# Patient Record
Sex: FEMALE | Race: WHITE | Employment: OTHER | URBAN - METROPOLITAN AREA
[De-identification: names, ages, dates, MRNs, and addresses within clinical notes are randomized per-mention and may not be internally consistent; named-entity substitution may affect disease eponyms.]

---

## 2017-01-02 DIAGNOSIS — I70.1 ATHEROSCLEROSIS OF RENAL ARTERY (H): Primary | ICD-10-CM

## 2017-01-05 DIAGNOSIS — I70.1 ATHEROSCLEROSIS OF RENAL ARTERY (H): Primary | ICD-10-CM

## 2017-01-05 RX ORDER — PREDNISONE 50 MG/1
50 TABLET ORAL ONCE
Qty: 1 TABLET | Refills: 0 | Status: SHIPPED | OUTPATIENT
Start: 2017-01-05 | End: 2017-01-05

## 2017-01-05 RX ORDER — DIPHENHYDRAMINE HCL 25 MG
50 TABLET ORAL SEE ADMIN INSTRUCTIONS
Qty: 2 TABLET | Refills: 0 | Status: SHIPPED
Start: 2017-01-05 | End: 2017-06-15

## 2017-01-05 NOTE — Clinical Note
January 5, 2017       TO: Jennifer Alvarez  [address]   [place] [address]         Dear Jennifer,    This is a notification with details reviewing your upcoming CT scan (Abd/Pelvis with contrast).    Procedure:  CT scan Abdomen Pelvis  When:   January 17, 2017 at 1:00pm .  Please arrive at 12:45 for check-in  Where:  [facility]:  [address] [place] [address]  Phone:  [phone]    Important information to note in preparation to your imaging.  1.  Do Not Eat or Drink 2 hours prior to your test.  2.  Please take benadryl 50 mg and prednisone 50 mg by mouth the evening before your scheduled imaging.  (these medications were sent to your requested pharmacy)  3.  Please take benadryl 50 mg by mouth the morning of the CT scan.      If you have any additional questions, please call your cardiology nurse.    Thank you.          Sincerely,      Office of Dr. Mana WILLS [facility]  Cardiology.

## 2017-01-05 NOTE — PROGRESS NOTES
"Order placed for Abd/Pelvis CT scan with contrast.  Order specifying specific instructions:  \"schedule contrast enhanced cross sectional CT of kidneys.  Given the multitude of antihypertensive agents, I would have a low threshold to proceed with renal angiography and stenting\" per Dr. Adair.    Patient notified of the need for additional imaging.  Patient verbalized the understanding for needed contrast.  She stated that her reaction to dye is a rash, swelling and trouble breathing.  She stated that she received pre-medication prior to the contrast dye in the past, and this worked well as she did not experience any of the noted symptoms after treatment.      She was given instruction for benadryl and prednisone to be taken the night before the CT scan.  She was instructed to take benadryl the morning of the scan as well.  Patient verbalized understanding and repeated back pre-medication dosing.      A letter was sent to patient with above information as well, per patient request.      Encouraged patient to call with any questions or concerns.                Jaleel Adair MD Trulen, Kimberly M, RN                   Petty,       I would recommend Bendadryl 50 mg po the evening before the procedure and 50 mg po on the morning of the procedure.   I would also recommend Prednisone 50 mg po the evening before and the morning of the procedure.     If the patient had anaphylaxis (as opposed to mild reaction) with prior exposure, please let me know.     Jaleel Adair MD                 Notes Recorded by Jaleel Adair MD on 1/1/2017 at 6:54 AM  Ya,    See report.  Please speak with CT and schedule the CT exactly as they requested.  They need to know we are looking for thin cuts through the renal arteries.    Jaleel Adair MD      ADDENDUM (1/1/16): We will schedule contrast enhanced cross sectional CT of kidneys.  Given the multitude of antihypertensive agents, I would have a low threshold to proceed with renal " angiography and stenting.

## 2017-01-10 ENCOUNTER — CARE COORDINATION (OUTPATIENT)
Dept: CARDIOLOGY | Facility: CLINIC | Age: 77
End: 2017-01-10

## 2017-01-17 ENCOUNTER — HOSPITAL ENCOUNTER (OUTPATIENT)
Dept: CT IMAGING | Facility: CLINIC | Age: 77
Discharge: HOME | End: 2017-01-17
Attending: INTERNAL MEDICINE | Admitting: INTERNAL MEDICINE
Payer: MEDICARE

## 2017-01-17 DIAGNOSIS — I70.1 ATHEROSCLEROSIS OF RENAL ARTERY (H): ICD-10-CM

## 2017-01-17 LAB
CREAT BLD-MCNC: 1 MG/DL (ref 0.52–1.04)
GFR SERPL CREATININE-BSD FRML MDRD: 54 ML/MIN/1.7M2

## 2017-01-17 PROCEDURE — 82565 ASSAY OF CREATININE: CPT

## 2017-01-17 PROCEDURE — 25000125 ZZHC RX 250: Performed by: RADIOLOGY

## 2017-01-17 PROCEDURE — 25500064 ZZH RX 255 OP 636: Performed by: RADIOLOGY

## 2017-01-17 PROCEDURE — 74178 CT ABD&PLV WO CNTR FLWD CNTR: CPT

## 2017-01-17 RX ORDER — IOPAMIDOL 755 MG/ML
75 INJECTION, SOLUTION INTRAVASCULAR ONCE
Status: COMPLETED | OUTPATIENT
Start: 2017-01-17 | End: 2017-01-17

## 2017-01-17 RX ADMIN — SODIUM CHLORIDE, PRESERVATIVE FREE 100 ML: 5 INJECTION INTRAVENOUS at 12:38

## 2017-01-17 RX ADMIN — IOPAMIDOL 75 ML: 755 INJECTION, SOLUTION INTRAVENOUS at 12:38

## 2017-01-19 ENCOUNTER — INFUSION THERAPY VISIT (OUTPATIENT)
Dept: INFUSION THERAPY | Facility: CLINIC | Age: 77
End: 2017-01-19
Attending: INTERNAL MEDICINE
Payer: MEDICARE

## 2017-01-19 VITALS
TEMPERATURE: 98 F | RESPIRATION RATE: 16 BRPM | DIASTOLIC BLOOD PRESSURE: 57 MMHG | HEART RATE: 67 BPM | OXYGEN SATURATION: 98 % | WEIGHT: 91.2 LBS | SYSTOLIC BLOOD PRESSURE: 119 MMHG | BODY MASS INDEX: 16.68 KG/M2

## 2017-01-19 DIAGNOSIS — D80.2 IGA DEFICIENCY (H): Primary | ICD-10-CM

## 2017-01-19 DIAGNOSIS — D80.1 HYPOGAMMAGLOBULINEMIA (H): ICD-10-CM

## 2017-01-19 PROCEDURE — 25000130 H RX MED GY IP 250 OP 259 PS 637: Mod: ZF | Performed by: INTERNAL MEDICINE

## 2017-01-19 PROCEDURE — 96367 TX/PROPH/DG ADDL SEQ IV INF: CPT

## 2017-01-19 PROCEDURE — 25000125 ZZHC RX 250: Mod: ZF | Performed by: INTERNAL MEDICINE

## 2017-01-19 PROCEDURE — 96365 THER/PROPH/DIAG IV INF INIT: CPT | Mod: ZF

## 2017-01-19 PROCEDURE — 96375 TX/PRO/DX INJ NEW DRUG ADDON: CPT | Mod: ZF

## 2017-01-19 PROCEDURE — 25000128 H RX IP 250 OP 636: Mod: ZF | Performed by: INTERNAL MEDICINE

## 2017-01-19 PROCEDURE — 25000132 ZZH RX MED GY IP 250 OP 250 PS 637: Mod: ZF | Performed by: INTERNAL MEDICINE

## 2017-01-19 PROCEDURE — 96366 THER/PROPH/DIAG IV INF ADDON: CPT | Mod: ZF

## 2017-01-19 PROCEDURE — A9270 NON-COVERED ITEM OR SERVICE: HCPCS | Mod: ZF | Performed by: INTERNAL MEDICINE

## 2017-01-19 PROCEDURE — 25000128 H RX IP 250 OP 636: Mod: ZF

## 2017-01-19 RX ORDER — DIPHENHYDRAMINE HCL 25 MG
25 CAPSULE ORAL ONCE
Status: COMPLETED | OUTPATIENT
Start: 2017-01-19 | End: 2017-01-19

## 2017-01-19 RX ORDER — ACETAMINOPHEN 325 MG/1
650 TABLET ORAL ONCE
Status: COMPLETED | OUTPATIENT
Start: 2017-01-19 | End: 2017-01-19

## 2017-01-19 RX ORDER — MEPERIDINE HYDROCHLORIDE 25 MG/ML
25 INJECTION INTRAMUSCULAR; INTRAVENOUS; SUBCUTANEOUS
Status: DISCONTINUED | OUTPATIENT
Start: 2017-01-19 | End: 2017-01-19 | Stop reason: HOSPADM

## 2017-01-19 RX ADMIN — ACETAMINOPHEN 650 MG: 325 TABLET ORAL at 13:08

## 2017-01-19 RX ADMIN — MEPERIDINE HYDROCHLORIDE 25 MG: 25 INJECTION, SOLUTION INTRAMUSCULAR; INTRAVENOUS; SUBCUTANEOUS at 13:31

## 2017-01-19 RX ADMIN — IMMUNE GLOBULIN INFUSION (HUMAN) 25 G: 100 INJECTION, SOLUTION INTRAVENOUS; SUBCUTANEOUS at 13:44

## 2017-01-19 RX ADMIN — HYDROCORTISONE SODIUM SUCCINATE 100 MG: 100 INJECTION, POWDER, LYOPHILIZED, FOR SOLUTION INTRAMUSCULAR; INTRAVENOUS at 13:22

## 2017-01-19 RX ADMIN — DIPHENHYDRAMINE HYDROCHLORIDE 25 MG: 25 CAPSULE ORAL at 13:08

## 2017-01-19 RX ADMIN — DEXTROSE MONOHYDRATE 50 ML: 50 INJECTION, SOLUTION INTRAVENOUS at 13:44

## 2017-01-19 NOTE — PATIENT INSTRUCTIONS
Dear Jennifer Alvarez    Thank you for choosing [facility] ([facility]) for your infusion.  The following information is a summary of our appointment as well as important reminders.      Please refer to your hospital discharge instructions for details on home care services, future appointments, phone numbers, and diet/activity levels.    Additional information: Your infusion of gammagard (immune globulin) 25 grams, solu-cortef 100 mg, demerol 25 mg, tylenol 650 mg, and benadryl 25 mg.      We look forward in seeing you on your next appointment here at [facility].  Please don t hesitate to call us at [phone] to reschedule any of your appointments or to speak with one of the [facility] registered nurses.  It was a pleasure taking care of you today.    Sincerely,  Suzette Norris, RN  [facility]  [facility]  [address] [place] [address]  [address]  Phone:  [phone]

## 2017-01-19 NOTE — MR AVS SNAPSHOT
After Visit Summary   1/19/2017    Jennifer Alvarez    MRN: [id number]           Patient Information     Date Of Birth          1940        Visit Information        Provider Department      1/19/2017 1:00 PM UC 43 ATC; UC SPEC INFUSION [facility] Specialty [facility] Procedure        Today's Diagnoses     IgA deficiency (H)    -  1     Hypogammaglobulinemia (H)           Care Instructions    Dear Jennifer Alvarez    Thank you for choosing [facility] ([facility]) for your infusion.  The following information is a summary of our appointment as well as important reminders.      Please refer to your hospital discharge instructions for details on home care services, future appointments, phone numbers, and diet/activity levels.    Additional information: Your infusion of gammagard (immune globulin) 25 grams, solu-cortef 100 mg, demerol 25 mg, tylenol 650 mg, and benadryl 25 mg.      We look forward in seeing you on your next appointment here at [facility].  Please don t hesitate to call us at [phone] to reschedule any of your appointments or to speak with one of the [facility] registered nurses.  It was a pleasure taking care of you today.    Sincerely,  Suzette Norris RN  [facility]  Specialty [facility]fusion [facility]  [address] [place] [address]  [address]  Phone:  [phone]          Follow-ups after your visit        Your next 10 appointments already scheduled     Feb 09, 2017 12:00 PM   Infusion 300 with UC SPEC INFUSION, UC 49 ATC   [facility] Specialty and Procedure ([facility])    [address] [place] [address] [phone]            Mar 02, 2017 11:00 AM   Infusion 300 with UC SPEC INFUSION, UC 49 ATC   [facility] Special[facility] Procedure ([facility])     [address] [facility] [facility]  [address] [facility] [address]   [phone]            Mar 15, 2017  2:30 PM   (Arrive by 2:15 PM)   Pacemaker Check with Uc Cv Device 1   [facility] ([facility])    [address] [place] [address]  [address]rd [facility] [address]   [phone]            Mar 15, 2017  3:00 PM   (Arrive by 2:45 PM)   RETURN ARRHYTHMIA with Shola Rivas MD   [facility] ([facility])    [address] [place] [address]  [address] [facility] [address]   [phone]            Apr 05, 2017 12:45 PM   Lab with UC LAB   [facility] ([facility])    [address] [place] [id number] [phone]            Apr 05, 2017  1:10 PM   (Arrive by 12:55 PM)   Return Interstitial Lung with Maxx Elizabeth MD   [facility] ([facility])    [address] [place] [id number] [phone]              Who to contact     If you have questions or need follow up information about today's clinic visit or your schedule please contact [facility] SPECIALTY AND PROCEDURE directly at [phone].  Normal or non-critical lab and imaging results will be communicated to you by [email or web address].iohart, letter or phone within 4 business days after the clinic has received the results. If you do not hear from us within 7 days, please contact the clinic through MyChart or phone. If you have a critical or abnormal lab result, we will notify you by phone as soon as possible.  Submit refill requests through Hookflash or call your pharmacy and they will forward the refill request to us. Please allow 3 business days for your refill to be completed.          Additional Information About Your Visit        Hookflash Information     Hookflash lets you send messages to your doctor, view your test results, renew your prescriptions, schedule appointments  "and more. To sign up, go to [email or web address] . Click on \"Log in\" on the left side of the screen, which will take you to the Welcome page. Then click on \"Sign up Now\" on the right side of the page.     You will be asked to enter the access code listed below, as well as some personal information. Please follow the directions to create your username and password.     Your access code is: [email or web address]  Expires: 2/15/2017  7:47 AM     Your access code will  in 90 days. If you need help or a new code, please call your [facility] clinic or [phone].        Care EveryWhere ID     This is your Care EveryWhere ID. This could be used by other organizations to access your [facility] medical records  [id number]        Your Vitals Were     Pulse Temperature Respirations Pulse Oximetry          88 98  F (36.7  C) (Oral) 16 98%         Blood Pressure from Last 3 Encounters:   17 138/76   16 163/85   16 170/84    Weight from Last 3 Encounters:   17 41.368 kg (91 lb 3.2 oz)   16 41.504 kg (91 lb 8 oz)   10/26/16 41.232 kg (90 lb 14.4 oz)              We Performed the Following     Treatment Conditions     Treatment Conditions          Today's Medication Changes          These changes are accurate as of: 17  4:12 PM.  If you have any questions, ask your nurse or doctor.               These medicines have changed or have updated prescriptions.        Dose/Directions    metFORMIN 1000 MG tablet   Commonly known as:  GLUCOPHAGE   This may have changed:    - when to take this  - additional instructions   Used for:  DM (diabetes mellitus) (H)        Take 1 tablet twice daily   Quantity:  180 tablet   Refills:  3                Primary Care Provider Office Phone # Fax #    Brandi Burks [phone]       [facility] [address]   [facility] [address]        Thank you!     Thank you for choosing [facility] SPECIAL[facility] PROCEDURE  for your care. Our " goal is always to provide you with excellent care. Hearing back from our patients is one way we can continue to improve our services. Please take a few minutes to complete the written survey that you may receive in the mail after your visit with us. Thank you!             Your Updated Medication List - Protect others around you: Learn how to safely use, store and throw away your medicines at www.disposemymeds.org.          This list is accurate as of: 1/19/17  4:12 PM.  Always use your most recent med list.                   Brand Name Dispense Instructions for use    * albuterol 108 (90 BASE) MCG/ACT Inhaler    PROAIR HFA/PROVENTIL HFA/VENTOLIN HFA    1 Inhaler    Take 2 puffs prn for wheezing or shortness of breath       * albuterol (2.5 MG/3ML) 0.083% neb solution     360 mL    Take 1 vial (2.5 mg) by nebulization 3 times daily       * albuterol 108 (90 BASE) MCG/ACT Inhaler    PROAIR HFA/PROVENTIL HFA/VENTOLIN HFA    1 Inhaler    Take 2 puffs prn for wheezing       * albuterol (2.5 MG/3ML) 0.083% neb solution      Take 1 ampule by nebulization every 6 hours as needed.       aspirin 81 MG tablet      Take 1 tablet by mouth daily.       Blood Pressure Kit      daily.       budesonide 3 MG 24 hr capsule    ENTOCORT EC    270 capsule    Take 1 capsule (3 mg) by mouth 3 times daily (with meals)       DEMEROL 25 MG/ML injection   Generic drug:  meperidine      Inject 25 mg into the vein as needed. 25 mg IV prior to IgG infusion       dicyclomine 10 MG capsule    BENTYL     Take 10 mg by mouth 4 times daily (before meals and nightly).       diltiazem 360 MG 24 hr CD capsule    DILTIAZEM HCL CD    90 capsule    Take 1 capsule (360 mg) by mouth daily       diphenhydrAMINE 25 MG tablet    BENADRYL    2 tablet    Take 2 tablets (50 mg) by mouth See Admin Instructions Please take 50 mg by mouth evening before  And morning of the Abd/Pelvis CT scan with contrast per Dr. Adair.  This is a pre medication for contrast dye.        FISH OIL      Unsure of dosage take two caps daily       fluticasone-salmeterol 250-50 MCG/DOSE diskus inhaler    ADVAIR    1 Inhaler    Inhale 1 puff into the lungs 2 times daily.       gabapentin 300 MG capsule    NEURONTIN    180 capsule    Take 2 capsules (600 mg) by mouth 3 times daily       hydrochlorothiazide 25 MG tablet    HYDRODIURIL     Take 25 mg by mouth as needed.       HYDROcodone-acetaminophen  MG per tablet    LORCET     Take 1 tablet by mouth every 6 hours as needed.       Immune Globulin (Human) 25 GM/500ML Soln      Inject 25 g into the vein See Admin Instructions. 25 grams every 3 weeks IVIG       Isosorbide Mononitrate  MG Tb24     90 tablet    Take 1 tablet (120 mg) by mouth daily       lisinopril 40 MG tablet    PRINIVIL/ZESTRIL     Take 1 tablet by mouth 2 times daily.       metFORMIN 1000 MG tablet    GLUCOPHAGE    180 tablet    Take 1 tablet twice daily       metoprolol 50 MG 24 hr tablet    TOPROL-XL    90 tablet    Take 1 tablet (50 mg) by mouth daily       nitroglycerin 0.4 MG sublingual tablet    NITROQUICK    25 tablet    Place 1 tablet under the tongue every 5 minutes as needed.       omeprazole 20 MG CR capsule    priLOSEC    60 capsule    Take 1 capsule (20 mg) by mouth 2 times daily       * blood glucose monitoring lancets     3 Box    Use to test blood sugar 3 times daily or as directed.       * ONE TOUCH FINE POINT LANCETS      2 times daily.       * ONE TOUCH ULTRA test strip   Generic drug:  blood glucose monitoring     200 strip    Test blood sugar twice a day.       * blood glucose monitoring test strip    TERRY CONTOUR NEXT    270 each    by In Vitro route 3 times daily Use to test blood sugar 3 times daily or as directed.       spironolactone 50 MG tablet    ALDACTONE     Take 50 mg by mouth daily.       tiotropium 18 MCG capsule    SPIRIVA HANDIHALER    30 capsule    One puff every day       TYLENOL 325 MG tablet   Generic drug:  acetaminophen      Take 1-2  tablets by mouth every 6 hours as needed.       * Notice:  This list has 8 medication(s) that are the same as other medications prescribed for you. Read the directions carefully, and ask your doctor or other care provider to review them with you.

## 2017-01-19 NOTE — PROGRESS NOTES
Nursing Note  Jennifer Al[facility]:   Chief Complaint   Patient presents with     Infusion     Gammagard (Immune globulin)     During [facility]'s [facility] appointment, orders from Dr. Elizabeth were completed.  Frequency: every 3 weeks    Progress note:  Patient identification verified by name and date of birth.  Assessment completed.  Vitals recorded in Doc Flowsheets.  Patient was provided with education regarding infusion and possible side effects.  Patient verbalized understanding.      needed: No  Premedications: administered per order.  Infusion Rates: infusion starts at 12 ml/hr for 15 min, 25 ml/hr for 15 min, 65 ml/hr for 15 min,  then increased to final rate of 125 ml/hr.  Approximate Infusion length:3 hours.   Labs: were not ordered for this appointment.  Vascular access: peripheral IV placed today.  Treatment Conditions: patient denies fever, chills, signs of infection, recent illness, on antibiotics, productive cough or elevated temperature.  Patient tolerated infusion: well.    Discharge Plan:   Follow up plan of care with: ongoing infusions [facility].  Discharge instructions were reviewed with patient.  Patient/representative verbalized understanding of discharge instructions and all questions answered.  [facility] in stable condition.    Suzette Norris RN    Administrations This Visit     acetaminophen (TYLENOL) tablet 650 mg     Admin Date Action Dose Route Administered By             01/19/2017 Given 650 mg Oral Suzette Norris RN                    dextrose 5% BOLUS     Admin Date Action Dose Route Administered By             01/19/2017 New Bag 50 mL Intravenous Suzette Norris, CARLIN                    diphenhydrAMINE (BENADRYL) capsule 25 mg     Admin Date Action Dose Route Administered By              01/19/2017 Given 25 mg Oral Suzette Norris, RN                    hydrocortisone sodium succinate (solu-CORTEF) 100 mg in NaCl 0.9 % intermittent infusion     Admin Date Action Dose Route Administered By             01/19/2017 New Bag 100 mg Intravenous Suzette Norris RN                    immune globulin - sucrose free (Gammagard) 10 % injection 25 g     Admin Date Action Dose Route Administered By             01/19/2017 New Bag 25 g Intravenous Suzette Norris, RN                    meperidine (DEMEROL) injection 25 mg     Admin Date Action Dose Route Administered By             01/19/2017 Given 25 mg Intravenous Suzette Norris RN                          /76 mmHg  Pulse 88  Temp(Src) 98  F (36.7  C) (Oral)  Resp 16  Wt 41.368 kg (91 lb 3.2 oz)  SpO2 98%

## 2017-02-09 ENCOUNTER — INFUSION THERAPY VISIT (OUTPATIENT)
Dept: INFUSION THERAPY | Facility: CLINIC | Age: 77
End: 2017-02-09
Attending: INTERNAL MEDICINE
Payer: MEDICARE

## 2017-02-09 VITALS
TEMPERATURE: 98.6 F | SYSTOLIC BLOOD PRESSURE: 130 MMHG | DIASTOLIC BLOOD PRESSURE: 86 MMHG | OXYGEN SATURATION: 97 % | RESPIRATION RATE: 16 BRPM | HEART RATE: 74 BPM

## 2017-02-09 DIAGNOSIS — D80.2 IGA DEFICIENCY (H): Primary | ICD-10-CM

## 2017-02-09 DIAGNOSIS — D80.1 HYPOGAMMAGLOBULINEMIA (H): ICD-10-CM

## 2017-02-09 PROCEDURE — 25000132 ZZH RX MED GY IP 250 OP 250 PS 637: Mod: ZF | Performed by: INTERNAL MEDICINE

## 2017-02-09 PROCEDURE — 96375 TX/PRO/DX INJ NEW DRUG ADDON: CPT

## 2017-02-09 PROCEDURE — 25000128 H RX IP 250 OP 636: Mod: ZF

## 2017-02-09 PROCEDURE — 25000130 H RX MED GY IP 250 OP 259 PS 637: Mod: ZF | Performed by: INTERNAL MEDICINE

## 2017-02-09 PROCEDURE — 96366 THER/PROPH/DIAG IV INF ADDON: CPT

## 2017-02-09 PROCEDURE — 25000128 H RX IP 250 OP 636: Mod: ZF | Performed by: INTERNAL MEDICINE

## 2017-02-09 PROCEDURE — 96365 THER/PROPH/DIAG IV INF INIT: CPT

## 2017-02-09 PROCEDURE — A9270 NON-COVERED ITEM OR SERVICE: HCPCS | Mod: ZF | Performed by: INTERNAL MEDICINE

## 2017-02-09 RX ORDER — DIPHENHYDRAMINE HCL 25 MG
25 CAPSULE ORAL ONCE
Status: COMPLETED | OUTPATIENT
Start: 2017-02-09 | End: 2017-02-09

## 2017-02-09 RX ORDER — MEPERIDINE HYDROCHLORIDE 25 MG/ML
25 INJECTION INTRAMUSCULAR; INTRAVENOUS; SUBCUTANEOUS
Status: DISCONTINUED | OUTPATIENT
Start: 2017-02-09 | End: 2017-02-09 | Stop reason: HOSPADM

## 2017-02-09 RX ORDER — ACETAMINOPHEN 325 MG/1
650 TABLET ORAL ONCE
Status: COMPLETED | OUTPATIENT
Start: 2017-02-09 | End: 2017-02-09

## 2017-02-09 RX ADMIN — MEPERIDINE HYDROCHLORIDE 25 MG: 25 INJECTION, SOLUTION INTRAMUSCULAR; INTRAVENOUS; SUBCUTANEOUS at 12:18

## 2017-02-09 RX ADMIN — ACETAMINOPHEN 650 MG: 325 TABLET ORAL at 12:16

## 2017-02-09 RX ADMIN — DIPHENHYDRAMINE HYDROCHLORIDE 25 MG: 25 CAPSULE ORAL at 12:16

## 2017-02-09 RX ADMIN — IMMUNE GLOBULIN INFUSION (HUMAN) 25 G: 100 INJECTION, SOLUTION INTRAVENOUS; SUBCUTANEOUS at 12:21

## 2017-02-09 NOTE — PROGRESS NOTES
Nursing Note  Jennifer Alvarez [facility] for:   Chief Complaint   Patient presents with     Infusion     IVIG     During [facility]'s [facility] appointment, orders from Dr. Elizabeth were completed.  Frequency: every 3 weeks    Progress note:  Patient identification verified by name and date of birth.  Assessment completed.  Vitals recorded in Doc Flowsheets.  Patient was provided with education regarding infusion and possible side effects.  Patient verbalized understanding.      needed: No  Premedications: administered per order.  Infusion Rates:   12 ml/hr x 15 min  25 ml/hr x 15 min  65 ml/hr x 15 min  125 ml/hr for remainder of infusion  Approximate Infusion length:3 hours.   Labs: were not ordered for this appointment.  Vascular access: peripheral IV placed today.  Treatment Conditions: patient denies fever, chills, signs of infection, recent illness, on antibiotics, productive cough or elevated temperature.  Patient tolerated infusion: well.    Discharge Plan:   Follow up plan of care with: ongoing infusions [facility].  Discharge instructions were reviewed with patient.  Patient/representative verbalized understanding of discharge instructions and all questions answered.  Patient discharged [facility] in stable condition.    Kita Barcenas RN    Administrations This Visit     acetaminophen (TYLENOL) tablet 650 mg     Admin Date Action Dose Route Administered By             02/09/2017 Given 650 mg Oral Kita Barcenas RN                    diphenhydrAMINE (BENADRYL) capsule 25 mg     Admin Date Action Dose Route Administered By             02/09/2017 Given 25 mg Oral Kita Barcenas RN                    immune globulin (Gammagard) - sucrose free 10 % injection 25 g     Admin Date Action Dose Route Administered By             02/09/2017 New Bag 25 g  Intravenous Kita Barcenas, RN                    meperidine (DEMEROL) injection 25 mg     Admin Date Action Dose Route Administered By             02/09/2017 Given 25 mg Intravenous Kita Barcenas, RN                              /86 mmHg  Pulse 74  Temp(Src) 98.6  F (37  C) (Oral)  Resp 16  SpO2 97%  Breastfeeding? No

## 2017-03-02 ENCOUNTER — INFUSION THERAPY VISIT (OUTPATIENT)
Dept: INFUSION THERAPY | Facility: CLINIC | Age: 77
End: 2017-03-02
Attending: INTERNAL MEDICINE
Payer: MEDICARE

## 2017-03-02 VITALS
TEMPERATURE: 96.7 F | SYSTOLIC BLOOD PRESSURE: 149 MMHG | RESPIRATION RATE: 16 BRPM | DIASTOLIC BLOOD PRESSURE: 56 MMHG | OXYGEN SATURATION: 99 % | HEART RATE: 71 BPM

## 2017-03-02 DIAGNOSIS — D80.2 IGA DEFICIENCY (H): Primary | ICD-10-CM

## 2017-03-02 DIAGNOSIS — I25.10 CAD (CORONARY ARTERY DISEASE): ICD-10-CM

## 2017-03-02 DIAGNOSIS — D80.1 HYPOGAMMAGLOBULINEMIA (H): ICD-10-CM

## 2017-03-02 PROCEDURE — 25000128 H RX IP 250 OP 636: Mod: ZF

## 2017-03-02 PROCEDURE — 25000132 ZZH RX MED GY IP 250 OP 250 PS 637: Mod: ZF | Performed by: INTERNAL MEDICINE

## 2017-03-02 PROCEDURE — 25000128 H RX IP 250 OP 636: Mod: ZF | Performed by: INTERNAL MEDICINE

## 2017-03-02 PROCEDURE — 96366 THER/PROPH/DIAG IV INF ADDON: CPT

## 2017-03-02 PROCEDURE — A9270 NON-COVERED ITEM OR SERVICE: HCPCS | Mod: ZF | Performed by: INTERNAL MEDICINE

## 2017-03-02 PROCEDURE — 96375 TX/PRO/DX INJ NEW DRUG ADDON: CPT

## 2017-03-02 PROCEDURE — 96365 THER/PROPH/DIAG IV INF INIT: CPT

## 2017-03-02 RX ORDER — DIPHENHYDRAMINE HCL 25 MG
25 CAPSULE ORAL ONCE
Status: CANCELLED
Start: 2017-03-02 | End: 2017-03-02

## 2017-03-02 RX ORDER — DILTIAZEM HYDROCHLORIDE 360 MG/1
360 CAPSULE, EXTENDED RELEASE ORAL DAILY
Qty: 90 CAPSULE | Refills: 3 | Status: CANCELLED | OUTPATIENT
Start: 2017-03-02

## 2017-03-02 RX ORDER — ACETAMINOPHEN 325 MG/1
650 TABLET ORAL ONCE
Status: CANCELLED
Start: 2017-03-02 | End: 2017-03-02

## 2017-03-02 RX ORDER — DIPHENHYDRAMINE HCL 25 MG
25 CAPSULE ORAL ONCE
Status: COMPLETED | OUTPATIENT
Start: 2017-03-02 | End: 2017-03-02

## 2017-03-02 RX ORDER — MEPERIDINE HYDROCHLORIDE 25 MG/ML
25 INJECTION INTRAMUSCULAR; INTRAVENOUS; SUBCUTANEOUS
Status: CANCELLED
Start: 2017-03-02

## 2017-03-02 RX ORDER — ACETAMINOPHEN 325 MG/1
650 TABLET ORAL ONCE
Status: COMPLETED | OUTPATIENT
Start: 2017-03-02 | End: 2017-03-02

## 2017-03-02 RX ORDER — MEPERIDINE HYDROCHLORIDE 25 MG/ML
25 INJECTION INTRAMUSCULAR; INTRAVENOUS; SUBCUTANEOUS
Status: DISCONTINUED | OUTPATIENT
Start: 2017-03-02 | End: 2017-03-02 | Stop reason: HOSPADM

## 2017-03-02 RX ADMIN — SODIUM CHLORIDE 100 MG: 9 INJECTION, SOLUTION INTRAVENOUS at 09:42

## 2017-03-02 RX ADMIN — MEPERIDINE HYDROCHLORIDE 25 MG: 25 INJECTION, SOLUTION INTRAMUSCULAR; INTRAVENOUS; SUBCUTANEOUS at 09:18

## 2017-03-02 RX ADMIN — DIPHENHYDRAMINE HYDROCHLORIDE 25 MG: 25 CAPSULE ORAL at 09:14

## 2017-03-02 RX ADMIN — ACETAMINOPHEN 650 MG: 325 TABLET ORAL at 09:14

## 2017-03-02 RX ADMIN — IMMUNE GLOBULIN INFUSION (HUMAN) 25 G: 100 INJECTION, SOLUTION INTRAVENOUS; SUBCUTANEOUS at 10:00

## 2017-03-02 NOTE — MR AVS SNAPSHOT
After Visit Summary   3/2/2017    Jennifer Alvarez    MRN: [id number]           Patient Information     Date Of Birth          1940        Visit Information        Provider Department      3/2/2017 9:00 AM UC 49 ATC; UC SPEC INFUSION [facility]        Today's Diagnoses     IgA deficiency (H)    -  1    Hypogammaglobulinemia (H)           Follow-ups after your visit        Your next 10 appointments already scheduled     Mar 23, 2017  1:00 PM CDT   Infusion 300 with UC SPEC INFUSION, UC 46 ATC   [facility] ([facility])    [address] [place] [address]  [address]nd [facility] [id number] [phone]            Apr 05, 2017 10:30 AM CDT   (Arrive by 10:15 AM)   Pacemaker Check with Uc Cv Device 1   [facility] ([facility])    [address] [place] [address]  3rd [facility] [id number] [phone]            Apr 05, 2017 11:00 AM CDT   (Arrive by 10:45 AM)   RETURN ARRHYTHMIA with Shola Rivas MD   [facility] ([facility])    [address] [place] [address]  3rd [facility] [id number] [phone]            Apr 05, 2017 12:45 PM CDT   Lab with UC LAB   [facility] ([facility])    [address] [place] [address]  1st [facility] [id number] [phone]            Apr 05, 2017  1:10 PM CDT   (Arrive by 12:55 PM)   Return Interstitial Lung with Maxx Elizabeth MD   [facility] ([facility])    [address] [place] [address]  3rd [facility] [id number] [phone]              Who to contact     If you have questions or need follow up information about today's clinic visit or your schedule please contact [facility] SPECIALTY AND PROCEDURE directly at [phone].  Normal or non-critical lab and imaging  "results will be communicated to you by MyChart, letter or phone within 4 business days after the clinic has received the results. If you do not hear from us within 7 days, please contact the clinic through Vid[employer] or phone. If you have a critical or abnormal lab result, we will notify you by phone as soon as possible.  Submit refill requests through [employer] or call your pharmacy and they will forward the refill request to us. Please allow 3 business days for your refill to be completed.          Additional Information About Your Visit        Vidit Information     Vidit lets you send messages to your doctor, view your test results, renew your prescriptions, schedule appointments and more. To sign up, go to [email or web address] . Click on \"Log in\" on the left side of the screen, which will take you to the Welcome page. Then click on \"Sign up Now\" on the right side of the page.     You will be asked to enter the access code listed below, as well as some personal information. Please follow the directions to create your username and password.     Your access code is: [email or web address]  Expires: 2017  6:31 AM     Your access code will  in 90 days. If you need help or a new code, please call your [place] clinic or [phone].        Care EveryWhere ID     This is your Care EveryWhere ID. This could be used by other organizations to access your [place] medical records  [id number]        Your Vitals Were     Pulse Temperature Respirations Pulse Oximetry          71 96.7  F (35.9  C) (Tympanic) 16 99%         Blood Pressure from Last 3 Encounters:   17 149/56   17 130/86   17 119/57    Weight from Last 3 Encounters:   17 41.4 kg (91 lb 3.2 oz)   16 41.5 kg (91 lb 8 oz)   10/26/16 41.2 kg (90 lb 14.4 oz)              We Performed the Following     Treatment Conditions     Treatment Conditions          Today's Medication Changes          These changes are accurate as of: 3/2/17  " 1:32 PM.  If you have any questions, ask your nurse or doctor.               These medicines have changed or have updated prescriptions.        Dose/Directions    metFORMIN 1000 MG tablet   Commonly known as:  GLUCOPHAGE   This may have changed:    - when to take this  - additional instructions   Used for:  DM (diabetes mellitus) (H)        Take 1 tablet twice daily   Quantity:  180 tablet   Refills:  3                Primary Care Provider Office Phone # Fax #    Brandi Burks [phone]       [facility] [address]   [facility] [address]        Thank you!     Thank you for choosing [facility] SPECIALTY AND PROCEDURE  for your care. Our goal is always to provide you with excellent care. Hearing back from our patients is one way we can continue to improve our services. Please take a few minutes to complete the written survey that you may receive in the mail after your visit with us. Thank you!             Your Updated Medication List - Protect others around you: Learn how to safely use, store and throw away your medicines at www.disposemymeds.org.          This list is accurate as of: 3/2/17  1:32 PM.  Always use your most recent med list.                   Brand Name Dispense Instructions for use    * albuterol 108 (90 BASE) MCG/ACT Inhaler    PROAIR HFA/PROVENTIL HFA/VENTOLIN HFA    1 Inhaler    Take 2 puffs prn for wheezing or shortness of breath       * albuterol (2.5 MG/3ML) 0.083% neb solution     360 mL    Take 1 vial (2.5 mg) by nebulization 3 times daily       * albuterol 108 (90 BASE) MCG/ACT Inhaler    PROAIR HFA/PROVENTIL HFA/VENTOLIN HFA    1 Inhaler    Take 2 puffs prn for wheezing       * albuterol (2.5 MG/3ML) 0.083% neb solution      Take 1 ampule by nebulization every 6 hours as needed.       aspirin 81 MG tablet      Take 1 tablet by mouth daily.       Blood Pressure Kit      daily.       budesonide 3 MG 24 hr capsule    ENTOCORT EC    270 capsule    Take 1  capsule (3 mg) by mouth 3 times daily (with meals)       DEMEROL 25 MG/ML injection   Generic drug:  meperidine      Inject 25 mg into the vein as needed. 25 mg IV prior to IgG infusion       dicyclomine 10 MG capsule    BENTYL     Take 10 mg by mouth 4 times daily (before meals and nightly).       diltiazem 360 MG 24 hr CD capsule    DILTIAZEM HCL CD    90 capsule    Take 1 capsule (360 mg) by mouth daily       diphenhydrAMINE 25 MG tablet    BENADRYL    2 tablet    Take 2 tablets (50 mg) by mouth See Admin Instructions Please take 50 mg by mouth evening before  And morning of the Abd/Pelvis CT scan with contrast per Dr. Adair.  This is a pre medication for contrast dye.       FISH OIL      Unsure of dosage take two caps daily       fluticasone-salmeterol 250-50 MCG/DOSE diskus inhaler    ADVAIR    1 Inhaler    Inhale 1 puff into the lungs 2 times daily.       gabapentin 300 MG capsule    NEURONTIN    180 capsule    Take 2 capsules (600 mg) by mouth 3 times daily       hydrochlorothiazide 25 MG tablet    HYDRODIURIL     Take 25 mg by mouth as needed.       HYDROcodone-acetaminophen  MG per tablet    LORCET     Take 1 tablet by mouth every 6 hours as needed.       Immune Globulin (Human) 25 GM/500ML Soln      Inject 25 g into the vein See Admin Instructions. 25 grams every 3 weeks IVIG       Isosorbide Mononitrate  MG Tb24     90 tablet    Take 1 tablet (120 mg) by mouth daily       lisinopril 40 MG tablet    PRINIVIL/ZESTRIL     Take 1 tablet by mouth 2 times daily.       metFORMIN 1000 MG tablet    GLUCOPHAGE    180 tablet    Take 1 tablet twice daily       metoprolol 50 MG 24 hr tablet    TOPROL-XL    90 tablet    Take 1 tablet (50 mg) by mouth daily       nitroglycerin 0.4 MG sublingual tablet    NITROQUICK    25 tablet    Place 1 tablet under the tongue every 5 minutes as needed.       omeprazole 20 MG CR capsule    priLOSEC    60 capsule    Take 1 capsule (20 mg) by mouth 2 times daily       *  blood glucose monitoring lancets     3 Box    Use to test blood sugar 3 times daily or as directed.       * ONE TOUCH FINE POINT LANCETS      2 times daily.       * ONE TOUCH ULTRA test strip   Generic drug:  blood glucose monitoring     200 strip    Test blood sugar twice a day.       * blood glucose monitoring test strip    TERRY CONTOUR NEXT    270 each    by In Vitro route 3 times daily Use to test blood sugar 3 times daily or as directed.       spironolactone 50 MG tablet    ALDACTONE     Take 50 mg by mouth daily.       tiotropium 18 MCG capsule    SPIRIVA HANDIHALER    30 capsule    One puff every day       TYLENOL 325 MG tablet   Generic drug:  acetaminophen      Take 1-2 tablets by mouth every 6 hours as needed.       * Notice:  This list has 8 medication(s) that are the same as other medications prescribed for you. Read the directions carefully, and ask your doctor or other care provider to review them with you.

## 2017-03-02 NOTE — PROGRESS NOTES
Nursing Note  Jennifer Al[facility] for:   No chief complaint on file.    During today's [facility] appointment, orders from Dr. Elizabeth were completed.  Frequency: every 3 weeks    Progress note:  Patient identification verified by name and date of birth.  Assessment completed.  Vitals recorded in Doc Flowsheets.  Patient was provided with education regarding infusion and possible side effects.  Patient verbalized understanding.      needed: No  Premedications: administered per order.  Infusion Rates:   12 ml/hr x 15 min  25 ml/hr x 15 min  65 ml/hr x 15 min  125 ml/hr for remainder of infusion  Approximate Infusion length:3 hours.   Labs: were not ordered for this appointment.  Vascular access: peripheral IV placed today.  Treatment Conditions: patient denies fever, chills, signs of infection, recent illness, on antibiotics, productive cough or elevated temperature.  Patient tolerated infusion: well.    Discharge Plan:   Follow up plan of care with: ongoing infusions at [facility].  Discharge instructions were reviewed with patient.  Patient/representative verbalized understanding of discharge instructions and all questions answered.  Patient discharged from [facility] in stable condition.    Kita Barcenas RN    Administrations This Visit     acetaminophen (TYLENOL) tablet 650 mg     Admin Date Action Dose Route Administered By             03/02/2017 Given 650 mg Oral Kita Barcenas RN                    diphenhydrAMINE (BENADRYL) capsule 25 mg     Admin Date Action Dose Route Administered By             03/02/2017 Given 25 mg Oral Kita Barcenas RN                    immune globulin - (GAMMAGARD)sucrose free 10 % injection 25 g     Admin Date Action Dose Route Administered By             03/02/2017 New Bag 25 g Intravenous Kita Barcenas RN                     meperidine (DEMEROL) injection 25 mg     Admin Date Action Dose Route Administered By             03/02/2017 Given 25 mg Intravenous Kita Barcenas, CARLIN                    methylPREDNISolone sodium succinate (solu-MEDROL) 100 mg in NaCl 0.9 % intermittent infusion     Admin Date Action Dose Rate Route Administered By          03/02/2017 New Bag 100 mg 200 mL/hr Intravenous Kita Barcenas RN                             /56  Pulse 71  Temp 96.7  F (35.9  C) (Tympanic)  SpO2 99%

## 2017-03-06 RX ORDER — DILTIAZEM HYDROCHLORIDE 360 MG/1
360 CAPSULE, EXTENDED RELEASE ORAL DAILY
Qty: 90 CAPSULE | Refills: 3 | Status: ON HOLD | OUTPATIENT
Start: 2017-03-06 | End: 2017-07-20

## 2017-03-23 ENCOUNTER — INFUSION THERAPY VISIT (OUTPATIENT)
Dept: INFUSION THERAPY | Facility: CLINIC | Age: 77
End: 2017-03-23
Attending: NURSE PRACTITIONER
Payer: MEDICARE

## 2017-03-23 VITALS
RESPIRATION RATE: 16 BRPM | DIASTOLIC BLOOD PRESSURE: 40 MMHG | OXYGEN SATURATION: 97 % | HEART RATE: 65 BPM | SYSTOLIC BLOOD PRESSURE: 123 MMHG | TEMPERATURE: 98.7 F

## 2017-03-23 DIAGNOSIS — J41.0 SIMPLE CHRONIC BRONCHITIS (H): ICD-10-CM

## 2017-03-23 DIAGNOSIS — D80.1 HYPOGAMMAGLOBULINEMIA (H): ICD-10-CM

## 2017-03-23 DIAGNOSIS — D80.2 IGA DEFICIENCY (H): Primary | ICD-10-CM

## 2017-03-23 LAB
ANION GAP SERPL CALCULATED.3IONS-SCNC: 9 MMOL/L (ref 3–14)
BUN SERPL-MCNC: 24 MG/DL (ref 7–30)
CALCIUM SERPL-MCNC: 8.4 MG/DL (ref 8.5–10.1)
CHLORIDE SERPL-SCNC: 103 MMOL/L (ref 94–109)
CO2 SERPL-SCNC: 28 MMOL/L (ref 20–32)
CREAT SERPL-MCNC: 0.89 MG/DL (ref 0.52–1.04)
ERYTHROCYTE [DISTWIDTH] IN BLOOD BY AUTOMATED COUNT: 13.9 % (ref 10–15)
GFR SERPL CREATININE-BSD FRML MDRD: 62 ML/MIN/1.7M2
GLUCOSE SERPL-MCNC: 108 MG/DL (ref 70–99)
HCT VFR BLD AUTO: 38.8 % (ref 35–47)
HGB BLD-MCNC: 12.9 G/DL (ref 11.7–15.7)
MCH RBC QN AUTO: 27.2 PG (ref 26.5–33)
MCHC RBC AUTO-ENTMCNC: 33.2 G/DL (ref 31.5–36.5)
MCV RBC AUTO: 82 FL (ref 78–100)
PLATELET # BLD AUTO: 162 10E9/L (ref 150–450)
POTASSIUM SERPL-SCNC: 3.6 MMOL/L (ref 3.4–5.3)
RBC # BLD AUTO: 4.75 10E12/L (ref 3.8–5.2)
SODIUM SERPL-SCNC: 140 MMOL/L (ref 133–144)
WBC # BLD AUTO: 3.7 10E9/L (ref 4–11)

## 2017-03-23 PROCEDURE — 96365 THER/PROPH/DIAG IV INF INIT: CPT

## 2017-03-23 PROCEDURE — 96366 THER/PROPH/DIAG IV INF ADDON: CPT

## 2017-03-23 PROCEDURE — 96375 TX/PRO/DX INJ NEW DRUG ADDON: CPT

## 2017-03-23 PROCEDURE — 80048 BASIC METABOLIC PNL TOTAL CA: CPT | Performed by: INTERNAL MEDICINE

## 2017-03-23 PROCEDURE — 85027 COMPLETE CBC AUTOMATED: CPT | Performed by: INTERNAL MEDICINE

## 2017-03-23 PROCEDURE — 25000128 H RX IP 250 OP 636: Mod: ZF | Performed by: INTERNAL MEDICINE

## 2017-03-23 PROCEDURE — 25000128 H RX IP 250 OP 636: Mod: ZF | Performed by: NURSE PRACTITIONER

## 2017-03-23 PROCEDURE — A9270 NON-COVERED ITEM OR SERVICE: HCPCS | Mod: ZF | Performed by: INTERNAL MEDICINE

## 2017-03-23 PROCEDURE — 82784 ASSAY IGA/IGD/IGG/IGM EACH: CPT | Performed by: INTERNAL MEDICINE

## 2017-03-23 PROCEDURE — 82787 IGG 1 2 3 OR 4 EACH: CPT | Performed by: INTERNAL MEDICINE

## 2017-03-23 PROCEDURE — 25000128 H RX IP 250 OP 636: Mod: ZF

## 2017-03-23 PROCEDURE — 25000132 ZZH RX MED GY IP 250 OP 250 PS 637: Mod: ZF | Performed by: INTERNAL MEDICINE

## 2017-03-23 RX ORDER — DIPHENHYDRAMINE HCL 25 MG
25 CAPSULE ORAL ONCE
Status: CANCELLED
Start: 2017-03-23 | End: 2017-03-23

## 2017-03-23 RX ORDER — MEPERIDINE HYDROCHLORIDE 25 MG/ML
25 INJECTION INTRAMUSCULAR; INTRAVENOUS; SUBCUTANEOUS
Status: DISCONTINUED | OUTPATIENT
Start: 2017-03-23 | End: 2017-03-23 | Stop reason: HOSPADM

## 2017-03-23 RX ORDER — MEPERIDINE HYDROCHLORIDE 25 MG/ML
25 INJECTION INTRAMUSCULAR; INTRAVENOUS; SUBCUTANEOUS
Status: CANCELLED
Start: 2017-03-23

## 2017-03-23 RX ORDER — DIPHENHYDRAMINE HCL 25 MG
25 CAPSULE ORAL ONCE
Status: COMPLETED | OUTPATIENT
Start: 2017-03-23 | End: 2017-03-23

## 2017-03-23 RX ORDER — ACETAMINOPHEN 325 MG/1
650 TABLET ORAL ONCE
Status: CANCELLED
Start: 2017-03-23 | End: 2017-03-23

## 2017-03-23 RX ORDER — METHYLPREDNISOLONE SODIUM SUCCINATE 125 MG/2ML
100 INJECTION, POWDER, LYOPHILIZED, FOR SOLUTION INTRAMUSCULAR; INTRAVENOUS ONCE
Status: COMPLETED | OUTPATIENT
Start: 2017-03-23 | End: 2017-03-23

## 2017-03-23 RX ORDER — ACETAMINOPHEN 325 MG/1
650 TABLET ORAL ONCE
Status: COMPLETED | OUTPATIENT
Start: 2017-03-23 | End: 2017-03-23

## 2017-03-23 RX ADMIN — ACETAMINOPHEN 650 MG: 325 TABLET ORAL at 12:42

## 2017-03-23 RX ADMIN — METHYLPREDNISOLONE SODIUM SUCCINATE 100 MG: 125 INJECTION, POWDER, FOR SOLUTION INTRAMUSCULAR; INTRAVENOUS at 12:43

## 2017-03-23 RX ADMIN — DIPHENHYDRAMINE HYDROCHLORIDE 25 MG: 25 CAPSULE ORAL at 12:42

## 2017-03-23 RX ADMIN — IMMUNE GLOBULIN INFUSION (HUMAN) 25 G: 100 INJECTION, SOLUTION INTRAVENOUS; SUBCUTANEOUS at 12:48

## 2017-03-23 RX ADMIN — MEPERIDINE HYDROCHLORIDE 25 MG: 25 INJECTION, SOLUTION INTRAMUSCULAR; INTRAVENOUS; SUBCUTANEOUS at 12:45

## 2017-03-23 NOTE — MR AVS SNAPSHOT
After Visit Summary   3/23/2017    Jennifer Alvarez    MRN: [id number]           Patient Information     Date Of Birth          1940        Visit Information        Provider Department      3/23/2017 1:00 PM UC 46 ATC; UC SPEC INFUSION [facility] Specialty and Procedure        Today's Diagnoses     IgA deficiency (H)    -  1    Hypogammaglobulinemia (H)        Simple chronic bronchitis (H)          Care Instructions    Dear Jennifer Alvarez    Thank you for choosing [facility] ([facility]) for your infusion.  The following information is a summary of our appointment as well as important reminders.           We look forward in seeing you on your next appointment here at [facility].  Please don t hesitate to call us at [phone] to reschedule any of your appointments or to speak with one of the [facility] registered nurses.  It was a pleasure taking care of you today.    Sincerely,  Tawana Robert, RN  [facility]  [facility]  [address] [place] [address]  [address]  Phone:  [phone]          Follow-ups after your visit        Your next 10 appointments already scheduled     Apr 05, 2017 10:30 AM CDT   (Arrive by 10:15 AM)   Pacemaker Check with  Cv Device 1   [facility] ([facility])    [address] [place] [id number] [phone]            Apr 05, 2017 11:00 AM CDT   (Arrive by 10:45 AM)   RETURN ARRHYTHMIA with Shola Rivas MD   [facility] ([facility])    [address] [place] [id number] [phone]            Apr 05, 2017 12:45 PM CDT   Lab with UC LAB   [facility] ([facility])    [address] [place] [id number] [phone]            Apr 05, 2017  1:10 PM CDT  "  (Arrive by 12:55 PM)   Return Interstitial Lung with Maxx Elizabeth MD   [facility] ([facility])    [address]  3rd Floor  [facility] [address] [phone]            2017  1:00 PM CDT   Infusion 300 with UC SPEC INFUSION, UC 42 ATC   [facility] Specialty and Procedure ([facility])    [address]  [address] Floor  [facility] [address] [phone]              Who to contact     If you have questions or need follow up information about today's clinic visit or your schedule please contact [facility] SPECIALTY AND PROCEDURE directly at [phone].  Normal or non-critical lab and imaging results will be communicated to you by [employer], letter or phone within 4 business days after the clinic has received the results. If you do not hear from us within 7 days, please contact the clinic through MyChart or phone. If you have a critical or abnormal lab result, we will notify you by phone as soon as possible.  Submit refill requests through [employer] or call your pharmacy and they will forward the refill request to us. Please allow 3 business days for your refill to be completed.          Additional Information About Your Visit        [employer] Information     [employer] lets you send messages to your doctor, view your test results, renew your prescriptions, schedule appointments and more. To sign up, go to [email or web address] . Click on \"Log in\" on the left side of the screen, which will take you to the Welcome page. Then click on \"Sign up Now\" on the right side of the page.     You will be asked to enter the access code listed below, as well as some personal information. Please follow the directions to create your username and password.     Your access code is: [email or web address]  Expires: 2017  7:31 AM     Your access code will  in 90 days. If you " need help or a new code, please call your [facility] or [phone].        Care EveryWhere ID     This is your Care EveryWhere ID. This could be used by other organizations to access your [place] medical records  [id number]        Your Vitals Were     Pulse Temperature Respirations Pulse Oximetry          70 98.7  F (37.1  C) (Oral) 16 97%         Blood Pressure from Last 3 Encounters:   03/23/17 154/59   03/02/17 149/56   02/09/17 130/86    Weight from Last 3 Encounters:   01/19/17 41.4 kg (91 lb 3.2 oz)   12/13/16 41.5 kg (91 lb 8 oz)   10/26/16 41.2 kg (90 lb 14.4 oz)              We Performed the Following     Basic metabolic panel     CBC with platelets     Immunoglobulin G subclasses          Today's Medication Changes          These changes are accurate as of: 3/23/17  3:16 PM.  If you have any questions, ask your nurse or doctor.               These medicines have changed or have updated prescriptions.        Dose/Directions    metFORMIN 1000 MG tablet   Commonly known as:  GLUCOPHAGE   This may have changed:    - when to take this  - additional instructions   Used for:  DM (diabetes mellitus) (H)        Take 1 tablet twice daily   Quantity:  180 tablet   Refills:  3                Primary Care Provider Office Phone # Fax #    Brandi Burks [phone]       [facility] [address]   [facility] [address]        Thank you!     Thank you for choosing [facility] SPECIALTY AND PROCEDURE  for your care. Our goal is always to provide you with excellent care. Hearing back from our patients is one way we can continue to improve our services. Please take a few minutes to complete the written survey that you may receive in the mail after your visit with us. Thank you!             Your Updated Medication List - Protect others around you: Learn how to safely use, store and throw away your medicines at www.disposemymeds.org.          This list is accurate as of: 3/23/17   3:16 PM.  Always use your most recent med list.                   Brand Name Dispense Instructions for use    * albuterol 108 (90 BASE) MCG/ACT Inhaler    PROAIR HFA/PROVENTIL HFA/VENTOLIN HFA    1 Inhaler    Take 2 puffs prn for wheezing or shortness of breath       * albuterol (2.5 MG/3ML) 0.083% neb solution     360 mL    Take 1 vial (2.5 mg) by nebulization 3 times daily       * albuterol 108 (90 BASE) MCG/ACT Inhaler    PROAIR HFA/PROVENTIL HFA/VENTOLIN HFA    1 Inhaler    Take 2 puffs prn for wheezing       * albuterol (2.5 MG/3ML) 0.083% neb solution      Take 1 ampule by nebulization every 6 hours as needed.       aspirin 81 MG tablet      Take 1 tablet by mouth daily.       Blood Pressure Kit      daily.       budesonide 3 MG 24 hr capsule    ENTOCORT EC    270 capsule    Take 1 capsule (3 mg) by mouth 3 times daily (with meals)       DEMEROL 25 MG/ML injection   Generic drug:  meperidine      Inject 25 mg into the vein as needed. 25 mg IV prior to IgG infusion       dicyclomine 10 MG capsule    BENTYL     Take 10 mg by mouth 4 times daily (before meals and nightly).       diltiazem 360 MG 24 hr CD capsule    CARDIZEM CD; CARTIA XT    90 capsule    Take 1 capsule (360 mg) by mouth daily       diphenhydrAMINE 25 MG tablet    BENADRYL    2 tablet    Take 2 tablets (50 mg) by mouth See Admin Instructions Please take 50 mg by mouth evening before  And morning of the Abd/Pelvis CT scan with contrast per Dr. Adair.  This is a pre medication for contrast dye.       FISH OIL      Unsure of dosage take two caps daily       fluticasone-salmeterol 250-50 MCG/DOSE diskus inhaler    ADVAIR    1 Inhaler    Inhale 1 puff into the lungs 2 times daily.       gabapentin 300 MG capsule    NEURONTIN    180 capsule    Take 2 capsules (600 mg) by mouth 3 times daily       hydrochlorothiazide 25 MG tablet    HYDRODIURIL     Take 25 mg by mouth as needed.       HYDROcodone-acetaminophen  MG per tablet    LORCET     Take 1  tablet by mouth every 6 hours as needed.       Immune Globulin (Human) 25 GM/500ML Soln      Inject 25 g into the vein See Admin Instructions. 25 grams every 3 weeks IVIG       Isosorbide Mononitrate  MG Tb24     90 tablet    Take 1 tablet (120 mg) by mouth daily       lisinopril 40 MG tablet    PRINIVIL/ZESTRIL     Take 1 tablet by mouth 2 times daily.       metFORMIN 1000 MG tablet    GLUCOPHAGE    180 tablet    Take 1 tablet twice daily       metoprolol 50 MG 24 hr tablet    TOPROL-XL    90 tablet    Take 1 tablet (50 mg) by mouth daily       nitroglycerin 0.4 MG sublingual tablet    NITROQUICK    25 tablet    Place 1 tablet under the tongue every 5 minutes as needed.       omeprazole 20 MG CR capsule    priLOSEC    60 capsule    Take 1 capsule (20 mg) by mouth 2 times daily       * blood glucose monitoring lancets     3 Box    Use to test blood sugar 3 times daily or as directed.       * ONE TOUCH FINE POINT LANCETS      2 times daily.       * ONE TOUCH ULTRA test strip   Generic drug:  blood glucose monitoring     200 strip    Test blood sugar twice a day.       * blood glucose monitoring test strip    TERRY CONTOUR NEXT    270 each    by In Vitro route 3 times daily Use to test blood sugar 3 times daily or as directed.       spironolactone 50 MG tablet    ALDACTONE     Take 50 mg by mouth daily.       tiotropium 18 MCG capsule    SPIRIVA HANDIHALER    30 capsule    One puff every day       TYLENOL 325 MG tablet   Generic drug:  acetaminophen      Take 1-2 tablets by mouth every 6 hours as needed.       * Notice:  This list has 8 medication(s) that are the same as other medications prescribed for you. Read the directions carefully, and ask your doctor or other care provider to review them with you.

## 2017-03-23 NOTE — PROGRESS NOTES
Nursing Note  Jennifer Alvarez [facility] for:   Chief Complaint   Patient presents with     Infusion     Gammagard (Immune globulin)     During today's [facility] appointment, orders from Dr. Elizabeth were completed.  Frequency: every 3 weeks    Progress note:  Patient identification verified by name and date of birth.  Assessment completed.  Vitals recorded in Doc Flowsheets.  Patient was provided with education regarding infusion and possible side effects.  Patient verbalized understanding.      needed: No  Premedications: administered per order.  Infusion Rates: infusion starts at 12 ml/hr for 15 min, 25 ml/hr for 15 min, 65 ml/hr for 15 min,  then increased to final rate of 125 ml/hr.  Approximate Infusion length:3 hours.   Labs: were not ordered for this appointment.  Vascular access: peripheral IV placed today.  Treatment Conditions: patient denies fever, chills, signs of infection, recent illness, on antibiotics, productive cough or elevated temperature.  Patient tolerated infusion: well.    Discharge Plan:   Follow up plan of care with: ongoing infusions at [facility].  Discharge instructions were reviewed with patient.  Patient/representative verbalized understanding of discharge instructions and all questions answered.  Patient discharged [facility] in stable condition.    Tawana Robert RN    Administrations This Visit     acetaminophen (TYLENOL) tablet 650 mg     Admin Date Action Dose Route Administered By             03/23/2017 Given 650 mg Oral Tawana Robert RN                    diphenhydrAMINE (BENADRYL) capsule 25 mg     Admin Date Action Dose Route Administered By             03/23/2017 Given 25 mg Oral Tawana Robert RN                    immune globulin - sucrose free 10 % (Gammagard) 25 g     Admin Date Action Dose Route  Administered By             03/23/2017 New Bag 25 g Intravenous Poeschel, Tawana WILLS RN                    meperidine (DEMEROL) injection 25 mg     Admin Date Action Dose Route Administered By             03/23/2017 Given 25 mg Intravenous PoeschelTawana RN                    methylPREDNISolone sodium succinate (solu-MEDROL) injection 100 mg     Admin Date Action Dose Route Administered By             03/23/2017 Given 100 mg Intravenous JeromecheTawana jameson RN                          /59  Pulse 70  Temp 98.7  F (37.1  C) (Oral)  Resp 16  SpO2 97%

## 2017-03-24 LAB
IGG SERPL-MCNC: 1070 MG/DL (ref 695–1620)
IGG1 SER-MCNC: NORMAL MG/DL (ref 300–856)
IGG2 SER-MCNC: NORMAL MG/DL (ref 158–761)
IGG3 SER-MCNC: NORMAL MG/DL (ref 24–192)
IGG4 SER-MCNC: NORMAL MG/DL (ref 11–86)

## 2017-03-25 LAB
RESULT: NORMAL
SEND OUTS MISC TEST CODE: NORMAL
SEND OUTS MISC TEST SPECIMEN: NORMAL
TEST NAME: NORMAL

## 2017-04-04 ENCOUNTER — PRE VISIT (OUTPATIENT)
Dept: CARDIOLOGY | Facility: CLINIC | Age: 77
End: 2017-04-04

## 2017-04-05 ENCOUNTER — OFFICE VISIT (OUTPATIENT)
Dept: PULMONOLOGY | Facility: CLINIC | Age: 77
End: 2017-04-05
Attending: INTERNAL MEDICINE
Payer: MEDICARE

## 2017-04-05 ENCOUNTER — ALLIED HEALTH/NURSE VISIT (OUTPATIENT)
Dept: CARDIOLOGY | Facility: CLINIC | Age: 77
End: 2017-04-05
Attending: INTERNAL MEDICINE
Payer: MEDICARE

## 2017-04-05 VITALS
DIASTOLIC BLOOD PRESSURE: 88 MMHG | BODY MASS INDEX: 16.99 KG/M2 | HEART RATE: 87 BPM | HEIGHT: 62 IN | WEIGHT: 92.3 LBS | SYSTOLIC BLOOD PRESSURE: 171 MMHG | OXYGEN SATURATION: 97 %

## 2017-04-05 VITALS
OXYGEN SATURATION: 98 % | HEIGHT: 62 IN | SYSTOLIC BLOOD PRESSURE: 172 MMHG | WEIGHT: 92.3 LBS | HEART RATE: 80 BPM | BODY MASS INDEX: 16.99 KG/M2 | DIASTOLIC BLOOD PRESSURE: 68 MMHG

## 2017-04-05 DIAGNOSIS — E78.00 HYPERCHOLESTEREMIA: ICD-10-CM

## 2017-04-05 DIAGNOSIS — I49.5 SICK SINUS SYNDROME (H): Primary | ICD-10-CM

## 2017-04-05 DIAGNOSIS — Z45.010 PACEMAKER GENERATOR END OF LIFE: ICD-10-CM

## 2017-04-05 DIAGNOSIS — J41.8 MIXED SIMPLE AND MUCOPURULENT CHRONIC BRONCHITIS (H): Primary | ICD-10-CM

## 2017-04-05 DIAGNOSIS — R00.1 SINUS BRADYCARDIA: Primary | ICD-10-CM

## 2017-04-05 DIAGNOSIS — D80.1 HYPOGAMMAGLOBULINEMIA (H): ICD-10-CM

## 2017-04-05 DIAGNOSIS — E78.5 HYPERLIPIDEMIA: ICD-10-CM

## 2017-04-05 LAB
CHOLEST SERPL-MCNC: 236 MG/DL
HDLC SERPL-MCNC: 60 MG/DL
LDLC SERPL CALC-MCNC: 131 MG/DL
NONHDLC SERPL-MCNC: 176 MG/DL
TRIGL SERPL-MCNC: 226 MG/DL

## 2017-04-05 PROCEDURE — 99212 OFFICE O/P EST SF 10 MIN: CPT | Mod: 27

## 2017-04-05 PROCEDURE — 99211 OFF/OP EST MAY X REQ PHY/QHP: CPT | Mod: ZF

## 2017-04-05 PROCEDURE — 93280 PM DEVICE PROGR EVAL DUAL: CPT | Mod: ZF

## 2017-04-05 PROCEDURE — 99205 OFFICE O/P NEW HI 60 MIN: CPT | Mod: 25 | Performed by: INTERNAL MEDICINE

## 2017-04-05 PROCEDURE — 93280 PM DEVICE PROGR EVAL DUAL: CPT | Mod: 26 | Performed by: INTERNAL MEDICINE

## 2017-04-05 RX ORDER — AZITHROMYCIN 250 MG/1
TABLET, FILM COATED ORAL
Qty: 6 TABLET | Refills: 4 | Status: SHIPPED | OUTPATIENT
Start: 2017-04-05 | End: 2018-01-23

## 2017-04-05 ASSESSMENT — PAIN SCALES - GENERAL: PAINLEVEL: NO PAIN (0)

## 2017-04-05 NOTE — MR AVS SNAPSHOT
After Visit Summary   4/5/2017    Jennifer Alvarez    MRN: [id number]           Patient Information     Date Of Birth          1940        Visit Information        Provider Department      4/5/2017 11:00 AM Shola Rivas MD [facility]        Today's Diagnoses     Sick sinus syndrome (H)    -  1    Pacemaker generator end of life          Care Instructions    Cardiology Provider you saw in clinic today: Dr. Rivas     Follow-up Visit:  You will receive a generator change when your device's battery needs replacement.        Please contact us via Voxbone for questions or concerns.    Susie Hunter RN   Electrophysiology Nurse Coordinator.  [phone]    If your question concerns the schedule/appointment times, contact:  [facility]   [phone]    Device Clinic (Pacemakers, ICD, Loop Recorders)   [phone]      You will receive all normal lab and testing results via Voxbone or mail if not reviewed in clinic today.   If you need a medication refill please contact your pharmacy.    As always, thank you for trusting us with your health care needs!          Follow-ups after your visit        Follow-up notes from your care team     Return if symptoms worsen or fail to improve.      Your next 10 appointments already scheduled     Apr 13, 2017  1:00 PM CDT   Infusion 300 with UC SPEC INFUSION, UC 42 ATC   [facility] ([facility])    [address] [place] [id number] [phone]            Jul 27, 2017  8:30 AM CDT   (Arrive by 8:15 AM)   Pacemaker Check with Uc Cv Device 1   [facility] ([facility])    [address] [place] [id number] [phone]            Oct 04, 2017 12:00 PM CDT   Lab with UC LAB   [facility] ([facility])    [address] [place] [address]  "[id number]   [phone]            Oct 04, 2017 12:30 PM CDT   PFT VISIT with FARTUN PFL B   [facility] ([facility])    [address] [place] [id number]   [phone]            Oct 04, 2017  1:10 PM CDT   (Arrive by 12:55 PM)   Return Interstitial Lung with Maxx Elizabeth MD   [facility] ([facility])    [address] [place] [id number] [phone]              Who to contact     If you have questions or need follow up information about today's clinic visit or your schedule please contact [facility] directly at [phone].  Normal or non-critical lab and imaging results will be communicated to you by Vendhart, letter or phone within 4 business days after the clinic has received the results. If you do not hear from us within 7 days, please contact the clinic through Vendhart or phone. If you have a critical or abnormal lab result, we will notify you by phone as soon as possible.  Submit refill requests through Agilys or call your pharmacy and they will forward the refill request to us. Please allow 3 business days for your refill to be completed.          Additional Information About Your Visit        Agilys Information     Agilys lets you send messages to your doctor, view your test results, renew your prescriptions, schedule appointments and more. To sign up, go to [email or web address] . Click on \"Log in\" on the left side of the screen, which will take you to the Welcome page. Then click on \"Sign up Now\" on the right side of the page.     You will be asked to enter the access code listed below, as well as some personal information. Please follow the directions to create your username and password.     Your access code is: [email or web address]  Expires: 2017  7:31 AM     Your access code will  in 90 days. If you need help or a new code, " "please call your [facility] or [phone].        Care EveryWhere ID     This is your Care EveryWhere ID. This could be used by other organizations to access your [facility] medical records  [id number]        Your Vitals Were     Pulse Height Pulse Oximetry BMI (Body Mass Index)          87 1.575 m (5' 2\") 97% 16.88 kg/m2         Blood Pressure from Last 3 Encounters:   04/05/17 172/68   04/05/17 171/88   03/23/17 123/40    Weight from Last 3 Encounters:   04/05/17 41.9 kg (92 lb 4.8 oz)   04/05/17 41.9 kg (92 lb 4.8 oz)   01/19/17 41.4 kg (91 lb 3.2 oz)              Today, you had the following     No orders found for display         Today's Medication Changes          These changes are accurate as of: 4/5/17 11:59 PM.  If you have any questions, ask your nurse or doctor.               Start taking these medicines.        Dose/Directions    azithromycin 250 MG tablet   Commonly known as:  ZITHROMAX   Used for:  Mixed simple and mucopurulent chronic bronchitis (H), Hypogammaglobulinemia (H)   Started by:  Maxx Elizabeth MD        Take as directed   Quantity:  6 tablet   Refills:  4         These medicines have changed or have updated prescriptions.        Dose/Directions    metFORMIN 1000 MG tablet   Commonly known as:  GLUCOPHAGE   This may have changed:    - when to take this  - additional instructions   Used for:  DM (diabetes mellitus) (H)        Take 1 tablet twice daily   Quantity:  180 tablet   Refills:  3            Where to get your medicines      These medications were sent to [facility]  [address]  [address] [facility] [address]     Phone:  [phone]     azithromycin 250 MG tablet                Primary Care Provider Office Phone # Fax #    Brandi DILLON Vitaliy [phone]       [facility] [address]   [facility] [address]        Thank you!     Thank you for choosing [facility]  for your care. Our goal is always to provide you with " excellent care. Hearing back from our patients is one way we can continue to improve our services. Please take a few minutes to complete the written survey that you may receive in the mail after your visit with us. Thank you!             Your Updated Medication List - Protect others around you: Learn how to safely use, store and throw away your medicines at www.disposemymeds.org.          This list is accurate as of: 4/5/17 11:59 PM.  Always use your most recent med list.                   Brand Name Dispense Instructions for use    * albuterol 108 (90 BASE) MCG/ACT Inhaler    PROAIR HFA/PROVENTIL HFA/VENTOLIN HFA    1 Inhaler    Take 2 puffs prn for wheezing or shortness of breath       * albuterol (2.5 MG/3ML) 0.083% neb solution     360 mL    Take 1 vial (2.5 mg) by nebulization 3 times daily       * albuterol 108 (90 BASE) MCG/ACT Inhaler    PROAIR HFA/PROVENTIL HFA/VENTOLIN HFA    1 Inhaler    Take 2 puffs prn for wheezing       * albuterol (2.5 MG/3ML) 0.083% neb solution      Take 1 ampule by nebulization every 6 hours as needed.       aspirin 81 MG tablet      Take 1 tablet by mouth daily.       azithromycin 250 MG tablet    ZITHROMAX    6 tablet    Take as directed       Blood Pressure Kit      daily.       budesonide 3 MG 24 hr capsule    ENTOCORT EC    270 capsule    Take 1 capsule (3 mg) by mouth 3 times daily (with meals)       DEMEROL 25 MG/ML injection   Generic drug:  meperidine      Inject 25 mg into the vein as needed. 25 mg IV prior to IgG infusion       dicyclomine 10 MG capsule    BENTYL     Take 10 mg by mouth 4 times daily (before meals and nightly).       diltiazem 360 MG 24 hr CD capsule    CARDIZEM CD; CARTIA XT    90 capsule    Take 1 capsule (360 mg) by mouth daily       diphenhydrAMINE 25 MG tablet    BENADRYL    2 tablet    Take 2 tablets (50 mg) by mouth See Admin Instructions Please take 50 mg by mouth evening before  And morning of the Abd/Pelvis CT scan with contrast per Dr. Adair.   This is a pre medication for contrast dye.       FISH OIL      Unsure of dosage take two caps daily       fluticasone-salmeterol 250-50 MCG/DOSE diskus inhaler    ADVAIR    1 Inhaler    Inhale 1 puff into the lungs 2 times daily.       gabapentin 300 MG capsule    NEURONTIN    180 capsule    Take 2 capsules (600 mg) by mouth 3 times daily       hydrochlorothiazide 25 MG tablet    HYDRODIURIL     Take 25 mg by mouth as needed.       HYDROcodone-acetaminophen  MG per tablet    LORCET     Take 1 tablet by mouth every 6 hours as needed.       Immune Globulin (Human) 25 GM/500ML Soln      Inject 25 g into the vein See Admin Instructions. 25 grams every 3 weeks IVIG       Isosorbide Mononitrate  MG Tb24     90 tablet    Take 1 tablet (120 mg) by mouth daily       lisinopril 40 MG tablet    PRINIVIL/ZESTRIL     Take 1 tablet by mouth 2 times daily.       metFORMIN 1000 MG tablet    GLUCOPHAGE    180 tablet    Take 1 tablet twice daily       metoprolol 50 MG 24 hr tablet    TOPROL-XL    90 tablet    Take 1 tablet (50 mg) by mouth daily       nitroglycerin 0.4 MG sublingual tablet    NITROQUICK    25 tablet    Place 1 tablet under the tongue every 5 minutes as needed.       omeprazole 20 MG CR capsule    priLOSEC    60 capsule    Take 1 capsule (20 mg) by mouth 2 times daily       * blood glucose monitoring lancets     3 Box    Use to test blood sugar 3 times daily or as directed.       * ONE TOUCH FINE POINT LANCETS      2 times daily.       * ONE TOUCH ULTRA test strip   Generic drug:  blood glucose monitoring     200 strip    Test blood sugar twice a day.       * blood glucose monitoring test strip    TERRY CONTOUR NEXT    270 each    by In Vitro route 3 times daily Use to test blood sugar 3 times daily or as directed.       spironolactone 50 MG tablet    ALDACTONE     Take 50 mg by mouth daily.       tiotropium 18 MCG capsule    SPIRIVA HANDIHALER    30 capsule    One puff every day       TYLENOL 325 MG  tablet   Generic drug:  acetaminophen      Take 1-2 tablets by mouth every 6 hours as needed.       * Notice:  This list has 8 medication(s) that are the same as other medications prescribed for you. Read the directions carefully, and ask your doctor or other care provider to review them with you.

## 2017-04-05 NOTE — PROGRESS NOTES
Electrophysiology Clinic Note  HPI:   Ms. Alvarez is a 76 year old female who has a past medical history significant for CAD s/p PCI LAD X2 2007, SSS s/p PPM implant 2008, CKD, IgA deficiency, renal artery stenosis, HLD, asthma, DM2, and GERD. She presents today to establish EP care.     Her cardiac history dates back to 2007 when she was found to have single vessel CAD with PCIX 2 to mid and distal LAD. A repeat coronary angiogram in 2008 revealed patent stents and non-obstructive CAD. Another coronary angiogram in May 2010 showed minimal CAD with patent stents. RHC showed mild elevation of PA pressures and left sided filling pressures. She had a dobutamine stress Echo in Sept 2011 that was negative for any inducible ischemia. LV function was normal at baseline and augmented appropriately with stress. A repeat echocardiogram in June 2013 revealed a normal global and regional left ventricular function with an EF of 60-65% and normal right ventricular function. There was mild concentric LVH and mild aortic sclerosis with no significant gradient across AV.  She had a negative dobutamine Echo in July 2015. She has been experiencing chest pain occurring 3-4X/week associated with activity. She is following with Dr. Adair for this who feels her symptoms are atypical and does not want to pursue further ischemic evaluation. Other work up revealed renal artery stenosis. Her device interrogation has showed she is nearing MARIA T for which she was sent to reestablish EP care. Her device checks have shown normal device function and stable lead parameters. She reports that she occassionally has pain at pacemaker site when laying on that side. Device site appears well healed. She denies any shortness of breath, dizziness, lightheadedness, palpitations, or syncopal symptoms. Current cardiac medications include: Toprol XL, Diltiazem, ASA, HCTZ, Spironolactone, Isosorbide, and Lisinopril.     PAST MEDICAL HISTORY:  Past Medical History:    Diagnosis Date     Abdominal pain     cramping     CAD (coronary artery disease)     stent x2, ppm     Cardiac pacemaker      Chronic kidney disease      Diabetes (H)     DM type 2, oral agent     Diarrhea      GERD (gastroesophageal reflux disease)      HTN (hypertension)      Hyperlipidemia LDL goal <70 11/19/2013     IgA deficiency (H)      Neck pain 12/31/2014     Uncomplicated asthma      Weight loss        CURRENT MEDICATIONS:  Current Outpatient Prescriptions   Medication Sig Dispense Refill     diltiazem (CARDIZEM CD; CARTIA XT) 360 MG 24 hr CD capsule Take 1 capsule (360 mg) by mouth daily 90 capsule 3     diphenhydrAMINE (BENADRYL) 25 MG tablet Take 2 tablets (50 mg) by mouth See Admin Instructions Please take 50 mg by mouth evening before  And morning of the Abd/Pelvis CT scan with contrast per Dr. Adair.  This is a pre medication for contrast dye. 2 tablet 0     gabapentin (NEURONTIN) 300 MG capsule Take 2 capsules (600 mg) by mouth 3 times daily 180 capsule 1     metoprolol (TOPROL-XL) 50 MG 24 hr tablet Take 1 tablet (50 mg) by mouth daily 90 tablet 3     albuterol (PROAIR HFA, PROVENTIL HFA, VENTOLIN HFA) 108 (90 BASE) MCG/ACT inhaler Take 2 puffs prn for wheezing 1 Inhaler 12     Isosorbide Mononitrate  MG TB24 Take 1 tablet (120 mg) by mouth daily 90 tablet 3     albuterol (2.5 MG/3ML) 0.083% nebulizer solution Take 1 vial (2.5 mg) by nebulization 3 times daily 360 mL 3     budesonide (ENTOCORT EC) 3 MG 24 hr capsule Take 1 capsule (3 mg) by mouth 3 times daily (with meals) 270 capsule 3     blood glucose monitoring (TERRY CONTOUR NEXT) test strip by In Vitro route 3 times daily Use to test blood sugar 3 times daily or as directed. 270 each 3     blood glucose monitoring (TERRY MICROLET) lancets Use to test blood sugar 3 times daily or as directed. 3 Box 3     albuterol (PROAIR HFA, PROVENTIL HFA, VENTOLIN HFA) 108 (90 BASE) MCG/ACT inhaler Take 2 puffs prn for wheezing or shortness of  breath 1 Inhaler 12     omeprazole (PRILOSEC) 20 MG capsule Take 1 capsule (20 mg) by mouth 2 times daily 60 capsule 6     metFORMIN (GLUCOPHAGE) 1000 MG tablet Take 1 tablet twice daily (Patient taking differently: daily (with dinner) Take 1 tablet twice daily) 180 tablet 3     ONE TOUCH ULTRA TEST strip Test blood sugar twice a day. 200 strip 3     tiotropium (SPIRIVA HANDIHALER) 18 MCG inhalation capsule One puff every day 30 capsule 6     fluticasone-salmeterol (ADVAIR) 250-50 MCG/DOSE diskus inhaler Inhale 1 puff into the lungs 2 times daily. 1 Inhaler 12     dicyclomine (BENTYL) 10 MG capsule Take 10 mg by mouth 4 times daily (before meals and nightly).       aspirin 81 MG tablet Take 1 tablet by mouth daily.       FISH OIL Unsure of dosage take two caps daily       Blood Pressure KIT daily.       lisinopril (PRINIVIL,ZESTRIL) 40 MG tablet Take 1 tablet by mouth 2 times daily.        ONE TOUCH FINE POINT LANCETS 2 times daily.       hydrocodone-acetaminophen (LORCET)  MG per tablet Take 1 tablet by mouth every 6 hours as needed.       hydrochlorothiazide (HYDRODIURIL) 25 MG tablet Take 25 mg by mouth as needed.       nitroGLYCERIN (NITROQUICK) 0.4 MG SL tablet Place 1 tablet under the tongue every 5 minutes as needed. 25 tablet 3     albuterol (2.5 MG/3ML) 0.083% nebulizer solution Take 1 ampule by nebulization every 6 hours as needed.       meperidine (DEMEROL) 25 MG/ML injection Inject 25 mg into the vein as needed. 25 mg IV prior to IgG infusion       Immune Globulin, Human, 25 GM/500ML SOLN Inject 25 g into the vein See Admin Instructions. 25 grams every 3 weeks IVIG       spironolactone (ALDACTONE) 50 MG tablet Take 50 mg by mouth daily.       acetaminophen (TYLENOL) 325 MG tablet Take 1-2 tablets by mouth every 6 hours as needed.         PAST SURGICAL HISTORY:  Past Surgical History:   Procedure Laterality Date     C LAP,SURG,COLECTOMY, PARTIAL, W/ANAST      partial colectomy;diverticulitis      cardiac stents      x 2     CARPAL TUNNEL RELEASE RT/LT      bilateral     CHOLECYSTECTOMY       COLONOSCOPY       COLONOSCOPY N/A 11/9/2015    Procedure: COMBINED COLONOSCOPY, SINGLE OR MULTIPLE BIOPSY/POLYPECTOMY BY BIOPSY;  Surgeon: Victor Hugo Turner MD;  Location:  GI     ENT SURGERY      back throat     ESOPHAGEAL MOTILITY STUDY  5-5-15     ESOPHAGOSCOPY, GASTROSCOPY, DUODENOSCOPY (EGD), COMBINED Left 5/13/2015    Procedure: COMBINED ESOPHAGOSCOPY, GASTROSCOPY, DUODENOSCOPY (EGD), BIOPSY SINGLE OR MULTIPLE;  Surgeon: Dipesh Bobby MD;  Location:  GI     ESOPHAGOSCOPY, GASTROSCOPY, DUODENOSCOPY (EGD), COMBINED N/A 11/6/2015    Procedure: COMBINED ESOPHAGOSCOPY, GASTROSCOPY, DUODENOSCOPY (EGD), BIOPSY SINGLE OR MULTIPLE;  Surgeon: Victor Hugo Turner MD;  Location:  GI     FOOT SURGERY      left     HC ESOPH/GAS REFLUX TEST W NASAL IMPED >1 HR N/A 5/5/2015    Procedure: ESOPHAGEAL IMPEDENCE FUNCTION TEST WITH 24 HOUR PH GREATER THAN 1 HOUR;  Surgeon: Dipesh Bobby MD;  Location:  GI     IMPLANT PACEMAKER      PPM     LAPAROTOMY EXPLORATORY       NISSEN FUNDOPLICATION      x 2     SHOULDER SURGERY      right     UPPER GI ENDOSCOPY       WRIST SURGERY      right cyst       ALLERGIES:     Allergies   Allergen Reactions     Bees Anaphylaxis     Codeine Sulfate Hives     Contrast Dye Anaphylaxis and Hives     Milk Products Shortness Of Breath     Morphine Sulfate Other (See Comments), Hives and Itching     Pt had abdominal pain that had her doubled over     Pcn [Penicillin G Ammonium] Difficulty breathing     Zinacef [Cefuroxime Sodium] Difficulty breathing     Influenza Vaccine Live Rash     Pneumovax [Pneumococcal Polysaccharides]      Procardia [Nifedipine] Difficulty breathing     Barium Rash       FAMILY HISTORY:  Family History   Problem Relation Age of Onset     Neurologic Disorder No family hx of        SOCIAL HISTORY:  Social History   Substance Use Topics     Smoking status: Former  "Smoker     Packs/day: 0.20     Years: 20.00     Types: Cigarettes     Quit date: 2/24/1974     Smokeless tobacco: Never Used     Alcohol use No       ROS:   A comprehensive 10 point review of systems negative other than as mentioned in HPI.  Exam:  /88  Pulse 87  Ht 1.575 m (5' 2\")  Wt 41.9 kg (92 lb 4.8 oz)  SpO2 97%  BMI 16.88 kg/m2  GENERAL APPEARANCE: healthy, alert and no distress  HEENT: no icterus, no xanthelasmas, normal pupil size and reaction, normal palate, mucosa moist, no central cyanosis  NECK: no adenopathy, no asymmetry, masses, or scars, thyroid normal to palpation and no bruits, JVP not elevated  RESPIRATORY: lungs clear to auscultation - no rales, rhonchi or wheezes, no use of accessory muscles, no retractions, respirations are unlabored, normal respiratory rate  CARDIOVASCULAR: regular rhythm, normal S1 with physiologic split S2, no S3 or S4 and no murmur, click or rub, precordium quiet with normal PMI.  ABDOMEN: soft, non tender, without hepatosplenomegaly, no masses palpable, bowel sounds normal, aorta not enlarged by palpation, no abdominal bruits  EXTREMITIES: peripheral pulses normal, no edema, no bruits  NEURO: alert and oriented to person/place/time, normal speech, gait and affect  VASC: Radial, femoral, dorsalis pedis and posterior tibialis pulses are normal in volumes and symmetric bilaterally. No bruits are heard.  SKIN: no ecchymoses, no rashes    Labs:  Reviewed.     Testing/Procedures:  PULMONARY FUNCTION TESTS:   PFT Latest Ref Rng & Units 9/28/2016   FVC L 1.53   FEV1 L 0.92   FVC% % 61   FEV1% % 47         7/2015 STRESS ECHOCARDIOGRAM:     Interpretation Summary  Normal dobutamine stress echo at target heartrate.No significant valvular  abnormality.      Assessment and Plan:   Ms. Alvarez is a 76 year old female who has a past medical history significant for CAD s/p PCI LAD X2 2007, SSS s/p PPM implant 2008, CKD, IgA deficiency, renal artery stenosis, HLD, asthma, DM2, " and GERD. She presents today to establish EP care. Her device interrogation has showed she is nearing MARIA T for which she was sent to reestablish EP care. Her device checks have shown normal device function and stable lead parameters. She reports that she occassionally has pain at pacemaker site when laying on that side. Device site appears well healed.  She has been experiencing chest pain occurring 3-4X/week associated with activity. She is following with Dr. Adair for this who feels her symptoms are atypical and does not want to pursue further ischemic evaluation. Her device has not triggered MARIA T yet. We discussed generator change in detail with patient including indications, risks, and benefits. We will schedule her for generator change when device reaches MARIA T. She will continue to follow with the device clinic per routine.       The patient states understanding and is agreeable with plan.   This patient was seen and evaluated with Dr. Rivas. The above note reflects our joint assessment and plan.   CINDY Bray CNP  Pager: [phone]    EP STAFF NOTE  I have seen and examined the patient as part of a shared visit with BERTA Bray NP.  I agree with the note above. I reviewed today's vital signs and medications. I have reviewed and discussed with the advanced practice provider their physical examination, assessment, and plan   Briefly, patient who is establishing care before her device reaches MARIA T and to plan a generator change.  My key history/exam findings are: device slightly more lateral with impingement when she leans on the left.   The key management decisions made by me: plan to perform gen change when MARIA T and plan to flip the device and move more medialy.    Shola Rivas MD [facility]  Cardiology - Electrophysiology    CC  MINDI RANGEL

## 2017-04-05 NOTE — NURSING NOTE
Chief Complaint   Patient presents with     Follow Up For     PPM nearing MARIA T, painful device site. PPM.

## 2017-04-05 NOTE — PROGRESS NOTES
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  Jennifer has had numerous respiratory tract infections and chronic bronchitis.  She is currently receiving IVIG therapy every 3 weeks.  Her last visit with me was 6 months ago.  Since her last visit with me, she was visiting relatives down in [place] and was taking care of her grandchildren who were sick and she developed a respiratory tract infection.  She took azithromycin which seemed to help a little bit.  However, she is still noticing some congestion which has increased since coming off the azithromycin.  She does not describe fever.  She does feels a little bit wheezy.  She is using albuterol and Advair inhalers as well as albuterol nebulizers.  She is currently not on Spiriva.  She describes that she really is not wheezing unless she is having a respiratory tract infection.  She is next scheduled for IVIG infusion next week on 04/13.       REVIEW OF SYSTEMS:  Her weight has been around 90-92 pounds.  She has been drinking Ensure which has been helping to try to keep that stable.  She continues to have problems with reflux and is on Prilosec.  She has been having some atypical chest pain and is followed by Dr. Adair in Cardiology for that.  In addition, she has been having leg cramps and is on gabapentin for that.       PHYSICAL EXAMINATION:   VITAL SIGNS:  Blood pressure today is 172/68.  Pulse is 80.  Sats are 98%.  Weight is 92 pounds.   HEENT:  Unremarkable.  No thrush is noted.   NECK:  No thyromegaly or adenopathy is noted.   CHEST:  She has some rhonchi, no wheezing.   HEART:  Regular rate and rhythm, normal S1, S2, and a 3/6 systolic ejection murmur.   ABDOMEN:  Nontender.   EXTREMITIES:  I do not see any clubbing, cyanosis or edema today.       LABORATORY:  She had immunoglobulin levels drawn in March which were within normal limits.      ASSESSMENT AND PLAN:  The patient has immunoglobulin deficiency  and recurrent respiratory tract infections.  She has been sick recently and got some improvement with azithromycin but relapsed when she came off of it.  I will give her another course of azithromycin and refills.  We will continue with her monthly IVIG infusions as she is due next week for that.  In addition, we will continue with albuterol and Advair inhalers.  I will follow up with her in 6 months with labs and PFTs at that time.       Maxx Elizabeth  Pulmonary/Critical Care  April 7, 2017 10:42 AM

## 2017-04-05 NOTE — LETTER
4/5/2017      RE: Jennifer Alvarez  [address]   [facility] [address]       Dear Colleague,    Thank you for the opportunity to participate in the care of your patient, Jennifer Alvarez, at the [facility]. Please see a copy of my visit note below.    Electrophysiology Clinic Note  HPI:   Ms. Alvarez is a 76 year old female who has a past medical history significant for CAD s/p PCI LAD X2 2007, SSS s/p PPM implant 2008, CKD, IgA deficiency, renal artery stenosis, HLD, asthma, DM2, and GERD. She presents today to establish EP care.     Her cardiac history dates back to 2007 when she was found to have single vessel CAD with PCIX 2 to mid and distal LAD. A repeat coronary angiogram in 2008 revealed patent stents and non-obstructive CAD. Another coronary angiogram in May 2010 showed minimal CAD with patent stents. RHC showed mild elevation of PA pressures and left sided filling pressures. She had a dobutamine stress Echo in Sept 2011 that was negative for any inducible ischemia. LV function was normal at baseline and augmented appropriately with stress. A repeat echocardiogram in June 2013 revealed a normal global and regional left ventricular function with an EF of 60-65% and normal right ventricular function. There was mild concentric LVH and mild aortic sclerosis with no significant gradient across AV.  She had a negative dobutamine Echo in July 2015. She has been experiencing chest pain occurring 3-4X/week associated with activity. She is following with Dr. Adair for this who feels her symptoms are atypical and does not want to pursue further ischemic evaluation. Other work up revealed renal artery stenosis. Her device interrogation has showed she is nearing MARIA T for which she was sent to reestablish EP care. Her device checks have shown normal device function and stable lead parameters. She reports that she occassionally has pain at pacemaker site when  laying on that side. Device site appears well healed. She denies any shortness of breath, dizziness, lightheadedness, palpitations, or syncopal symptoms. Current cardiac medications include: Toprol XL, Diltiazem, ASA, HCTZ, Spironolactone, Isosorbide, and Lisinopril.     PAST MEDICAL HISTORY:  Past Medical History:   Diagnosis Date     Abdominal pain     cramping     CAD (coronary artery disease)     stent x2, ppm     Cardiac pacemaker      Chronic kidney disease      Diabetes (H)     DM type 2, oral agent     Diarrhea      GERD (gastroesophageal reflux disease)      HTN (hypertension)      Hyperlipidemia LDL goal <70 11/19/2013     IgA deficiency (H)      Neck pain 12/31/2014     Uncomplicated asthma      Weight loss        CURRENT MEDICATIONS:  Current Outpatient Prescriptions   Medication Sig Dispense Refill     diltiazem (CARDIZEM CD; CARTIA XT) 360 MG 24 hr CD capsule Take 1 capsule (360 mg) by mouth daily 90 capsule 3     diphenhydrAMINE (BENADRYL) 25 MG tablet Take 2 tablets (50 mg) by mouth See Admin Instructions Please take 50 mg by mouth evening before  And morning of the Abd/Pelvis CT scan with contrast per Dr. Adair.  This is a pre medication for contrast dye. 2 tablet 0     gabapentin (NEURONTIN) 300 MG capsule Take 2 capsules (600 mg) by mouth 3 times daily 180 capsule 1     metoprolol (TOPROL-XL) 50 MG 24 hr tablet Take 1 tablet (50 mg) by mouth daily 90 tablet 3     albuterol (PROAIR HFA, PROVENTIL HFA, VENTOLIN HFA) 108 (90 BASE) MCG/ACT inhaler Take 2 puffs prn for wheezing 1 Inhaler 12     Isosorbide Mononitrate  MG TB24 Take 1 tablet (120 mg) by mouth daily 90 tablet 3     albuterol (2.5 MG/3ML) 0.083% nebulizer solution Take 1 vial (2.5 mg) by nebulization 3 times daily 360 mL 3     budesonide (ENTOCORT EC) 3 MG 24 hr capsule Take 1 capsule (3 mg) by mouth 3 times daily (with meals) 270 capsule 3     blood glucose monitoring (Storee CONTOUR NEXT) test strip by In Vitro route 3 times daily  Use to test blood sugar 3 times daily or as directed. 270 each 3     blood glucose monitoring (TERRY MICROLET) lancets Use to test blood sugar 3 times daily or as directed. 3 Box 3     albuterol (PROAIR HFA, PROVENTIL HFA, VENTOLIN HFA) 108 (90 BASE) MCG/ACT inhaler Take 2 puffs prn for wheezing or shortness of breath 1 Inhaler 12     omeprazole (PRILOSEC) 20 MG capsule Take 1 capsule (20 mg) by mouth 2 times daily 60 capsule 6     metFORMIN (GLUCOPHAGE) 1000 MG tablet Take 1 tablet twice daily (Patient taking differently: daily (with dinner) Take 1 tablet twice daily) 180 tablet 3     ONE TOUCH ULTRA TEST strip Test blood sugar twice a day. 200 strip 3     tiotropium (SPIRIVA HANDIHALER) 18 MCG inhalation capsule One puff every day 30 capsule 6     fluticasone-salmeterol (ADVAIR) 250-50 MCG/DOSE diskus inhaler Inhale 1 puff into the lungs 2 times daily. 1 Inhaler 12     dicyclomine (BENTYL) 10 MG capsule Take 10 mg by mouth 4 times daily (before meals and nightly).       aspirin 81 MG tablet Take 1 tablet by mouth daily.       FISH OIL Unsure of dosage take two caps daily       Blood Pressure KIT daily.       lisinopril (PRINIVIL,ZESTRIL) 40 MG tablet Take 1 tablet by mouth 2 times daily.        ONE TOUCH FINE POINT LANCETS 2 times daily.       hydrocodone-acetaminophen (LORCET)  MG per tablet Take 1 tablet by mouth every 6 hours as needed.       hydrochlorothiazide (HYDRODIURIL) 25 MG tablet Take 25 mg by mouth as needed.       nitroGLYCERIN (NITROQUICK) 0.4 MG SL tablet Place 1 tablet under the tongue every 5 minutes as needed. 25 tablet 3     albuterol (2.5 MG/3ML) 0.083% nebulizer solution Take 1 ampule by nebulization every 6 hours as needed.       meperidine (DEMEROL) 25 MG/ML injection Inject 25 mg into the vein as needed. 25 mg IV prior to IgG infusion       Immune Globulin, Human, 25 GM/500ML SOLN Inject 25 g into the vein See Admin Instructions. 25 grams every 3 weeks IVIG       spironolactone  (ALDACTONE) 50 MG tablet Take 50 mg by mouth daily.       acetaminophen (TYLENOL) 325 MG tablet Take 1-2 tablets by mouth every 6 hours as needed.         PAST SURGICAL HISTORY:  Past Surgical History:   Procedure Laterality Date     C LAP,SURG,COLECTOMY, PARTIAL, W/ANAST      partial colectomy;diverticulitis     cardiac stents      x 2     CARPAL TUNNEL RELEASE RT/LT      bilateral     CHOLECYSTECTOMY       COLONOSCOPY       COLONOSCOPY N/A 11/9/2015    Procedure: COMBINED COLONOSCOPY, SINGLE OR MULTIPLE BIOPSY/POLYPECTOMY BY BIOPSY;  Surgeon: Victor Hugo Turner MD;  Location:  GI     ENT SURGERY      back throat     ESOPHAGEAL MOTILITY STUDY  5-5-15     ESOPHAGOSCOPY, GASTROSCOPY, DUODENOSCOPY (EGD), COMBINED Left 5/13/2015    Procedure: COMBINED ESOPHAGOSCOPY, GASTROSCOPY, DUODENOSCOPY (EGD), BIOPSY SINGLE OR MULTIPLE;  Surgeon: Dipesh Bobby MD;  Location:  GI     ESOPHAGOSCOPY, GASTROSCOPY, DUODENOSCOPY (EGD), COMBINED N/A 11/6/2015    Procedure: COMBINED ESOPHAGOSCOPY, GASTROSCOPY, DUODENOSCOPY (EGD), BIOPSY SINGLE OR MULTIPLE;  Surgeon: Victor Hugo Turner MD;  Location:  GI     FOOT SURGERY      left     HC ESOPH/GAS REFLUX TEST W NASAL IMPED >1 HR N/A 5/5/2015    Procedure: ESOPHAGEAL IMPEDENCE FUNCTION TEST WITH 24 HOUR PH GREATER THAN 1 HOUR;  Surgeon: Dipesh Bobby MD;  Location:  GI     IMPLANT PACEMAKER      PPM     LAPAROTOMY EXPLORATORY       NISSEN FUNDOPLICATION      x 2     SHOULDER SURGERY      right     UPPER GI ENDOSCOPY       WRIST SURGERY      right cyst       ALLERGIES:     Allergies   Allergen Reactions     Bees Anaphylaxis     Codeine Sulfate Hives     Contrast Dye Anaphylaxis and Hives     Milk Products Shortness Of Breath     Morphine Sulfate Other (See Comments), Hives and Itching     Pt had abdominal pain that had her doubled over     Pcn [Penicillin G Ammonium] Difficulty breathing     Zinacef [Cefuroxime Sodium] Difficulty breathing     Influenza Vaccine Live  "Rash     Pneumovax [Pneumococcal Polysaccharides]      Procardia [Nifedipine] Difficulty breathing     Barium Rash       FAMILY HISTORY:  Family History   Problem Relation Age of Onset     Neurologic Disorder No family hx of        SOCIAL HISTORY:  Social History   Substance Use Topics     Smoking status: Former Smoker     Packs/day: 0.20     Years: 20.00     Types: Cigarettes     Quit date: 2/24/1974     Smokeless tobacco: Never Used     Alcohol use No       ROS:   A comprehensive 10 point review of systems negative other than as mentioned in HPI.  Exam:  /88  Pulse 87  Ht 1.575 m (5' 2\")  Wt 41.9 kg (92 lb 4.8 oz)  SpO2 97%  BMI 16.88 kg/m2  GENERAL APPEARANCE: healthy, alert and no distress  HEENT: no icterus, no xanthelasmas, normal pupil size and reaction, normal palate, mucosa moist, no central cyanosis  NECK: no adenopathy, no asymmetry, masses, or scars, thyroid normal to palpation and no bruits, JVP not elevated  RESPIRATORY: lungs clear to auscultation - no rales, rhonchi or wheezes, no use of accessory muscles, no retractions, respirations are unlabored, normal respiratory rate  CARDIOVASCULAR: regular rhythm, normal S1 with physiologic split S2, no S3 or S4 and no murmur, click or rub, precordium quiet with normal PMI.  ABDOMEN: soft, non tender, without hepatosplenomegaly, no masses palpable, bowel sounds normal, aorta not enlarged by palpation, no abdominal bruits  EXTREMITIES: peripheral pulses normal, no edema, no bruits  NEURO: alert and oriented to person/place/time, normal speech, gait and affect  VASC: Radial, femoral, dorsalis pedis and posterior tibialis pulses are normal in volumes and symmetric bilaterally. No bruits are heard.  SKIN: no ecchymoses, no rashes    Labs:  Reviewed.     Testing/Procedures:  PULMONARY FUNCTION TESTS:   PFT Latest Ref Rng & Units 9/28/2016   FVC L 1.53   FEV1 L 0.92   FVC% % 61   FEV1% % 47         7/2015 STRESS ECHOCARDIOGRAM:     Interpretation " Summary  Normal dobutamine stress echo at target heartrate.No significant valvular  abnormality.      Assessment and Plan:   Ms. Alvarez is a 76 year old female who has a past medical history significant for CAD s/p PCI LAD X2 2007, SSS s/p PPM implant 2008, CKD, IgA deficiency, renal artery stenosis, HLD, asthma, DM2, and GERD. She presents today to establish EP care. Her device interrogation has showed she is nearing MARIA T for which she was sent to reestablish EP care. Her device checks have shown normal device function and stable lead parameters. She reports that she occassionally has pain at pacemaker site when laying on that side. Device site appears well healed.  She has been experiencing chest pain occurring 3-4X/week associated with activity. She is following with Dr. Adair for this who feels her symptoms are atypical and does not want to pursue further ischemic evaluation. Her device has not triggered MARIA T yet. We discussed generator change in detail with patient including indications, risks, and benefits. We will schedule her for generator change when device reaches MARIA T. She will continue to follow with the device clinic per routine.       The patient states understanding and is agreeable with plan.   This patient was seen and evaluated with Dr. Rivas. The above note reflects our joint assessment and plan.   CINDY Bray CNP  Pager: [phone]    EP STAFF NOTE  I have seen and examined the patient as part of a shared visit with BERTA Bray NP.  I agree with the note above. I reviewed today's vital signs and medications. I have reviewed and discussed with the advanced practice provider their physical examination, assessment, and plan   Briefly, patient who is establishing care before her device reaches MARIA T and to plan a generator change.  My key history/exam findings are: device slightly more lateral with impingement when she leans on the left.   The key management decisions made by me: plan to perform  gen change when MARIA T and plan to flip the device and move more medialy.    Shola Rivas MD [facility]  Cardiology - Electrophysiology    CC  MINDI RANGEL

## 2017-04-05 NOTE — MR AVS SNAPSHOT
After Visit Summary   4/5/2017    Jennifer Alvarez    MRN: [id number]           Patient Information     Date Of Birth          1940        Visit Information        Provider Department      4/5/2017 10:30 AM 1, Uc Cv Device [facility]         Follow-ups after your visit        Your next 10 appointments already scheduled     Apr 05, 2017 10:30 AM CDT   (Arrive by 10:15 AM)   Pacemaker Check with Uc Cv Device 1   [facility] ([facility])    [address] [place] [address]  3rd [facility] [address] [phone]            Apr 05, 2017 11:00 AM CDT   (Arrive by 10:45 AM)   RETURN ARRHYTHMIA with Shola Rivas MD   [facility] ([facility])    [address] [place] [address]  3rd [facility] [address] [phone]            Apr 05, 2017 12:45 PM CDT   Lab with UC LAB   [facility] ([facility])    [address] [place] [address]  1st [facility] [address] [phone]            Apr 05, 2017  1:10 PM CDT   (Arrive by 12:55 PM)   Return Interstitial Lung with Maxx Elizabeth MD   [facility] ([facility])    [address] [place] [address]  3rd [facility] [address] [phone]            Apr 13, 2017  1:00 PM CDT   Infusion 300 with UC SPEC INFUSION, UC 42 ATC   [facility] ([facility])    [address] [place] [address]  2nd [facility] [address] [phone]            Jul 27, 2017  8:30 AM CDT   (Arrive by 8:15 AM)   Pacemaker Check with Uc Cv Device 1   [facility] ([facility])    [address] [place] [address]  3rd [facility] [address] [phone]              Who to contact     If you have questions or need follow up information about today's clinic visit or your schedule please contact [facility] directly at  "[phone].  Normal or non-critical lab and imaging results will be communicated to you by Celladonhart, letter or phone within 4 business days after the clinic has received the results. If you do not hear from us within 7 days, please contact the clinic through Celladonhart or phone. If you have a critical or abnormal lab result, we will notify you by phone as soon as possible.  Submit refill requests through [facility] or call your pharmacy and they will forward the refill request to us. Please allow 3 business days for your refill to be completed.          Additional Information About Your Visit        Celladonhart Information     [facility] lets you send messages to your doctor, view your test results, renew your prescriptions, schedule appointments and more. To sign up, go to [email or web address] . Click on \"Log in\" on the left side of the screen, which will take you to the Welcome page. Then click on \"Sign up Now\" on the right side of the page.     You will be asked to enter the access code listed below, as well as some personal information. Please follow the directions to create your username and password.     Your access code is: [email or web address]  Expires: 2017  7:31 AM     Your access code will  in 90 days. If you need help or a new code, please call your [facility] clinic or [phone].        Care EveryWhere ID     This is your Care EveryWhere ID. This could be used by other organizations to access your [facility] medical records  [id number]         Blood Pressure from Last 3 Encounters:   17 123/40   17 149/56   17 130/86    Weight from Last 3 Encounters:   17 41.4 kg (91 lb 3.2 oz)   16 41.5 kg (91 lb 8 oz)   10/26/16 41.2 kg (90 lb 14.4 oz)              Today, you had the following     No orders found for display         Today's Medication Changes          These changes are accurate as of: 17 10:24 AM.  If you have any questions, ask your nurse or doctor.               These " medicines have changed or have updated prescriptions.        Dose/Directions    metFORMIN 1000 MG tablet   Commonly known as:  GLUCOPHAGE   This may have changed:    - when to take this  - additional instructions   Used for:  DM (diabetes mellitus) (H)        Take 1 tablet twice daily   Quantity:  180 tablet   Refills:  3                Primary Care Provider Office Phone # Fax #    Brandi Burks [phone]       [facility] [address]   [facility] [address]        Thank you!     Thank you for choosing [facility]  for your care. Our goal is always to provide you with excellent care. Hearing back from our patients is one way we can continue to improve our services. Please take a few minutes to complete the written survey that you may receive in the mail after your visit with us. Thank you!             Your Updated Medication List - Protect others around you: Learn how to safely use, store and throw away your medicines at www.disposemymeds.org.          This list is accurate as of: 4/5/17 10:24 AM.  Always use your most recent med list.                   Brand Name Dispense Instructions for use    * albuterol 108 (90 BASE) MCG/ACT Inhaler    PROAIR HFA/PROVENTIL HFA/VENTOLIN HFA    1 Inhaler    Take 2 puffs prn for wheezing or shortness of breath       * albuterol (2.5 MG/3ML) 0.083% neb solution     360 mL    Take 1 vial (2.5 mg) by nebulization 3 times daily       * albuterol 108 (90 BASE) MCG/ACT Inhaler    PROAIR HFA/PROVENTIL HFA/VENTOLIN HFA    1 Inhaler    Take 2 puffs prn for wheezing       * albuterol (2.5 MG/3ML) 0.083% neb solution      Take 1 ampule by nebulization every 6 hours as needed.       aspirin 81 MG tablet      Take 1 tablet by mouth daily.       Blood Pressure Kit      daily.       budesonide 3 MG 24 hr capsule    ENTOCORT EC    270 capsule    Take 1 capsule (3 mg) by mouth 3 times daily (with meals)       DEMEROL 25 MG/ML injection   Generic drug:  meperidine       Inject 25 mg into the vein as needed. 25 mg IV prior to IgG infusion       dicyclomine 10 MG capsule    BENTYL     Take 10 mg by mouth 4 times daily (before meals and nightly).       diltiazem 360 MG 24 hr CD capsule    CARDIZEM CD; CARTIA XT    90 capsule    Take 1 capsule (360 mg) by mouth daily       diphenhydrAMINE 25 MG tablet    BENADRYL    2 tablet    Take 2 tablets (50 mg) by mouth See Admin Instructions Please take 50 mg by mouth evening before  And morning of the Abd/Pelvis CT scan with contrast per Dr. Adair.  This is a pre medication for contrast dye.       FISH OIL      Unsure of dosage take two caps daily       fluticasone-salmeterol 250-50 MCG/DOSE diskus inhaler    ADVAIR    1 Inhaler    Inhale 1 puff into the lungs 2 times daily.       gabapentin 300 MG capsule    NEURONTIN    180 capsule    Take 2 capsules (600 mg) by mouth 3 times daily       hydrochlorothiazide 25 MG tablet    HYDRODIURIL     Take 25 mg by mouth as needed.       HYDROcodone-acetaminophen  MG per tablet    LORCET     Take 1 tablet by mouth every 6 hours as needed.       Immune Globulin (Human) 25 GM/500ML Soln      Inject 25 g into the vein See Admin Instructions. 25 grams every 3 weeks IVIG       Isosorbide Mononitrate  MG Tb24     90 tablet    Take 1 tablet (120 mg) by mouth daily       lisinopril 40 MG tablet    PRINIVIL/ZESTRIL     Take 1 tablet by mouth 2 times daily.       metFORMIN 1000 MG tablet    GLUCOPHAGE    180 tablet    Take 1 tablet twice daily       metoprolol 50 MG 24 hr tablet    TOPROL-XL    90 tablet    Take 1 tablet (50 mg) by mouth daily       nitroglycerin 0.4 MG sublingual tablet    NITROQUICK    25 tablet    Place 1 tablet under the tongue every 5 minutes as needed.       omeprazole 20 MG CR capsule    priLOSEC    60 capsule    Take 1 capsule (20 mg) by mouth 2 times daily       * blood glucose monitoring lancets     3 Box    Use to test blood sugar 3 times daily or as directed.       * ONE  TOUCH FINE POINT LANCETS      2 times daily.       * ONE TOUCH ULTRA test strip   Generic drug:  blood glucose monitoring     200 strip    Test blood sugar twice a day.       * blood glucose monitoring test strip    TERRY CONTOUR NEXT    270 each    by In Vitro route 3 times daily Use to test blood sugar 3 times daily or as directed.       spironolactone 50 MG tablet    ALDACTONE     Take 50 mg by mouth daily.       tiotropium 18 MCG capsule    SPIRIVA HANDIHALER    30 capsule    One puff every day       TYLENOL 325 MG tablet   Generic drug:  acetaminophen      Take 1-2 tablets by mouth every 6 hours as needed.       * Notice:  This list has 8 medication(s) that are the same as other medications prescribed for you. Read the directions carefully, and ask your doctor or other care provider to review them with you.

## 2017-04-05 NOTE — PATIENT INSTRUCTIONS
Cardiology Provider you saw in clinic today: Dr. Rivas     Follow-up Visit:  You will receive a generator change when your device's battery needs replacement.        Please contact us via [employer] for questions or concerns.    Susie Hunter RN   Electrophysiology Nurse Coordinator.  [phone]    If your question concerns the schedule/appointment times, contact:  [facility]   [phone]    Device Clinic (Pacemakers, ICD, Loop Recorders)   [phone]      You will receive all normal lab and testing results via [employer] or mail if not reviewed in clinic today.   If you need a medication refill please contact your pharmacy.    As always, thank you for trusting us with your health care needs!

## 2017-04-05 NOTE — NURSING NOTE
Chief Complaint   Patient presents with     Breathing Problem     Patient is being seen for follow up of chronic bronchitis      Kavita Harris CMA at 1:07 PM on 4/5/2017

## 2017-04-05 NOTE — MR AVS SNAPSHOT
After Visit Summary   4/5/2017    Jennifer Alvarez    MRN: [id number]           Patient Information     Date Of Birth          1940        Visit Information        Provider Department      4/5/2017 1:10 PM Maxx Elizabeth MD [facility]        Today's Diagnoses     Mixed simple and mucopurulent chronic bronchitis (H)    -  1    Hypogammaglobulinemia (H)           Follow-ups after your visit        Follow-up notes from your care team     Return in about 6 months (around 10/5/2017).      Your next 10 appointments already scheduled     Apr 13, 2017  1:00 PM CDT   Infusion 300 with  SPEC INFUSION, UC 42 ATC   [facility] [place] [facility] ([facility])    [address]  2nd Floor  [facility] [address] [phone]            Jul 27, 2017  8:30 AM CDT   (Arrive by 8:15 AM)   Pacemaker Check with  Cv Device 1   [facility] ([facility])    [address]  3rd Floor  [facility] [address] [phone]            Oct 04, 2017 12:00 PM CDT   Lab with  LAB   [facility] Lab ([facility])    [address]  1st Floor  [facility] [address] [phone]            Oct 04, 2017 12:30 PM CDT   PFT VISIT with  PFL B   [facility] Pulmonary Function Testing ([facility])    [address] [place] [address]  3rd [facility] [address] [phone]            Oct 04, 2017  1:10 PM CDT   (Arrive by 12:55 PM)   Return Interstitial Lung with Maxx Elizabeth MD   [facility] ([facility])    [address]  3rd [facility] [address] [phone]              Future tests that were ordered for you today     Open Future Orders        Priority Expected Expires Ordered    CBC with platelets Routine 10/5/2017 4/5/2018 4/5/2017    Basic  "metabolic panel Routine 10/5/2017 2018 2017    Immunoglobulin G subclasses Routine 10/5/2017 2018 2017    IgG Routine 10/5/2017 2018 2017    Spirometry, Breathing Capacity Routine 10/5/2017 2018 2017    HC DIFFUSING CAPACITY Routine 10/5/2017 2018 2017            Who to contact     If you have questions or need follow up information about today's clinic visit or your schedule please contact [facility] directly at [phone].  Normal or non-critical lab and imaging results will be communicated to you by Zoutonshart, letter or phone within 4 business days after the clinic has received the results. If you do not hear from us within 7 days, please contact the clinic through Zoutonshart or phone. If you have a critical or abnormal lab result, we will notify you by phone as soon as possible.  Submit refill requests through Peer60 or call your pharmacy and they will forward the refill request to us. Please allow 3 business days for your refill to be completed.          Additional Information About Your Visit        Zoutonshart Information     [email or web address] lets you send messages to your doctor, view your test results, renew your prescriptions, schedule appointments and more. To sign up, go to [email or web address] . Click on \"Log in\" on the left side of the screen, which will take you to the Welcome page. Then click on \"Sign up Now\" on the right side of the page.     You will be asked to enter the access code listed below, as well as some personal information. Please follow the directions to create your username and password.     Your access code is: [email or web address]  Expires: 2017  7:31 AM     Your access code will  in 90 days. If you need help or a new code, please call your [place] clinic or [phone].        Care EveryWhere ID     This is your Care EveryWhere ID. This could be used by other organizations to access your [place] medical " "records  [id number]        Your Vitals Were     Pulse Height Pulse Oximetry BMI (Body Mass Index)          80 1.575 m (5' 2.01\") 98% 16.88 kg/m2         Blood Pressure from Last 3 Encounters:   04/05/17 172/68   04/05/17 171/88   03/23/17 123/40    Weight from Last 3 Encounters:   04/05/17 41.9 kg (92 lb 4.8 oz)   04/05/17 41.9 kg (92 lb 4.8 oz)   01/19/17 41.4 kg (91 lb 3.2 oz)                 Today's Medication Changes          These changes are accurate as of: 4/5/17  1:26 PM.  If you have any questions, ask your nurse or doctor.               Start taking these medicines.        Dose/Directions    azithromycin 250 MG tablet   Commonly known as:  ZITHROMAX   Used for:  Mixed simple and mucopurulent chronic bronchitis (H), Hypogammaglobulinemia (H)   Started by:  Maxx Elizabeth MD        Take as directed   Quantity:  6 tablet   Refills:  4         These medicines have changed or have updated prescriptions.        Dose/Directions    metFORMIN 1000 MG tablet   Commonly known as:  GLUCOPHAGE   This may have changed:    - when to take this  - additional instructions   Used for:  DM (diabetes mellitus) (H)        Take 1 tablet twice daily   Quantity:  180 tablet   Refills:  3            Where to get your medicines      These medications were sent to [facility] [address] [address]     Phone:  [phone]     azithromycin 250 MG tablet                Primary Care Provider Office Phone # Fax #    Brandi DILLON Vitaliy [phone]       [facility] [address]   [facility] [address]        Thank you!     Thank you for choosing [facility]  for your care. Our goal is always to provide you with excellent care. Hearing back from our patients is one way we can continue to improve our services. Please take a few minutes to complete the written survey that you may receive in the mail after your visit with us. Thank you!      "        Your Updated Medication List - Protect others around you: Learn how to safely use, store and throw away your medicines at www.disposemymeds.org.          This list is accurate as of: 4/5/17  1:26 PM.  Always use your most recent med list.                   Brand Name Dispense Instructions for use    * albuterol 108 (90 BASE) MCG/ACT Inhaler    PROAIR HFA/PROVENTIL HFA/VENTOLIN HFA    1 Inhaler    Take 2 puffs prn for wheezing or shortness of breath       * albuterol (2.5 MG/3ML) 0.083% neb solution     360 mL    Take 1 vial (2.5 mg) by nebulization 3 times daily       * albuterol 108 (90 BASE) MCG/ACT Inhaler    PROAIR HFA/PROVENTIL HFA/VENTOLIN HFA    1 Inhaler    Take 2 puffs prn for wheezing       * albuterol (2.5 MG/3ML) 0.083% neb solution      Take 1 ampule by nebulization every 6 hours as needed.       aspirin 81 MG tablet      Take 1 tablet by mouth daily.       azithromycin 250 MG tablet    ZITHROMAX    6 tablet    Take as directed       Blood Pressure Kit      daily.       budesonide 3 MG 24 hr capsule    ENTOCORT EC    270 capsule    Take 1 capsule (3 mg) by mouth 3 times daily (with meals)       DEMEROL 25 MG/ML injection   Generic drug:  meperidine      Inject 25 mg into the vein as needed. 25 mg IV prior to IgG infusion       dicyclomine 10 MG capsule    BENTYL     Take 10 mg by mouth 4 times daily (before meals and nightly).       diltiazem 360 MG 24 hr CD capsule    CARDIZEM CD; CARTIA XT    90 capsule    Take 1 capsule (360 mg) by mouth daily       diphenhydrAMINE 25 MG tablet    BENADRYL    2 tablet    Take 2 tablets (50 mg) by mouth See Admin Instructions Please take 50 mg by mouth evening before  And morning of the Abd/Pelvis CT scan with contrast per Dr. Adair.  This is a pre medication for contrast dye.       FISH OIL      Unsure of dosage take two caps daily       fluticasone-salmeterol 250-50 MCG/DOSE diskus inhaler    ADVAIR    1 Inhaler    Inhale 1 puff into the lungs 2 times daily.        gabapentin 300 MG capsule    NEURONTIN    180 capsule    Take 2 capsules (600 mg) by mouth 3 times daily       hydrochlorothiazide 25 MG tablet    HYDRODIURIL     Take 25 mg by mouth as needed.       HYDROcodone-acetaminophen  MG per tablet    LORCET     Take 1 tablet by mouth every 6 hours as needed.       Immune Globulin (Human) 25 GM/500ML Soln      Inject 25 g into the vein See Admin Instructions. 25 grams every 3 weeks IVIG       Isosorbide Mononitrate  MG Tb24     90 tablet    Take 1 tablet (120 mg) by mouth daily       lisinopril 40 MG tablet    PRINIVIL/ZESTRIL     Take 1 tablet by mouth 2 times daily.       metFORMIN 1000 MG tablet    GLUCOPHAGE    180 tablet    Take 1 tablet twice daily       metoprolol 50 MG 24 hr tablet    TOPROL-XL    90 tablet    Take 1 tablet (50 mg) by mouth daily       nitroglycerin 0.4 MG sublingual tablet    NITROQUICK    25 tablet    Place 1 tablet under the tongue every 5 minutes as needed.       omeprazole 20 MG CR capsule    priLOSEC    60 capsule    Take 1 capsule (20 mg) by mouth 2 times daily       * blood glucose monitoring lancets     3 Box    Use to test blood sugar 3 times daily or as directed.       * ONE TOUCH FINE POINT LANCETS      2 times daily.       * ONE TOUCH ULTRA test strip   Generic drug:  blood glucose monitoring     200 strip    Test blood sugar twice a day.       * blood glucose monitoring test strip    TERRY CONTOUR NEXT    270 each    by In Vitro route 3 times daily Use to test blood sugar 3 times daily or as directed.       spironolactone 50 MG tablet    ALDACTONE     Take 50 mg by mouth daily.       tiotropium 18 MCG capsule    SPIRIVA HANDIHALER    30 capsule    One puff every day       TYLENOL 325 MG tablet   Generic drug:  acetaminophen      Take 1-2 tablets by mouth every 6 hours as needed.       * Notice:  This list has 8 medication(s) that are the same as other medications prescribed for you. Read the directions carefully, and  ask your doctor or other care provider to review them with you.

## 2017-04-05 NOTE — PATIENT INSTRUCTIONS
It was a pleasure to see you in clinic today.  Please do not hesitate to call with any questions or concerns.  We look forward to seeing you in clinic at your next device check in 3 months.    Linn Bradshaw, RN, MS, CCRN  Electrophysiology Nurse Clinician  [facility]    During Business Hours Please Call:  [phone]  After Hours Please Call:  [phone] - select option #4 and ask for job code [id number]

## 2017-04-05 NOTE — PROGRESS NOTES
Patient seen in clinic for evaluation and iterative programming of her [employer] dual lead pacemaker per MD orders.  Patient is scheduled to see Dr. Rivas today.  Normal pacemaker function.  2 VHR episodes recorded - 4-6 sec, 168-214 bpm.  Intrinsic rhythm = NSR @ 85 bpm.  AP = 27%.   = <1%.  Estimated battery longevity to MARIA T = 0.75-1.5 years.  Patient reports that she is feeling well and denies specific complaints.  Plan for patient to return to clinic in 3 months to monitor battery status.  Dr. Rivas notified of interrogation results.    Dual lead pacemaker

## 2017-04-05 NOTE — LETTER
4/5/2017       RE: Jennifer Alvarez  [facility] [place] [address]     Dear Colleague,    Thank you for referring your patient, Jennifer Alvarez, to the [facility] at [facility]. Please see a copy of my visit note below.    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  Jennifer has had numerous respiratory tract infections and chronic bronchitis.  She is currently receiving IVIG therapy every 3 weeks.  Her last visit with me was 6 months ago.  Since her last visit with me, she was visiting relatives down in [place] and was taking care of her grandchildren who were sick and she developed a respiratory tract infection.  She took azithromycin which seemed to help a little bit.  However, she is still noticing some congestion which has increased since coming off the azithromycin.  She does not describe fever.  She does feels a little bit wheezy.  She is using albuterol and Advair inhalers as well as albuterol nebulizers.  She is currently not on Spiriva.  She describes that she really is not wheezing unless she is having a respiratory tract infection.  She is next scheduled for IVIG infusion next week on 04/13.       REVIEW OF SYSTEMS:  Her weight has been around 90-92 pounds.  She has been drinking Ensure which has been helping to try to keep that stable.  She continues to have problems with reflux and is on Prilosec.  She has been having some atypical chest pain and is followed by Dr. Adair in Cardiology for that.  In addition, she has been having leg cramps and is on gabapentin for that.       PHYSICAL EXAMINATION:   VITAL SIGNS:  Blood pressure today is 172/68.  Pulse is 80.  Sats are 98%.  Weight is 92 pounds.   HEENT:  Unremarkable.  No thrush is noted.   NECK:  No thyromegaly or adenopathy is noted.   CHEST:  She has some rhonchi, no wheezing.   HEART:  Regular rate and rhythm,  normal S1, S2, and a 3/6 systolic ejection murmur.   ABDOMEN:  Nontender.   EXTREMITIES:  I do not see any clubbing, cyanosis or edema today.       LABORATORY:  She had immunoglobulin levels drawn in March which were within normal limits.      ASSESSMENT AND PLAN:  The patient has immunoglobulin deficiency and recurrent respiratory tract infections.  She has been sick recently and got some improvement with azithromycin but relapsed when she came off of it.  I will give her another course of azithromycin and refills.  We will continue with her monthly IVIG infusions as she is due next week for that.  In addition, we will continue with albuterol and Advair inhalers.  I will follow up with her in 6 months with labs and PFTs at that time.       Maxx Elizabeth  Pulmonary/Critical Care  April 7, 2017 10:42 AM

## 2017-04-07 ENCOUNTER — CARE COORDINATION (OUTPATIENT)
Dept: CARDIOLOGY | Facility: CLINIC | Age: 77
End: 2017-04-07

## 2017-04-07 DIAGNOSIS — T50.8X5A ALLERGIC REACTION TO CONTRAST DYE: Primary | ICD-10-CM

## 2017-04-07 NOTE — PROGRESS NOTES
Need to discuss lipid panel and procedure.    May 17, 2017  Left message for Jennifer. She is scheduled for a renal angiogram and stent placement on June 28th. Arrive at the [facility] Waiting room at 11 am.   She should be NPO 8 hours prior to the procedure. She should take all her morning medications with water. She should hold her metformin for 2 days prior to the procedure. She will need a  for the procedure as she will have sedation.  She should take prednisone 60 mg by mouth the evening before the procedure and 30 mg by mouth the morning of the procedure.  She should take benadryl 50 mg by mouth the evening before the procedure and 50 mg by mouth the morning of the procedure.   Will confirm with Jennifer with return phone call.    May 18, 2017  Spoke with Jennifer on the phone. She is agreeable to the plan. Sending letter with instructions.

## 2017-04-07 NOTE — LETTER
May 18, 2017      TO: Jennifer Yane Alvarez  PO   [facility] [address]         Dear Jennifer,    You are scheduled for a renal angiogram and probable renal artery stenting on Wednesday, June 28th.   Arrive at the [facility], [place] [address] and check in at the [facility] Waiting room at 11am.      Please follow the following instructions for prep;  * you will need a  for the procedure as you will be receiving sedation. They will cancel the procedure if you do not have a .  * You should not eat or drink 8 hours prior to the procedure. You should take all your morning medications as prescribed.  * Hold the metformin 2 days prior to the procedure.  * You will need to premedicate for your contrast allergy.  1. Take prednisone 60 mg by mouth and benadryl 50 mg by mouth the evening before the procedure.  2. Take prednisone 30 mg by mouth and benadryl 50 mg by mouth the morning of the procedure.  * there is a small possibility that you may stay overnight in the hospital, however, plan on leaving same day.    It was a pleasure to see you at your last clinic visit. Please do not hesitate to call me if you have any questions or concerns.    Sincerely,    Samantha Ordaz RN  Nurse Coordinator for Jaleel Adair MD  [phone]

## 2017-04-13 ENCOUNTER — INFUSION THERAPY VISIT (OUTPATIENT)
Dept: INFUSION THERAPY | Facility: CLINIC | Age: 77
End: 2017-04-13
Attending: INTERNAL MEDICINE
Payer: MEDICARE

## 2017-04-13 VITALS
HEART RATE: 71 BPM | TEMPERATURE: 98.3 F | DIASTOLIC BLOOD PRESSURE: 64 MMHG | RESPIRATION RATE: 16 BRPM | SYSTOLIC BLOOD PRESSURE: 148 MMHG

## 2017-04-13 DIAGNOSIS — D80.1 HYPOGAMMAGLOBULINEMIA (H): ICD-10-CM

## 2017-04-13 DIAGNOSIS — D80.2 IGA DEFICIENCY (H): Primary | ICD-10-CM

## 2017-04-13 PROCEDURE — 25000132 ZZH RX MED GY IP 250 OP 250 PS 637: Mod: ZF | Performed by: INTERNAL MEDICINE

## 2017-04-13 PROCEDURE — 96365 THER/PROPH/DIAG IV INF INIT: CPT

## 2017-04-13 PROCEDURE — 40000269 ZZH STATISTIC NO CHARGE FACILITY FEE: Mod: ZF

## 2017-04-13 PROCEDURE — 96366 THER/PROPH/DIAG IV INF ADDON: CPT

## 2017-04-13 PROCEDURE — 25000128 H RX IP 250 OP 636: Mod: ZF

## 2017-04-13 PROCEDURE — A9270 NON-COVERED ITEM OR SERVICE: HCPCS | Mod: ZF | Performed by: INTERNAL MEDICINE

## 2017-04-13 PROCEDURE — 25000128 H RX IP 250 OP 636: Mod: ZF | Performed by: INTERNAL MEDICINE

## 2017-04-13 PROCEDURE — 96375 TX/PRO/DX INJ NEW DRUG ADDON: CPT

## 2017-04-13 RX ORDER — MEPERIDINE HYDROCHLORIDE 25 MG/ML
25 INJECTION INTRAMUSCULAR; INTRAVENOUS; SUBCUTANEOUS
Status: CANCELLED
Start: 2017-04-13

## 2017-04-13 RX ORDER — ACETAMINOPHEN 325 MG/1
650 TABLET ORAL ONCE
Status: CANCELLED
Start: 2017-04-13 | End: 2017-04-13

## 2017-04-13 RX ORDER — ACETAMINOPHEN 325 MG/1
650 TABLET ORAL ONCE
Status: COMPLETED | OUTPATIENT
Start: 2017-04-13 | End: 2017-04-13

## 2017-04-13 RX ORDER — MEPERIDINE HYDROCHLORIDE 25 MG/ML
25 INJECTION INTRAMUSCULAR; INTRAVENOUS; SUBCUTANEOUS
Status: DISCONTINUED | OUTPATIENT
Start: 2017-04-13 | End: 2017-04-13 | Stop reason: HOSPADM

## 2017-04-13 RX ORDER — DIPHENHYDRAMINE HCL 25 MG
25 CAPSULE ORAL ONCE
Status: CANCELLED
Start: 2017-04-13 | End: 2017-04-13

## 2017-04-13 RX ORDER — DIPHENHYDRAMINE HCL 25 MG
25 CAPSULE ORAL ONCE
Status: COMPLETED | OUTPATIENT
Start: 2017-04-13 | End: 2017-04-13

## 2017-04-13 RX ADMIN — ACETAMINOPHEN 650 MG: 325 TABLET ORAL at 13:02

## 2017-04-13 RX ADMIN — DIPHENHYDRAMINE HYDROCHLORIDE 25 MG: 25 CAPSULE ORAL at 13:02

## 2017-04-13 RX ADMIN — METHYLPREDNISOLONE SODIUM SUCCINATE 100 MG: 125 INJECTION, POWDER, FOR SOLUTION INTRAMUSCULAR; INTRAVENOUS at 13:03

## 2017-04-13 RX ADMIN — IMMUNE GLOBULIN INFUSION (HUMAN) 25 G: 100 INJECTION, SOLUTION INTRAVENOUS; SUBCUTANEOUS at 13:11

## 2017-04-13 RX ADMIN — MEPERIDINE HYDROCHLORIDE 25 MG: 25 INJECTION, SOLUTION INTRAMUSCULAR; INTRAVENOUS; SUBCUTANEOUS at 13:07

## 2017-04-13 NOTE — PATIENT INSTRUCTIONS
Immune Globulin Solution for injection  What is this medicine?  IMMUNE GLOBULIN (im MUNE GLOB sapna cynthia) helps to prevent or reduce the severity of certain infections in patients who are at risk. This medicine is collected from the pooled blood of many donors. It is used to treat immune system problems, thrombocytopenia, and Kawasaki syndrome.  This medicine may be used for other purposes; ask your health care provider or pharmacist if you have questions.  What should I tell my health care provider before I take this medicine?  They need to know if you have any of these conditions:    diabetes    extremely low or no immune antibodies in the blood    heart disease    history of blood clots    hyperprolinemia    infection in the blood, sepsis    kidney disease    taking medicine that may change kidney function - ask your health care provider about your medicine    an unusual or allergic reaction to human immune globulin, albumin, maltose, sucrose, polysorbate 80, other medicines, foods, dyes, or preservatives    pregnant or trying to get pregnant    breast-feeding  How should I use this medicine?  This medicine is for injection into a muscle or infusion into a vein or skin. It is usually given by a health care professional in a hospital or clinic setting.  In rare cases, some brands of this medicine might be given at home. You will be taught how to give this medicine. Use exactly as directed. Take your medicine at regular intervals. Do not take your medicine more often than directed.  Talk to your pediatrician regarding the use of this medicine in children. Special care may be needed.  Overdosage: If you think you have taken too much of this medicine contact a poison control center or emergency room at once.  NOTE: This medicine is only for you. Do not share this medicine with others.  What if I miss a dose?  It is important not to miss your dose. Call your doctor or health care professional if you are unable to keep  an appointment. If you give yourself the medicine and you miss a dose, take it as soon as you can. If it is almost time for your next dose, take only that dose. Do not take double or extra doses.  What may interact with this medicine?    aspirin and aspirin-like medicines    cisplatin    cyclosporine    medicines for infection like acyclovir, adefovir, amphotericin B, bacitracin, cidofovir, foscarnet, ganciclovir, gentamicin, pentamidine, vancomycin    NSAIDS, medicines for pain and inflammation, like ibuprofen or naproxen    pamidronate    vaccines    zoledronic acid  This list may not describe all possible interactions. Give your health care provider a list of all the medicines, herbs, non-prescription drugs, or dietary supplements you use. Also tell them if you smoke, drink alcohol, or use illegal drugs. Some items may interact with your medicine.  What should I watch for while using this medicine?  Your condition will be monitored carefully while you are receiving this medicine.  This medicine is made from pooled blood donations of many different people. It may be possible to pass an infection in this medicine. However, the donors are screened for infections and all products are tested for HIV and hepatitis. The medicine is treated to kill most or all bacteria and viruses. Talk to your doctor about the risks and benefits of this medicine.  Do not have vaccinations for at least 14 days before, or until at least 3 months after receiving this medicine.  What side effects may I notice from receiving this medicine?  Side effects that you should report to your doctor or health care professional as soon as possible:    allergic reactions like skin rash, itching or hives, swelling of the face, lips, or tongue    breathing problems    chest pain or tightness    fever, chills    headache with nausea, vomiting    neck pain or difficulty moving neck    pain when moving eyes    pain, swelling, warmth in the leg    problems  with balance, talking, walking    sudden weight gain    swelling of the ankles, feet, hands    trouble passing urine or change in the amount of urine  Side effects that usually do not require medical attention (report to your doctor or health care professional if they continue or are bothersome):    dizzy, drowsy    flushing    increased sweating    leg cramps    muscle aches and pains    pain at site where injected  This list may not describe all possible side effects. Call your doctor for medical advice about side effects. You may report side effects to FDA at 7-652-FDA-6818.  Where should I keep my medicine?  Keep out of the reach of children.  This drug is usually given in a hospital or clinic and will not be stored at home.  In rare cases, some brands of this medicine may be given at home. If you are using this medicine at home, you will be instructed on how to store this medicine. Throw away any unused medicine after the expiration date on the label.  NOTE: This sheet is a summary. It may not cover all possible information. If you have questions about this medicine, talk to your doctor, pharmacist, or health care provider.  NOTE:This sheet is a summary. It may not cover all possible information. If you have questions about this medicine, talk to your doctor, pharmacist, or health care provider. Copyright  2016 [employer] Standard

## 2017-04-13 NOTE — MR AVS SNAPSHOT
After Visit Summary   4/13/2017    Jennifer Alvarez    MRN: [id number]           Patient Information     Date Of Birth          1940        Visit Information        Provider Department      4/13/2017 1:00 PM UC 42 ATC; [facility] SPE[facility]        Today's Diagnoses     IgA deficiency (H)    -  1    Hypogammaglobulinemia (H)          Care Instructions      Immune Globulin Solution for injection  What is this medicine?  IMMUNE GLOBULIN (im MUNE GLOB sapna cynthia) helps to prevent or reduce the severity of certain infections in patients who are at risk. This medicine is collected from the pooled blood of many donors. It is used to treat immune system problems, thrombocytopenia, and Kawasaki syndrome.  This medicine may be used for other purposes; ask your health care provider or pharmacist if you have questions.  What should I tell my health care provider before I take this medicine?  They need to know if you have any of these conditions:    diabetes    extremely low or no immune antibodies in the blood    heart disease    history of blood clots    hyperprolinemia    infection in the blood, sepsis    kidney disease    taking medicine that may change kidney function - ask your health care provider about your medicine    an unusual or allergic reaction to human immune globulin, albumin, maltose, sucrose, polysorbate 80, other medicines, foods, dyes, or preservatives    pregnant or trying to get pregnant    breast-feeding  How should I use this medicine?  This medicine is for injection into a muscle or infusion into a vein or skin. It is usually given by a health care professional in a hospital or clinic setting.  In rare cases, some brands of this medicine might be given at home. You will be taught how to give this medicine. Use exactly as directed. Take your medicine at regular intervals. Do not take your medicine more often than directed.  Talk to  your pediatrician regarding the use of this medicine in children. Special care may be needed.  Overdosage: If you think you have taken too much of this medicine contact a poison control center or emergency room at once.  NOTE: This medicine is only for you. Do not share this medicine with others.  What if I miss a dose?  It is important not to miss your dose. Call your doctor or health care professional if you are unable to keep an appointment. If you give yourself the medicine and you miss a dose, take it as soon as you can. If it is almost time for your next dose, take only that dose. Do not take double or extra doses.  What may interact with this medicine?    aspirin and aspirin-like medicines    cisplatin    cyclosporine    medicines for infection like acyclovir, adefovir, amphotericin B, bacitracin, cidofovir, foscarnet, ganciclovir, gentamicin, pentamidine, vancomycin    NSAIDS, medicines for pain and inflammation, like ibuprofen or naproxen    pamidronate    vaccines    zoledronic acid  This list may not describe all possible interactions. Give your health care provider a list of all the medicines, herbs, non-prescription drugs, or dietary supplements you use. Also tell them if you smoke, drink alcohol, or use illegal drugs. Some items may interact with your medicine.  What should I watch for while using this medicine?  Your condition will be monitored carefully while you are receiving this medicine.  This medicine is made from pooled blood donations of many different people. It may be possible to pass an infection in this medicine. However, the donors are screened for infections and all products are tested for HIV and hepatitis. The medicine is treated to kill most or all bacteria and viruses. Talk to your doctor about the risks and benefits of this medicine.  Do not have vaccinations for at least 14 days before, or until at least 3 months after receiving this medicine.  What side effects may I notice from  receiving this medicine?  Side effects that you should report to your doctor or health care professional as soon as possible:    allergic reactions like skin rash, itching or hives, swelling of the face, lips, or tongue    breathing problems    chest pain or tightness    fever, chills    headache with nausea, vomiting    neck pain or difficulty moving neck    pain when moving eyes    pain, swelling, warmth in the leg    problems with balance, talking, walking    sudden weight gain    swelling of the ankles, feet, hands    trouble passing urine or change in the amount of urine  Side effects that usually do not require medical attention (report to your doctor or health care professional if they continue or are bothersome):    dizzy, drowsy    flushing    increased sweating    leg cramps    muscle aches and pains    pain at site where injected  This list may not describe all possible side effects. Call your doctor for medical advice about side effects. You may report side effects to FDA at 8-933-FDA-3780.  Where should I keep my medicine?  Keep out of the reach of children.  This drug is usually given in a hospital or clinic and will not be stored at home.  In rare cases, some brands of this medicine may be given at home. If you are using this medicine at home, you will be instructed on how to store this medicine. Throw away any unused medicine after the expiration date on the label.  NOTE: This sheet is a summary. It may not cover all possible information. If you have questions about this medicine, talk to your doctor, pharmacist, or health care provider.  NOTE:This sheet is a summary. It may not cover all possible information. If you have questions about this medicine, talk to your doctor, pharmacist, or health care provider. Copyright  2016 [employer]              Follow-ups after your visit        Your next 10 appointments already scheduled     Jul 27, 2017  8:30 AM CDT   (Arrive by 8:15 AM)   Pacemaker Check  "with  Cv Device 1   [facility] ([facility])    [address]  [address] [facility] [place] [id number] [phone]            Oct 04, 2017 12:00 PM CDT   Lab with  LAB   [facility] ([facility])    [address]  [address] [facility] [place] [id number] [phone]            Oct 04, 2017 12:30 PM CDT   PFT VISIT with  PFL B   [facility] Pulmonary Function Testing ([facility])    [address] [place] [address]  [address] [facility] [place] [id number] [phone]            Oct 04, 2017  1:10 PM CDT   (Arrive by 12:55 PM)   Return Interstitial Lung with Maxx Elizabeth MD   [facility] ([facility])    [address] [place] [id number] [phone]              Who to contact     If you have questions or need follow up information about today's clinic visit or your schedule please contact [facility] directly at [phone].  Normal or non-critical lab and imaging results will be communicated to you by [facility], letter or phone within 4 business days after the clinic has received the results. If you do not hear from us within 7 days, please contact the clinic through [facility] or phone. If you have a critical or abnormal lab result, we will notify you by phone as soon as possible.  Submit refill requests through [employer] or call your pharmacy and they will forward the refill request to us. Please allow 3 business days for your refill to be completed.          Additional Information About Your Visit        [facility] Information     [employer] lets you send messages to your doctor, view your test results, renew your prescriptions, schedule appointments and more. To sign up, go to [email or web address] . Click on \"Log in\" on the left side of the screen, which will take you to the Welcome page. Then click " "on \"Sign up Now\" on the right side of the page.     You will be asked to enter the access code listed below, as well as some personal information. Please follow the directions to create your username and password.     Your access code is: [email or web address]  Expires: 2017  7:31 AM     Your access code will  in 90 days. If you need help or a new code, please call your [facility] or [phone].        Care EveryWhere ID     This is your Care EveryWhere ID. This could be used by other organizations to access your [place] medical records  [id number]        Your Vitals Were     Pulse Temperature Respirations             71 98.3  F (36.8  C) (Oral) 16          Blood Pressure from Last 3 Encounters:   17 148/64   17 172/68   17 171/88    Weight from Last 3 Encounters:   17 41.9 kg (92 lb 4.8 oz)   17 41.9 kg (92 lb 4.8 oz)   17 41.4 kg (91 lb 3.2 oz)              We Performed the Following     Treatment Conditions     Treatment Conditions          Today's Medication Changes          These changes are accurate as of: 17  3:45 PM.  If you have any questions, ask your nurse or doctor.               These medicines have changed or have updated prescriptions.        Dose/Directions    metFORMIN 1000 MG tablet   Commonly known as:  GLUCOPHAGE   This may have changed:    - when to take this  - additional instructions   Used for:  DM (diabetes mellitus) (H)        Take 1 tablet twice daily   Quantity:  180 tablet   Refills:  3                Primary Care Provider Office Phone # Fax #    Brandi DILLON Vitaliy [phone]       [facility] [address]   [facility] [address]        Thank you!     Thank you for choosing [facility]  for your care. Our goal is always to provide you with excellent care. Hearing back from our patients is one way we can continue to improve our services. Please take a few minutes to complete the " written survey that you may receive in the mail after your visit with us. Thank you!             Your Updated Medication List - Protect others around you: Learn how to safely use, store and throw away your medicines at www.disposemymeds.org.          This list is accurate as of: 4/13/17  3:45 PM.  Always use your most recent med list.                   Brand Name Dispense Instructions for use    * albuterol 108 (90 BASE) MCG/ACT Inhaler    PROAIR HFA/PROVENTIL HFA/VENTOLIN HFA    1 Inhaler    Take 2 puffs prn for wheezing or shortness of breath       * albuterol (2.5 MG/3ML) 0.083% neb solution     360 mL    Take 1 vial (2.5 mg) by nebulization 3 times daily       * albuterol 108 (90 BASE) MCG/ACT Inhaler    PROAIR HFA/PROVENTIL HFA/VENTOLIN HFA    1 Inhaler    Take 2 puffs prn for wheezing       * albuterol (2.5 MG/3ML) 0.083% neb solution      Take 1 ampule by nebulization every 6 hours as needed.       aspirin 81 MG tablet      Take 1 tablet by mouth daily.       azithromycin 250 MG tablet    ZITHROMAX    6 tablet    Take as directed       Blood Pressure Kit      daily.       budesonide 3 MG 24 hr capsule    ENTOCORT EC    270 capsule    Take 1 capsule (3 mg) by mouth 3 times daily (with meals)       DEMEROL 25 MG/ML injection   Generic drug:  meperidine      Inject 25 mg into the vein as needed. 25 mg IV prior to IgG infusion       dicyclomine 10 MG capsule    BENTYL     Take 10 mg by mouth 4 times daily (before meals and nightly).       diltiazem 360 MG 24 hr CD capsule    CARDIZEM CD; CARTIA XT    90 capsule    Take 1 capsule (360 mg) by mouth daily       diphenhydrAMINE 25 MG tablet    BENADRYL    2 tablet    Take 2 tablets (50 mg) by mouth See Admin Instructions Please take 50 mg by mouth evening before  And morning of the Abd/Pelvis CT scan with contrast per Dr. Adair.  This is a pre medication for contrast dye.       FISH OIL      Unsure of dosage take two caps daily       fluticasone-salmeterol 250-50  MCG/DOSE diskus inhaler    ADVAIR    1 Inhaler    Inhale 1 puff into the lungs 2 times daily.       gabapentin 300 MG capsule    NEURONTIN    180 capsule    Take 2 capsules (600 mg) by mouth 3 times daily       hydrochlorothiazide 25 MG tablet    HYDRODIURIL     Take 25 mg by mouth as needed.       HYDROcodone-acetaminophen  MG per tablet    LORCET     Take 1 tablet by mouth every 6 hours as needed.       Immune Globulin (Human) 25 GM/500ML Soln      Inject 25 g into the vein See Admin Instructions. 25 grams every 3 weeks IVIG       Isosorbide Mononitrate  MG Tb24     90 tablet    Take 1 tablet (120 mg) by mouth daily       lisinopril 40 MG tablet    PRINIVIL/ZESTRIL     Take 1 tablet by mouth 2 times daily.       metFORMIN 1000 MG tablet    GLUCOPHAGE    180 tablet    Take 1 tablet twice daily       metoprolol 50 MG 24 hr tablet    TOPROL-XL    90 tablet    Take 1 tablet (50 mg) by mouth daily       nitroglycerin 0.4 MG sublingual tablet    NITROQUICK    25 tablet    Place 1 tablet under the tongue every 5 minutes as needed.       omeprazole 20 MG CR capsule    priLOSEC    60 capsule    Take 1 capsule (20 mg) by mouth 2 times daily       * blood glucose monitoring lancets     3 Box    Use to test blood sugar 3 times daily or as directed.       * ONE TOUCH FINE POINT LANCETS      2 times daily.       * ONE TOUCH ULTRA test strip   Generic drug:  blood glucose monitoring     200 strip    Test blood sugar twice a day.       * blood glucose monitoring test strip    TERRY CONTOUR NEXT    270 each    by In Vitro route 3 times daily Use to test blood sugar 3 times daily or as directed.       spironolactone 50 MG tablet    ALDACTONE     Take 50 mg by mouth daily.       tiotropium 18 MCG capsule    SPIRIVA HANDIHALER    30 capsule    One puff every day       TYLENOL 325 MG tablet   Generic drug:  acetaminophen      Take 1-2 tablets by mouth every 6 hours as needed.       * Notice:  This list has 8 medication(s)  that are the same as other medications prescribed for you. Read the directions carefully, and ask your doctor or other care provider to review them with you.

## 2017-04-13 NOTE — PROGRESS NOTES
Nursing Note  Jennifer Alvarez [facility]:   Chief Complaint   Patient presents with     Infusion     IVIG     During [facility]'s [facility] appointment, orders from Dr. Elizabeth were completed.  Frequency: every 3 weeks    Progress note:  Patient identification verified by name and date of birth.  Assessment completed.  Vitals recorded in Doc Flowsheets.  Patient was provided with education regarding infusion and possible side effects.  Patient verbalized understanding.      needed: No  Premedications: administered per order.  Infusion Rates: 12 ml/hr x 15 min, 25 ml/hr x 15 min, 65 ml/hr x 15 min, final rate 125 ml/hr  Approximate Infusion length:3 hours.   Labs: were not ordered for this appointment.  Vascular access: peripheral IV placed today.  Treatment Conditions: patient denies fever, chills, signs of infection, recent illness, on antibiotics, productive cough or elevated temperature.  Patient tolerated infusion: well.    Drug Waste Record    Drug Name: solu-medrol  Dose: 100 mg given  Route administered: IV  NDC #: 7045-8529-36  Amount of waste(mL):0.5 ml  Reason for waste: Single use vial      Discharge Plan:   Follow up plan of care with: ongoing infusions at [facility].  Discharge instructions were reviewed with patient.  Patient/representative verbalized understanding of discharge instructions and all questions answered.  Patient discharged [facility] in stable condition.    Roxann Freeman RN       Administrations This Visit     acetaminophen (TYLENOL) tablet 650 mg     Admin Date Action Dose Route Administered By             04/13/2017 Given 650 mg Oral Roxann Freeman RN                    diphenhydrAMINE (BENADRYL) capsule 25 mg     Admin Date Action Dose Route Administered By             04/13/2017 Given 25 mg Oral Roxann Freeman RN                     immune globulin - (GAMMAGARD)sucrose free 10 % injection 25 g     Admin Date Action Dose Route Administered By             04/13/2017 New Bag 25 g Intravenous Roxann Freeman, RN                    meperidine (DEMEROL) injection 25 mg     Admin Date Action Dose Route Administered By             04/13/2017 Given 25 mg Intravenous Roxann Freeman, CARLIN                    methylPREDNISolone sodium succinate (solu-MEDROL) 100 mg      Admin Date Action Dose Route Administered By             04/13/2017 New Bag 100 mg Intravenous Roxann Freeman, CARLIN                            /72 (BP Location: Left arm)  Pulse 82  Temp 98.3  F (36.8  C) (Oral)  Resp 18

## 2017-04-14 ENCOUNTER — TELEPHONE (OUTPATIENT)
Dept: CARDIOLOGY | Facility: CLINIC | Age: 77
End: 2017-04-14

## 2017-04-14 DIAGNOSIS — D80.1 HYPOGAMMAGLOBULINEMIA (H): ICD-10-CM

## 2017-04-14 DIAGNOSIS — J41.8 MIXED SIMPLE AND MUCOPURULENT CHRONIC BRONCHITIS (H): ICD-10-CM

## 2017-04-14 NOTE — TELEPHONE ENCOUNTER
"Pt called in to triage requesting to speak to Samantha Ordaz about an \"upcoming procedure\" that she has questions about. Pt was unsure of the name of the procedure. Pt also states her BPs have been running high since she saw Dr. Adair in clinic back in December of 2016, with SBPs between 158-178. Pt states she can be reached on either her home or mobile number.  "

## 2017-05-04 ENCOUNTER — INFUSION THERAPY VISIT (OUTPATIENT)
Dept: INFUSION THERAPY | Facility: CLINIC | Age: 77
End: 2017-05-04
Attending: INTERNAL MEDICINE
Payer: MEDICARE

## 2017-05-04 VITALS
RESPIRATION RATE: 16 BRPM | DIASTOLIC BLOOD PRESSURE: 63 MMHG | SYSTOLIC BLOOD PRESSURE: 123 MMHG | TEMPERATURE: 98.8 F | HEART RATE: 65 BPM

## 2017-05-04 DIAGNOSIS — D80.1 HYPOGAMMAGLOBULINEMIA (H): ICD-10-CM

## 2017-05-04 DIAGNOSIS — D80.2 IGA DEFICIENCY (H): Primary | ICD-10-CM

## 2017-05-04 DIAGNOSIS — I70.1 RENAL ARTERY STENOSIS (H): Primary | ICD-10-CM

## 2017-05-04 PROCEDURE — 25000128 H RX IP 250 OP 636: Mod: ZF

## 2017-05-04 PROCEDURE — 40000269 ZZH STATISTIC NO CHARGE FACILITY FEE: Mod: ZF

## 2017-05-04 PROCEDURE — A9270 NON-COVERED ITEM OR SERVICE: HCPCS | Mod: ZF | Performed by: INTERNAL MEDICINE

## 2017-05-04 PROCEDURE — 25000128 H RX IP 250 OP 636: Mod: ZF | Performed by: INTERNAL MEDICINE

## 2017-05-04 PROCEDURE — 96375 TX/PRO/DX INJ NEW DRUG ADDON: CPT

## 2017-05-04 PROCEDURE — 96366 THER/PROPH/DIAG IV INF ADDON: CPT

## 2017-05-04 PROCEDURE — 25000132 ZZH RX MED GY IP 250 OP 250 PS 637: Mod: ZF | Performed by: INTERNAL MEDICINE

## 2017-05-04 PROCEDURE — 96365 THER/PROPH/DIAG IV INF INIT: CPT

## 2017-05-04 RX ORDER — MEPERIDINE HYDROCHLORIDE 25 MG/ML
25 INJECTION INTRAMUSCULAR; INTRAVENOUS; SUBCUTANEOUS
Status: CANCELLED
Start: 2017-05-04

## 2017-05-04 RX ORDER — DIPHENHYDRAMINE HYDROCHLORIDE 50 MG/ML
25 INJECTION INTRAMUSCULAR; INTRAVENOUS ONCE
Status: CANCELLED | OUTPATIENT
Start: 2017-05-04

## 2017-05-04 RX ORDER — ACETAMINOPHEN 325 MG/1
650 TABLET ORAL ONCE
Status: CANCELLED
Start: 2017-05-04 | End: 2017-05-04

## 2017-05-04 RX ORDER — METHYLPREDNISOLONE SODIUM SUCCINATE 125 MG/2ML
100 INJECTION, POWDER, LYOPHILIZED, FOR SOLUTION INTRAMUSCULAR; INTRAVENOUS ONCE
Status: COMPLETED | OUTPATIENT
Start: 2017-05-04 | End: 2017-05-04

## 2017-05-04 RX ORDER — ACETAMINOPHEN 325 MG/1
650 TABLET ORAL ONCE
Status: COMPLETED | OUTPATIENT
Start: 2017-05-04 | End: 2017-05-04

## 2017-05-04 RX ORDER — DIPHENHYDRAMINE HCL 25 MG
25 CAPSULE ORAL ONCE
Status: COMPLETED | OUTPATIENT
Start: 2017-05-04 | End: 2017-05-04

## 2017-05-04 RX ORDER — DIPHENHYDRAMINE HCL 25 MG
25 CAPSULE ORAL ONCE
Status: CANCELLED
Start: 2017-05-04 | End: 2017-05-04

## 2017-05-04 RX ORDER — MEPERIDINE HYDROCHLORIDE 25 MG/ML
25 INJECTION INTRAMUSCULAR; INTRAVENOUS; SUBCUTANEOUS
Status: DISCONTINUED | OUTPATIENT
Start: 2017-05-04 | End: 2017-05-04 | Stop reason: HOSPADM

## 2017-05-04 RX ORDER — SODIUM CHLORIDE 9 MG/ML
INJECTION, SOLUTION INTRAVENOUS CONTINUOUS
Status: DISCONTINUED | OUTPATIENT
Start: 2017-05-04 | End: 2017-07-20

## 2017-05-04 RX ADMIN — ACETAMINOPHEN 650 MG: 325 TABLET ORAL at 09:25

## 2017-05-04 RX ADMIN — METHYLPREDNISOLONE SODIUM SUCCINATE 100 MG: 125 INJECTION, POWDER, FOR SOLUTION INTRAMUSCULAR; INTRAVENOUS at 09:28

## 2017-05-04 RX ADMIN — MEPERIDINE HYDROCHLORIDE 25 MG: 25 INJECTION, SOLUTION INTRAMUSCULAR; INTRAVENOUS; SUBCUTANEOUS at 09:29

## 2017-05-04 RX ADMIN — DIPHENHYDRAMINE HYDROCHLORIDE 25 MG: 25 CAPSULE ORAL at 09:25

## 2017-05-04 RX ADMIN — IMMUNE GLOBULIN INFUSION (HUMAN) 25 G: 100 INJECTION, SOLUTION INTRAVENOUS; SUBCUTANEOUS at 09:35

## 2017-05-04 NOTE — MR AVS SNAPSHOT
After Visit Summary   5/4/2017    Jennifer Alvarez    MRN: [id number]           Patient Information     Date Of Birth          1940        Visit Information        Provider Department      5/4/2017 9:00 AM UC 46 ATC;  [facility]        Today's Diagnoses     IgA deficiency (H)    -  1    Hypogammaglobulinemia (H)          Care Instructions      Immune Globulin Solution for injection  What is this medicine?  IMMUNE GLOBULIN (im MUNE GLOB sapna cynthia) helps to prevent or reduce the severity of certain infections in patients who are at risk. This medicine is collected from the pooled blood of many donors. It is used to treat immune system problems, thrombocytopenia, and Kawasaki syndrome.  This medicine may be used for other purposes; ask your health care provider or pharmacist if you have questions.  What should I tell my health care provider before I take this medicine?  They need to know if you have any of these conditions:    diabetes    extremely low or no immune antibodies in the blood    heart disease    history of blood clots    hyperprolinemia    infection in the blood, sepsis    kidney disease    taking medicine that may change kidney function - ask your health care provider about your medicine    an unusual or allergic reaction to human immune globulin, albumin, maltose, sucrose, polysorbate 80, other medicines, foods, dyes, or preservatives    pregnant or trying to get pregnant    breast-feeding  How should I use this medicine?  This medicine is for injection into a muscle or infusion into a vein or skin. It is usually given by a health care professional in a hospital or clinic setting.  In rare cases, some brands of this medicine might be given at home. You will be taught how to give this medicine. Use exactly as directed. Take your medicine at regular intervals. Do not take your medicine more often than directed.  Talk to  your pediatrician regarding the use of this medicine in children. Special care may be needed.  Overdosage: If you think you have taken too much of this medicine contact a poison control center or emergency room at once.  NOTE: This medicine is only for you. Do not share this medicine with others.  What if I miss a dose?  It is important not to miss your dose. Call your doctor or health care professional if you are unable to keep an appointment. If you give yourself the medicine and you miss a dose, take it as soon as you can. If it is almost time for your next dose, take only that dose. Do not take double or extra doses.  What may interact with this medicine?    aspirin and aspirin-like medicines    cisplatin    cyclosporine    medicines for infection like acyclovir, adefovir, amphotericin B, bacitracin, cidofovir, foscarnet, ganciclovir, gentamicin, pentamidine, vancomycin    NSAIDS, medicines for pain and inflammation, like ibuprofen or naproxen    pamidronate    vaccines    zoledronic acid  This list may not describe all possible interactions. Give your health care provider a list of all the medicines, herbs, non-prescription drugs, or dietary supplements you use. Also tell them if you smoke, drink alcohol, or use illegal drugs. Some items may interact with your medicine.  What should I watch for while using this medicine?  Your condition will be monitored carefully while you are receiving this medicine.  This medicine is made from pooled blood donations of many different people. It may be possible to pass an infection in this medicine. However, the donors are screened for infections and all products are tested for HIV and hepatitis. The medicine is treated to kill most or all bacteria and viruses. Talk to your doctor about the risks and benefits of this medicine.  Do not have vaccinations for at least 14 days before, or until at least 3 months after receiving this medicine.  What side effects may I notice from  receiving this medicine?  Side effects that you should report to your doctor or health care professional as soon as possible:    allergic reactions like skin rash, itching or hives, swelling of the face, lips, or tongue    breathing problems    chest pain or tightness    fever, chills    headache with nausea, vomiting    neck pain or difficulty moving neck    pain when moving eyes    pain, swelling, warmth in the leg    problems with balance, talking, walking    sudden weight gain    swelling of the ankles, feet, hands    trouble passing urine or change in the amount of urine  Side effects that usually do not require medical attention (report to your doctor or health care professional if they continue or are bothersome):    dizzy, drowsy    flushing    increased sweating    leg cramps    muscle aches and pains    pain at site where injected  This list may not describe all possible side effects. Call your doctor for medical advice about side effects. You may report side effects to FDA at 0-412-FDA-6411.  Where should I keep my medicine?  Keep out of the reach of children.  This drug is usually given in a hospital or clinic and will not be stored at home.  In rare cases, some brands of this medicine may be given at home. If you are using this medicine at home, you will be instructed on how to store this medicine. Throw away any unused medicine after the expiration date on the label.  NOTE: This sheet is a summary. It may not cover all possible information. If you have questions about this medicine, talk to your doctor, pharmacist, or health care provider.  NOTE:This sheet is a summary. It may not cover all possible information. If you have questions about this medicine, talk to your doctor, pharmacist, or health care provider. Copyright  2016 Gold Standard              Follow-ups after your visit        Your next 10 appointments already scheduled     May 25, 2017  7:00 AM CDT   Infusion 300 with FARTUN SPEC INFUSION, UC  45 ATC   [facility] ([facility])    [address]  [address]  [facility] [address]   [phone]            Jul 27, 2017  8:30 AM CDT   (Arrive by 8:15 AM)   Pacemaker Check with  Cv Device 1   [facility] ([facility])    [address]  [address] [facility] [address]   [phone]            Oct 04, 2017 12:00 PM CDT   Lab with  LAB   [facility] ([facility])    [address] [place] [address]  [address] [facility] [address]   [phone]            Oct 04, 2017 12:30 PM CDT   PFT VISIT with  PFL B   [facility] Function [facility] ([facility])    [address] [place] [address]  [address] [facility] [address]   [phone]            Oct 04, 2017  1:10 PM CDT   (Arrive by 12:55 PM)   Return Interstitial Lung with Maxx Elizabeth MD   [facility] ([facility])    [address] [place] [address]   [phone]              Who to contact     If you have questions or need follow up information about today's clinic visit or your schedule please contact [facility] PROCED[facility] directly at [phone].  Normal or non-critical lab and imaging results will be communicated to you by Kliphart, letter or phone within 4 business days after the clinic has received the results. If you do not hear from us within 7 days, please contact the clinic through Kliphart or phone. If you have a critical or abnormal lab result, we will notify you by phone as soon as possible.  Submit refill requests through [employer] or call your pharmacy and they will forward the refill request to us. Please allow 3 business days for your refill to be completed.          Additional Information About Your Visit        NanoPowers Information     [employer]ers lets you  "send messages to your doctor, view your test results, renew your prescriptions, schedule appointments and more. To sign up, go to [email or web address] . Click on \"Log in\" on the left side of the screen, which will take you to the Welcome page. Then click on \"Sign up Now\" on the right side of the page.     You will be asked to enter the access code listed below, as well as some personal information. Please follow the directions to create your username and password.     Your access code is: [email or web address]  Expires: 2017  7:31 AM     Your access code will  in 90 days. If you need help or a new code, please call your [facility] clinic or [phone].        Care EveryWhere ID     This is your Care EveryWhere ID. This could be used by other organizations to access your [facility] medical records  [id number]        Your Vitals Were     Pulse Temperature Respirations             65 98.8  F (37.1  C) (Oral) 16          Blood Pressure from Last 3 Encounters:   17 123/63   17 148/64   17 172/68    Weight from Last 3 Encounters:   17 41.9 kg (92 lb 4.8 oz)   17 41.9 kg (92 lb 4.8 oz)   17 41.4 kg (91 lb 3.2 oz)              We Performed the Following     Treatment Conditions     Treatment Conditions          Today's Medication Changes          These changes are accurate as of: 17 12:30 PM.  If you have any questions, ask your nurse or doctor.               These medicines have changed or have updated prescriptions.        Dose/Directions    metFORMIN 1000 MG tablet   Commonly known as:  GLUCOPHAGE   This may have changed:    - when to take this  - additional instructions   Used for:  DM (diabetes mellitus) (H)        Take 1 tablet twice daily   Quantity:  180 tablet   Refills:  3                Primary Care Provider Office Phone # Fax #    Brandi S Vitaliy [phone]       [facility] [address]   [facility] [address]        Thank you!     Thank you for choosing " [facility]  for your care. Our goal is always to provide you with excellent care. Hearing back from our patients is one way we can continue to improve our services. Please take a few minutes to complete the written survey that you may receive in the mail after your visit with us. Thank you!             Your Updated Medication List - Protect others around you: Learn how to safely use, store and throw away your medicines at www.disposemymeds.org.          This list is accurate as of: 5/4/17 12:30 PM.  Always use your most recent med list.                   Brand Name Dispense Instructions for use    * albuterol 108 (90 BASE) MCG/ACT Inhaler    PROAIR HFA/PROVENTIL HFA/VENTOLIN HFA    1 Inhaler    Take 2 puffs prn for wheezing or shortness of breath       * albuterol (2.5 MG/3ML) 0.083% neb solution     360 mL    Take 1 vial (2.5 mg) by nebulization 3 times daily       * albuterol 108 (90 BASE) MCG/ACT Inhaler    PROAIR HFA/PROVENTIL HFA/VENTOLIN HFA    1 Inhaler    Take 2 puffs prn for wheezing       * albuterol (2.5 MG/3ML) 0.083% neb solution      Take 1 ampule by nebulization every 6 hours as needed.       aspirin 81 MG tablet      Take 1 tablet by mouth daily.       azithromycin 250 MG tablet    ZITHROMAX    6 tablet    Take as directed       Blood Pressure Kit      daily.       budesonide 3 MG 24 hr capsule    ENTOCORT EC    270 capsule    Take 1 capsule (3 mg) by mouth 3 times daily (with meals)       DEMEROL 25 MG/ML injection   Generic drug:  meperidine      Inject 25 mg into the vein as needed. 25 mg IV prior to IgG infusion       dicyclomine 10 MG capsule    BENTYL     Take 10 mg by mouth 4 times daily (before meals and nightly).       diltiazem 360 MG 24 hr CD capsule    CARDIZEM CD; CARTIA XT    90 capsule    Take 1 capsule (360 mg) by mouth daily       diphenhydrAMINE 25 MG tablet    BENADRYL    2 tablet    Take 2 tablets (50 mg) by mouth See Admin  Instructions Please take 50 mg by mouth evening before  And morning of the Abd/Pelvis CT scan with contrast per Dr. Adair.  This is a pre medication for contrast dye.       FISH OIL      Unsure of dosage take two caps daily       fluticasone-salmeterol 250-50 MCG/DOSE diskus inhaler    ADVAIR    1 Inhaler    Inhale 1 puff into the lungs 2 times daily.       gabapentin 300 MG capsule    NEURONTIN    180 capsule    Take 2 capsules (600 mg) by mouth 3 times daily       hydrochlorothiazide 25 MG tablet    HYDRODIURIL     Take 25 mg by mouth as needed.       HYDROcodone-acetaminophen  MG per tablet    LORCET     Take 1 tablet by mouth every 6 hours as needed.       Immune Globulin (Human) 25 GM/500ML Soln      Inject 25 g into the vein See Admin Instructions. 25 grams every 3 weeks IVIG       Isosorbide Mononitrate  MG Tb24     90 tablet    Take 1 tablet (120 mg) by mouth daily       lisinopril 40 MG tablet    PRINIVIL/ZESTRIL     Take 1 tablet by mouth 2 times daily.       metFORMIN 1000 MG tablet    GLUCOPHAGE    180 tablet    Take 1 tablet twice daily       metoprolol 50 MG 24 hr tablet    TOPROL-XL    90 tablet    Take 1 tablet (50 mg) by mouth daily       nitroglycerin 0.4 MG sublingual tablet    NITROQUICK    25 tablet    Place 1 tablet under the tongue every 5 minutes as needed.       omeprazole 20 MG CR capsule    priLOSEC    60 capsule    Take 1 capsule (20 mg) by mouth 2 times daily       * blood glucose monitoring lancets     3 Box    Use to test blood sugar 3 times daily or as directed.       * ONE TOUCH FINE POINT LANCETS      2 times daily.       * ONE TOUCH ULTRA test strip   Generic drug:  blood glucose monitoring     200 strip    Test blood sugar twice a day.       * blood glucose monitoring test strip    TERRY CONTOUR NEXT    270 each    by In Vitro route 3 times daily Use to test blood sugar 3 times daily or as directed.       spironolactone 50 MG tablet    ALDACTONE     Take 50 mg by  mouth daily.       tiotropium 18 MCG capsule    SPIRIVA HANDIHALER    30 capsule    One puff every day       TYLENOL 325 MG tablet   Generic drug:  acetaminophen      Take 1-2 tablets by mouth every 6 hours as needed.       * Notice:  This list has 8 medication(s) that are the same as other medications prescribed for you. Read the directions carefully, and ask your doctor or other care provider to review them with you.

## 2017-05-04 NOTE — PATIENT INSTRUCTIONS
Immune Globulin Solution for injection  What is this medicine?  IMMUNE GLOBULIN (im MUNE GLOB sapna cynthia) helps to prevent or reduce the severity of certain infections in patients who are at risk. This medicine is collected from the pooled blood of many donors. It is used to treat immune system problems, thrombocytopenia, and Kawasaki syndrome.  This medicine may be used for other purposes; ask your health care provider or pharmacist if you have questions.  What should I tell my health care provider before I take this medicine?  They need to know if you have any of these conditions:    diabetes    extremely low or no immune antibodies in the blood    heart disease    history of blood clots    hyperprolinemia    infection in the blood, sepsis    kidney disease    taking medicine that may change kidney function - ask your health care provider about your medicine    an unusual or allergic reaction to human immune globulin, albumin, maltose, sucrose, polysorbate 80, other medicines, foods, dyes, or preservatives    pregnant or trying to get pregnant    breast-feeding  How should I use this medicine?  This medicine is for injection into a muscle or infusion into a vein or skin. It is usually given by a health care professional in a hospital or clinic setting.  In rare cases, some brands of this medicine might be given at home. You will be taught how to give this medicine. Use exactly as directed. Take your medicine at regular intervals. Do not take your medicine more often than directed.  Talk to your pediatrician regarding the use of this medicine in children. Special care may be needed.  Overdosage: If you think you have taken too much of this medicine contact a poison control center or emergency room at once.  NOTE: This medicine is only for you. Do not share this medicine with others.  What if I miss a dose?  It is important not to miss your dose. Call your doctor or health care professional if you are unable to keep  an appointment. If you give yourself the medicine and you miss a dose, take it as soon as you can. If it is almost time for your next dose, take only that dose. Do not take double or extra doses.  What may interact with this medicine?    aspirin and aspirin-like medicines    cisplatin    cyclosporine    medicines for infection like acyclovir, adefovir, amphotericin B, bacitracin, cidofovir, foscarnet, ganciclovir, gentamicin, pentamidine, vancomycin    NSAIDS, medicines for pain and inflammation, like ibuprofen or naproxen    pamidronate    vaccines    zoledronic acid  This list may not describe all possible interactions. Give your health care provider a list of all the medicines, herbs, non-prescription drugs, or dietary supplements you use. Also tell them if you smoke, drink alcohol, or use illegal drugs. Some items may interact with your medicine.  What should I watch for while using this medicine?  Your condition will be monitored carefully while you are receiving this medicine.  This medicine is made from pooled blood donations of many different people. It may be possible to pass an infection in this medicine. However, the donors are screened for infections and all products are tested for HIV and hepatitis. The medicine is treated to kill most or all bacteria and viruses. Talk to your doctor about the risks and benefits of this medicine.  Do not have vaccinations for at least 14 days before, or until at least 3 months after receiving this medicine.  What side effects may I notice from receiving this medicine?  Side effects that you should report to your doctor or health care professional as soon as possible:    allergic reactions like skin rash, itching or hives, swelling of the face, lips, or tongue    breathing problems    chest pain or tightness    fever, chills    headache with nausea, vomiting    neck pain or difficulty moving neck    pain when moving eyes    pain, swelling, warmth in the leg    problems  with balance, talking, walking    sudden weight gain    swelling of the ankles, feet, hands    trouble passing urine or change in the amount of urine  Side effects that usually do not require medical attention (report to your doctor or health care professional if they continue or are bothersome):    dizzy, drowsy    flushing    increased sweating    leg cramps    muscle aches and pains    pain at site where injected  This list may not describe all possible side effects. Call your doctor for medical advice about side effects. You may report side effects to FDA at 3-888-FDA-8453.  Where should I keep my medicine?  Keep out of the reach of children.  This drug is usually given in a hospital or clinic and will not be stored at home.  In rare cases, some brands of this medicine may be given at home. If you are using this medicine at home, you will be instructed on how to store this medicine. Throw away any unused medicine after the expiration date on the label.  NOTE: This sheet is a summary. It may not cover all possible information. If you have questions about this medicine, talk to your doctor, pharmacist, or health care provider.  NOTE:This sheet is a summary. It may not cover all possible information. If you have questions about this medicine, talk to your doctor, pharmacist, or health care provider. Copyright  2016 Gold Standard

## 2017-05-04 NOTE — PROGRESS NOTES
Nursing Note  Jennifer Al[facility]:   Chief Complaint   Patient presents with     Infusion     IVIG     During [facility]'s [facility] appointment, orders from Dr. Elizabeth were completed.  Frequency: every 3 weeks    Progress note:  Patient identification verified by name and date of birth.  Assessment completed.  Vitals recorded in Doc Flowsheets.  Patient was provided with education regarding infusion and possible side effects.  Patient verbalized understanding.      needed: No  Premedications: administered per order.    Infusion Rates:  12 ml/hr x 15 min  25 ml/hr x 15 min  65 ml/hr x 15 min  125 ml/hr for remainder of infusion    Approximate Infusion length:3 hours.   Labs: were not ordered for this appointment.  Vascular access: peripheral IV placed today.  Treatment Conditions: patient denies fever, chills, signs of infection, recent illness, on antibiotics, productive cough or elevated temperature.  Patient tolerated infusion: well.    Drug Waste Record    Drug Name: solu-medrol  Dose: 100 mg given  Route administered: IV  NDC #: 4091-4836-49  Amount of waste(mL):0.4 ml  Reason for waste: Single use vial      Discharge Plan:   Follow up plan of care with: ongoing infusions [facility].  Discharge instructions were reviewed with patient.  Patient/representative verbalized understanding of discharge instructions and all questions answered.  [facility] discharged [facility] in stable condition.    Roxann Freeman RN       Administrations This Visit     acetaminophen (TYLENOL) tablet 650 mg     Admin Date Action Dose Route Administered By             05/04/2017 Given 650 mg Oral Roxann Freeman RN                    diphenhydrAMINE (BENADRYL) capsule 25 mg     Admin Date Action Dose Route Administered By             05/04/2017 Given 25 mg Oral Roxann Freeman RN                     immune globulin - (GAMMAGARD)sucrose free 10 % injection 25 g     Admin Date Action Dose Route Administered By             05/04/2017 New Bag 25 g Intravenous Roxann Freeman, RN                    meperidine (DEMEROL) injection 25 mg     Admin Date Action Dose Route Administered By             05/04/2017 Given 25 mg Intravenous Roxann Freeman, CARLIN                    methylPREDNISolone sodium succinate (solu-MEDROL) injection 100 mg     Admin Date Action Dose Route Administered By             05/04/2017 Given 100 mg Intravenous Roxann Freeman, CARLIN                          /78 (BP Location: Left arm)  Pulse 85  Temp 98.8  F (37.1  C) (Oral)  Resp 16

## 2017-05-17 DIAGNOSIS — I70.1 RENAL ARTERY STENOSIS, NATIVE (H): ICD-10-CM

## 2017-05-17 DIAGNOSIS — T50.8X5A ALLERGIC REACTION TO CONTRAST DYE: Primary | ICD-10-CM

## 2017-05-17 RX ORDER — PREDNISONE 20 MG/1
TABLET ORAL
Qty: 5 TABLET | Refills: 0 | Status: SHIPPED | OUTPATIENT
Start: 2017-05-17 | End: 2018-01-01

## 2017-05-25 ENCOUNTER — INFUSION THERAPY VISIT (OUTPATIENT)
Dept: INFUSION THERAPY | Facility: CLINIC | Age: 77
End: 2017-05-25
Attending: INTERNAL MEDICINE
Payer: MEDICARE

## 2017-05-25 VITALS
DIASTOLIC BLOOD PRESSURE: 52 MMHG | WEIGHT: 93.4 LBS | HEART RATE: 86 BPM | OXYGEN SATURATION: 99 % | BODY MASS INDEX: 17.08 KG/M2 | SYSTOLIC BLOOD PRESSURE: 158 MMHG | TEMPERATURE: 97.1 F

## 2017-05-25 DIAGNOSIS — D80.2 IGA DEFICIENCY (H): Primary | ICD-10-CM

## 2017-05-25 DIAGNOSIS — I70.1 RENAL ARTERY STENOSIS (H): ICD-10-CM

## 2017-05-25 DIAGNOSIS — D80.1 HYPOGAMMAGLOBULINEMIA (H): ICD-10-CM

## 2017-05-25 LAB
ANION GAP SERPL CALCULATED.3IONS-SCNC: 9 MMOL/L (ref 3–14)
BUN SERPL-MCNC: 29 MG/DL (ref 7–30)
CALCIUM SERPL-MCNC: 8.5 MG/DL (ref 8.5–10.1)
CHLORIDE SERPL-SCNC: 107 MMOL/L (ref 94–109)
CO2 SERPL-SCNC: 27 MMOL/L (ref 20–32)
CREAT SERPL-MCNC: 0.78 MG/DL (ref 0.52–1.04)
ERYTHROCYTE [DISTWIDTH] IN BLOOD BY AUTOMATED COUNT: 14.1 % (ref 10–15)
GFR SERPL CREATININE-BSD FRML MDRD: 72 ML/MIN/1.7M2
GLUCOSE SERPL-MCNC: 127 MG/DL (ref 70–99)
HCT VFR BLD AUTO: 38.6 % (ref 35–47)
HGB BLD-MCNC: 12.5 G/DL (ref 11.7–15.7)
MCH RBC QN AUTO: 27.2 PG (ref 26.5–33)
MCHC RBC AUTO-ENTMCNC: 32.4 G/DL (ref 31.5–36.5)
MCV RBC AUTO: 84 FL (ref 78–100)
PLATELET # BLD AUTO: 175 10E9/L (ref 150–450)
POTASSIUM SERPL-SCNC: 3.6 MMOL/L (ref 3.4–5.3)
RBC # BLD AUTO: 4.6 10E12/L (ref 3.8–5.2)
SODIUM SERPL-SCNC: 143 MMOL/L (ref 133–144)
WBC # BLD AUTO: 3.6 10E9/L (ref 4–11)

## 2017-05-25 PROCEDURE — 96366 THER/PROPH/DIAG IV INF ADDON: CPT

## 2017-05-25 PROCEDURE — A9270 NON-COVERED ITEM OR SERVICE: HCPCS | Mod: ZF | Performed by: INTERNAL MEDICINE

## 2017-05-25 PROCEDURE — 85027 COMPLETE CBC AUTOMATED: CPT | Performed by: INTERNAL MEDICINE

## 2017-05-25 PROCEDURE — 96365 THER/PROPH/DIAG IV INF INIT: CPT

## 2017-05-25 PROCEDURE — 80048 BASIC METABOLIC PNL TOTAL CA: CPT | Performed by: INTERNAL MEDICINE

## 2017-05-25 PROCEDURE — 96375 TX/PRO/DX INJ NEW DRUG ADDON: CPT

## 2017-05-25 PROCEDURE — 25000132 ZZH RX MED GY IP 250 OP 250 PS 637: Mod: ZF | Performed by: INTERNAL MEDICINE

## 2017-05-25 PROCEDURE — 25000128 H RX IP 250 OP 636: Mod: ZF

## 2017-05-25 PROCEDURE — 25000128 H RX IP 250 OP 636: Mod: ZF | Performed by: INTERNAL MEDICINE

## 2017-05-25 RX ORDER — METHYLPREDNISOLONE SODIUM SUCCINATE 125 MG/2ML
100 INJECTION, POWDER, LYOPHILIZED, FOR SOLUTION INTRAMUSCULAR; INTRAVENOUS ONCE
Status: CANCELLED
Start: 2017-05-25 | End: 2017-05-25

## 2017-05-25 RX ORDER — ACETAMINOPHEN 325 MG/1
650 TABLET ORAL ONCE
Status: COMPLETED | OUTPATIENT
Start: 2017-05-25 | End: 2017-05-25

## 2017-05-25 RX ORDER — MEPERIDINE HYDROCHLORIDE 25 MG/ML
25 INJECTION INTRAMUSCULAR; INTRAVENOUS; SUBCUTANEOUS
Status: DISCONTINUED | OUTPATIENT
Start: 2017-05-25 | End: 2017-05-25 | Stop reason: HOSPADM

## 2017-05-25 RX ORDER — ACETAMINOPHEN 325 MG/1
650 TABLET ORAL ONCE
Status: CANCELLED
Start: 2017-05-25 | End: 2017-05-25

## 2017-05-25 RX ORDER — METHYLPREDNISOLONE SODIUM SUCCINATE 125 MG/2ML
100 INJECTION, POWDER, LYOPHILIZED, FOR SOLUTION INTRAMUSCULAR; INTRAVENOUS ONCE
Status: COMPLETED | OUTPATIENT
Start: 2017-05-25 | End: 2017-05-25

## 2017-05-25 RX ORDER — DIPHENHYDRAMINE HCL 25 MG
25 CAPSULE ORAL ONCE
Status: COMPLETED | OUTPATIENT
Start: 2017-05-25 | End: 2017-05-25

## 2017-05-25 RX ORDER — DIPHENHYDRAMINE HCL 25 MG
25 CAPSULE ORAL ONCE
Status: CANCELLED
Start: 2017-05-25 | End: 2017-05-25

## 2017-05-25 RX ORDER — MEPERIDINE HYDROCHLORIDE 25 MG/ML
25 INJECTION INTRAMUSCULAR; INTRAVENOUS; SUBCUTANEOUS
Status: CANCELLED
Start: 2017-05-25

## 2017-05-25 RX ADMIN — METHYLPREDNISOLONE SODIUM SUCCINATE 100 MG: 125 INJECTION, POWDER, FOR SOLUTION INTRAMUSCULAR; INTRAVENOUS at 07:30

## 2017-05-25 RX ADMIN — MEPERIDINE HYDROCHLORIDE 25 MG: 25 INJECTION, SOLUTION INTRAMUSCULAR; INTRAVENOUS; SUBCUTANEOUS at 07:35

## 2017-05-25 RX ADMIN — IMMUNE GLOBULIN INFUSION (HUMAN) 25 G: 100 INJECTION, SOLUTION INTRAVENOUS; SUBCUTANEOUS at 07:36

## 2017-05-25 RX ADMIN — DIPHENHYDRAMINE HYDROCHLORIDE 25 MG: 25 CAPSULE ORAL at 07:28

## 2017-05-25 RX ADMIN — ACETAMINOPHEN 650 MG: 325 TABLET ORAL at 07:28

## 2017-05-25 NOTE — PATIENT INSTRUCTIONS
Dear Jennifer Alvarez    Thank you for choosing [facility] ([facility]) for your infusion.  The following information is a summary of our appointment as well as important reminders.      Additional information: Your infusion of IVIG (gammagard) 25 grams and pre-medications.      We look forward in seeing you on your next appointment here at [facility].  Please don t hesitate to call us at [phone] to reschedule any of your appointments or to speak with one of the [facility] registered nurses.  It was a pleasure taking care of you today.    Sincerely,  Suzette Norris, RN  [facility]  [facility]  [address] [place] [address]  [address]  Phone:  [phone]

## 2017-05-25 NOTE — PROGRESS NOTES
Nursing Note  Jennifer Alvarez [facility] for:   Chief Complaint   Patient presents with     Infusion     IVIG (immune globulin)     During [facility]'s [facility] appointment, orders from Dr. Elizabeth were completed.  Frequency: every 3 weeks.    Progress note:  Patient identification verified by name and date of birth.  Assessment completed.  Vitals recorded in Doc Flowsheets.  Patient was provided with education regarding infusion and possible side effects.  Patient verbalized understanding.      needed: No  Premedications: were not ordered.  Infusion Rates: infusion given over approximately 12 ml/hr for 15 min then, 25 ml/hr for 15 min then, 65 ml/hr for 15 min then the final rate 125 ml/hr till the end.  Approximate Infusion length: 3 1/2 hours.   Labs: were drawn per orders.   Vascular access: peripheral IV placed today.  Treatment Conditions: patient denies fever, chills, signs of infection, recent illness, on antibiotics, productive cough or elevated temperature.  Patient tolerated infusion: well.    Drug Waste Record    Drug Name: Solu-medrol  Dose: 100 mg (1.6 ml)  Route administered: IV  NDC #: 3711-2961-62  Amount of waste(mL): 25 mg (0.4 ml)  Reason for waste: Single use vial      Discharge Plan:   Follow up plan of care with: ongoing infusions at [facility].  Discharge instructions were reviewed with patient.  Patient/representative verbalized understanding of discharge instructions and all questions answered.  Patient discharged [facility] in stable condition.    Suzette Norris RN    Administrations This Visit     acetaminophen (TYLENOL) tablet 650 mg     Admin Date Action Dose Route Administered By             05/25/2017 Given 650 mg Oral Suzette Norris RN                    diphenhydrAMINE (BENADRYL) capsule 25 mg     Admin Date Action Dose Route  Administered By             05/25/2017 Given 25 mg Oral Suzette Norris RN                    immune globulin (Gammagard) - sucrose free 10 % injection 25 g     Admin Date Action Dose Route Administered By             05/25/2017 New Bag 25 g Intravenous Suzette Norris RN                    meperidine (DEMEROL) injection 25 mg     Admin Date Action Dose Route Administered By             05/25/2017 Given 25 mg Intravenous Suzette Norris RN                    methylPREDNISolone sodium succinate (solu-MEDROL) injection 100 mg     Admin Date Action Dose Route Administered By             05/25/2017 Given 100 mg Intravenous Suzette Norris RN                          /76  Pulse 87  Temp 97.1  F (36.2  C) (Tympanic)  Wt 42.4 kg (93 lb 6.4 oz)  SpO2 99%  BMI 17.08 kg/m2

## 2017-05-25 NOTE — MR AVS SNAPSHOT
After Visit Summary   5/25/2017    Jennifer Alvarez    MRN: [id number]           Patient Information     Date Of Birth          1940        Visit Information        Provider Department      5/25/2017 7:00 AM UC 45 ATC; UC SPEC INFUSION [facility]        Today's Diagnoses     IgA deficiency (H)    -  1    Hypogammaglobulinemia (H)        Renal artery stenosis (H)          Care Instructions    Dear Jennifer Alvarez    Thank you for choosing [facility] ([facility]) for your infusion.  The following information is a summary of our appointment as well as important reminders.      Additional information: Your infusion of IVIG (gammagard) 25 grams and pre-medications.      We look forward in seeing you on your next appointment here at [facility].  Please don t hesitate to call us at [phone] to reschedule any of your appointments or to speak with one of the [facility] registered nurses.  It was a pleasure taking care of you today.    Sincerely,  Suzette Norris, RN  [facility]  [facility]  [address] [place] [address]  [address]  Phone:  [phone]            Follow-ups after your visit        Your next 10 appointments already scheduled     Ian 15, 2017 11:00 AM CDT   Infusion 300 with UC SPEC INFUSION, UC 44 ATC   [facility] ([facility])    [address] [place] [id number] [phone]            Jun 28, 2017 11:00 AM CDT   Procedure 6.5 hour with U2A ROOM 8   [address] [facility] ([facility] [place])    [address] [facility] [id number]               Jun 28, 2017 12:30 PM CDT   IR RENAL ANGIOGRAM RIGHT with UUIR4   [facility] [place], Interventional Radiology ([facility],  [facility])    [address] [facility] [address] [phone]           1. Your doctor will need to do a history and physical within 30 days before this procedure. 2. Your doctor will determine whether you need a 12 lead EKG, as well as which medications should not be taken the morning of the exam. 3. Laboratory tests are to be obtained by your doctor prior to the exam (creatinine, Hgb/Hct, platelet count, and INR) 4. If you have allergies to x-ray contrast or iodine, contact your doctor or a Radiology nurse prior to the exam day for instructions. 5. Someone will need to drive you to and from the hospital. 6. Bring a list of all drugs you are taking; include supplements and over-the-counter medications. 7. Wear comfortable clothes and leave your valuables at home. 8. If you are or may be pregnant, contact your doctor or a Radiology nurse prior to the day of the exam. 9.  If you have diabetes, check with your doctor or a Radiology nurse to see if your insulin needs to be adjusted for the exam. 10. If you are taking Coumadin (to thin you blood) please contact your doctor or a Radiology nurse at least 5 days before the exam for special instructions. 11. You should not have received barium (x-ray contrast) within 48 hours of this exam. 12. The day before your exam you may eat your regular diet and are encouraged to drink at least 2 quarts of clear liquids. Drink no alcoholic beverages for 24 hours prior to the exam. 13. If you have a colostomy you will need to irrigate it with tap water at 8PM the evening before and again at 6AM the morning of the exam. 14. Do not smoke for 24 hours prior to the procedure. 15. Do not eat any solid food or milk products for 6 hours prior to the exam. You may drink clear liquids until 2 hours prior to the exam. Clear liquids include the following: water, Jell-O, clear broth, apple juice or any non-carbonated drink that you can see through (no pop!) 16. The  morning of the exam you may brush your teeth and take medications as directed with a sip of water. 17. Tell the Radiology nurse if you have any allergies. 18. You will be asked to empty your bladder before the exam begins. 19. Following the exam you will need to remain on complete bedrest for 4-6 hours. The nurse will monitor your vital signs, puncture site, and the pulses and temperature of the arm or leg that was punctured. 20. When discharged, you cannot drive until morning, and an adult must be with you until then. You should stay in the [place] area overnight.            Jul 27, 2017  8:30 AM CDT   (Arrive by 8:15 AM)   Pacemaker Check with  Cv Device 1   [facility] ([facility])    [address] [place] [id number] [phone]            Oct 04, 2017 12:00 PM CDT   Lab with  LAB   [facility] Lab ([facility])    [address] [place] [id number] [phone]            Oct 04, 2017 12:30 PM CDT   PFT VISIT with  PFL B   [facility] Pulmonary Function Testing ([facility])    [address] [place] [id number] [phone]            Oct 04, 2017  1:10 PM CDT   (Arrive by 12:55 PM)   Return Interstitial Lung with Maxx Elizabeth MD   [facility] ([facility])    [address] [place] [id number] [phone]              Who to contact     If you have questions or need follow up information about today's clinic visit or your schedule please contact [facility] [place] [facility] directly at [phone].  Normal or non-critical lab and imaging results will be communicated to you by MyChart, letter or phone within 4 business days after the clinic has received the results. If you do not hear from us within 7 days, please contact the  "clinic through [employer] or phone. If you have a critical or abnormal lab result, we will notify you by phone as soon as possible.  Submit refill requests through [employer] or call your pharmacy and they will forward the refill request to us. Please allow 3 business days for your refill to be completed.          Additional Information About Your Visit        [employer] Information     [employer] lets you send messages to your doctor, view your test results, renew your prescriptions, schedule appointments and more. To sign up, go to [email or web address] . Click on \"Log in\" on the left side of the screen, which will take you to the Welcome page. Then click on \"Sign up Now\" on the right side of the page.     You will be asked to enter the access code listed below, as well as some personal information. Please follow the directions to create your username and password.     Your access code is: [email or web address]  Expires: 2017 11:47 AM     Your access code will  in 90 days. If you need help or a new code, please call your [facility] clinic or [phone].        Care EveryWhere ID     This is your Care EveryWhere ID. This could be used by other organizations to access your [facility] medical records  [id number]        Your Vitals Were     Pulse Temperature Pulse Oximetry BMI (Body Mass Index)          86 97.1  F (36.2  C) (Tympanic) 99% 17.08 kg/m2         Blood Pressure from Last 3 Encounters:   17 158/52   17 123/63   17 148/64    Weight from Last 3 Encounters:   17 42.4 kg (93 lb 6.4 oz)   17 41.9 kg (92 lb 4.8 oz)   17 41.9 kg (92 lb 4.8 oz)              We Performed the Following     Basic metabolic panel  (Ca, Cl, CO2, Creat, Gluc, K, Na, BUN)     CBC with platelets          Today's Medication Changes          These changes are accurate as of: 17 11:47 AM.  If you have any questions, ask your nurse or doctor.               These medicines have changed or have updated " prescriptions.        Dose/Directions    metFORMIN 1000 MG tablet   Commonly known as:  GLUCOPHAGE   This may have changed:    - when to take this  - additional instructions   Used for:  DM (diabetes mellitus) (H)        Take 1 tablet twice daily   Quantity:  180 tablet   Refills:  3                Primary Care Provider Office Phone # Fax #    Brandi Burks [phone]       [facility] [address]   [facility] [place] [address]        Thank you!     Thank you for choosing [facility]  for your care. Our goal is always to provide you with excellent care. Hearing back from our patients is one way we can continue to improve our services. Please take a few minutes to complete the written survey that you may receive in the mail after your visit with us. Thank you!             Your Updated Medication List - Protect others around you: Learn how to safely use, store and throw away your medicines at www.disposemymeds.org.          This list is accurate as of: 5/25/17 11:47 AM.  Always use your most recent med list.                   Brand Name Dispense Instructions for use    * albuterol 108 (90 BASE) MCG/ACT Inhaler    PROAIR HFA/PROVENTIL HFA/VENTOLIN HFA    1 Inhaler    Take 2 puffs prn for wheezing or shortness of breath       * albuterol (2.5 MG/3ML) 0.083% neb solution     360 mL    Take 1 vial (2.5 mg) by nebulization 3 times daily       * albuterol 108 (90 BASE) MCG/ACT Inhaler    PROAIR HFA/PROVENTIL HFA/VENTOLIN HFA    1 Inhaler    Take 2 puffs prn for wheezing       * albuterol (2.5 MG/3ML) 0.083% neb solution      Take 1 ampule by nebulization every 6 hours as needed.       aspirin 81 MG tablet      Take 1 tablet by mouth daily.       azithromycin 250 MG tablet    ZITHROMAX    6 tablet    Take as directed       Blood Pressure Kit      daily.       budesonide 3 MG 24 hr capsule    ENTOCORT EC    270 capsule    Take 1 capsule (3 mg) by mouth 3 times daily  (with meals)       DEMEROL 25 MG/ML injection   Generic drug:  meperidine      Inject 25 mg into the vein as needed. 25 mg IV prior to IgG infusion       dicyclomine 10 MG capsule    BENTYL     Take 10 mg by mouth 4 times daily (before meals and nightly).       diltiazem 360 MG 24 hr CD capsule    CARDIZEM CD; CARTIA XT    90 capsule    Take 1 capsule (360 mg) by mouth daily       diphenhydrAMINE 25 MG tablet    BENADRYL    2 tablet    Take 2 tablets (50 mg) by mouth See Admin Instructions Please take 50 mg by mouth evening before  And morning of the Abd/Pelvis CT scan with contrast per Dr. Adair.  This is a pre medication for contrast dye.       FISH OIL      Unsure of dosage take two caps daily       fluticasone-salmeterol 250-50 MCG/DOSE diskus inhaler    ADVAIR    1 Inhaler    Inhale 1 puff into the lungs 2 times daily.       gabapentin 300 MG capsule    NEURONTIN    180 capsule    Take 2 capsules (600 mg) by mouth 3 times daily       hydrochlorothiazide 25 MG tablet    HYDRODIURIL     Take 25 mg by mouth as needed.       HYDROcodone-acetaminophen  MG per tablet    LORCET     Take 1 tablet by mouth every 6 hours as needed.       Immune Globulin (Human) 25 GM/500ML Soln      Inject 25 g into the vein See Admin Instructions. 25 grams every 3 weeks IVIG       Isosorbide Mononitrate  MG Tb24     90 tablet    Take 1 tablet (120 mg) by mouth daily       lisinopril 40 MG tablet    PRINIVIL/ZESTRIL     Take 1 tablet by mouth 2 times daily.       metFORMIN 1000 MG tablet    GLUCOPHAGE    180 tablet    Take 1 tablet twice daily       metoprolol 50 MG 24 hr tablet    TOPROL-XL    90 tablet    Take 1 tablet (50 mg) by mouth daily       nitroglycerin 0.4 MG sublingual tablet    NITROQUICK    25 tablet    Place 1 tablet under the tongue every 5 minutes as needed.       omeprazole 20 MG CR capsule    priLOSEC    60 capsule    Take 1 capsule (20 mg) by mouth 2 times daily       * blood glucose monitoring lancets      3 Box    Use to test blood sugar 3 times daily or as directed.       * ONE TOUCH FINE POINT LANCETS      2 times daily.       * ONE TOUCH ULTRA test strip   Generic drug:  blood glucose monitoring     200 strip    Test blood sugar twice a day.       * blood glucose monitoring test strip    TERRY CONTOUR NEXT    270 each    by In Vitro route 3 times daily Use to test blood sugar 3 times daily or as directed.       predniSONE 20 MG tablet    DELTASONE    5 tablet    Patient to take 60 mg the evening before the procedure and 30 mg the morning of the procedure.       spironolactone 50 MG tablet    ALDACTONE     Take 50 mg by mouth daily.       tiotropium 18 MCG capsule    SPIRIVA HANDIHALER    30 capsule    One puff every day       TYLENOL 325 MG tablet   Generic drug:  acetaminophen      Take 1-2 tablets by mouth every 6 hours as needed.       * Notice:  This list has 8 medication(s) that are the same as other medications prescribed for you. Read the directions carefully, and ask your doctor or other care provider to review them with you.

## 2017-05-31 DIAGNOSIS — I70.1 RIGHT RENAL ARTERY STENOSIS (H): Primary | ICD-10-CM

## 2017-05-31 DIAGNOSIS — I70.1 RIGHT RENAL ARTERY STENOSIS (H): ICD-10-CM

## 2017-05-31 DIAGNOSIS — I70.1 LEFT RENAL ARTERY STENOSIS (H): ICD-10-CM

## 2017-05-31 NOTE — LETTER
May 31, 2017      TO: Jennifer Yane Antonio  [address]   [address] [place] [id number]     Dear Jennifer,    You are scheduled for a renal angiogram with possible stent placement on Wednesday, July 19th.  Arrive at the [facility] waiting room at 11 am. This is located at the [facility], [place] [address].    Please follow the following instructions for prep:  * you will need a  for the procedure as you will be receiving sedation. They will cancel the procedure if you do not have a .  * You should not eat or drink 8 hours prior to the procedure. You should take all your morning medications as prescribed.  * Hold the metformin 2 days prior to the procedure.  * You will need to premedicate for your contrast allergy.  1. Take prednisone 60 mg by mouth and benadryl 50 mg by mouth the evening before the procedure.  2. Take prednisone 30 mg by mouth and benadryl 50 mg by mouth the morning of the procedure.  * there is a small possibility that you may stay overnight in the hospital, however, plan on leaving same day.    It was a pleasure to see you at your last clinic visit. Please do not hesitate to call me if you have any questions or concerns.    Sincerely,    Samantha Ordaz RN  Nurse Coordinator for Dr. Jaleel Adair  [phone]

## 2017-05-31 NOTE — LETTER
May 31, 2017      TO: Jenniferanna Mae  [address]   [address] [place] [id number]         Dear Jennifer,    Your labs have been reviewed by Dr. Adair. No change in medications needed for now.    It was a pleasure to see you at your last clinic visit. Please do not hesitate to call me if you have any questions or concerns.    Sincerely,      Samantha Ordaz RN  Nurse Coordinator for Jaleel Adair MD  [phone]                              Results for JENNIFER MAE (MRN [id number]) as of 5/31/2017 09:59   Ref. Range 5/25/2017 07:20   Sodium Latest Ref Range: 133 - 144 mmol/L 143   Potassium Latest Ref Range: 3.4 - 5.3 mmol/L 3.6   Chloride Latest Ref Range: 94 - 109 mmol/L 107   Carbon Dioxide Latest Ref Range: 20 - 32 mmol/L 27   Urea Nitrogen Latest Ref Range: 7 - 30 mg/dL 29   Creatinine Latest Ref Range: 0.52 - 1.04 mg/dL 0.78   GFR Estimate Latest Ref Range: >60 mL/min/1.7m2 72   GFR Estimate If Black Latest Ref Range: >60 mL/min/1.7m2 87   Calcium Latest Ref Range: 8.5 - 10.1 mg/dL 8.5   Anion Gap Latest Ref Range: 3 - 14 mmol/L 9   Glucose Latest Ref Range: 70 - 99 mg/dL 127 (H)   WBC Latest Ref Range: 4.0 - 11.0 10e9/L 3.6 (L)   Hemoglobin Latest Ref Range: 11.7 - 15.7 g/dL 12.5   Hematocrit Latest Ref Range: 35.0 - 47.0 % 38.6   Platelet Count Latest Ref Range: 150 - 450 10e9/L 175   RBC Count Latest Ref Range: 3.8 - 5.2 10e12/L 4.60   MCV Latest Ref Range: 78 - 100 fl 84   MCH Latest Ref Range: 26.5 - 33.0 pg 27.2   MCHC Latest Ref Range: 31.5 - 36.5 g/dL 32.4   RDW Latest Ref Range: 10.0 - 15.0 % 14.1

## 2017-06-05 ENCOUNTER — TELEPHONE (OUTPATIENT)
Dept: CARDIOLOGY | Facility: CLINIC | Age: 77
End: 2017-06-05

## 2017-06-14 DIAGNOSIS — R07.9 CHEST PAIN: ICD-10-CM

## 2017-06-15 ENCOUNTER — INFUSION THERAPY VISIT (OUTPATIENT)
Dept: INFUSION THERAPY | Facility: CLINIC | Age: 77
End: 2017-06-15
Attending: INTERNAL MEDICINE
Payer: MEDICARE

## 2017-06-15 VITALS
RESPIRATION RATE: 16 BRPM | BODY MASS INDEX: 16.99 KG/M2 | HEART RATE: 64 BPM | OXYGEN SATURATION: 97 % | TEMPERATURE: 97.4 F | WEIGHT: 92.9 LBS | DIASTOLIC BLOOD PRESSURE: 67 MMHG | SYSTOLIC BLOOD PRESSURE: 158 MMHG

## 2017-06-15 DIAGNOSIS — D80.1 HYPOGAMMAGLOBULINEMIA (H): ICD-10-CM

## 2017-06-15 DIAGNOSIS — I70.1 ATHEROSCLEROSIS OF RENAL ARTERY (H): ICD-10-CM

## 2017-06-15 DIAGNOSIS — D80.2 IGA DEFICIENCY (H): Primary | ICD-10-CM

## 2017-06-15 LAB
ALBUMIN UR-MCNC: NEGATIVE MG/DL
APPEARANCE UR: CLEAR
BILIRUB UR QL STRIP: NEGATIVE
COLOR UR AUTO: YELLOW
GLUCOSE UR STRIP-MCNC: NEGATIVE MG/DL
HGB UR QL STRIP: ABNORMAL
HYALINE CASTS #/AREA URNS LPF: 1 /LPF (ref 0–2)
KETONES UR STRIP-MCNC: NEGATIVE MG/DL
LEUKOCYTE ESTERASE UR QL STRIP: NEGATIVE
MUCOUS THREADS #/AREA URNS LPF: PRESENT /LPF
NITRATE UR QL: NEGATIVE
PH UR STRIP: 5 PH (ref 5–7)
RBC #/AREA URNS AUTO: 66 /HPF (ref 0–2)
SP GR UR STRIP: 1.02 (ref 1–1.03)
URN SPEC COLLECT METH UR: ABNORMAL
UROBILINOGEN UR STRIP-MCNC: 0 MG/DL (ref 0–2)
WBC #/AREA URNS AUTO: 3 /HPF (ref 0–2)

## 2017-06-15 PROCEDURE — A9270 NON-COVERED ITEM OR SERVICE: HCPCS | Mod: ZF | Performed by: INTERNAL MEDICINE

## 2017-06-15 PROCEDURE — 25000132 ZZH RX MED GY IP 250 OP 250 PS 637: Mod: ZF | Performed by: INTERNAL MEDICINE

## 2017-06-15 PROCEDURE — 96375 TX/PRO/DX INJ NEW DRUG ADDON: CPT

## 2017-06-15 PROCEDURE — 25000128 H RX IP 250 OP 636: Mod: ZF

## 2017-06-15 PROCEDURE — 96366 THER/PROPH/DIAG IV INF ADDON: CPT

## 2017-06-15 PROCEDURE — 96365 THER/PROPH/DIAG IV INF INIT: CPT

## 2017-06-15 PROCEDURE — 25000128 H RX IP 250 OP 636: Mod: ZF | Performed by: INTERNAL MEDICINE

## 2017-06-15 PROCEDURE — 81001 URINALYSIS AUTO W/SCOPE: CPT | Performed by: INTERNAL MEDICINE

## 2017-06-15 RX ORDER — DIPHENHYDRAMINE HCL 25 MG
25 CAPSULE ORAL ONCE
Status: COMPLETED | OUTPATIENT
Start: 2017-06-15 | End: 2017-06-15

## 2017-06-15 RX ORDER — METHYLPREDNISOLONE SODIUM SUCCINATE 125 MG/2ML
100 INJECTION, POWDER, LYOPHILIZED, FOR SOLUTION INTRAMUSCULAR; INTRAVENOUS ONCE
Status: COMPLETED | OUTPATIENT
Start: 2017-06-15 | End: 2017-06-15

## 2017-06-15 RX ORDER — NITROGLYCERIN 0.4 MG/1
0.4 TABLET SUBLINGUAL EVERY 5 MIN PRN
Qty: 25 TABLET | Refills: 3 | Status: SHIPPED | OUTPATIENT
Start: 2017-06-15

## 2017-06-15 RX ORDER — ACETAMINOPHEN 325 MG/1
650 TABLET ORAL ONCE
Status: COMPLETED | OUTPATIENT
Start: 2017-06-15 | End: 2017-06-15

## 2017-06-15 RX ORDER — DIPHENHYDRAMINE HCL 25 MG
50 TABLET ORAL SEE ADMIN INSTRUCTIONS
Qty: 4 TABLET | Refills: 0 | Status: SHIPPED | OUTPATIENT
Start: 2017-06-15 | End: 2018-01-23

## 2017-06-15 RX ORDER — MEPERIDINE HYDROCHLORIDE 25 MG/ML
25 INJECTION INTRAMUSCULAR; INTRAVENOUS; SUBCUTANEOUS
Status: DISCONTINUED | OUTPATIENT
Start: 2017-06-15 | End: 2017-06-15 | Stop reason: HOSPADM

## 2017-06-15 RX ORDER — DIPHENHYDRAMINE HCL 25 MG
25 CAPSULE ORAL ONCE
Status: CANCELLED
Start: 2017-06-15 | End: 2017-06-15

## 2017-06-15 RX ORDER — METHYLPREDNISOLONE SODIUM SUCCINATE 125 MG/2ML
100 INJECTION, POWDER, LYOPHILIZED, FOR SOLUTION INTRAMUSCULAR; INTRAVENOUS ONCE
Status: CANCELLED
Start: 2017-06-15 | End: 2017-06-15

## 2017-06-15 RX ORDER — MEPERIDINE HYDROCHLORIDE 25 MG/ML
25 INJECTION INTRAMUSCULAR; INTRAVENOUS; SUBCUTANEOUS
Status: CANCELLED
Start: 2017-06-15

## 2017-06-15 RX ORDER — ACETAMINOPHEN 325 MG/1
650 TABLET ORAL ONCE
Status: CANCELLED
Start: 2017-06-15 | End: 2017-06-15

## 2017-06-15 RX ADMIN — DIPHENHYDRAMINE HYDROCHLORIDE 25 MG: 25 CAPSULE ORAL at 11:23

## 2017-06-15 RX ADMIN — MEPERIDINE HYDROCHLORIDE 25 MG: 25 INJECTION, SOLUTION INTRAMUSCULAR; INTRAVENOUS; SUBCUTANEOUS at 11:25

## 2017-06-15 RX ADMIN — IMMUNE GLOBULIN INFUSION (HUMAN) 25 G: 100 INJECTION, SOLUTION INTRAVENOUS; SUBCUTANEOUS at 11:34

## 2017-06-15 RX ADMIN — METHYLPREDNISOLONE SODIUM SUCCINATE 100 MG: 125 INJECTION, POWDER, FOR SOLUTION INTRAMUSCULAR; INTRAVENOUS at 11:27

## 2017-06-15 RX ADMIN — ACETAMINOPHEN 650 MG: 325 TABLET ORAL at 11:23

## 2017-06-15 NOTE — PROGRESS NOTES
Nursing Note  Jennifer Al[facility] for:   Chief Complaint   Patient presents with     Infusion     IVIG for hypogammaglobulinemia     During [facility]'s [facility] appointment, orders from Dr. Elizabeth were completed.  Frequency: every 3 weeks.    Progress note:  Patient identification verified by name and date of birth.  Assessment completed.  Vitals recorded in Doc Flowsheets.  Patient was provided with education regarding infusion and possible side effects.  Patient verbalized understanding.      needed: No  Premedications: were not ordered.  Infusion Rates: infusion given over approximately 12 ml/hr for 15 min then, 25 ml/hr for 15 min then, 65 ml/hr for 15 min then the final rate 125 ml/hr till the end.  Approximate Infusion length: 3 hours  Labs: were drawn per orders.   Vascular access: peripheral IV placed today.  Treatment Conditions: c/o burning and frequency with urination: Dr. Glenn weber. Per Dr. Elizabeth ok to proceed with infusion and UA/UC ordered.   Patient tolerated infusion: well.    Drug Waste Record    Drug Name: Solu-medrol  Dose: 100 mg (1.6 ml)  Route administered: IV  NDC #: 0008-7121-88  Amount of waste(mL): 25 mg (0.4 ml)  Reason for waste: Single use vial      Discharge Plan:   Follow up plan of care with: ongoing infusions [facility].  Discharge instructions were reviewed with patient.  Patient/representative verbalized understanding of discharge instructions and all questions answered.  [facility] in stable condition.    Mary Jo Marquis RN        /52  Pulse 69  Temp 97.4  F (36.3  C) (Oral)  Resp 16  Wt 42.1 kg (92 lb 14.4 oz)  SpO2 97%  BMI 16.99 kg/m2

## 2017-06-15 NOTE — MR AVS SNAPSHOT
After Visit Summary   6/15/2017    Jennifer Alvarez    MRN: [id number]           Patient Information     Date Of Birth          1940        Visit Information        Provider Department      6/15/2017 11:00 AM UC 44 ATC; UC SPEC INFUSION [facility] Specialty [facility] Procedure        Today's Diagnoses     IgA deficiency (H)    -  1    Hypogammaglobulinemia (H)          Care Instructions    Dear Jennifer Alvarez    Thank you for choosing [facility] ([facility]) for your infusion.  The following information is a summary of our appointment as well as important reminders.        We look forward in seeing you on your next appointment here at [facility].  Please don t hesitate to call us at [phone] to reschedule any of your appointments or to speak with one of the [facility] registered nurses.  It was a pleasure taking care of you today.    Sincerely,  Roxie Tamez RN  [facility]  Specialty Infusion & Procedure Center  [address] [place] [address]  [address]  Phone:  [phone]            Follow-ups after your visit        Your next 10 appointments already scheduled     Jul 19, 2017 11:00 AM CDT   Procedure 6.5 hour with U2A ROOM 17   [address] [facility] [place] ([facility] [place])    [address] [address]               Jul 19, 2017 11:00 AM CDT   (Arrive by 10:45 AM)   Pacemaker Check with Uc Cv Device 1   [facility] ([facility])    [address]  3rd Floor  [facility] [address] [phone]            Jul 19, 2017 12:30 PM CDT   IR RENAL ANGIOGRAM RIGHT with UUIR4   [facility] [place] Interventional Radiology ([facility])    [address] [address] [phone]           1. Your doctor will need to do a history and  physical within 30 days before this procedure. 2. Your doctor will determine whether you need a 12 lead EKG, as well as which medications should not be taken the morning of the exam. 3. Laboratory tests are to be obtained by your doctor prior to the exam (creatinine, Hgb/Hct, platelet count, and INR) 4. If you have allergies to x-ray contrast or iodine, contact your doctor or a Radiology nurse prior to the exam day for instructions. 5. Someone will need to drive you to and from the hospital. 6. Bring a list of all drugs you are taking; include supplements and over-the-counter medications. 7. Wear comfortable clothes and leave your valuables at home. 8. If you are or may be pregnant, contact your doctor or a Radiology nurse prior to the day of the exam. 9.  If you have diabetes, check with your doctor or a Radiology nurse to see if your insulin needs to be adjusted for the exam. 10. If you are taking Coumadin (to thin you blood) please contact your doctor or a Radiology nurse at least 5 days before the exam for special instructions. 11. You should not have received barium (x-ray contrast) within 48 hours of this exam. 12. The day before your exam you may eat your regular diet and are encouraged to drink at least 2 quarts of clear liquids. Drink no alcoholic beverages for 24 hours prior to the exam. 13. If you have a colostomy you will need to irrigate it with tap water at 8PM the evening before and again at 6AM the morning of the exam. 14. Do not smoke for 24 hours prior to the procedure. 15. Do not eat any solid food or milk products for 6 hours prior to the exam. You may drink clear liquids until 2 hours prior to the exam. Clear liquids include the following: water, Jell-O, clear broth, apple juice or any non-carbonated drink that you can see through (no pop!) 16. The morning of the exam you may brush your teeth and take medications as directed with a sip of water. 17. Tell the Radiology nurse if you have any  allergies. 18. You will be asked to empty your bladder before the exam begins. 19. Following the exam you will need to remain on complete bedrest for 4-6 hours. The nurse will monitor your vital signs, puncture site, and the pulses and temperature of the arm or leg that was punctured. 20. When discharged, you cannot drive until morning, and an adult must be with you until then. You should stay in the [place] area overnight.            Oct 04, 2017 12:00 PM CDT   Lab with  LAB   [facility] ([facility])    [address] [place] [address]  [address] [facility] [id number] [phone]            Oct 04, 2017 12:30 PM CDT   PFT VISIT with  PFL B   [facility] Pulmonary Function Testing ([facility])    [address] [place] [id number] [phone]            Oct 04, 2017  1:10 PM CDT   (Arrive by 12:55 PM)   Return Interstitial Lung with Maxx Elizabeth MD   [facility] ([facility])    [address] [place] [address] [phone]              Who to contact     If you have questions or need follow up information about today's clinic visit or your schedule please contact [facility] SPECIALTY AND PROCEDURE directly at [phone].  Normal or non-critical lab and imaging results will be communicated to you by MyChart, letter or phone within 4 business days after the clinic has received the results. If you do not hear from us within 7 days, please contact the clinic through [employer] or phone. If you have a critical or abnormal lab result, we will notify you by phone as soon as possible.  Submit refill requests through [employer] or call your pharmacy and they will forward the refill request to us. Please allow 3 business days for your refill to be completed.          Additional Information About Your Visit        MyChart Information      "[employer] lets you send messages to your doctor, view your test results, renew your prescriptions, schedule appointments and more. To sign up, go to [email or web address] . Click on \"Log in\" on the left side of the screen, which will take you to the Welcome page. Then click on \"Sign up Now\" on the right side of the page.     You will be asked to enter the access code listed below, as well as some personal information. Please follow the directions to create your username and password.     Your access code is: [email or web address]  Expires: 2017 11:47 AM     Your access code will  in 90 days. If you need help or a new code, please call your [facility] clinic or [phone].        Care EveryWhere ID     This is your Care EveryWhere ID. This could be used by other organizations to access your [facility] medical records  [id number]        Your Vitals Were     Pulse Temperature Respirations Pulse Oximetry BMI (Body Mass Index)       64 97.4  F (36.3  C) (Oral) 16 97% 16.99 kg/m2        Blood Pressure from Last 3 Encounters:   06/15/17 158/67   17 158/52   17 123/63    Weight from Last 3 Encounters:   06/15/17 42.1 kg (92 lb 14.4 oz)   17 42.4 kg (93 lb 6.4 oz)   17 41.9 kg (92 lb 4.8 oz)              We Performed the Following     UA with Microscopic reflex to Culture          Today's Medication Changes          These changes are accurate as of: 6/15/17  2:24 PM.  If you have any questions, ask your nurse or doctor.               These medicines have changed or have updated prescriptions.        Dose/Directions    metFORMIN 1000 MG tablet   Commonly known as:  GLUCOPHAGE   This may have changed:    - when to take this  - additional instructions   Used for:  DM (diabetes mellitus) (H)        Take 1 tablet twice daily   Quantity:  180 tablet   Refills:  3                Primary Care Provider Office Phone # Fax #    Brandi DILLON Vitaliy [phone]       [facility] [address]  [facility] [address]        Thank you!     Thank you for choosing [facility] SPECIALTY AND PROCEDURE  for your care. Our goal is always to provide you with excellent care. Hearing back from our patients is one way we can continue to improve our services. Please take a few minutes to complete the written survey that you may receive in the mail after your visit with us. Thank you!             Your Updated Medication List - Protect others around you: Learn how to safely use, store and throw away your medicines at www.disposemymeds.org.          This list is accurate as of: 6/15/17  2:24 PM.  Always use your most recent med list.                   Brand Name Dispense Instructions for use    * albuterol 108 (90 BASE) MCG/ACT Inhaler    PROAIR HFA/PROVENTIL HFA/VENTOLIN HFA    1 Inhaler    Take 2 puffs prn for wheezing or shortness of breath       * albuterol (2.5 MG/3ML) 0.083% neb solution     360 mL    Take 1 vial (2.5 mg) by nebulization 3 times daily       * albuterol 108 (90 BASE) MCG/ACT Inhaler    PROAIR HFA/PROVENTIL HFA/VENTOLIN HFA    1 Inhaler    Take 2 puffs prn for wheezing       * albuterol (2.5 MG/3ML) 0.083% neb solution      Take 1 ampule by nebulization every 6 hours as needed.       aspirin 81 MG tablet      Take 1 tablet by mouth daily.       azithromycin 250 MG tablet    ZITHROMAX    6 tablet    Take as directed       Blood Pressure Kit      daily.       budesonide 3 MG 24 hr capsule    ENTOCORT EC    270 capsule    Take 1 capsule (3 mg) by mouth 3 times daily (with meals)       DEMEROL 25 MG/ML injection   Generic drug:  meperidine      Inject 25 mg into the vein as needed. 25 mg IV prior to IgG infusion       dicyclomine 10 MG capsule    BENTYL     Take 10 mg by mouth 4 times daily (before meals and nightly).       diltiazem 360 MG 24 hr CD capsule    CARDIZEM CD; CARTIA XT    90 capsule    Take 1 capsule (360 mg) by mouth daily       diphenhydrAMINE 25 MG tablet     BENADRYL    2 tablet    Take 2 tablets (50 mg) by mouth See Admin Instructions Please take 50 mg by mouth evening before  And morning of the Abd/Pelvis CT scan with contrast per Dr. Adair.  This is a pre medication for contrast dye.       FISH OIL      Unsure of dosage take two caps daily       fluticasone-salmeterol 250-50 MCG/DOSE diskus inhaler    ADVAIR    1 Inhaler    Inhale 1 puff into the lungs 2 times daily.       gabapentin 300 MG capsule    NEURONTIN    180 capsule    Take 2 capsules (600 mg) by mouth 3 times daily       hydrochlorothiazide 25 MG tablet    HYDRODIURIL     Take 25 mg by mouth as needed.       HYDROcodone-acetaminophen  MG per tablet    LORCET     Take 1 tablet by mouth every 6 hours as needed.       Immune Globulin (Human) 25 GM/500ML Soln      Inject 25 g into the vein See Admin Instructions. 25 grams every 3 weeks IVIG       Isosorbide Mononitrate  MG Tb24     90 tablet    Take 1 tablet (120 mg) by mouth daily       lisinopril 40 MG tablet    PRINIVIL/ZESTRIL     Take 1 tablet by mouth 2 times daily.       metFORMIN 1000 MG tablet    GLUCOPHAGE    180 tablet    Take 1 tablet twice daily       metoprolol 50 MG 24 hr tablet    TOPROL-XL    90 tablet    Take 1 tablet (50 mg) by mouth daily       nitroglycerin 0.4 MG sublingual tablet    NITROQUICK    25 tablet    Place 1 tablet (0.4 mg) under the tongue every 5 minutes as needed       omeprazole 20 MG CR capsule    priLOSEC    60 capsule    Take 1 capsule (20 mg) by mouth 2 times daily       * blood glucose monitoring lancets     3 Box    Use to test blood sugar 3 times daily or as directed.       * ONE TOUCH FINE POINT LANCETS      2 times daily.       * ONE TOUCH ULTRA test strip   Generic drug:  blood glucose monitoring     200 strip    Test blood sugar twice a day.       * blood glucose monitoring test strip    TERRY CONTOUR NEXT    270 each    by In Vitro route 3 times daily Use to test blood sugar 3 times daily or as  directed.       predniSONE 20 MG tablet    DELTASONE    5 tablet    Patient to take 60 mg the evening before the procedure and 30 mg the morning of the procedure.       spironolactone 50 MG tablet    ALDACTONE     Take 50 mg by mouth daily.       tiotropium 18 MCG capsule    SPIRIVA HANDIHALER    30 capsule    One puff every day       TYLENOL 325 MG tablet   Generic drug:  acetaminophen      Take 1-2 tablets by mouth every 6 hours as needed.       * Notice:  This list has 8 medication(s) that are the same as other medications prescribed for you. Read the directions carefully, and ask your doctor or other care provider to review them with you.

## 2017-06-15 NOTE — PATIENT INSTRUCTIONS
Dear Jennifer Alvarez    Thank you for choosing [facility] ([facility]) for your infusion.  The following information is a summary of our appointment as well as important reminders.        We look forward in seeing you on your next appointment here at [facility].  Please don t hesitate to call us at [phone] to reschedule any of your appointments or to speak with one of the [facility] registered nurses.  It was a pleasure taking care of you today.    Sincerely,  Roxie Tamez RN  [facility]  Specialty Infusion & Procedure Center  [address] [place] [address]  [address]  Phone:  [phone]

## 2017-06-23 RX ORDER — MEPERIDINE HYDROCHLORIDE 25 MG/ML
25 INJECTION INTRAMUSCULAR; INTRAVENOUS; SUBCUTANEOUS
Status: CANCELLED
Start: 2017-06-23

## 2017-07-06 ENCOUNTER — INFUSION THERAPY VISIT (OUTPATIENT)
Dept: INFUSION THERAPY | Facility: CLINIC | Age: 77
End: 2017-07-06
Attending: INTERNAL MEDICINE
Payer: MEDICARE

## 2017-07-06 VITALS
SYSTOLIC BLOOD PRESSURE: 162 MMHG | OXYGEN SATURATION: 99 % | RESPIRATION RATE: 16 BRPM | TEMPERATURE: 98.4 F | DIASTOLIC BLOOD PRESSURE: 94 MMHG | HEART RATE: 67 BPM

## 2017-07-06 DIAGNOSIS — D80.1 HYPOGAMMAGLOBULINEMIA (H): ICD-10-CM

## 2017-07-06 DIAGNOSIS — D80.2 IGA DEFICIENCY (H): Primary | ICD-10-CM

## 2017-07-06 PROCEDURE — A9270 NON-COVERED ITEM OR SERVICE: HCPCS | Mod: ZF | Performed by: INTERNAL MEDICINE

## 2017-07-06 PROCEDURE — 96375 TX/PRO/DX INJ NEW DRUG ADDON: CPT

## 2017-07-06 PROCEDURE — 25000132 ZZH RX MED GY IP 250 OP 250 PS 637: Mod: ZF | Performed by: INTERNAL MEDICINE

## 2017-07-06 PROCEDURE — 25000128 H RX IP 250 OP 636: Mod: ZF | Performed by: INTERNAL MEDICINE

## 2017-07-06 PROCEDURE — 96365 THER/PROPH/DIAG IV INF INIT: CPT

## 2017-07-06 PROCEDURE — 25000128 H RX IP 250 OP 636: Mod: ZF

## 2017-07-06 PROCEDURE — 96366 THER/PROPH/DIAG IV INF ADDON: CPT

## 2017-07-06 RX ORDER — MEPERIDINE HYDROCHLORIDE 25 MG/ML
25 INJECTION INTRAMUSCULAR; INTRAVENOUS; SUBCUTANEOUS
Status: DISCONTINUED | OUTPATIENT
Start: 2017-07-06 | End: 2017-07-06 | Stop reason: HOSPADM

## 2017-07-06 RX ORDER — DIPHENHYDRAMINE HCL 25 MG
25 CAPSULE ORAL ONCE
Status: COMPLETED | OUTPATIENT
Start: 2017-07-06 | End: 2017-07-06

## 2017-07-06 RX ORDER — METHYLPREDNISOLONE SODIUM SUCCINATE 125 MG/2ML
100 INJECTION, POWDER, LYOPHILIZED, FOR SOLUTION INTRAMUSCULAR; INTRAVENOUS ONCE
Status: CANCELLED
Start: 2017-07-06 | End: 2017-07-06

## 2017-07-06 RX ORDER — ACETAMINOPHEN 325 MG/1
650 TABLET ORAL ONCE
Status: COMPLETED | OUTPATIENT
Start: 2017-07-06 | End: 2017-07-06

## 2017-07-06 RX ORDER — ACETAMINOPHEN 325 MG/1
650 TABLET ORAL ONCE
Status: CANCELLED
Start: 2017-07-06 | End: 2017-07-06

## 2017-07-06 RX ORDER — MEPERIDINE HYDROCHLORIDE 25 MG/ML
25 INJECTION INTRAMUSCULAR; INTRAVENOUS; SUBCUTANEOUS
Status: CANCELLED
Start: 2017-07-06

## 2017-07-06 RX ORDER — DIPHENHYDRAMINE HCL 25 MG
25 CAPSULE ORAL ONCE
Status: CANCELLED
Start: 2017-07-06 | End: 2017-07-06

## 2017-07-06 RX ORDER — METHYLPREDNISOLONE SODIUM SUCCINATE 125 MG/2ML
100 INJECTION, POWDER, LYOPHILIZED, FOR SOLUTION INTRAMUSCULAR; INTRAVENOUS ONCE
Status: COMPLETED | OUTPATIENT
Start: 2017-07-06 | End: 2017-07-06

## 2017-07-06 RX ADMIN — METHYLPREDNISOLONE SODIUM SUCCINATE 100 MG: 125 INJECTION, POWDER, FOR SOLUTION INTRAMUSCULAR; INTRAVENOUS at 08:26

## 2017-07-06 RX ADMIN — MEPERIDINE HYDROCHLORIDE 25 MG: 25 INJECTION, SOLUTION INTRAMUSCULAR; INTRAVENOUS; SUBCUTANEOUS at 08:27

## 2017-07-06 RX ADMIN — IMMUNE GLOBULIN INFUSION (HUMAN) 25 G: 100 INJECTION, SOLUTION INTRAVENOUS; SUBCUTANEOUS at 08:35

## 2017-07-06 RX ADMIN — DIPHENHYDRAMINE HYDROCHLORIDE 25 MG: 25 CAPSULE ORAL at 08:26

## 2017-07-06 RX ADMIN — ACETAMINOPHEN 650 MG: 325 TABLET ORAL at 08:26

## 2017-07-06 NOTE — MR AVS SNAPSHOT
After Visit Summary   7/6/2017    Jennifer Alvarez    MRN: [id number]           Patient Information     Date Of Birth          1940        Visit Information        Provider Department      7/6/2017 8:00 AM UC 45 ATC; UC SPEC INFUSION [facility] Specialty and Procedure        Today's Diagnoses     IgA deficiency (H)    -  1    Hypogammaglobulinemia (H)           Follow-ups after your visit        Your next 10 appointments already scheduled     Jul 19, 2017 11:00 AM CDT   Procedure 6.5 hour with U2A ROOM 17   Unit 2A [facility] ([facility])    [address] [address]               Jul 19, 2017 11:00 AM CDT   (Arrive by 10:45 AM)   Pacemaker Check with Huyen Cv Device 1   [facility] ([facility])    [address]  3rd Floor  [facility] [address] [phone]            Jul 19, 2017 12:30 PM CDT   IR RENAL ANGIOGRAM RIGHT with UUIR4   [facility] [place] Interventional Radiology ([facility])    [address] [facility] [address] [facility] [address] [phone]           1. Your doctor will need to do a history and physical within 30 days before this procedure. 2. Your doctor will determine whether you need a 12 lead EKG, as well as which medications should not be taken the morning of the exam. 3. Laboratory tests are to be obtained by your doctor prior to the exam (creatinine, Hgb/Hct, platelet count, and INR) 4. If you have allergies to x-ray contrast or iodine, contact your doctor or a Radiology nurse prior to the exam day for instructions. 5. Someone will need to drive you to and from the hospital. 6. Bring a list of all drugs you are taking; include supplements and over-the-counter medications. 7. Wear comfortable clothes and leave your valuables at home. 8. If you are or may be pregnant, contact your doctor or a Radiology  nurse prior to the day of the exam. 9.  If you have diabetes, check with your doctor or a Radiology nurse to see if your insulin needs to be adjusted for the exam. 10. If you are taking Coumadin (to thin you blood) please contact your doctor or a Radiology nurse at least 5 days before the exam for special instructions. 11. You should not have received barium (x-ray contrast) within 48 hours of this exam. 12. The day before your exam you may eat your regular diet and are encouraged to drink at least 2 quarts of clear liquids. Drink no alcoholic beverages for 24 hours prior to the exam. 13. If you have a colostomy you will need to irrigate it with tap water at 8PM the evening before and again at 6AM the morning of the exam. 14. Do not smoke for 24 hours prior to the procedure. 15. Do not eat any solid food or milk products for 6 hours prior to the exam. You may drink clear liquids until 2 hours prior to the exam. Clear liquids include the following: water, Jell-O, clear broth, apple juice or any non-carbonated drink that you can see through (no pop!) 16. The morning of the exam you may brush your teeth and take medications as directed with a sip of water. 17. Tell the Radiology nurse if you have any allergies. 18. You will be asked to empty your bladder before the exam begins. 19. Following the exam you will need to remain on complete bedrest for 4-6 hours. The nurse will monitor your vital signs, puncture site, and the pulses and temperature of the arm or leg that was punctured. 20. When discharged, you cannot drive until morning, and an adult must be with you until then. You should stay in the [place] area overnight.            Jul 27, 2017 12:00 PM CDT   Infusion 300 with UC SPEC INFUSION, UC 49 ATC   [facility] ([facility])    [address]  2nd Floor  [facility] [address] [phone]            Aug 18, 2017 11:00 AM CDT  "  Infusion 300 with  SPEC INFUSION, UC 44 ATC   [facility] Specialty and Procedure ([facility])    [address]  2nd Floor  [facility] [address] [phone]            Oct 04, 2017 12:00 PM CDT   Lab with  LAB   [facility] ([facility])    [address]  [address] [facility] [address] [phone]            Oct 04, 2017 12:30 PM CDT   PFT VISIT with  PFL B   [facility] Pulmonary Function Testing ([facility])    [address]  3rd [facility] [address] [phone]            Oct 04, 2017  1:10 PM CDT   (Arrive by 12:55 PM)   Return Interstitial Lung with Maxx Elizabeth MD   [facility] for [facility] ([facility])    [address] [id number] [phone]              Who to contact     If you have questions or need follow up information about today's clinic visit or your schedule please contact [facility] SPECIALTY AND PROCEDURE directly at [phone].  Normal or non-critical lab and imaging results will be communicated to you by [employer], letter or phone within 4 business days after the clinic has received the results. If you do not hear from us within 7 days, please contact the clinic through [employer] or phone. If you have a critical or abnormal lab result, we will notify you by phone as soon as possible.  Submit refill requests through [employer] or call your pharmacy and they will forward the refill request to us. Please allow 3 business days for your refill to be completed.          Additional Information About Your Visit        [employer]ree Information     [employer] lets you send messages to your doctor, view your test results, renew your prescriptions, schedule appointments and more. To sign up, go to [email or web address] . Click on \"Log in\" on the left side " "of the screen, which will take you to the Welcome page. Then click on \"Sign up Now\" on the right side of the page.     You will be asked to enter the access code listed below, as well as some personal information. Please follow the directions to create your username and password.     Your access code is: [email or web address]  Expires: 2017 11:47 AM     Your access code will  in 90 days. If you need help or a new code, please call your [facility] or [phone].        Care EveryWhere ID     This is your Care EveryWhere ID. This could be used by other organizations to access your [place] medical records  [id number]        Your Vitals Were     Pulse Temperature Respirations Pulse Oximetry          67 98.4  F (36.9  C) (Oral) 16 99%         Blood Pressure from Last 3 Encounters:   17 (!) 162/94   06/15/17 158/67   17 158/52    Weight from Last 3 Encounters:   06/15/17 42.1 kg (92 lb 14.4 oz)   17 42.4 kg (93 lb 6.4 oz)   17 41.9 kg (92 lb 4.8 oz)              Today, you had the following     No orders found for display         Today's Medication Changes          These changes are accurate as of: 17 12:26 PM.  If you have any questions, ask your nurse or doctor.               These medicines have changed or have updated prescriptions.        Dose/Directions    metFORMIN 1000 MG tablet   Commonly known as:  GLUCOPHAGE   This may have changed:    - when to take this  - additional instructions   Used for:  DM (diabetes mellitus) (H)        Take 1 tablet twice daily   Quantity:  180 tablet   Refills:  3                Primary Care Provider Office Phone # Fax #    Brandi Burks [phone]       [facility] [address]   [facility] [address]        Equal Access to Services     SUJATHA HOLT : Anshu Altamirano, waaxda luqadaha, qaybta kaalmada ademarkyada, chester lovelace. [facility] [phone].    ATENCIÓN: Si habla español, tiene a taylor " disposición servicios gratuitos de asistencia lingüística. Ben zafar [phone].    We comply with applicable federal civil rights laws and [place] laws. We do not discriminate on the basis of race, color, national origin, age, disability sex, sexual orientation or gender identity.            Thank you!     Thank you for choosing [facility] SPECIALTY AND PROCEDURE  for your care. Our goal is always to provide you with excellent care. Hearing back from our patients is one way we can continue to improve our services. Please take a few minutes to complete the written survey that you may receive in the mail after your visit with us. Thank you!             Your Updated Medication List - Protect others around you: Learn how to safely use, store and throw away your medicines at www.disposemymeds.org.          This list is accurate as of: 7/6/17 12:26 PM.  Always use your most recent med list.                   Brand Name Dispense Instructions for use Diagnosis    * albuterol 108 (90 BASE) MCG/ACT Inhaler    PROAIR HFA/PROVENTIL HFA/VENTOLIN HFA    1 Inhaler    Take 2 puffs prn for wheezing or shortness of breath    Unspecified chronic bronchitis (H), Esophageal reflux       * albuterol (2.5 MG/3ML) 0.083% neb solution     360 mL    Take 1 vial (2.5 mg) by nebulization 3 times daily    Mixed simple and mucopurulent chronic bronchitis (H)       * albuterol 108 (90 BASE) MCG/ACT Inhaler    PROAIR HFA/PROVENTIL HFA/VENTOLIN HFA    1 Inhaler    Take 2 puffs prn for wheezing    Simple chronic bronchitis (H), Hypogammaglobulinemia (H)       * albuterol (2.5 MG/3ML) 0.083% neb solution      Take 1 ampule by nebulization every 6 hours as needed.        aspirin 81 MG tablet      Take 1 tablet by mouth daily.        azithromycin 250 MG tablet    ZITHROMAX    6 tablet    Take as directed    Mixed simple and mucopurulent chronic bronchitis (H), Hypogammaglobulinemia (H)       Blood Pressure Kit      daily.         budesonide 3 MG 24 hr capsule    ENTOCORT EC    270 capsule    Take 1 capsule (3 mg) by mouth 3 times daily (with meals)    Diarrhea, Loss of weight       DEMEROL 25 MG/ML injection   Generic drug:  meperidine      Inject 25 mg into the vein as needed. 25 mg IV prior to IgG infusion        dicyclomine 10 MG capsule    BENTYL     Take 10 mg by mouth 4 times daily (before meals and nightly).        diltiazem 360 MG 24 hr CD capsule    CARDIZEM CD; CARTIA XT    90 capsule    Take 1 capsule (360 mg) by mouth daily    CAD (coronary artery disease)       diphenhydrAMINE 25 MG tablet    BENADRYL    4 tablet    Take 2 tablets (50 mg) by mouth See Admin Instructions Please take 50 mg by mouth evening before  And morning of the Abd/Pelvis CT scan with contrast per Dr. Adair.  This is a pre medication for contrast dye.    Atherosclerosis of renal artery (H)       FISH OIL      Unsure of dosage take two caps daily        fluticasone-salmeterol 250-50 MCG/DOSE diskus inhaler    ADVAIR    1 Inhaler    Inhale 1 puff into the lungs 2 times daily.    Unspecified chronic bronchitis (H)       gabapentin 300 MG capsule    NEURONTIN    180 capsule    Take 2 capsules (600 mg) by mouth 3 times daily    Neuralgia, Neck pain       hydrochlorothiazide 25 MG tablet    HYDRODIURIL     Take 25 mg by mouth as needed.        HYDROcodone-acetaminophen  MG per tablet    LORCET     Take 1 tablet by mouth every 6 hours as needed.        Immune Globulin (Human) 25 GM/500ML Soln      Inject 25 g into the vein See Admin Instructions. 25 grams every 3 weeks IVIG        Isosorbide Mononitrate  MG Tb24     90 tablet    Take 1 tablet (120 mg) by mouth daily    Coronary artery disease due to calcified coronary lesion       lisinopril 40 MG tablet    PRINIVIL/ZESTRIL     Take 1 tablet by mouth 2 times daily.        metFORMIN 1000 MG tablet    GLUCOPHAGE    180 tablet    Take 1 tablet twice daily    DM (diabetes mellitus) (H)       metoprolol  50 MG 24 hr tablet    TOPROL-XL    90 tablet    Take 1 tablet (50 mg) by mouth daily    Palpitations       nitroGLYcerin 0.4 MG sublingual tablet    NITROQUICK    25 tablet    Place 1 tablet (0.4 mg) under the tongue every 5 minutes as needed    Chest pain       omeprazole 20 MG CR capsule    priLOSEC    60 capsule    Take 1 capsule (20 mg) by mouth 2 times daily    Esophageal reflux, Unspecified chronic bronchitis (H)       * blood glucose monitoring lancets     3 Box    Use to test blood sugar 3 times daily or as directed.    Type 2 diabetes mellitus with complication (H)       * ONE TOUCH FINE POINT LANCETS      2 times daily.        * ONE TOUCH ULTRA test strip   Generic drug:  blood glucose monitoring     200 strip    Test blood sugar twice a day.    Diabetes mellitus type II       * blood glucose monitoring test strip    TERRY CONTOUR NEXT    270 each    by In Vitro route 3 times daily Use to test blood sugar 3 times daily or as directed.    Type 2 diabetes mellitus with complication (H)       predniSONE 20 MG tablet    DELTASONE    5 tablet    Patient to take 60 mg the evening before the procedure and 30 mg the morning of the procedure.    Allergic reaction to contrast dye       spironolactone 50 MG tablet    ALDACTONE     Take 50 mg by mouth daily.        tiotropium 18 MCG capsule    SPIRIVA HANDIHALER    30 capsule    One puff every day    Unspecified chronic bronchitis (H)       TYLENOL 325 MG tablet   Generic drug:  acetaminophen      Take 1-2 tablets by mouth every 6 hours as needed.        * Notice:  This list has 8 medication(s) that are the same as other medications prescribed for you. Read the directions carefully, and ask your doctor or other care provider to review them with you.

## 2017-07-06 NOTE — PROGRESS NOTES
Nursing Note  [facility]ennifer [facility]:   Chief Complaint   Patient presents with     Infusion     IVIG     During [facility]'s [facility] appointment, orders from Dr. Elizabeth were completed.  Frequency: every 3 weeks    Progress note:  Patient identification verified by name and date of birth.  Assessment completed.  Vitals recorded in Doc Flowsheets.  Patient was provided with education regarding infusion and possible side effects.  Patient verbalized understanding.      needed: No  Premedications: administered per order.  Infusion Rates:   12 ml/hr x 15 min  25 ml/hr x 15 min  65 ml/hr x 15 min  125 ml/hr for remainder of infusion  Approximate Infusion length: 3 hours   Labs: were not ordered for this appointment.  Vascular access: peripheral IV placed today.  Treatment Conditions: patient denies fever, chills, signs of infection, recent illness, on antibiotics, productive cough or elevated temperature.  Patient tolerated infusion: well.    Discharge Plan:   Follow up plan of care with: ongoing infusions [facility].  Discharge instructions were reviewed with patient.  Patient/representative verbalized understanding of discharge instructions and all questions answered.  [facility] in stable condition.    Kita Barcenas RN    Administrations This Visit     acetaminophen (TYLENOL) tablet 650 mg     Admin Date Action Dose Route Administered By             07/06/2017 Given 650 mg Oral Kita Barcenas RN                    diphenhydrAMINE (BENADRYL) capsule 25 mg     Admin Date Action Dose Route Administered By             07/06/2017 Given 25 mg Oral Kita Barcenas RN                    immune globulin (Gammagard) - sucrose free 10 % injection 25 g     Admin Date Action Dose Route Administered By             07/06/2017 New Bag 25 g  Intravenous Kita Barcenas RN                    meperidine (DEMEROL) injection 25 mg     Admin Date Action Dose Route Administered By             07/06/2017 Given 25 mg Intravenous Kita Barcenas RN                    methylPREDNISolone sodium succinate (solu-MEDROL) injection 100 mg     Admin Date Action Dose Route Administered By             07/06/2017 Given 100 mg Intravenous Kita Barcenas RN                              /78  Pulse 74  Temp 98.4  F (36.9  C) (Oral)  Resp 16  SpO2 99%

## 2017-07-07 ENCOUNTER — TRANSFERRED RECORDS (OUTPATIENT)
Dept: HEALTH INFORMATION MANAGEMENT | Facility: CLINIC | Age: 77
End: 2017-07-07

## 2017-07-12 RX ORDER — SODIUM CHLORIDE 9 MG/ML
INJECTION, SOLUTION INTRAVENOUS CONTINUOUS
Status: CANCELLED | OUTPATIENT
Start: 2017-07-12

## 2017-07-12 RX ORDER — LIDOCAINE 40 MG/G
CREAM TOPICAL
Status: CANCELLED | OUTPATIENT
Start: 2017-07-12

## 2017-07-12 RX ORDER — METHYLPREDNISOLONE 32 MG/1
32 TABLET ORAL ONCE
Status: CANCELLED | OUTPATIENT
Start: 2017-07-12 | End: 2017-07-12

## 2017-07-19 ENCOUNTER — HOSPITAL ENCOUNTER (OUTPATIENT)
Facility: CLINIC | Age: 77
Setting detail: OBSERVATION
Discharge: HOME | End: 2017-07-20
Attending: INTERNAL MEDICINE | Admitting: INTERNAL MEDICINE
Payer: MEDICARE

## 2017-07-19 ENCOUNTER — APPOINTMENT (OUTPATIENT)
Dept: INTERVENTIONAL RADIOLOGY/VASCULAR | Facility: CLINIC | Age: 77
End: 2017-07-19
Attending: INTERNAL MEDICINE
Payer: MEDICARE

## 2017-07-19 ENCOUNTER — APPOINTMENT (OUTPATIENT)
Dept: MEDSURG UNIT | Facility: CLINIC | Age: 77
End: 2017-07-19
Attending: INTERNAL MEDICINE
Payer: MEDICARE

## 2017-07-19 ENCOUNTER — ALLIED HEALTH/NURSE VISIT (OUTPATIENT)
Dept: CARDIOLOGY | Facility: CLINIC | Age: 77
End: 2017-07-19
Attending: INTERNAL MEDICINE
Payer: MEDICARE

## 2017-07-19 DIAGNOSIS — I70.1 RENAL ARTERY STENOSIS (H): ICD-10-CM

## 2017-07-19 DIAGNOSIS — I70.1 LEFT RENAL ARTERY STENOSIS (H): ICD-10-CM

## 2017-07-19 DIAGNOSIS — J41.0 SIMPLE CHRONIC BRONCHITIS (H): Primary | ICD-10-CM

## 2017-07-19 DIAGNOSIS — R00.1 SINUS BRADYCARDIA: Primary | ICD-10-CM

## 2017-07-19 PROBLEM — Z98.62 S/P RENAL ARTERY ANGIOPLASTY: Status: ACTIVE | Noted: 2017-07-19

## 2017-07-19 LAB
GLUCOSE BLDC GLUCOMTR-MCNC: 123 MG/DL (ref 70–99)
GLUCOSE BLDC GLUCOMTR-MCNC: 146 MG/DL (ref 70–99)

## 2017-07-19 PROCEDURE — C1887 CATHETER, GUIDING: HCPCS

## 2017-07-19 PROCEDURE — 00000146 ZZHCL STATISTIC GLUCOSE BY METER IP

## 2017-07-19 PROCEDURE — 37246 TRLUML BALO ANGIOP 1ST ART: CPT

## 2017-07-19 PROCEDURE — 37236 OPEN/PERQ PLACE STENT 1ST: CPT | Performed by: INTERNAL MEDICINE

## 2017-07-19 PROCEDURE — 93280 PM DEVICE PROGR EVAL DUAL: CPT | Mod: 26 | Performed by: INTERNAL MEDICINE

## 2017-07-19 PROCEDURE — 25000128 H RX IP 250 OP 636: Performed by: INTERNAL MEDICINE

## 2017-07-19 PROCEDURE — 36251 INS CATH REN ART 1ST UNILAT: CPT

## 2017-07-19 PROCEDURE — 99217 ZZC OBSERVATION CARE DISCHARGE: CPT | Mod: GC | Performed by: INTERNAL MEDICINE

## 2017-07-19 PROCEDURE — C1725 CATH, TRANSLUMIN NON-LASER: HCPCS

## 2017-07-19 PROCEDURE — 27210732 ZZH ACCESSORY CR1

## 2017-07-19 PROCEDURE — 93280 PM DEVICE PROGR EVAL DUAL: CPT | Mod: ZF

## 2017-07-19 PROCEDURE — C1876 STENT, NON-COA/NON-COV W/DEL: HCPCS

## 2017-07-19 PROCEDURE — 36251 INS CATH REN ART 1ST UNILAT: CPT | Performed by: INTERNAL MEDICINE

## 2017-07-19 PROCEDURE — 047A3DZ DILATION OF LEFT RENAL ARTERY WITH INTRALUMINAL DEVICE, PERCUTANEOUS APPROACH: ICD-10-PCS | Performed by: INTERNAL MEDICINE

## 2017-07-19 PROCEDURE — C1760 CLOSURE DEV, VASC: HCPCS

## 2017-07-19 PROCEDURE — C1769 GUIDE WIRE: HCPCS

## 2017-07-19 PROCEDURE — 27210908 ZZH NEEDLE CR4

## 2017-07-19 PROCEDURE — 94640 AIRWAY INHALATION TREATMENT: CPT

## 2017-07-19 PROCEDURE — 40000167 ZZH STATISTIC PP CARE STAGE 2

## 2017-07-19 PROCEDURE — 27210845 ZZH DEVICE INFLATION CR5

## 2017-07-19 PROCEDURE — 25000132 ZZH RX MED GY IP 250 OP 250 PS 637: Mod: GY | Performed by: INTERNAL MEDICINE

## 2017-07-19 PROCEDURE — 25000125 ZZHC RX 250: Performed by: INTERNAL MEDICINE

## 2017-07-19 PROCEDURE — 27210780 ZZH KIT CR3

## 2017-07-19 PROCEDURE — 40000275 ZZH STATISTIC RCP TIME EA 10 MIN

## 2017-07-19 PROCEDURE — G0378 HOSPITAL OBSERVATION PER HR: HCPCS

## 2017-07-19 PROCEDURE — 99152 MOD SED SAME PHYS/QHP 5/>YRS: CPT

## 2017-07-19 PROCEDURE — 99153 MOD SED SAME PHYS/QHP EA: CPT

## 2017-07-19 PROCEDURE — 27210804 ZZH SHEATH CR3

## 2017-07-19 PROCEDURE — A9270 NON-COVERED ITEM OR SERVICE: HCPCS | Mod: GY | Performed by: INTERNAL MEDICINE

## 2017-07-19 PROCEDURE — 85347 COAGULATION TIME ACTIVATED: CPT

## 2017-07-19 RX ORDER — NALOXONE HYDROCHLORIDE 0.4 MG/ML
.2-.4 INJECTION, SOLUTION INTRAMUSCULAR; INTRAVENOUS; SUBCUTANEOUS
Status: ACTIVE | OUTPATIENT
Start: 2017-07-19 | End: 2017-07-20

## 2017-07-19 RX ORDER — SODIUM CHLORIDE 9 MG/ML
INJECTION, SOLUTION INTRAVENOUS CONTINUOUS
Status: DISCONTINUED | OUTPATIENT
Start: 2017-07-19 | End: 2017-07-19

## 2017-07-19 RX ORDER — DIPHENHYDRAMINE HYDROCHLORIDE 50 MG/ML
25 INJECTION INTRAMUSCULAR; INTRAVENOUS ONCE
Status: DISCONTINUED | OUTPATIENT
Start: 2017-07-19 | End: 2017-07-20 | Stop reason: HOSPADM

## 2017-07-19 RX ORDER — DILTIAZEM HYDROCHLORIDE 180 MG/1
360 CAPSULE, COATED, EXTENDED RELEASE ORAL DAILY
Status: DISCONTINUED | OUTPATIENT
Start: 2017-07-20 | End: 2017-07-20

## 2017-07-19 RX ORDER — LISINOPRIL 40 MG/1
40 TABLET ORAL 2 TIMES DAILY
Status: DISCONTINUED | OUTPATIENT
Start: 2017-07-19 | End: 2017-07-20

## 2017-07-19 RX ORDER — HEPARIN SODIUM 1000 [USP'U]/ML
500-6000 INJECTION, SOLUTION INTRAVENOUS; SUBCUTANEOUS
Status: COMPLETED | OUTPATIENT
Start: 2017-07-19 | End: 2017-07-19

## 2017-07-19 RX ORDER — ACETAMINOPHEN 650 MG/1
650 SUPPOSITORY RECTAL EVERY 4 HOURS PRN
Status: DISCONTINUED | OUTPATIENT
Start: 2017-07-19 | End: 2017-07-20 | Stop reason: HOSPADM

## 2017-07-19 RX ORDER — SPIRONOLACTONE 50 MG/1
50 TABLET, FILM COATED ORAL DAILY
Status: DISCONTINUED | OUTPATIENT
Start: 2017-07-19 | End: 2017-07-20

## 2017-07-19 RX ORDER — ALBUTEROL SULFATE 90 UG/1
2 AEROSOL, METERED RESPIRATORY (INHALATION)
Status: DISCONTINUED | OUTPATIENT
Start: 2017-07-19 | End: 2017-07-20 | Stop reason: HOSPADM

## 2017-07-19 RX ORDER — ASPIRIN 81 MG/1
81 TABLET ORAL DAILY
Status: DISCONTINUED | OUTPATIENT
Start: 2017-07-19 | End: 2017-07-20 | Stop reason: HOSPADM

## 2017-07-19 RX ORDER — IODIXANOL 320 MG/ML
150 INJECTION, SOLUTION INTRAVASCULAR ONCE
Status: COMPLETED | OUTPATIENT
Start: 2017-07-19 | End: 2017-07-19

## 2017-07-19 RX ORDER — HYDROCHLOROTHIAZIDE 25 MG/1
25 TABLET ORAL DAILY
Status: DISCONTINUED | OUTPATIENT
Start: 2017-07-19 | End: 2017-07-20 | Stop reason: HOSPADM

## 2017-07-19 RX ORDER — LIDOCAINE 40 MG/G
CREAM TOPICAL
Status: DISCONTINUED | OUTPATIENT
Start: 2017-07-19 | End: 2017-07-20 | Stop reason: HOSPADM

## 2017-07-19 RX ORDER — FLUMAZENIL 0.1 MG/ML
0.2 INJECTION, SOLUTION INTRAVENOUS
Status: ACTIVE | OUTPATIENT
Start: 2017-07-19 | End: 2017-07-20

## 2017-07-19 RX ORDER — ACETAMINOPHEN 325 MG/1
650 TABLET ORAL EVERY 4 HOURS PRN
Status: DISCONTINUED | OUTPATIENT
Start: 2017-07-19 | End: 2017-07-20 | Stop reason: HOSPADM

## 2017-07-19 RX ORDER — NALOXONE HYDROCHLORIDE 0.4 MG/ML
.1-.4 INJECTION, SOLUTION INTRAMUSCULAR; INTRAVENOUS; SUBCUTANEOUS
Status: DISCONTINUED | OUTPATIENT
Start: 2017-07-19 | End: 2017-07-20 | Stop reason: HOSPADM

## 2017-07-19 RX ORDER — ACETAMINOPHEN 325 MG/1
325-650 TABLET ORAL EVERY 6 HOURS PRN
Status: DISCONTINUED | OUTPATIENT
Start: 2017-07-19 | End: 2017-07-20 | Stop reason: HOSPADM

## 2017-07-19 RX ORDER — METOPROLOL SUCCINATE 50 MG/1
50 TABLET, EXTENDED RELEASE ORAL DAILY
Status: DISCONTINUED | OUTPATIENT
Start: 2017-07-20 | End: 2017-07-20 | Stop reason: HOSPADM

## 2017-07-19 RX ORDER — CLOPIDOGREL BISULFATE 75 MG/1
75 TABLET ORAL DAILY
Status: DISCONTINUED | OUTPATIENT
Start: 2017-07-20 | End: 2017-07-20 | Stop reason: HOSPADM

## 2017-07-19 RX ORDER — GABAPENTIN 300 MG/1
600 CAPSULE ORAL 3 TIMES DAILY
Status: DISCONTINUED | OUTPATIENT
Start: 2017-07-19 | End: 2017-07-20 | Stop reason: HOSPADM

## 2017-07-19 RX ORDER — HYDROCODONE BITARTRATE AND ACETAMINOPHEN 5; 325 MG/1; MG/1
1 TABLET ORAL EVERY 6 HOURS PRN
Status: DISCONTINUED | OUTPATIENT
Start: 2017-07-19 | End: 2017-07-20 | Stop reason: HOSPADM

## 2017-07-19 RX ORDER — FLUMAZENIL 0.1 MG/ML
0.2 INJECTION, SOLUTION INTRAVENOUS
Status: DISCONTINUED | OUTPATIENT
Start: 2017-07-19 | End: 2017-07-20 | Stop reason: HOSPADM

## 2017-07-19 RX ORDER — FENTANYL CITRATE 50 UG/ML
25-50 INJECTION, SOLUTION INTRAMUSCULAR; INTRAVENOUS
Status: ACTIVE | OUTPATIENT
Start: 2017-07-19 | End: 2017-07-20

## 2017-07-19 RX ORDER — DICYCLOMINE HYDROCHLORIDE 10 MG/1
10 CAPSULE ORAL
Status: DISCONTINUED | OUTPATIENT
Start: 2017-07-19 | End: 2017-07-20 | Stop reason: HOSPADM

## 2017-07-19 RX ORDER — FENTANYL CITRATE 50 UG/ML
25-50 INJECTION, SOLUTION INTRAMUSCULAR; INTRAVENOUS EVERY 5 MIN PRN
Status: DISCONTINUED | OUTPATIENT
Start: 2017-07-19 | End: 2017-07-20 | Stop reason: HOSPADM

## 2017-07-19 RX ORDER — ALBUTEROL SULFATE 0.83 MG/ML
2.5 SOLUTION RESPIRATORY (INHALATION) EVERY 6 HOURS PRN
Status: DISCONTINUED | OUTPATIENT
Start: 2017-07-19 | End: 2017-07-20 | Stop reason: HOSPADM

## 2017-07-19 RX ORDER — CLOPIDOGREL BISULFATE 75 MG/1
300 TABLET ORAL ONCE
Status: COMPLETED | OUTPATIENT
Start: 2017-07-19 | End: 2017-07-19

## 2017-07-19 RX ORDER — ATROPINE SULFATE 0.1 MG/ML
0.5 INJECTION INTRAVENOUS EVERY 5 MIN PRN
Status: ACTIVE | OUTPATIENT
Start: 2017-07-19 | End: 2017-07-20

## 2017-07-19 RX ORDER — ISOSORBIDE MONONITRATE 60 MG/1
120 TABLET, EXTENDED RELEASE ORAL DAILY
Status: DISCONTINUED | OUTPATIENT
Start: 2017-07-19 | End: 2017-07-20 | Stop reason: HOSPADM

## 2017-07-19 RX ORDER — ALBUTEROL SULFATE 0.83 MG/ML
2.5 SOLUTION RESPIRATORY (INHALATION) 3 TIMES DAILY
Status: DISCONTINUED | OUTPATIENT
Start: 2017-07-19 | End: 2017-07-20 | Stop reason: HOSPADM

## 2017-07-19 RX ORDER — ASPIRIN 81 MG/1
81 TABLET ORAL DAILY
Status: DISCONTINUED | OUTPATIENT
Start: 2017-07-20 | End: 2017-07-19

## 2017-07-19 RX ADMIN — DICYCLOMINE HYDROCHLORIDE 10 MG: 10 CAPSULE ORAL at 22:47

## 2017-07-19 RX ADMIN — DICYCLOMINE HYDROCHLORIDE 10 MG: 10 CAPSULE ORAL at 19:02

## 2017-07-19 RX ADMIN — ISOSORBIDE MONONITRATE 120 MG: 60 TABLET, EXTENDED RELEASE ORAL at 19:02

## 2017-07-19 RX ADMIN — GABAPENTIN 600 MG: 300 CAPSULE ORAL at 19:02

## 2017-07-19 RX ADMIN — OMEPRAZOLE 20 MG: 20 CAPSULE, DELAYED RELEASE ORAL at 19:02

## 2017-07-19 RX ADMIN — HYDROCHLOROTHIAZIDE 25 MG: 25 TABLET ORAL at 19:02

## 2017-07-19 RX ADMIN — ALBUTEROL SULFATE 2.5 MG: 2.5 SOLUTION RESPIRATORY (INHALATION) at 21:56

## 2017-07-19 RX ADMIN — ASPIRIN 81 MG: 81 TABLET, COATED ORAL at 19:02

## 2017-07-19 RX ADMIN — SODIUM CHLORIDE: 9 INJECTION, SOLUTION INTRAVENOUS at 12:16

## 2017-07-19 RX ADMIN — LISINOPRIL 40 MG: 40 TABLET ORAL at 19:02

## 2017-07-19 RX ADMIN — MIDAZOLAM 3 MG: 1 INJECTION INTRAMUSCULAR; INTRAVENOUS at 14:53

## 2017-07-19 RX ADMIN — IODIXANOL 50 ML: 320 INJECTION, SOLUTION INTRAVASCULAR at 14:50

## 2017-07-19 RX ADMIN — CLOPIDOGREL 300 MG: 75 TABLET, FILM COATED ORAL at 15:42

## 2017-07-19 RX ADMIN — SPIRONOLACTONE 50 MG: 50 TABLET ORAL at 19:02

## 2017-07-19 RX ADMIN — FENTANYL CITRATE 150 MCG: 50 INJECTION, SOLUTION INTRAMUSCULAR; INTRAVENOUS at 14:53

## 2017-07-19 RX ADMIN — HEPARIN SODIUM 7000 UNITS: 1000 INJECTION, SOLUTION INTRAVENOUS; SUBCUTANEOUS at 14:56

## 2017-07-19 NOTE — IP AVS SNAPSHOT
Unit 6D Observation [address] [address]    Phone:  [phone]    Fax:  [phone]                                       After Visit Summary   7/19/2017    Jennifer Alvarez    MRN: [id number]           After Visit Summary Signature Page     I have received my discharge instructions, and my questions have been answered. I have discussed any challenges I see with this plan with the nurse or doctor.    ..........................................................................................................................................  Patient/Patient Representative Signature      ..........................................................................................................................................  Patient Representative Print Name and Relationship to Patient    ..................................................               ................................................  Date                                            Time    ..........................................................................................................................................  Reviewed by Signature/Title    ...................................................              ..............................................  Date                                                            Time

## 2017-07-19 NOTE — PROGRESS NOTES
Patient seen on 2A for evaluation and iterative programming of her [employer] dual lead pacemaker per MD orders.  Normal pacemaker function.  3 episodes recorded as VHR which appear to be atrial tachycardia - 3-6 sec, 180-207 bpm.  Intrinsic rhythm = NSR @ 61 bpm.  AP = 22%.   = <1%.  Estimated battery longevity to MARIA T = 0.5-1.25 years.  Plan for patient to send transtelephonic pacemaker checks every 6 weeks to monitor battery status.  Plan for patient to return to clinic in 6 mos.    Dual lead pacemaker

## 2017-07-19 NOTE — H&P
Cardiology H&P    HPI:  Ms Alvarez is a 77 yo female with refractory hypertension who is admitted for observation after undergoing elective left renal artery angiography and stenting.    Nonselective aortography identified a LEFT 80% ostial renal artery stenosis with minimal atherosclerotic plaque beyond.  A 5.0x12 mm Herculink stent was placed in the LEFT ostial renal artery and the ostial edge was splayed open with a balloon.    Her medical history is also notable for coronary artery disease s/p prior stenting of the LAD, type 2 diabetes, sick sinus syndrome s/p PPM, and chronic kidney disease.    Past Medical History:   Diagnosis Date     Abdominal pain     cramping     Arthritis      CAD (coronary artery disease)     stent x2, ppm     Cancer (H)     skin     Cardiac pacemaker      CC (Crohn's colitis) (H)      Chronic kidney disease      Diabetes (H)     DM type 2, oral agent     Diarrhea      GERD (gastroesophageal reflux disease)      History of blood transfusion      HTN (hypertension)      Hyperlipidemia LDL goal <70 11/19/2013     IgA deficiency (H)      Neck pain 12/31/2014     Uncomplicated asthma      Weight loss      Past Surgical History:   Procedure Laterality Date     APPENDECTOMY       C LAP,SURG,COLECTOMY, PARTIAL, W/ANAST      partial colectomy;diverticulitis     cardiac stents      x 2     CARPAL TUNNEL RELEASE RT/LT      bilateral     CHOLECYSTECTOMY       COLONOSCOPY       COLONOSCOPY N/A 11/9/2015    Procedure: COMBINED COLONOSCOPY, SINGLE OR MULTIPLE BIOPSY/POLYPECTOMY BY BIOPSY;  Surgeon: Victor Hugo Turner MD;  Location:  GI     ENT SURGERY      back throat     ESOPHAGEAL MOTILITY STUDY  5-5-15     ESOPHAGOSCOPY, GASTROSCOPY, DUODENOSCOPY (EGD), COMBINED Left 5/13/2015    Procedure: COMBINED ESOPHAGOSCOPY, GASTROSCOPY, DUODENOSCOPY (EGD), BIOPSY SINGLE OR MULTIPLE;  Surgeon: Dipesh Bobby MD;  Location:  GI     ESOPHAGOSCOPY, GASTROSCOPY, DUODENOSCOPY (EGD), COMBINED N/A  11/6/2015    Procedure: COMBINED ESOPHAGOSCOPY, GASTROSCOPY, DUODENOSCOPY (EGD), BIOPSY SINGLE OR MULTIPLE;  Surgeon: Victor Hugo Turner MD;  [facility]     FOOT SURGERY      left     HC ESOPH/GAS REFLUX TEST W NASAL IMPED >1 HR N/A 5/5/2015    Procedure: ESOPHAGEAL IMPEDENCE FUNCTION TEST WITH 24 HOUR PH GREATER THAN 1 HOUR;  Surgeon: Dipesh Bobby MD;  [facility]     IMPLANT PACEMAKER      PPM     LAPAROTOMY EXPLORATORY       NISSEN FUNDOPLICATION      x 2     SHOULDER SURGERY      right     UPPER GI ENDOSCOPY       WRIST SURGERY      right cyst     Family History   Problem Relation Age of Onset     Neurologic Disorder No family hx of      Social History     Social History     Marital status:      Spouse name: N/A     Number of children: N/A     Years of education: N/A     Occupational History     Not on file.     Social History Main Topics     Smoking status: Former Smoker     Packs/day: 0.20     Years: 20.00     Types: Cigarettes     Quit date: 2/24/1974     Smokeless tobacco: Never Used     Alcohol use No     Drug use: No     Sexual activity: Not on file     Other Topics Concern     Not on file     Social History Narrative        Allergies   Allergen Reactions     Bees Anaphylaxis     Codeine Sulfate Hives     Contrast Dye Anaphylaxis and Hives     Milk Products Shortness Of Breath     Morphine Sulfate Other (See Comments), Hives and Itching     Pt had abdominal pain that had her doubled over     Pcn [Penicillin G Ammonium] Difficulty breathing     Zinacef [Cefuroxime Sodium] Difficulty breathing     Influenza Vaccine Live Rash     Pneumovax [Pneumococcal Polysaccharides]      Procardia [Nifedipine] Difficulty breathing     Barium Rash       No current facility-administered medications on file prior to encounter.   Current Outpatient Prescriptions on File Prior to Encounter:  predniSONE (DELTASONE) 20 MG tablet Patient to take 60 mg the evening before the procedure and 30 mg the  morning of the procedure.   azithromycin (ZITHROMAX) 250 MG tablet Take as directed   diltiazem (CARDIZEM CD; CARTIA XT) 360 MG 24 hr CD capsule Take 1 capsule (360 mg) by mouth daily   gabapentin (NEURONTIN) 300 MG capsule Take 2 capsules (600 mg) by mouth 3 times daily   metoprolol (TOPROL-XL) 50 MG 24 hr tablet Take 1 tablet (50 mg) by mouth daily   budesonide (ENTOCORT EC) 3 MG 24 hr capsule Take 1 capsule (3 mg) by mouth 3 times daily (with meals)   omeprazole (PRILOSEC) 20 MG capsule Take 1 capsule (20 mg) by mouth 2 times daily   metFORMIN (GLUCOPHAGE) 1000 MG tablet Take 1 tablet twice daily (Patient taking differently: daily (with dinner) Take 1 tablet twice daily)   fluticasone-salmeterol (ADVAIR) 250-50 MCG/DOSE diskus inhaler Inhale 1 puff into the lungs 2 times daily.   dicyclomine (BENTYL) 10 MG capsule Take 10 mg by mouth 4 times daily (before meals and nightly).   aspirin 81 MG tablet Take 1 tablet by mouth daily.   FISH OIL Unsure of dosage take two caps daily   lisinopril (PRINIVIL,ZESTRIL) 40 MG tablet Take 1 tablet by mouth 2 times daily.    hydrocodone-acetaminophen (LORCET)  MG per tablet Take 1 tablet by mouth every 6 hours as needed.   hydrochlorothiazide (HYDRODIURIL) 25 MG tablet Take 25 mg by mouth as needed.   meperidine (DEMEROL) 25 MG/ML injection Inject 25 mg into the vein as needed. 25 mg IV prior to IgG infusion   spironolactone (ALDACTONE) 50 MG tablet Take 50 mg by mouth daily.   albuterol (PROAIR HFA, PROVENTIL HFA, VENTOLIN HFA) 108 (90 BASE) MCG/ACT inhaler Take 2 puffs prn for wheezing   Isosorbide Mononitrate  MG TB24 Take 1 tablet (120 mg) by mouth daily (Patient taking differently: Take 120 mg by mouth daily as needed )   albuterol (2.5 MG/3ML) 0.083% nebulizer solution Take 1 vial (2.5 mg) by nebulization 3 times daily   blood glucose monitoring (TERRY CONTOUR NEXT) test strip by In Vitro route 3 times daily Use to test blood sugar 3 times daily or as directed.  "  blood glucose monitoring (TERRY MICROLET) lancets Use to test blood sugar 3 times daily or as directed.   albuterol (PROAIR HFA, PROVENTIL HFA, VENTOLIN HFA) 108 (90 BASE) MCG/ACT inhaler Take 2 puffs prn for wheezing or shortness of breath   ONE TOUCH ULTRA TEST strip Test blood sugar twice a day.   tiotropium (SPIRIVA HANDIHALER) 18 MCG inhalation capsule One puff every day   Blood Pressure KIT daily.   ONE TOUCH FINE POINT LANCETS 2 times daily.   albuterol (2.5 MG/3ML) 0.083% nebulizer solution Take 1 ampule by nebulization every 6 hours as needed.   Immune Globulin, Human, 25 GM/500ML SOLN Inject 25 g into the vein See Admin Instructions. 25 grams every 3 weeks IVIG   acetaminophen (TYLENOL) 325 MG tablet Take 1-2 tablets by mouth every 6 hours as needed.         Examination:  /83 (BP Location: Left arm)  Pulse 70  Temp 98.2  F (36.8  C) (Oral)  Resp 16  Ht 1.575 m (5' 2\")  Wt 40.8 kg (90 lb)  SpO2 100%  BMI 16.46 kg/m2  She is lying in bed seemingly comfortably with unlabored breathing.  She has a RRR with normal s1 and s2.  There is a 2/6 BRIGHT loudest at the LUSB.  No rub is present.  Lungs are clear on ascultation.  Abdomen is soft.  She is not jaundiced.  Femoral arteriotomy site is soft, minimally tender, and does not have a hematoma.      Labs, imaging & procedures were reviewed.  Glc 123.        Impression:  1. Status post successful stenting of the left renal artery for renal artery stenosis  2. Refractory hypertension  3. Coronary artery disease with normal LV function on echo in 2015.  4. Type 2 diabetes mellitus  5. Status post pacemaker implantation in 2008 for sick sinus syndrome    Plan:  Dual antiplatelet therapy with aspirin & clopidogrel  Re-introduce antihypertensive therapy and monitor BP overnight  Discharge tomorrow if stable on her aggressive antihypertensive regimen.    I discussed the patient with Dr. Jaleel Adair.    Navneet Zuniga MD  Cardiovascular disease fellow    Staff " Cardiologist: I supervised the cardiology fellow and I agree with the documentation above.  We will observe patient overnight as I expect the BP to drop and we will need to determine how many of her antihypertensive medications, if any, should be given and whether the doses need to be reduced.    Jaleel Adair MD

## 2017-07-19 NOTE — MR AVS SNAPSHOT
After Visit Summary   7/19/2017    Jennifer Alvarez    MRN: [id number]           Patient Information     Date Of Birth          1940        Visit Information        Provider Department      7/19/2017 11:00 AM 1, [facility] Cv Device [facility]        Today's Diagnoses     Sinus bradycardia    -  1       Follow-ups after your visit        Your next 10 appointments already scheduled     Jul 27, 2017 12:00 PM CDT   Infusion 300 with UC SPEC INFUSION, UC 49 ATC   [facility] ([facility])    [address]  [address] [facility] [address]   [phone]            Aug 22, 2017 11:00 AM CDT   Infusion 300 with UC SPEC INFUSION, UC 44 ATC   [facility] ([facility])    [address] [place] [address]  [address] [facility] [address]   [phone]            Oct 04, 2017 12:00 PM CDT   Lab with UC LAB   [facility] ([facility])    [address] [place] [address]  [address] [facility] [address] [phone]            Oct 04, 2017 12:30 PM CDT   PFT VISIT with UC PFL B   [facility] ([facility])    [address] [place] [address]  [address] [facility] [address] [phone]            Oct 04, 2017  1:10 PM CDT   (Arrive by 12:55 PM)   Return Interstitial Lung with Maxx Elizabeth MD   [facility] ([facility])    [address] [place] [address] [phone]              Who to contact     If you have questions or need follow up information about today's clinic visit or your schedule please contact [facility] directly at [phone].  Normal or non-critical lab and imaging results will be communicated to you by MyChart, letter or phone within 4 business days after the clinic has received the  "results. If you do not hear from us within 7 days, please contact the clinic through [employer] or phone. If you have a critical or abnormal lab result, we will notify you by phone as soon as possible.  Submit refill requests through [employer] or call your pharmacy and they will forward the refill request to us. Please allow 3 business days for your refill to be completed.          Additional Information About Your Visit        [employer] Information     [employer] lets you send messages to your doctor, view your test results, renew your prescriptions, schedule appointments and more. To sign up, go to [email or web address] . Click on \"Log in\" on the left side of the screen, which will take you to the Welcome page. Then click on \"Sign up Now\" on the right side of the page.     You will be asked to enter the access code listed below, as well as some personal information. Please follow the directions to create your username and password.     Your access code is: [email or web address]  Expires: 2017 11:47 AM     Your access code will  in 90 days. If you need help or a new code, please call your [facility] clinic or [phone].        Care EveryWhere ID     This is your Care EveryWhere ID. This could be used by other organizations to access your [facility] medical records  [id number]         Blood Pressure from Last 3 Encounters:   17 126/57   17 (!) 162/94   06/15/17 158/67    Weight from Last 3 Encounters:   17 40.8 kg (90 lb)   06/15/17 42.1 kg (92 lb 14.4 oz)   17 42.4 kg (93 lb 6.4 oz)              We Performed the Following     PM DEVICE PROGRAMMING EVAL, DUAL LEAD PACER          Today's Medication Changes      Notice     This visit is during an admission. Changes to the med list made in this visit will be reflected in the After Visit Summary of the admission.             Primary Care Provider Office Phone # Fax #    Brandi DILLON Vitaliy [phone]       [facility] [address]  [facility] [address]        Equal Access to Services     [facility] CenterDENITA : Hadmarvin adolph sims corylisa Sorosi, wacaroda luqadaha, qadonovanta marcorociocurly nassar, chester peraza marinefidencio guzmanaaronradha lovelace. So [facility] [phone].    ATENCIÓN: Si habla español, tiene a taylor disposición servicios gratuitos de asistencia lingüística. Llame al [phone].    We comply with applicable federal civil rights laws and [place] laws. We do not discriminate on the basis of race, color, national origin, age, disability sex, sexual orientation or gender identity.            Thank you!     Thank you for choosing [facility]  for your care. Our goal is always to provide you with excellent care. Hearing back from our patients is one way we can continue to improve our services. Please take a few minutes to complete the written survey that you may receive in the mail after your visit with us. Thank you!             Your Updated Medication List - Protect others around you: Learn how to safely use, store and throw away your medicines at www.disposemymeds.org.      Notice     This visit is during an admission. Changes to the med list made in this visit will be reflected in the After Visit Summary of the admission.

## 2017-07-19 NOTE — PROGRESS NOTES
Tolerated recovery well, Report called to unit 6D to Elyssa. Plavix loading dose given at 1600 but still needs prescription for home, CSI fellow and 6D nurse informed.

## 2017-07-19 NOTE — PROCEDURES
FINAL INTERVENTIONAL RADIOLOGY REPORT:     PROCEDURES PERFORMED:   Left Renal Artery Angiography  Left Renal Artery Stenting    PHYSICIANS:  Attending Physician: Jaleel Adair MD  Interventional Radiology Fellow: Leroy Pearl MD     INDICATION:  Jennifer Alvarez is a 76 year old female with refractory HTN, on 5 medications, who was referred for elective LEFT renal artery stenting.  CT angiogram identified a LEFT 70% ostial renal artery stenosis.    DESCRIPTION:  1. Consent obtained with discussion of risks.  All questions were answered.  2. Sterile prep and procedure.  3. Location with Sheaths:   Rt Femoral Arterial  6 Fr  10 cm [short]  4. Access: Local anesthetic with lidocaine.  A micropuncture 21 guage needle with ultrasound guidance was used to establish vascular access using a modified Seldinger technique.  5. Diagnostic Catheters: None  6. Guiding Catheters:  6 Fr  IM GC  6. Estimated blood loss: < 25 ml    MEDICATIONS:  The procedure was performed under conscious sedation for 140 minutes from 13:20 to 15:00.  The patient was assessed immediately before the first sedation medication was administered.  Midazolam 3 mg and Fentanyl 150 mcg were administered.  Heart rate, BP, respiration, oxygen saturation and patient responses were monitored throughout the procedure with the assistance of the RN under my supervision.  >> IV UFH 7000 U was administered to achieve anticoagulation.  >> Antiplatelet Therapy: ASA 81 mg  or Plavix 300 mg    Procedures:  Aortography:  Nonselective aortography identified a LEFT 80% ostial renal artery stenosis.  Beyond the ostial lesion, there was minimal atherosclerotic plaque.      LEFT Renal Angiogram:  Selective renal angiography was performed using a 6 Fr MARINE guiding catheter    LEFT Renal Angioplasty / Stenting: A Prograde wire/catheter was used to cross the LEFT renal artery ostial lesion.  The catheter was advanced over the hydrophilic wire.  The wire was removed and replaced  "with a 300 cm 0.014\" [place]icus wire.  The lesion was predilated with a [place] 3.0x20 mm balloon with a good result.  There was no edge dissection.  A 5.0x12 mm Herculink stent was placed in the LEFT ostial renal artery.  It was post-dilated and the ostial edge was splayed with a 5.0 NC balloon.  There was no edge dissection and there was brisk flow into the renal arteries with blush.    Sheath Removal:  The sheath in the RFA was removed using the  6 Fr Perclose.    There were no immediate complications.    Contrast: Visipque 320, 50 ml     Fluoroscopy Time: 23 min    COMPLICATIONS:  1. None    SUMMARY:   1. LEFT renal artery stenosis  2. Successful LEFT renal artery stenting    PLAN:   ASA 81 mg qd.  Start Plavix 500 mg   Reintroduce BP medications.  Observe overnight.    The attending interventional cardiologist was present and supervised all critical aspects the procedure.    Leroy Pearl MD  Inteventional Fellow    Jaleel Adair MD   Cardiology Staff            "

## 2017-07-19 NOTE — PROGRESS NOTES
Interventional Radiology Intra-procedural Nursing Note    Patient Name: Jennifer Alvarez  Medical Record Number: [id number]  Today's Date: July 19, 2017    Start Time: 1320  End of procedure time: 1500  Procedure: left renal artery stent placement  Report given to: 2a  Time pt departs:  1505  :     Other Notes:   Right fem artery puncture. Left renal artery stent placement. Heparin 7000 u total administered intra procedure.  @ 1406. Pt. Tolerates well. No noted complications.  perclose at 1500-2 hour bedrest.    Tigist Mi

## 2017-07-19 NOTE — IP AVS SNAPSHOT
MRN:[id number]                      After Visit Summary   7/19/2017    Jennifer Alvarez    MRN: [id number]           Thank you!     Thank you for choosing [facility] for your care. Our goal is always to provide you with excellent care. Hearing back from our patients is one way we can continue to improve our services. Please take a few minutes to complete the written survey that you may receive in the mail after you visit with us. Thank you!        Patient Information     Date Of Birth          1940        Designated Caregiver       Most Recent Value    Caregiver    Will someone help with your care after discharge? yes    Name of designated caregiver Nicko Alvarez     Phone number of caregiver N/A    Caregiver address Does not know off hand      About your hospital stay     You were admitted on:  July 19, 2017 You last received care in the:  Unit 6D Observation [facility]    You were discharged on:  July 20, 2017        Reason for your hospital stay       You had a stent placed in your renal artery to restore blood flow to your kidney.  This should help lower your blood pressure, because the kidney releases hormones that increase blood pressure when it has low blood flow.                  Who to Call     For medical emergencies, please call 911.  For non-urgent questions about your medical care, please call your primary care provider or clinic, [phone]          Attending Provider     Provider Specialty    Shola Rivas MD Clinical Cardiac Electrophysiology    Jaleel Adair MD Internal Medicine - Interventional Cardiology       Primary Care Provider Office Phone # Fax #    Brandi Burks [phone]      After Care Instructions     Activity       Your activity upon discharge: For the next 7 days, do not lift anything heavier than 10 lbs and do not engage in activities that require repetitive hip flexion (biking, jogging, etc).            Diet       Follow this  diet upon discharge: Heart healthy diet (low salt, low cholesterol)            Discharge Instructions       Check your blood pressure after arriving home this afternoon and call the [facility] cardiology triage line this afternoon to reach Dr. Mana Patel's nurse and report it to her.  If the systolic BP (top number) is higher than 140 we will instruct you to resume some of your BP meds.                  Follow-up Appointments     Adult [facility] Follow-up and recommended labs and tests       Follow up with your primary care physician on Monday, 7/24/17, as previously scheduled.  You should have a Basic Metabolic Panel checked at that visit.  Follow up with Dr. Jaleel Adair at the [facility] in 4 weeks.    Appointments on [place] and/or [facility] (with [facility] or [facility] provider or service). Call [phone] if you haven't heard regarding these appointments within 7 days of discharge.                  Your next 10 appointments already scheduled     Jul 27, 2017 12:00 PM CDT   Infusion 300 with UC SPEC INFUSION, UC 49 ATC   [facility] ([facility])    [address] [place] [address]   [phone]            Aug 22, 2017 10:00 AM CDT   (Arrive by 9:45 AM)   Return Visit with CINDY Ortiz [facility])    [address] [place] [address]   [phone]            Aug 22, 2017 10:30 AM CDT   (Arrive by 10:15 AM)   Pacemaker Check with Uc Cv Device 1   [facility] ([facility])    [address] [place] [address]   [phone]            Aug 22, 2017 11:00 AM CDT   Infusion 300 with UC SPEC INFUSION, UC 44 ATC   [facility] Specialty [facility] Procedure ([facility])    [address] [place] [address]  "[facility] [address]   [phone]            Oct 04, 2017 12:00 PM CDT   Lab with  LAB   [facility] ([facility])    [address] [facility] [facility]  [address] [facility] [address]   [phone]            Oct 04, 2017 12:30 PM CDT   PFT VISIT with  PFL B   [facility] Pulmonary Function Testing ([facility])    [address] [facility]  [address] [facility] [address]   [phone]            Oct 04, 2017  1:10 PM CDT   (Arrive by 12:55 PM)   Return Interstitial Lung with Maxx Elizabeth MD   [facility] ([facility])    [address] [place] [address]   [phone]            Jan 23, 2018 10:30 AM CST   (Arrive by 10:15 AM)   Pacemaker Check with  Cv Device [address] [place] [address] Heart Care ([facility])    [address] [place] [address]  [address] [facility] [address]   [phone]              Pending Results     Date and Time Order Name Status Description    5/31/2017 1505 IR Renal Angiogram Left In process             Statement of Approval     Ordered          07/20/17 1025  I have reviewed and agree with all the recommendations and orders detailed in this document.  EFFECTIVE NOW     Approved and electronically signed by:  Navneet Zuniga MD             Admission Information     Date & Time Department Dept. Phone    7/19/2017 Unit 6D Observation [facility] [phone]      Your Vitals Were     Blood Pressure Pulse Temperature Respirations Height Weight    106/56 70 98.3  F (36.8  C) (Oral) 16 1.575 m (5' 2\") 40.8 kg (90 lb)    Pulse Oximetry BMI (Body Mass Index)                94% 16.46 kg/m2          [employer] Information     [employer] lets you send messages to your doctor, view your test results, renew your prescriptions, schedule appointments and more. To sign up, go to [email or web address] . Click on \"Log in\" on the left side of the " "screen, which will take you to the Welcome page. Then click on \"Sign up Now\" on the right side of the page.     You will be asked to enter the access code listed below, as well as some personal information. Please follow the directions to create your username and password.     Your access code is: [email or web address]  Expires: 2017 11:47 AM     Your access code will  in 90 days. If you need help or a new code, please call your [facility] clinic or [phone].        Care EveryWhere ID     This is your Care EveryWhere ID. This could be used by other organizations to access your [facility] medical records  [id number]        Equal Access to Services     GUERA HOLT : Anshu Altamirano, virgil coffman, kevin nassar, chester lovelace. So [facility] [phone].    ATENCIÓN: Si habla español, tiene a taylor disposición servicios gratuitos de asistencia lingüística. Llame al [phone].    We comply with applicable federal civil rights laws and [place] laws. We do not discriminate on the basis of race, color, national origin, age, disability sex, sexual orientation or gender identity.               Review of your medicines      START taking        Dose / Directions    clopidogrel 75 MG tablet   Commonly known as:  PLAVIX   Used for:  Left renal artery stenosis (H)        Dose:  75 mg   Take 1 tablet (75 mg) by mouth daily   Quantity:  90 tablet   Refills:  1         CONTINUE these medicines which may have CHANGED, or have new prescriptions. If we are uncertain of the size of tablets/capsules you have at home, strength may be listed as something that might have changed.        Dose / Directions    metFORMIN 1000 MG tablet   Commonly known as:  GLUCOPHAGE   This may have changed:    - when to take this  - additional instructions   Used for:  DM (diabetes mellitus) (H)        Take 1 tablet twice daily   Quantity:  180 tablet   Refills:  3         CONTINUE these medicines which " have NOT CHANGED        Dose / Directions    * albuterol 108 (90 BASE) MCG/ACT Inhaler   Commonly known as:  PROAIR HFA/PROVENTIL HFA/VENTOLIN HFA   Used for:  Unspecified chronic bronchitis (H), Esophageal reflux        Take 2 puffs prn for wheezing or shortness of breath   Quantity:  1 Inhaler   Refills:  12       * albuterol (2.5 MG/3ML) 0.083% neb solution   Used for:  Mixed simple and mucopurulent chronic bronchitis (H)        Dose:  2.5 mg   Take 1 vial (2.5 mg) by nebulization 3 times daily   Quantity:  360 mL   Refills:  3       * albuterol 108 (90 BASE) MCG/ACT Inhaler   Commonly known as:  PROAIR HFA/PROVENTIL HFA/VENTOLIN HFA   Used for:  Simple chronic bronchitis (H), Hypogammaglobulinemia (H)        Take 2 puffs prn for wheezing   Quantity:  1 Inhaler   Refills:  12       * albuterol (2.5 MG/3ML) 0.083% neb solution        Dose:  1 ampule   Take 1 ampule by nebulization every 6 hours as needed.   Refills:  0       aspirin 81 MG tablet        Dose:  1 tablet   Take 1 tablet by mouth daily.   Refills:  0       azithromycin 250 MG tablet   Commonly known as:  ZITHROMAX   Used for:  Mixed simple and mucopurulent chronic bronchitis (H), Hypogammaglobulinemia (H)        Take as directed   Quantity:  6 tablet   Refills:  4       Blood Pressure Kit        daily.   Refills:  0       budesonide 3 MG 24 hr capsule   Commonly known as:  ENTOCORT EC   Used for:  Diarrhea, Loss of weight        Dose:  3 mg   Take 1 capsule (3 mg) by mouth 3 times daily (with meals)   Quantity:  270 capsule   Refills:  3       DEMEROL 25 MG/ML injection   Generic drug:  meperidine        Dose:  25 mg   Inject 25 mg into the vein as needed. 25 mg IV prior to IgG infusion   Refills:  0       dicyclomine 10 MG capsule   Commonly known as:  BENTYL        Dose:  10 mg   Take 10 mg by mouth 4 times daily (before meals and nightly).   Refills:  0       diphenhydrAMINE 25 MG tablet   Commonly known as:  BENADRYL   Used for:  Atherosclerosis  of renal artery (H)        Dose:  50 mg   Take 2 tablets (50 mg) by mouth See Admin Instructions Please take 50 mg by mouth evening before  And morning of the Abd/Pelvis CT scan with contrast per Dr. Adair.  This is a pre medication for contrast dye.   Quantity:  4 tablet   Refills:  0       FISH OIL        Unsure of dosage take two caps daily   Refills:  0       fluticasone-salmeterol 250-50 MCG/DOSE diskus inhaler   Commonly known as:  ADVAIR   Used for:  Unspecified chronic bronchitis (H)        Dose:  1 puff   Inhale 1 puff into the lungs 2 times daily.   Quantity:  1 Inhaler   Refills:  12       gabapentin 300 MG capsule   Commonly known as:  NEURONTIN   Used for:  Neuralgia, Neck pain        Dose:  600 mg   Take 2 capsules (600 mg) by mouth 3 times daily   Quantity:  180 capsule   Refills:  1       HYDROcodone-acetaminophen  MG per tablet   Commonly known as:  LORCET        Dose:  1 tablet   Take 1 tablet by mouth every 6 hours as needed.   Refills:  0       Immune Globulin (Human) 25 GM/500ML Soln        Dose:  25 g   Inject 25 g into the vein See Admin Instructions. 25 grams every 3 weeks IVIG   Refills:  0       nitroGLYcerin 0.4 MG sublingual tablet   Commonly known as:  NITROQUICK   Used for:  Chest pain        Dose:  0.4 mg   Place 1 tablet (0.4 mg) under the tongue every 5 minutes as needed   Quantity:  25 tablet   Refills:  3       omeprazole 20 MG CR capsule   Commonly known as:  priLOSEC   Used for:  Esophageal reflux, Unspecified chronic bronchitis (H)        Dose:  20 mg   Take 1 capsule (20 mg) by mouth 2 times daily   Quantity:  60 capsule   Refills:  6       * blood glucose monitoring lancets   Used for:  Type 2 diabetes mellitus with complication (H)        Use to test blood sugar 3 times daily or as directed.   Quantity:  3 Box   Refills:  3       * ONE TOUCH FINE POINT LANCETS        2 times daily.   Refills:  0       * ONE TOUCH ULTRA test strip   Used for:  Diabetes mellitus type II    Generic drug:  blood glucose monitoring        Test blood sugar twice a day.   Quantity:  200 strip   Refills:  3       * blood glucose monitoring test strip   Commonly known as:  TERRY CONTOUR NEXT   Used for:  Type 2 diabetes mellitus with complication (H)        by In Vitro route 3 times daily Use to test blood sugar 3 times daily or as directed.   Quantity:  270 each   Refills:  3       predniSONE 20 MG tablet   Commonly known as:  DELTASONE   Used for:  Allergic reaction to contrast dye        Patient to take 60 mg the evening before the procedure and 30 mg the morning of the procedure.   Quantity:  5 tablet   Refills:  0       tiotropium 18 MCG capsule   Commonly known as:  SPIRIVA HANDIHALER   Used for:  Unspecified chronic bronchitis (H)        One puff every day   Quantity:  30 capsule   Refills:  6       TYLENOL 325 MG tablet   Generic drug:  acetaminophen        Dose:  1-2 tablet   Take 1-2 tablets by mouth every 6 hours as needed.   Refills:  0       * Notice:  This list has 8 medication(s) that are the same as other medications prescribed for you. Read the directions carefully, and ask your doctor or other care provider to review them with you.      STOP taking     diltiazem 360 MG 24 hr CD capsule   Commonly known as:  CARDIZEM CD; CARTIA XT           hydrochlorothiazide 25 MG tablet   Commonly known as:  HYDRODIURIL           Isosorbide Mononitrate  MG Tb24           lisinopril 40 MG tablet   Commonly known as:  PRINIVIL/ZESTRIL           metoprolol 50 MG 24 hr tablet   Commonly known as:  TOPROL-XL           spironolactone 50 MG tablet   Commonly known as:  ALDACTONE                Where to get your medicines      Some of these will need a paper prescription and others can be bought over the counter. Ask your nurse if you have questions.     Bring a paper prescription for each of these medications     clopidogrel 75 MG tablet                Protect others around you: Learn how to safely use,  store and throw away your medicines at www.disposemymeds.org.             Medication List: This is a list of all your medications and when to take them. Check marks below indicate your daily home schedule. Keep this list as a reference.      Medications           Morning Afternoon Evening Bedtime As Needed    * albuterol 108 (90 BASE) MCG/ACT Inhaler   Commonly known as:  PROAIR HFA/PROVENTIL HFA/VENTOLIN HFA   Take 2 puffs prn for wheezing or shortness of breath                                * albuterol (2.5 MG/3ML) 0.083% neb solution   Take 1 vial (2.5 mg) by nebulization 3 times daily   Last time this was given:  2.5 mg on 7/19/2017  9:56 PM                                * albuterol 108 (90 BASE) MCG/ACT Inhaler   Commonly known as:  PROAIR HFA/PROVENTIL HFA/VENTOLIN HFA   Take 2 puffs prn for wheezing                                * albuterol (2.5 MG/3ML) 0.083% neb solution   Take 1 ampule by nebulization every 6 hours as needed.   Last time this was given:  2.5 mg on 7/19/2017  9:56 PM                                aspirin 81 MG tablet   Take 1 tablet by mouth daily.                                azithromycin 250 MG tablet   Commonly known as:  ZITHROMAX   Take as directed                                Blood Pressure Kit   daily.                                budesonide 3 MG 24 hr capsule   Commonly known as:  ENTOCORT EC   Take 1 capsule (3 mg) by mouth 3 times daily (with meals)                                clopidogrel 75 MG tablet   Commonly known as:  PLAVIX   Take 1 tablet (75 mg) by mouth daily   Last time this was given:  75 mg on 7/20/2017  8:08 AM                                DEMEROL 25 MG/ML injection   Inject 25 mg into the vein as needed. 25 mg IV prior to IgG infusion   Generic drug:  meperidine                                dicyclomine 10 MG capsule   Commonly known as:  BENTYL   Take 10 mg by mouth 4 times daily (before meals and nightly).   Last time this was given:  10 mg on  7/20/2017  8:08 AM                                diphenhydrAMINE 25 MG tablet   Commonly known as:  BENADRYL   Take 2 tablets (50 mg) by mouth See Admin Instructions Please take 50 mg by mouth evening before  And morning of the Abd/Pelvis CT scan with contrast per Dr. Adair.  This is a pre medication for contrast dye.                                FISH OIL   Unsure of dosage take two caps daily                                fluticasone-salmeterol 250-50 MCG/DOSE diskus inhaler   Commonly known as:  ADVAIR   Inhale 1 puff into the lungs 2 times daily.                                gabapentin 300 MG capsule   Commonly known as:  NEURONTIN   Take 2 capsules (600 mg) by mouth 3 times daily   Last time this was given:  600 mg on 7/20/2017  8:08 AM                                HYDROcodone-acetaminophen  MG per tablet   Commonly known as:  LORCET   Take 1 tablet by mouth every 6 hours as needed.                                Immune Globulin (Human) 25 GM/500ML Soln   Inject 25 g into the vein See Admin Instructions. 25 grams every 3 weeks IVIG                                metFORMIN 1000 MG tablet   Commonly known as:  GLUCOPHAGE   Take 1 tablet twice daily                                nitroGLYcerin 0.4 MG sublingual tablet   Commonly known as:  NITROQUICK   Place 1 tablet (0.4 mg) under the tongue every 5 minutes as needed                                omeprazole 20 MG CR capsule   Commonly known as:  priLOSEC   Take 1 capsule (20 mg) by mouth 2 times daily   Last time this was given:  20 mg on 7/20/2017  8:09 AM                                * blood glucose monitoring lancets   Use to test blood sugar 3 times daily or as directed.                                * ONE TOUCH FINE POINT LANCETS   2 times daily.                                * ONE TOUCH ULTRA test strip   Test blood sugar twice a day.   Generic drug:  blood glucose monitoring                                * blood glucose monitoring test  strip   Commonly known as:  TERRY CONTOUR NEXT   by In Vitro route 3 times daily Use to test blood sugar 3 times daily or as directed.                                predniSONE 20 MG tablet   Commonly known as:  DELTASONE   Patient to take 60 mg the evening before the procedure and 30 mg the morning of the procedure.                                tiotropium 18 MCG capsule   Commonly known as:  SPIRIVA HANDIHALER   One puff every day                                TYLENOL 325 MG tablet   Take 1-2 tablets by mouth every 6 hours as needed.   Generic drug:  acetaminophen                                * Notice:  This list has 8 medication(s) that are the same as other medications prescribed for you. Read the directions carefully, and ask your doctor or other care provider to review them with you.

## 2017-07-19 NOTE — PROGRESS NOTES
Patient returned to the unit, awake and alert post renal angiogram. R groin site is dry and intact, pedal pulse is 2. Eating and taking po fluids well. Patient will be admitted for observation overnight. States has some groin pain at this time and would like something for this.

## 2017-07-20 VITALS
HEIGHT: 62 IN | SYSTOLIC BLOOD PRESSURE: 106 MMHG | DIASTOLIC BLOOD PRESSURE: 56 MMHG | BODY MASS INDEX: 16.56 KG/M2 | RESPIRATION RATE: 16 BRPM | OXYGEN SATURATION: 94 % | TEMPERATURE: 98.3 F | WEIGHT: 90 LBS | HEART RATE: 70 BPM

## 2017-07-20 LAB — GLUCOSE BLDC GLUCOMTR-MCNC: 90 MG/DL (ref 70–99)

## 2017-07-20 PROCEDURE — G0378 HOSPITAL OBSERVATION PER HR: HCPCS

## 2017-07-20 PROCEDURE — 00000146 ZZHCL STATISTIC GLUCOSE BY METER IP

## 2017-07-20 PROCEDURE — A9270 NON-COVERED ITEM OR SERVICE: HCPCS | Mod: GY | Performed by: INTERNAL MEDICINE

## 2017-07-20 PROCEDURE — 25000132 ZZH RX MED GY IP 250 OP 250 PS 637: Mod: GY | Performed by: INTERNAL MEDICINE

## 2017-07-20 PROCEDURE — 99219 ZZC INITIAL OBSERVATION CARE,LEVL II: CPT | Mod: GC | Performed by: INTERNAL MEDICINE

## 2017-07-20 RX ORDER — CLOPIDOGREL BISULFATE 75 MG/1
75 TABLET ORAL DAILY
Qty: 90 TABLET | Refills: 1 | Status: SHIPPED | OUTPATIENT
Start: 2017-07-20 | End: 2017-08-22

## 2017-07-20 RX ORDER — LISINOPRIL 20 MG/1
40 TABLET ORAL DAILY
Status: DISCONTINUED | OUTPATIENT
Start: 2017-07-20 | End: 2017-07-20 | Stop reason: HOSPADM

## 2017-07-20 RX ADMIN — ASPIRIN 81 MG: 81 TABLET, COATED ORAL at 08:08

## 2017-07-20 RX ADMIN — DICYCLOMINE HYDROCHLORIDE 10 MG: 10 CAPSULE ORAL at 08:08

## 2017-07-20 RX ADMIN — GABAPENTIN 600 MG: 300 CAPSULE ORAL at 08:08

## 2017-07-20 RX ADMIN — ISOSORBIDE MONONITRATE 120 MG: 60 TABLET, EXTENDED RELEASE ORAL at 08:10

## 2017-07-20 RX ADMIN — OMEPRAZOLE 20 MG: 20 CAPSULE, DELAYED RELEASE ORAL at 08:09

## 2017-07-20 RX ADMIN — CLOPIDOGREL 75 MG: 75 TABLET, FILM COATED ORAL at 08:08

## 2017-07-20 ASSESSMENT — ACTIVITIES OF DAILY LIVING (ADL)
AMBULATION: 0-->INDEPENDENT
RETIRED_COMMUNICATION: 0-->UNDERSTANDS/COMMUNICATES WITHOUT DIFFICULTY
TOILETING: 0-->INDEPENDENT
DRESS: 0-->INDEPENDENT
RETIRED_EATING: 0-->INDEPENDENT
FALL_HISTORY_WITHIN_LAST_SIX_MONTHS: NO
TRANSFERRING: 0-->INDEPENDENT
BATHING: 0-->INDEPENDENT
COGNITION: 0 - NO COGNITION ISSUES REPORTED
SWALLOWING: 0-->SWALLOWS FOODS/LIQUIDS WITHOUT DIFFICULTY

## 2017-07-20 NOTE — PLAN OF CARE
Problem: Goal Outcome Summary  Goal: Goal Outcome Summary  Outcome: No Change  Observation goals:  Stabilize post cath lab intervention : Pt stable.  No bleeding R groin site: yes  No hematoma: : Yes  Stable BP yes. Groin site soft, flat

## 2017-07-20 NOTE — PROGRESS NOTES
"/65  Pulse 70  Temp 98.2  F (36.8  C) (Oral)  Resp 16  Ht 1.575 m (5' 2\")  Wt 40.8 kg (90 lb)  SpO2 100%  BMI 16.46 kg/m2  Provider notified of the need for observation goals. Vital signs stable. Patient denies pain or shortness of breath. She is SBA with ambulation. She is alert and oriented x 4 and able to make needs known. Right froin site is flat and soft to palpation, patient arrived to the floor with a small amount of drainage, bandage changed. No hematoma. Will continue to monitor.  "

## 2017-07-20 NOTE — PLAN OF CARE
Problem: Discharge Planning  Goal: Discharge Planning (Adult, OB, Behavioral, Peds)  Outcome: No Change  Observation goals:  Stabilize post cath lab intervention : Pt stable.  No bleeding R groin site: yes  No hematoma: : Yes  Stable BP yes  Orders state notify MD for sbp less than 85.   R extremity pulses intact.

## 2017-07-20 NOTE — PLAN OF CARE
Problem: Goal Outcome Summary  Goal: Goal Outcome Summary  Outcome: No Change  Observation goals:  Stabilize post cath lab intervention : Pt stable.  No bleeding R groin site: yes  No hematoma: : Yes  Stable BP yes BP 93/48. Orders state notify MD for sbp less than 85.   R extremity pulses intact.

## 2017-07-20 NOTE — PLAN OF CARE
Problem: Discharge Planning  Goal: Discharge Planning (Adult, OB, Behavioral, Peds)  Outcome: No Change  D: Patient discharged to home at 1015. Patient left unit via ambulation down to front doors.    I: Pt sent home with a paper prescription  pharmacy to be filled. All discharge medications and instructions reviewed with patient. Patient instructed to call clinic triage nurse if she experiences a fever >101.4, uncontrolled nausea, vomiting, diarrhea, or pain; or experiences any signs or symptoms of bleeding. Other phone numbers to call with questions or concerns after discharge reviewed. PIV removed. Education completed.    A: Pt verbalized understanding of discharge medications and instructions. Patient will get medications at [facility] discharge pharmacy.    P: Patient to follow-up in clinic on Monday.

## 2017-07-21 ENCOUNTER — CARE COORDINATION (OUTPATIENT)
Dept: CARDIOLOGY | Facility: CLINIC | Age: 77
End: 2017-07-21

## 2017-07-21 DIAGNOSIS — I25.10 CAD (CORONARY ARTERY DISEASE): Primary | ICD-10-CM

## 2017-07-21 LAB — KCT BLD-ACNC: 184 SEC (ref 105–167)

## 2017-07-21 RX ORDER — METOPROLOL SUCCINATE 25 MG/1
25 TABLET, EXTENDED RELEASE ORAL DAILY
Qty: 30 TABLET | COMMUNITY
Start: 2017-07-21 | End: 2017-08-22

## 2017-07-21 NOTE — PROGRESS NOTES
Patient was suppose to call yesterday when she got home after discharge with blood pressure. She has not taken any yet. Will call back in one hour    Patient reporting BP's of :  120/52  125/53  128/53  Heart rate in the 70's, states that she feels great, doesn't have a headache anymore.  She is to take her BP daily for the next week and let me know if it trends up. Parameter for systolic is > 140, then she should call the clinic.  Groin site flat and dry but is very bruised.  She is not to lift anything heavy (> 10 lbs) for one week.  Her son is picking up the Plavix today.    Date: 7/21/2017    Time of Call: 3:23 PM     Diagnosis:  CAD     [ TORB ] Ordering provider: Dr. Jaleel Adair  Order: do not start the lisinopril  Start metoprolol XL 25 mg by mouth daily     Order received by: Samantha Ordaz RN     Follow-up/additional notes: patient understands and agrees to the plan.

## 2017-07-21 NOTE — LETTER
July 21, 2017      TO: Jennifer Alvarez     [facility] [id number]         Dear Jennifer,    Noemi is the itinerary for your upcoming appointment for follow up post renal artery stent placement.  Dr. Adair would like you to restart the Metoprolol XL at 25 mg by mouth once daily. Not for blood pressure this time, but for your coronary artery disease.  You need to take your blood pressure once daily over the next week and let me know if it starts to trend upwards. If it becomes > 140 systolic (top number), I need you to call [phone] and let me know.  The electrophysiology department will be sending you some equipment to set up and attach to the land line so they can remote monitor your pacemaker at 6-8 week intervals. They will send instructions.    It was a pleasure to see you at your last clinic visit. Please do not hesitate to call me if you have any questions or concerns.    Sincerely,    Samantha Ordaz RN  Nurse Coordinator for Jaleel Adair MD  [phone]

## 2017-07-22 NOTE — DISCHARGE SUMMARY
"[facility], [place]    Cardiology Discharge Summary    Date of Admission:  7/19/2017  Date of Discharge:  7/20/2017 10:30 AM  Discharging Attending Provider:  Dr. Jaleel Adair    Discharge Diagnoses   1. Renal artery stenosis status post successful stenting of the left renal artery  2. Refractory hypertension  3. Coronary artery disease with normal LV function on echo in 2015.    Follow-ups Needed After Discharge   Follow up BP and resume antihypertensives as necessary    Hospital Course   Ms Alvarez is a 75 yo female with refractory hypertension who was admitted for observation of her blood pressure after undergoing elective left renal artery angiography and stenting.     Nonselective aortography identified a LEFT 80% ostial renal artery stenosis with minimal atherosclerotic plaque beyond.  A 5.0x12 mm Herculink stent was placed in the LEFT ostial renal artery and the ostial edge was splayed open with a balloon.  The procedure was well tolerated.    Her BP in the hours after the surgery and the morning after was in the 90s-100/70 range despite holding her antihypertensives.  She was not lightheaded or dizzy.  Examination of the femoral arteriotomy site revealed a non-tender, well healed site with no palpable hematoma and no audible bruit.    She was discharged with instructions to call Dr. Adair's nurse this afternoon to report her BP and receive instructions on selectively resuming antihypertensives if her SBP was >140.  We will also call her tomorrow for the same.  She has an appointment on Monday (in 4 days) with her PCP.  She will return to see Dr. Jaleel Adair in 1 month.    The patient [facility]  Pager: [phone]  ______________________________________________________________________    Physical Exam   /56  Pulse 70  Temp 98.3  F (36.8  C) (Oral)  Resp 16  Ht 1.575 m (5' 2\")  Wt 40.8 kg (90 lb) "  SpO2 94%  BMI 16.46 kg/m2    General Appearance: Well appearing female with unlabored breathing.  Neuro:  Alert & oriented fully.  Respiratory: Vesicular lung sounds on ascultation.  Cardiovascular: RRR with normal s1 and s2.  Skin: femoral arteriotomy site has a 2/4 pulse, is not tender, does not have a palpable hematoma, and does not have an audible bruit.    Significant Results and Procedures     Results for orders placed or performed during the hospital encounter of 01/17/17   CT Abdomen Pelvis w/o & w Contrast    Narrative    Abdominal Limited CTA of the renal arteries 1/17/2017 1:32 PM    Clinical History: Concern for renal artery stenosis    Comparisons: Ultrasound dated 12/29/2016 comment CT dated 9/3/2009    Technique: Helical acquisition of images in the noncontrast and  arterial phase to evaluate the renal arteries. Coronal and sagittal  reconstructions were obtained and studied.     Contrast: 75 mL Isovue-370    Findings:   Exam is somewhat limited given patient motion and bolus timing.    Scattered atherosclerotic calcifications of the abdominal aorta. The  celiac trunk, SMA and MARINE are patent. There is a single right and  single left renal artery.    No significant calcifications at the right renal artery ostium. The  right renal artery is normal in caliber without focal narrowing.  Calcifications at the left renal artery ostium. Focal narrowing of the  left renal artery at the ostium to approximately 2 mm, the remainder  of the left renal artery measures approximately 5 mm. No additional  areas of narrowing are appreciated.    Remaining abdomen:   No hepatic lesions. The gallbladder is absent. The extrahepatic common  bile duct measures up to 7 mm. The spleen (with small splenule) and  adrenal glands are unremarkable. Prominence of the pancreatic duct up  to 3 mm in the head without pancreatic mass. Symmetric nephrographic  enhancement of the kidneys. Bilateral parapelvic cysts.  7 mm  Hyperdense  renal cyst arising from the superior pole of the left  kidney is to small to definitely characterize, but does not  demonstrate significant contrast enhancement and may represent a  hemorrhagic or proteinaceous cyst. No hydronephrosis or hydroureter.  The urinary bladder is partially distended. Surgical changes of Nissen  fundoplication. Additional surgical anastomoses in the right and  posterior pelvis. Mild swirling of the mesentery in the pelvis without  evidence for internal hernia. No focally dilated loops of bowel. No  pneumatosis, portal venous gas or pneumoperitoneum. Trace free fluid  in the pelvis. Phleboliths. No pathologic lymphadenopathy.    Lung bases: No focal consolidation or pleural effusion. The heart is  not enlarged. There is no pericardial effusion. Partially visualized  cardiac device with lead tips in the right atrium and right ventricle.    Bones and soft tissues: No acute osseous abnormalities or suspicious  osseous lesions. Soft tissues are within normal limits.      Impression    Impression:   1. High grade stenosis of the left renal artery at the ostium. This  corresponds with sonographic findings suggesting renal artery  stenosis.  2. Hyperdense renal cyst arising from the superior pole the of the  left kidney is too small to definitely characterize, but does not  demonstrate significant arterial enhancement. Considerations include  hemorrhagic versus proteinaceous cyst.    I have personally reviewed the examination and initial interpretation  and I agree with the findings.    HERNAN CHENG MD        Primary Care Physician   LLUVIA ANDERSON    Discharge Disposition   Discharged to home  Condition at discharge: Stable    Discharge Orders     Reason for your hospital stay   You had a stent placed in your renal artery to restore blood flow to your kidney.  This should help lower your blood pressure, because the kidney releases hormones that increase blood pressure when it has low blood  flow.     Adult [facility] Follow-up and recommended labs and tests   Follow up with your primary care physician on Monday, 7/24/17, as previously scheduled.  You should have a Basic Metabolic Panel checked at that visit.  Follow up with Dr. Jaleel Adair at the [facility] in 4 weeks.    Appointments on [facility] and/or [facility] (with [facility] or [facility] provider or service). Call [phone] if you haven't heard regarding these appointments within 7 days of discharge.     Activity   Your activity upon discharge: For the next 7 days, do not lift anything heavier than 10 lbs and do not engage in activities that require repetitive hip flexion (biking, jogging, etc).     Discharge Instructions   Check your blood pressure after arriving home this afternoon and call the [facility] cardiology triage line this afternoon to reach Dr. Mana Patel's nurse and report it to her.  If the systolic BP (top number) is higher than 140 we will instruct you to resume some of your BP meds.     Diet   Follow this diet upon discharge: Heart healthy diet (low salt, low cholesterol)       Discharge Medications   Discharge Medication List as of 7/20/2017 10:25 AM      START taking these medications    Details   clopidogrel (PLAVIX) 75 MG tablet Take 1 tablet (75 mg) by mouth daily, Disp-90 tablet, R-1, Local Print         CONTINUE these medications which have NOT CHANGED    Details   diphenhydrAMINE (BENADRYL) 25 MG tablet Take 2 tablets (50 mg) by mouth See Admin Instructions Please take 50 mg by mouth evening before  And morning of the Abd/Pelvis CT scan with contrast per Dr. Adair.  This is a pre medication for contrast dye., Disp-4 tablet, R-0, Local Print      predniSONE (DELTASONE) 20 MG tablet Patient to take 60 mg the evening before the procedure and 30 mg the morning of the procedure., Disp-5 tablet, R-0, E-Prescribe      azithromycin (ZITHROMAX) 250 MG tablet Take as directed, Disp-6 tablet, R-4,  E-Prescribe      gabapentin (NEURONTIN) 300 MG capsule Take 2 capsules (600 mg) by mouth 3 times daily, Disp-180 capsule, R-1, E-PrescribeNeeds appt for future refills      budesonide (ENTOCORT EC) 3 MG 24 hr capsule Take 1 capsule (3 mg) by mouth 3 times daily (with meals), Disp-270 capsule, R-3, E-Prescribe      omeprazole (PRILOSEC) 20 MG capsule Take 1 capsule (20 mg) by mouth 2 times daily, Disp-60 capsule, R-6, E-Prescribe      metFORMIN (GLUCOPHAGE) 1000 MG tablet Take 1 tablet twice daily, Disp-180 tablet, R-3, E-Prescribe      fluticasone-salmeterol (ADVAIR) 250-50 MCG/DOSE diskus inhaler Inhale 1 puff into the lungs 2 times daily., Disp-1 Inhaler, R-12, Fax      dicyclomine (BENTYL) 10 MG capsule Take 10 mg by mouth 4 times daily (before meals and nightly)., Historical      aspirin 81 MG tablet Take 1 tablet by mouth daily., Historical      FISH OIL Unsure of dosage take two caps daily, Historical      hydrocodone-acetaminophen (LORCET)  MG per tablet Take 1 tablet by mouth every 6 hours as needed., Historical      meperidine (DEMEROL) 25 MG/ML injection Inject 25 mg into the vein as needed. 25 mg IV prior to IgG infusion, Historical      nitroglycerin (NITROQUICK) 0.4 MG sublingual tablet Place 1 tablet (0.4 mg) under the tongue every 5 minutes as needed, Disp-25 tablet, R-3, E-Prescribe      !! albuterol (PROAIR HFA, PROVENTIL HFA, VENTOLIN HFA) 108 (90 BASE) MCG/ACT inhaler Take 2 puffs prn for wheezing, Disp-1 Inhaler, R-12, E-Prescribe2 puffs prn for wheezing      !! albuterol (2.5 MG/3ML) 0.083% nebulizer solution Take 1 vial (2.5 mg) by nebulization 3 times daily, Disp-360 mL, R-3, E-Prescribe      !! blood glucose monitoring (TERRY CONTOUR NEXT) test strip by In Vitro route 3 times daily Use to test blood sugar 3 times daily or as directed., Disp-270 each, R-3, E-Prescribe      !! blood glucose monitoring (TERRY MICROLET) lancets Use to test blood sugar 3 times daily or as directed., Disp-3  Box, R-3, E-Prescribe      !! albuterol (PROAIR HFA, PROVENTIL HFA, VENTOLIN HFA) 108 (90 BASE) MCG/ACT inhaler Take 2 puffs prn for wheezing or shortness of breath, Disp-1 Inhaler, R-12, E-Prescribe      !! ONE TOUCH ULTRA TEST strip Test blood sugar twice a day.Disp-200 strip, R-3, DAWE-Prescribe      tiotropium (SPIRIVA HANDIHALER) 18 MCG inhalation capsule One puff every day, Disp-30 capsule, R-6, Fax      Blood Pressure KIT daily., Historical      !! ONE TOUCH [place] LANCETS 2 times daily., Historical      !! albuterol (2.5 MG/3ML) 0.083% nebulizer solution Take 1 ampule by nebulization every 6 hours as needed., Historical      Immune Globulin, Human, 25 GM/500ML SOLN Inject 25 g into the vein See Admin Instructions. 25 grams every 3 weeks IVIG, Historical      acetaminophen (TYLENOL) 325 MG tablet Take 1-2 tablets by mouth every 6 hours as needed., Historical       !! - Potential duplicate medications found. Please discuss with provider.      STOP taking these medications       diltiazem (CARDIZEM CD; CARTIA XT) 360 MG 24 hr CD capsule Comments:   Reason for Stopping:         metoprolol (TOPROL-XL) 50 MG 24 hr tablet Comments:   Reason for Stopping:         Isosorbide Mononitrate  MG TB24 Comments:   Reason for Stopping:         lisinopril (PRINIVIL,ZESTRIL) 40 MG tablet Comments:   Reason for Stopping:         hydrochlorothiazide (HYDRODIURIL) 25 MG tablet Comments:   Reason for Stopping:         spironolactone (ALDACTONE) 50 MG tablet Comments:   Reason for Stopping:             Allergies   Allergies   Allergen Reactions     Bees Anaphylaxis     Codeine Sulfate Hives     Contrast Dye Anaphylaxis and Hives     Milk Products Shortness Of Breath     Morphine Sulfate Other (See Comments), Hives and Itching     Pt had abdominal pain that had her doubled over     Pcn [Penicillin G Ammonium] Difficulty breathing     Zinacef [Cefuroxime Sodium] Difficulty breathing     Influenza Vaccine Live Rash      Pneumovax [Pneumococcal Polysaccharides]      Procardia [Nifedipine] Difficulty breathing     Barium Rash       ATTENDING NOTE:  Patient has been evaluated by me.  I have reviewed today's vital signs, medications, labs, and imaging results.  I have reviewed and edited, as necessary, the history, review of systems, physical examination, and assessment and plan.  I have discussed my assessment and plan with the house staff.   In summary, the patient had classic renal artery hypertension and had been on quintuple medication regime prior to admission.  She underwent successful left renal artery stenting and her BP was NORMAL on no medications the morning after the procedure.   We will resume the beta blocker post discharge as she has Hx of CAD but hold off on the other 4 medications at this time.  She is on ASA and Plavix on discharge but I will plan on simply reducing this regime to Plavix 75 mg qd.      Jaleel Adair MD     Cardiovascular Division

## 2017-07-24 ENCOUNTER — TELEPHONE (OUTPATIENT)
Dept: INFUSION THERAPY | Facility: CLINIC | Age: 77
End: 2017-07-24

## 2017-07-24 RX ORDER — MEPERIDINE HYDROCHLORIDE 25 MG/ML
25 INJECTION INTRAMUSCULAR; INTRAVENOUS; SUBCUTANEOUS
Status: CANCELLED
Start: 2017-07-24

## 2017-07-24 NOTE — TELEPHONE ENCOUNTER
----- Message from Maxx Elizabeth MD sent at 6/24/2017  8:02 AM CDT -----  Regarding: RE: demerol for each ivig?  That is my memory too.  With this knowledge I would recommend continuing to give Demerol as a premed.    Maxx Elizabeth  ----- Message -----     From: Deidre Campbell RN     Sent: 6/23/2017  11:39 AM       To: Maxx Elizabeth MD, Tamra Watters [facility]  Subject: RE: demerol for each ivig?                       Historically, pt has had pretty severe reactions to IVIG.  Since getting the premedications like this (since she was in the [facility] greater than 7 years) , she has had no reactions.     If my memory serves me.. I believe she had pretty severe rigors.    Dr. Elizabeth,  Could your clinic contact the patient to find out what her reactions were to the IVIG.  If we were to change the plan, if her plan is to change can they have a conversation so she is well aware before she comes in?    Thank you,    Deidre Campbell RN   [facility]  [phone]             ----- Message -----     From: Tamra Watters [facility]     Sent: 6/23/2017  10:10 AM       To: Deidre Campbell RN  Subject: FW: demerol for each ivig?                       Hi Deidre    I had asked Dr Elizabeth about the need for demerol every time and you can see his response below. I did make the adjustment to the therapy plan to make it only prn. I just didn't think we would need to have her get demerol every time - unless she has an infusion reaction requiring it.    Carlyn    ----- Message -----     From: Maxx Elizabeth MD     Sent: 6/14/2017   9:09 AM       To: Tamra Watters [place]  Subject: RE: demerol for each ivig?                       It can be made PRN.  It was originally written to try to diminish the symptoms of any reaction to the IVIG.    Maxx Bower---- Message -----     From: Tamra Watters [place]     Sent: 6/13/2017   7:17 PM       To: Maxx Elizabeth MD  Subject: demerol for each ivig?                            Hi Dr Elizabeth    I see that Jennifer has been getting IVIG for quite awhile and gets Demerol 25mg each time. Do you know why she gets that? It appears she tolerates her infusions (or has been). I only ask because if we don't have to give a 76 year old lady a narcotic, it seems like a better choice.     Thanks for any insight you can give    marsha Watters [facility] June 13, 2017   [facility]  [phone]

## 2017-07-27 ENCOUNTER — INFUSION THERAPY VISIT (OUTPATIENT)
Dept: INFUSION THERAPY | Facility: CLINIC | Age: 77
End: 2017-07-27
Attending: INTERNAL MEDICINE
Payer: MEDICARE

## 2017-07-27 VITALS
SYSTOLIC BLOOD PRESSURE: 129 MMHG | RESPIRATION RATE: 16 BRPM | DIASTOLIC BLOOD PRESSURE: 50 MMHG | TEMPERATURE: 99.2 F | OXYGEN SATURATION: 96 % | HEART RATE: 64 BPM

## 2017-07-27 DIAGNOSIS — D80.1 HYPOGAMMAGLOBULINEMIA (H): ICD-10-CM

## 2017-07-27 DIAGNOSIS — D80.2 IGA DEFICIENCY (H): Primary | ICD-10-CM

## 2017-07-27 PROCEDURE — 25000132 ZZH RX MED GY IP 250 OP 250 PS 637: Mod: ZF | Performed by: INTERNAL MEDICINE

## 2017-07-27 PROCEDURE — 25000128 H RX IP 250 OP 636: Mod: ZF | Performed by: INTERNAL MEDICINE

## 2017-07-27 PROCEDURE — 96365 THER/PROPH/DIAG IV INF INIT: CPT

## 2017-07-27 PROCEDURE — 96366 THER/PROPH/DIAG IV INF ADDON: CPT

## 2017-07-27 PROCEDURE — 25000128 H RX IP 250 OP 636: Mod: ZF

## 2017-07-27 PROCEDURE — 96375 TX/PRO/DX INJ NEW DRUG ADDON: CPT

## 2017-07-27 PROCEDURE — A9270 NON-COVERED ITEM OR SERVICE: HCPCS | Mod: ZF | Performed by: INTERNAL MEDICINE

## 2017-07-27 PROCEDURE — 40000141 ZZH STATISTIC PERIPHERAL IV START W/O US GUIDANCE: Mod: ZF

## 2017-07-27 RX ORDER — DIPHENHYDRAMINE HCL 25 MG
25 CAPSULE ORAL ONCE
Status: COMPLETED | OUTPATIENT
Start: 2017-07-27 | End: 2017-07-27

## 2017-07-27 RX ORDER — METHYLPREDNISOLONE SODIUM SUCCINATE 125 MG/2ML
100 INJECTION, POWDER, LYOPHILIZED, FOR SOLUTION INTRAMUSCULAR; INTRAVENOUS ONCE
Status: CANCELLED
Start: 2017-07-27 | End: 2017-07-27

## 2017-07-27 RX ORDER — ACETAMINOPHEN 325 MG/1
650 TABLET ORAL ONCE
Status: CANCELLED
Start: 2017-07-27 | End: 2017-07-27

## 2017-07-27 RX ORDER — METHYLPREDNISOLONE SODIUM SUCCINATE 125 MG/2ML
100 INJECTION, POWDER, LYOPHILIZED, FOR SOLUTION INTRAMUSCULAR; INTRAVENOUS ONCE
Status: COMPLETED | OUTPATIENT
Start: 2017-07-27 | End: 2017-07-27

## 2017-07-27 RX ORDER — MEPERIDINE HYDROCHLORIDE 25 MG/ML
25 INJECTION INTRAMUSCULAR; INTRAVENOUS; SUBCUTANEOUS
Status: CANCELLED
Start: 2017-07-27

## 2017-07-27 RX ORDER — DIPHENHYDRAMINE HCL 25 MG
25 CAPSULE ORAL ONCE
Status: CANCELLED
Start: 2017-07-27 | End: 2017-07-27

## 2017-07-27 RX ORDER — ACETAMINOPHEN 325 MG/1
650 TABLET ORAL ONCE
Status: COMPLETED | OUTPATIENT
Start: 2017-07-27 | End: 2017-07-27

## 2017-07-27 RX ORDER — MEPERIDINE HYDROCHLORIDE 25 MG/ML
25 INJECTION INTRAMUSCULAR; INTRAVENOUS; SUBCUTANEOUS
Status: DISCONTINUED | OUTPATIENT
Start: 2017-07-27 | End: 2017-07-27 | Stop reason: HOSPADM

## 2017-07-27 RX ADMIN — DIPHENHYDRAMINE HYDROCHLORIDE 25 MG: 25 CAPSULE ORAL at 12:27

## 2017-07-27 RX ADMIN — MEPERIDINE HYDROCHLORIDE 25 MG: 25 INJECTION, SOLUTION INTRAMUSCULAR; INTRAVENOUS; SUBCUTANEOUS at 12:36

## 2017-07-27 RX ADMIN — ACETAMINOPHEN 650 MG: 325 TABLET ORAL at 12:27

## 2017-07-27 RX ADMIN — IMMUNE GLOBULIN INFUSION (HUMAN) 25 G: 100 INJECTION, SOLUTION INTRAVENOUS; SUBCUTANEOUS at 12:49

## 2017-07-27 RX ADMIN — METHYLPREDNISOLONE SODIUM SUCCINATE 100 MG: 125 INJECTION, POWDER, FOR SOLUTION INTRAMUSCULAR; INTRAVENOUS at 12:32

## 2017-07-27 RX ADMIN — DEXTROSE MONOHYDRATE 50 ML: 50 INJECTION, SOLUTION INTRAVENOUS at 12:49

## 2017-07-27 NOTE — PROGRESS NOTES
Nursing Note  Jennifer Alvarez presents today to [facility] for:   Chief Complaint   Patient presents with     Infusion     IVIG infusion for IgA Deficiency     During today's [facility] appointment, orders from Dr. Elizabeth were completed.  Frequency: every 3 weeks.     Progress note:  Patient identification verified by name and date of birth.  Assessment completed.  Vitals recorded in Doc Flowsheets.  Patient was provided with education regarding infusion and possible side effects.  Patient verbalized understanding.      needed: No  Premedications: administered per order.  Infusion Rates:  12 cc/hour x 15 min  25 cc/hour x 15 min  65 cc/hour x 15 min  125 cc/hour for the remainder of the infusion.     Approximate Infusion length:3 hours.   Labs: were not ordered for this appointment.  Vascular access: peripheral IV was placed at vascular access prior to appointment.  Treatment Conditions: patient denies fever, chills, signs of infection, recent illness, on antibiotics, productive cough or elevated temperature.  Patient tolerated infusion: well.    Drug Waste Record    Drug Name: Solu Medrol  Dose: 100 mg  Route administered: IV  NDC #: 1608-2443-90  Amount of waste(mL):0.4 ml  Reason for waste: Single use vial      Discharge Plan:   Follow up plan of care with: ongoing infusions at [facility].  Discharge instructions were reviewed with patient.  Patient/representative verbalized understanding of discharge instructions and all questions answered.  Patient discharged from [facility] in stable condition.    Bonny Mckeon RN    Administrations This Visit     acetaminophen (TYLENOL) tablet 650 mg     Admin Date Action Dose Route Administered By             07/27/2017 Given 650 mg Oral Bonny Mckeon RN                    dextrose 5% BOLUS     Admin Date Action Dose Route Administered By              07/27/2017 New Bag 50 mL Intravenous Bonny Mckeon RN                    diphenhydrAMINE (BENADRYL) capsule 25 mg     Admin Date Action Dose Route Administered By             07/27/2017 Given 25 mg Oral Bonny Mckeon RN                    immune globulin -(GAMMAGARD) sucrose free 10 % injection 25 g     Admin Date Action Dose Route Administered By             07/27/2017 New Bag 25 g Intravenous Bonny Mckeon RN                    meperidine (DEMEROL) injection 25 mg     Admin Date Action Dose Route Administered By             07/27/2017 Given 25 mg Intravenous Bonny Mckeon RN                    methylPREDNISolone sodium succinate (solu-MEDROL) injection 100 mg     Admin Date Action Dose Route Administered By             07/27/2017 Given 100 mg Intravenous Bonny Mckeon RN                          /54  Pulse 79  Temp 99.2  F (37.3  C) (Tympanic)  Resp 18  SpO2 96%

## 2017-07-27 NOTE — PATIENT INSTRUCTIONS
Dear Jennifer Alvarez    Thank you for choosing [facility] ([facility]) for your infusion.  The following information is a summary of our appointment as well as important reminders.        We look forward in seeing you on your next appointment here at [facility].  Please don t hesitate to call us at [phone] to reschedule any of your appointments or to speak with one of the [facility] registered nurses.  It was a pleasure taking care of you today.    Sincerely,  CARLIN Draper  [facility]  [facility]  [address] [place] [address]  [id number]  Phone:  [phone]

## 2017-07-27 NOTE — MR AVS SNAPSHOT
After Visit Summary   7/27/2017    Jennifer Alvarez    MRN: [id number]           Patient Information     Date Of Birth          1940        Visit Information        Provider Department      7/27/2017 12:00 PM UC 49 ATC; UC SPEC INFUSION [facility]        Today's Diagnoses     IgA deficiency (H)    -  1    Hypogammaglobulinemia (H)          Care Instructions    Dear Jennifer Alvarez    Thank you for choosing [facility] ([facility]) for your infusion.  The following information is a summary of our appointment as well as important reminders.        We look forward in seeing you on your next appointment here at [facility].  Please don t hesitate to call us at [phone] to reschedule any of your appointments or to speak with one of the [facility] registered nurses.  It was a pleasure taking care of you today.    Sincerely,  CARLIN Draper  [facility]  [address] [place] [address]  [address]  Phone:  [phone]            Follow-ups after your visit        Your next 10 appointments already scheduled     Aug 22, 2017 10:00 AM CDT   (Arrive by 9:45 AM)   Return Visit with CINDY Ortiz CNP   [facility] ([facility])    [address] [place] [id number] [phone]            Aug 22, 2017 10:30 AM CDT   (Arrive by 10:15 AM)   Pacemaker Check with Uc Cv Device 1   [facility] ([facility])    [address] [place] [address] [id number] [phone]            Aug 22, 2017 11:00 AM CDT   Infusion 300 with UC SPEC INFUSION, UC 44 ATC   [facility] ([facility])    [address] [place] [address]  [address] [facility] [id number] [phone]            Oct 04,  2017 12:00 PM CDT   Lab with  LAB   [facility] ([facility])    [address] [facility]  1st [facility] [address] [phone]            Oct 04, 2017 12:30 PM CDT   PFT VISIT with  PFL B   [facility] Pulmonary Function Testing ([facility])    [address] [place] [address]  3rd [facility] [address]   [phone]            Oct 04, 2017  1:10 PM CDT   (Arrive by 12:55 PM)   Return Interstitial Lung with Maxx Elizabeth MD   [facility] ([facility])    [address] [address] [phone]            Jan 23, 2018 10:30 AM CST   (Arrive by 10:15 AM)   Pacemaker Check with  Cv Device [address] [place] [address] [facility] ([facility])    [address] [place] [id number] [phone]            Jan 23, 2018 11:00 AM CST   (Arrive by 10:45 AM)   RETURN ARRHYTHMIA with Shola Rivas MD   [facility] Heart [facility] ([facility])    [address] [place] [id number] [phone]            Jan 23, 2018 11:30 AM CST   (Arrive by 11:15 AM)   Return Visit with CNIDY Ortiz CNP   [facility] ([facility])    [address] [place] [id number] [phone]              Who to contact     If you have questions or need follow up information about today's clinic visit or your schedule please contact [facility] [place] [facility] directly at [phone].  Normal or non-critical lab and imaging results will be communicated to you by MyChart, letter or phone within 4 business days after the clinic has received the results. If you do not hear from us within 7 days, please contact the clinic through MyChart or phone. If you have a critical or abnormal lab result, we will notify you by phone as soon as  "possible.  Submit refill requests through [employer] or call your pharmacy and they will forward the refill request to us. Please allow 3 business days for your refill to be completed.          Additional Information About Your Visit        [employer] Information     [employer] lets you send messages to your doctor, view your test results, renew your prescriptions, schedule appointments and more. To sign up, go to [email or web address] . Click on \"Log in\" on the left side of the screen, which will take you to the Welcome page. Then click on \"Sign up Now\" on the right side of the page.     You will be asked to enter the access code listed below, as well as some personal information. Please follow the directions to create your username and password.     Your access code is: [email or web address]  Expires: 2017 11:47 AM     Your access code will  in 90 days. If you need help or a new code, please call your [place] clinic or [phone].        Care EveryWhere ID     This is your Care EveryWhere ID. This could be used by other organizations to access your [place] medical records  [id number]        Your Vitals Were     Pulse Temperature Respirations Pulse Oximetry          79 99.2  F (37.3  C) (Tympanic) 18 96%         Blood Pressure from Last 3 Encounters:   17 125/54   17 106/56   17 (!) 162/94    Weight from Last 3 Encounters:   17 40.8 kg (90 lb)   06/15/17 42.1 kg (92 lb 14.4 oz)   17 42.4 kg (93 lb 6.4 oz)              Today, you had the following     No orders found for display         Today's Medication Changes          These changes are accurate as of: 17  3:14 PM.  If you have any questions, ask your nurse or doctor.               These medicines have changed or have updated prescriptions.        Dose/Directions    metFORMIN 1000 MG tablet   Commonly known as:  GLUCOPHAGE   This may have changed:    - when to take this  - additional instructions   Used for:  DM (diabetes " mellitus) (H)        Take 1 tablet twice daily   Quantity:  180 tablet   Refills:  3                Primary Care Provider Office Phone # Fax #    Brandi Burks [phone]       [facility] [address]   [facility] [address]        Equal Access to Services     GUERA HOLT : Hadii aad ku hadlamonto Soomaali, waaxda luqadaha, qaybta kaalmada adeegyada, waxmoustapha idiin hayjoycen ademark garduno la'joycefidencio lovelace. So [facility] [phone].    ATENCIÓN: Si habla español, tiene a taylor disposición servicios gratuitos de asistencia lingüística. Llame al [phone].    We comply with applicable federal civil rights laws and [place] laws. We do not discriminate on the basis of race, color, national origin, age, disability sex, sexual orientation or gender identity.            Thank you!     Thank you for choosing [facility] PROCED[facility]  for your care. Our goal is always to provide you with excellent care. Hearing back from our patients is one way we can continue to improve our services. Please take a few minutes to complete the written survey that you may receive in the mail after your visit with us. Thank you!             Your Updated Medication List - Protect others around you: Learn how to safely use, store and throw away your medicines at www.disposemymeds.org.          This list is accurate as of: 7/27/17  3:14 PM.  Always use your most recent med list.                   Brand Name Dispense Instructions for use Diagnosis    * albuterol 108 (90 BASE) MCG/ACT Inhaler    PROAIR HFA/PROVENTIL HFA/VENTOLIN HFA    1 Inhaler    Take 2 puffs prn for wheezing or shortness of breath    Unspecified chronic bronchitis (H), Esophageal reflux       * albuterol (2.5 MG/3ML) 0.083% neb solution     360 mL    Take 1 vial (2.5 mg) by nebulization 3 times daily    Mixed simple and mucopurulent chronic bronchitis (H)       * albuterol 108 (90 BASE) MCG/ACT Inhaler    PROAIR HFA/PROVENTIL HFA/VENTOLIN HFA    1  Inhaler    Take 2 puffs prn for wheezing    Simple chronic bronchitis (H), Hypogammaglobulinemia (H)       * albuterol (2.5 MG/3ML) 0.083% neb solution      Take 1 ampule by nebulization every 6 hours as needed.        aspirin 81 MG tablet      Take 1 tablet by mouth daily.        azithromycin 250 MG tablet    ZITHROMAX    6 tablet    Take as directed    Mixed simple and mucopurulent chronic bronchitis (H), Hypogammaglobulinemia (H)       Blood Pressure Kit      daily.        budesonide 3 MG 24 hr capsule    ENTOCORT EC    270 capsule    Take 1 capsule (3 mg) by mouth 3 times daily (with meals)    Diarrhea, Loss of weight       clopidogrel 75 MG tablet    PLAVIX    90 tablet    Take 1 tablet (75 mg) by mouth daily    Left renal artery stenosis (H)       DEMEROL 25 MG/ML injection   Generic drug:  meperidine      Inject 25 mg into the vein as needed. 25 mg IV prior to IgG infusion        dicyclomine 10 MG capsule    BENTYL     Take 10 mg by mouth 4 times daily (before meals and nightly).        diphenhydrAMINE 25 MG tablet    BENADRYL    4 tablet    Take 2 tablets (50 mg) by mouth See Admin Instructions Please take 50 mg by mouth evening before  And morning of the Abd/Pelvis CT scan with contrast per Dr. Adair.  This is a pre medication for contrast dye.    Atherosclerosis of renal artery (H)       FISH OIL      Unsure of dosage take two caps daily        fluticasone-salmeterol 250-50 MCG/DOSE diskus inhaler    ADVAIR    1 Inhaler    Inhale 1 puff into the lungs 2 times daily.    Unspecified chronic bronchitis (H)       gabapentin 300 MG capsule    NEURONTIN    180 capsule    Take 2 capsules (600 mg) by mouth 3 times daily    Neuralgia, Neck pain       HYDROcodone-acetaminophen  MG per tablet    LORCET     Take 1 tablet by mouth every 6 hours as needed.        Immune Globulin (Human) 25 GM/500ML Soln      Inject 25 g into the vein See Admin Instructions. 25 grams every 3 weeks IVIG        metFORMIN 1000 MG  tablet    GLUCOPHAGE    180 tablet    Take 1 tablet twice daily    DM (diabetes mellitus) (H)       metoprolol 25 MG 24 hr tablet    TOPROL-XL    30 tablet    Take 1 tablet (25 mg) by mouth daily    CAD (coronary artery disease)       nitroGLYcerin 0.4 MG sublingual tablet    NITROQUICK    25 tablet    Place 1 tablet (0.4 mg) under the tongue every 5 minutes as needed    Chest pain       omeprazole 20 MG CR capsule    priLOSEC    60 capsule    Take 1 capsule (20 mg) by mouth 2 times daily    Esophageal reflux, Unspecified chronic bronchitis (H)       * blood glucose monitoring lancets     3 Box    Use to test blood sugar 3 times daily or as directed.    Type 2 diabetes mellitus with complication (H)       * ONE TOUCH FINE POINT LANCETS      2 times daily.        * ONE TOUCH ULTRA test strip   Generic drug:  blood glucose monitoring     200 strip    Test blood sugar twice a day.    Diabetes mellitus type II       * blood glucose monitoring test strip    TERRY CONTOUR NEXT    270 each    by In Vitro route 3 times daily Use to test blood sugar 3 times daily or as directed.    Type 2 diabetes mellitus with complication (H)       predniSONE 20 MG tablet    DELTASONE    5 tablet    Patient to take 60 mg the evening before the procedure and 30 mg the morning of the procedure.    Allergic reaction to contrast dye       tiotropium 18 MCG capsule    SPIRIVA HANDIHALER    30 capsule    One puff every day    Unspecified chronic bronchitis (H)       TYLENOL 325 MG tablet   Generic drug:  acetaminophen      Take 1-2 tablets by mouth every 6 hours as needed.        * Notice:  This list has 8 medication(s) that are the same as other medications prescribed for you. Read the directions carefully, and ask your doctor or other care provider to review them with you.

## 2017-07-28 ENCOUNTER — CARE COORDINATION (OUTPATIENT)
Dept: CARDIOLOGY | Facility: CLINIC | Age: 77
End: 2017-07-28

## 2017-07-28 NOTE — PROGRESS NOTES
Marisol RN with [facility] orthopedics called asking if Jennifer is able to hold her Plavix and ASA for an up coming total hip on 8/10. Her return phone number is [phone].

## 2017-08-02 NOTE — PROGRESS NOTES
Let ortho know that Jennifer will be unable to hold her Plavix or aspirin for upcoming total hip since she just had a renal artery stenting 2 weeks ago.  Ortho has contacted Jennifer with the information.

## 2017-08-21 DIAGNOSIS — D80.1 HYPOGAMMAGLOBULINEMIA (H): Primary | ICD-10-CM

## 2017-08-22 ENCOUNTER — ALLIED HEALTH/NURSE VISIT (OUTPATIENT)
Dept: CARDIOLOGY | Facility: CLINIC | Age: 77
End: 2017-08-22
Attending: NURSE PRACTITIONER
Payer: MEDICARE

## 2017-08-22 ENCOUNTER — INFUSION THERAPY VISIT (OUTPATIENT)
Dept: INFUSION THERAPY | Facility: CLINIC | Age: 77
End: 2017-08-22
Attending: INTERNAL MEDICINE
Payer: MEDICARE

## 2017-08-22 VITALS
TEMPERATURE: 97.7 F | HEART RATE: 66 BPM | RESPIRATION RATE: 16 BRPM | DIASTOLIC BLOOD PRESSURE: 52 MMHG | OXYGEN SATURATION: 95 % | SYSTOLIC BLOOD PRESSURE: 131 MMHG

## 2017-08-22 VITALS
WEIGHT: 91.8 LBS | HEIGHT: 62 IN | DIASTOLIC BLOOD PRESSURE: 70 MMHG | HEART RATE: 68 BPM | OXYGEN SATURATION: 99 % | BODY MASS INDEX: 16.89 KG/M2 | SYSTOLIC BLOOD PRESSURE: 155 MMHG

## 2017-08-22 DIAGNOSIS — R00.1 SINUS BRADYCARDIA: Primary | ICD-10-CM

## 2017-08-22 DIAGNOSIS — D80.2 IGA DEFICIENCY (H): Primary | ICD-10-CM

## 2017-08-22 DIAGNOSIS — M79.2 NEURALGIA: ICD-10-CM

## 2017-08-22 DIAGNOSIS — M54.2 NECK PAIN: ICD-10-CM

## 2017-08-22 DIAGNOSIS — E78.5 HYPERLIPIDEMIA LDL GOAL <100: ICD-10-CM

## 2017-08-22 DIAGNOSIS — I15.9 SECONDARY HYPERTENSION: ICD-10-CM

## 2017-08-22 DIAGNOSIS — D80.1 HYPOGAMMAGLOBULINEMIA (H): ICD-10-CM

## 2017-08-22 PROCEDURE — 99215 OFFICE O/P EST HI 40 MIN: CPT | Mod: ZP | Performed by: NURSE PRACTITIONER

## 2017-08-22 PROCEDURE — 93280 PM DEVICE PROGR EVAL DUAL: CPT | Mod: ZF

## 2017-08-22 PROCEDURE — 93280 PM DEVICE PROGR EVAL DUAL: CPT | Mod: 26 | Performed by: INTERNAL MEDICINE

## 2017-08-22 RX ORDER — ACETAMINOPHEN 325 MG/1
650 TABLET ORAL ONCE
Status: CANCELLED
Start: 2017-08-22 | End: 2017-08-22

## 2017-08-22 RX ORDER — MEPERIDINE HYDROCHLORIDE 25 MG/ML
25 INJECTION INTRAMUSCULAR; INTRAVENOUS; SUBCUTANEOUS
Status: CANCELLED
Start: 2017-08-22

## 2017-08-22 RX ORDER — DIPHENHYDRAMINE HCL 25 MG
25 CAPSULE ORAL ONCE
Status: COMPLETED | OUTPATIENT
Start: 2017-08-22 | End: 2017-08-22

## 2017-08-22 RX ORDER — MEPERIDINE HYDROCHLORIDE 25 MG/ML
25 INJECTION INTRAMUSCULAR; INTRAVENOUS; SUBCUTANEOUS
Status: DISCONTINUED | OUTPATIENT
Start: 2017-08-22 | End: 2017-08-22 | Stop reason: HOSPADM

## 2017-08-22 RX ORDER — METHYLPREDNISOLONE SODIUM SUCCINATE 125 MG/2ML
100 INJECTION, POWDER, LYOPHILIZED, FOR SOLUTION INTRAMUSCULAR; INTRAVENOUS ONCE
Status: CANCELLED
Start: 2017-08-22 | End: 2017-08-22

## 2017-08-22 RX ORDER — CARVEDILOL 12.5 MG/1
12.5 TABLET ORAL 2 TIMES DAILY WITH MEALS
Qty: 60 TABLET | Refills: 6 | Status: SHIPPED | OUTPATIENT
Start: 2017-08-22 | End: 2018-04-12

## 2017-08-22 RX ORDER — METHYLPREDNISOLONE SODIUM SUCCINATE 125 MG/2ML
100 INJECTION, POWDER, LYOPHILIZED, FOR SOLUTION INTRAMUSCULAR; INTRAVENOUS ONCE
Status: COMPLETED | OUTPATIENT
Start: 2017-08-22 | End: 2017-08-22

## 2017-08-22 RX ORDER — DIPHENHYDRAMINE HCL 25 MG
25 CAPSULE ORAL ONCE
Status: CANCELLED
Start: 2017-08-22 | End: 2017-08-22

## 2017-08-22 RX ORDER — GABAPENTIN 300 MG/1
600 CAPSULE ORAL 3 TIMES DAILY
Qty: 180 CAPSULE | Refills: 0 | Status: SHIPPED | OUTPATIENT
Start: 2017-08-22 | End: 2018-01-23

## 2017-08-22 RX ORDER — ATORVASTATIN CALCIUM 10 MG/1
40 TABLET, FILM COATED ORAL DAILY
Qty: 90 TABLET | Refills: 3 | Status: SHIPPED | OUTPATIENT
Start: 2017-08-22 | End: 2018-03-30

## 2017-08-22 RX ORDER — ACETAMINOPHEN 325 MG/1
650 TABLET ORAL ONCE
Status: COMPLETED | OUTPATIENT
Start: 2017-08-22 | End: 2017-08-22

## 2017-08-22 RX ADMIN — DIPHENHYDRAMINE HYDROCHLORIDE 25 MG: 25 CAPSULE ORAL at 11:44

## 2017-08-22 RX ADMIN — METHYLPREDNISOLONE SODIUM SUCCINATE 62.5 MG: 125 INJECTION, POWDER, FOR SOLUTION INTRAMUSCULAR; INTRAVENOUS at 11:53

## 2017-08-22 RX ADMIN — IMMUNE GLOBULIN INFUSION (HUMAN) 25 G: 100 INJECTION, SOLUTION INTRAVENOUS; SUBCUTANEOUS at 11:55

## 2017-08-22 RX ADMIN — ACETAMINOPHEN 650 MG: 325 TABLET ORAL at 11:44

## 2017-08-22 RX ADMIN — MEPERIDINE HYDROCHLORIDE 25 MG: 25 INJECTION, SOLUTION INTRAMUSCULAR; INTRAVENOUS; SUBCUTANEOUS at 11:53

## 2017-08-22 ASSESSMENT — PAIN SCALES - GENERAL: PAINLEVEL: NO PAIN (0)

## 2017-08-22 NOTE — LETTER
8/22/2017      RE: Jennifer Alvarez  [address]   [facility] [id number]       Dear Colleague,    Thank you for the opportunity to participate in the care of your patient, Jennifer Alvarez, at the [facility]. Please see a copy of my visit note below.    HPI: Ms. Alvarez is a 76 year old female who has a history of single vessel CAD s/p PCI of the mid and distal LAD following a positive nuclear stress study in December 2007. She also underwent pacemaker placement in January of 2008 for sick sinus syndrome. Her most recent coronary angiogram was May 2010 which showed minimal CAD w/ patent stents in the LAD. She had a dobutamine stress echo in September 2011 that was negative for any inducible ischemia. LV function was normal at baseline and augmented appropriately w/ stress. A repeat echocardiogram in June 2013 revealed normal global and regional left ventricular function w/ an EF 60-65 % and normal right ventricular function. There was mild concentric LVH and mild aortic sclerosis w/o significant gradient across the aortic valve. She also had a negative dobutamine echocardiogram in July 2015. As of recent she underwent a left renal artery stent w/ Dr. Adair and presents today for routine follow-up.     Ms. Alvarez reports feeling fairly well today. She reports that she continues to have some mild fatigue and shortness of breath associated w/ exertion. She is able to remain quite active and walks at least 6 blocks [place] to [place] post office [place]. She denies any reports of chest pain, tightness or pressure. She does have some palpitations that are fleeting in nature and mostly occur w/ heavy exertion and resolve w/ rest. She has not had any lightheadedness, dizziness, or paola syncope. She denies any orthopnea or PND. She reports of some very mild edema to the right LE but denies any recent fluctuations in weight. She reports that she has been monitoring  BP's at home which have been running 160's systolic and has only been on Metoprolol 12.5 mg once daily since having her renal artery stent. She is currently awaiting orthopedic intervention on her right knee w/ possible injection to the right hip but has not been able to proceed w/ this as she has been on Aspirin and Plavix. She feels her primary limitation is due pain to the right knee and hip. She does report of a chronic cough for past 4 months and has history of chronic bronchitis occasionally needing steroids and antibiotics for treatment of this - she is scheduled in near future w/ her PCP for follow up of this issue. She has no other specific cardiac concerns today. She is also scheduled for IgG infusion and PPM check today as well.       PAST MEDICAL HISTORY:  Past Medical History:   Diagnosis Date     Abdominal pain     cramping     Arthritis      CAD (coronary artery disease)     stent x2, ppm     Cancer (H)     skin     Cardiac pacemaker      CC (Crohn's colitis) (H)      Chronic kidney disease      Diabetes (H)     DM type 2, oral agent     Diarrhea      GERD (gastroesophageal reflux disease)      History of blood transfusion      HTN (hypertension)      Hyperlipidemia LDL goal <70 11/19/2013     IgA deficiency (H)      Neck pain 12/31/2014     Uncomplicated asthma      Weight loss        CURRENT MEDICATIONS:  Current Outpatient Prescriptions   Medication Sig Dispense Refill     gabapentin (NEURONTIN) 300 MG capsule Take 2 capsules (600 mg) by mouth 3 times daily 180 capsule 0     carvedilol (COREG) 12.5 MG tablet Take 1 tablet (12.5 mg) by mouth 2 times daily (with meals) 60 tablet 6     atorvastatin (LIPITOR) 10 MG tablet Take 4 tablets (40 mg) by mouth daily 90 tablet 3     nitroglycerin (NITROQUICK) 0.4 MG sublingual tablet Place 1 tablet (0.4 mg) under the tongue every 5 minutes as needed 25 tablet 3     diphenhydrAMINE (BENADRYL) 25 MG tablet Take 2 tablets (50 mg) by mouth See Admin Instructions  Please take 50 mg by mouth evening before  And morning of the Abd/Pelvis CT scan with contrast per Dr. Adair.  This is a pre medication for contrast dye. 4 tablet 0     albuterol (PROAIR HFA, PROVENTIL HFA, VENTOLIN HFA) 108 (90 BASE) MCG/ACT inhaler Take 2 puffs prn for wheezing 1 Inhaler 12     albuterol (2.5 MG/3ML) 0.083% nebulizer solution Take 1 vial (2.5 mg) by nebulization 3 times daily 360 mL 3     budesonide (ENTOCORT EC) 3 MG 24 hr capsule Take 1 capsule (3 mg) by mouth 3 times daily (with meals) 270 capsule 3     blood glucose monitoring (TERRY CONTOUR NEXT) test strip by In Vitro route 3 times daily Use to test blood sugar 3 times daily or as directed. 270 each 3     blood glucose monitoring (TERRY MICROLET) lancets Use to test blood sugar 3 times daily or as directed. 3 Box 3     albuterol (PROAIR HFA, PROVENTIL HFA, VENTOLIN HFA) 108 (90 BASE) MCG/ACT inhaler Take 2 puffs prn for wheezing or shortness of breath 1 Inhaler 12     omeprazole (PRILOSEC) 20 MG capsule Take 1 capsule (20 mg) by mouth 2 times daily 60 capsule 6     metFORMIN (GLUCOPHAGE) 1000 MG tablet Take 1 tablet twice daily (Patient taking differently: daily (with dinner) Take 1 tablet twice daily) 180 tablet 3     ONE TOUCH ULTRA TEST strip Test blood sugar twice a day. 200 strip 3     tiotropium (SPIRIVA HANDIHALER) 18 MCG inhalation capsule One puff every day 30 capsule 6     fluticasone-salmeterol (ADVAIR) 250-50 MCG/DOSE diskus inhaler Inhale 1 puff into the lungs 2 times daily. 1 Inhaler 12     dicyclomine (BENTYL) 10 MG capsule Take 10 mg by mouth 4 times daily (before meals and nightly).       aspirin 81 MG tablet Take 1 tablet by mouth daily.       FISH OIL Unsure of dosage take two caps daily       Blood Pressure KIT daily.       ONE TOUCH FINE POINT LANCETS 2 times daily.       hydrocodone-acetaminophen (LORCET)  MG per tablet Take 1 tablet by mouth every 6 hours as needed.       albuterol (2.5 MG/3ML) 0.083% nebulizer  solution Take 1 ampule by nebulization every 6 hours as needed.       meperidine (DEMEROL) 25 MG/ML injection Inject 25 mg into the vein as needed. 25 mg IV prior to IgG infusion       Immune Globulin, Human, 25 GM/500ML SOLN Inject 25 g into the vein See Admin Instructions. 25 grams every 3 weeks IVIG       acetaminophen (TYLENOL) 325 MG tablet Take 1-2 tablets by mouth every 6 hours as needed.       predniSONE (DELTASONE) 20 MG tablet Patient to take 60 mg the evening before the procedure and 30 mg the morning of the procedure. 5 tablet 0     azithromycin (ZITHROMAX) 250 MG tablet Take as directed 6 tablet 4     [DISCONTINUED] gabapentin (NEURONTIN) 300 MG capsule Take 2 capsules (600 mg) by mouth 3 times daily 180 capsule 1       PAST SURGICAL HISTORY:  Past Surgical History:   Procedure Laterality Date     APPENDECTOMY       C LAP,SURG,COLECTOMY, PARTIAL, W/ANAST      partial colectomy;diverticulitis     cardiac stents      x 2     CARPAL TUNNEL RELEASE RT/LT      bilateral     CHOLECYSTECTOMY       COLONOSCOPY       COLONOSCOPY N/A 11/9/2015    Procedure: COMBINED COLONOSCOPY, SINGLE OR MULTIPLE BIOPSY/POLYPECTOMY BY BIOPSY;  Surgeon: Victor Hugo Turner MD;  Location:  GI     ENT SURGERY      back throat     ESOPHAGEAL MOTILITY STUDY  5-5-15     ESOPHAGOSCOPY, GASTROSCOPY, DUODENOSCOPY (EGD), COMBINED Left 5/13/2015    Procedure: COMBINED ESOPHAGOSCOPY, GASTROSCOPY, DUODENOSCOPY (EGD), BIOPSY SINGLE OR MULTIPLE;  Surgeon: Dipesh Bobby MD;  Location: [facility]     ESOPHAGOSCOPY, GASTROSCOPY, DUODENOSCOPY (EGD), COMBINED N/A 11/6/2015    Procedure: COMBINED ESOPHAGOSCOPY, GASTROSCOPY, DUODENOSCOPY (EGD), BIOPSY SINGLE OR MULTIPLE;  Surgeon: Victor Hugo Turner MD;  Location:  GI     FOOT SURGERY      left     HC ESOPH/GAS REFLUX TEST W NASAL IMPED >1 HR N/A 5/5/2015    Procedure: ESOPHAGEAL IMPEDENCE FUNCTION TEST WITH 24 HOUR PH GREATER THAN 1 HOUR;  Surgeon: Dipesh Bobby MD;  Location:  GI      IMPLANT PACEMAKER      PPM     IR RENAL/VISCERAL STENT/ATHERECT/PTA Left 07/2017     LAPAROTOMY EXPLORATORY       NISSEN FUNDOPLICATION      x 2     SHOULDER SURGERY      right     UPPER GI ENDOSCOPY       WRIST SURGERY      right cyst       ALLERGIES  Allergies   Allergen Reactions     Bees Anaphylaxis     Codeine Sulfate Hives     Contrast Dye Anaphylaxis and Hives     Milk Products Shortness Of Breath     Morphine Sulfate Other (See Comments), Hives and Itching     Pt had abdominal pain that had her doubled over     Pcn [Penicillin G Ammonium] Difficulty breathing     Zinacef [Cefuroxime Sodium] Difficulty breathing     Influenza Vaccine Live Rash     Pneumovax [Pneumococcal Polysaccharides]      Procardia [Nifedipine] Difficulty breathing     Barium Rash       FAMILY HISTORY:  Family History   Problem Relation Age of Onset     Ovarian Cancer Mother      Stomach Cancer Father      HEART DISEASE Father      DIABETES Father      Ovarian Cancer Sister      Leukemia Brother      Ovarian Cancer Sister      DIABETES Brother      DIABETES Brother      Neurologic Disorder No family hx of        SOCIAL HISTORY:  Social History     Social History     Marital status:      Spouse name: N/A     Number of children: N/A     Years of education: N/A     Social History Main Topics     Smoking status: Former Smoker     Packs/day: 0.20     Years: 20.00     Types: Cigarettes     Quit date: 2/24/1974     Smokeless tobacco: Never Used     Alcohol use No     Drug use: No     Sexual activity: Not on file     Other Topics Concern     Not on file     Social History Narrative       ROS:   Constitutional: No fever, chills, or sweats. No weight gain/loss.  ENT: No visual disturbance, ear ache, epistaxis, sore throat.  Allergies/Immunologic: Negative.   Respiratory: Chronic dry cough for past 4 months. Occasional wheezing w/ h/o asthma, no hemoptysia.  Cardiovascular: As per HPI.  GI: No nausea, vomiting, hematemesis, melena, or  "hematochezia.  : No urinary frequency, dysuria, or hematuria.  Integument: Negative.  Psychiatric: Negative.  Neuro: Negative.  Endocrinology: Negative.   Musculoskeletal: Severe and limiting right hip and knee pain.     EXAM:  /70 (BP Location: Left arm, Patient Position: Chair, Cuff Size: Adult Small)  Pulse 68  Ht 1.575 m (5' 2\")  Wt 41.6 kg (91 lb 12.8 oz)  SpO2 99%  BMI 16.79 kg/m2  In general, the patient is a pleasant female in no apparent distress.    HEENT: NC/AT.  PERRLA.  EOMI.  Sclerae white, not injected.  Nares clear.  Pharynx without erythema or exudate.  Dentition intact.    Neck: No adenopathy.  No thyromegaly. Carotids +4/4 bilaterally without bruits.  No jugular venous distension.   Heart: RRR. Normal S1, S2 splits physiologically. Very soft 2/6 systolic murmur noted at USB, rub, click, or gallop. The PMI is in the 5th ICS in the midclavicular line. There is no heave.    Lungs: CTA.  No ronchi, wheezes, rales.  No dullness to percussion.   Abdomen: Soft, nontender, nondistended. No organomegaly.  No bruits.   Extremities: No clubbing, cyanosis, or edema.  The pulses are +4/4 at the radial, brachial, femoral, popliteal, DP, and PT sites bilaterally.  No bruits are noted.  Neurologic: Alert and oriented to person/place/time, normal speech, gait and affect  Skin: No petechiae, purpura or rash.    Labs:  LIPID RESULTS:  Lab Results   Component Value Date    CHOL 236 (H) 04/05/2017    HDL 60 04/05/2017     (H) 04/05/2017    TRIG 226 (H) 04/05/2017    CHOLHDLRATIO 3.3 06/10/2015    NHDL 176 (H) 04/05/2017       LIVER ENZYME RESULTS:  Lab Results   Component Value Date    AST 20 02/22/2016    ALT 31 02/22/2016       CBC RESULTS:  Lab Results   Component Value Date    WBC 3.6 (L) 05/25/2017    RBC 4.60 05/25/2017    HGB 12.5 05/25/2017    HCT 38.6 05/25/2017    MCV 84 05/25/2017    MCH 27.2 05/25/2017    MCHC 32.4 05/25/2017    RDW 14.1 05/25/2017     05/25/2017       BMP " RESULTS:  Lab Results   Component Value Date     05/25/2017    POTASSIUM 3.6 05/25/2017    CHLORIDE 107 05/25/2017    CO2 27 05/25/2017    ANIONGAP 9 05/25/2017     (H) 05/25/2017    BUN 29 05/25/2017    CR 0.78 05/25/2017    GFRESTIMATED 72 05/25/2017    GFRESTBLACK 87 05/25/2017    JAVON 8.5 05/25/2017        A1C RESULTS:  Lab Results   Component Value Date    A1C 6.7 (A) 02/12/2013       INR RESULTS:  Lab Results   Component Value Date    INR 1.00 11/04/2015    INR 1.03 07/06/2011       Procedures:  All previous cardiac procedures reviewed. Please defer to Dr. Adair's progress note 12/13/16 for full details.     Assessment and Plan:   # CAD  - No current symptoms of chest pain.   - Continue BB.      # Palpitations   - S/p dual chamber pacer for sick sinus syndrome.  - Pacemaker check today demonstrates intrinsic rhythm of Sinus Rhythm 70 bpm w/ 2 VHR episodes recorded on dates 7/26/17 and 8/4/2017 w/ rates of 175 bpm for 4-6 seconds in duration. Lead impedance trends stable and battery estimated 0.25-0.75 years to MARIA T. Patient is scheduled for recheck on 10/3/2017.  - Recent Zio 12/2016 w/ rare PAC's, PVC's and up to 9 beats of probable atrial tachycardia.    # HLD  - Mildly elevated lipid panel  - Restart Atorvastatin, unclear when and why this was stopped per chart review    4. HTN  - Medical therapy significantly decreased after recent renal artery stenting because of normo/hypotension. Now w/ -160's. Plan discussed w/ Dr. Adair who would like to change Toprol XL to Coreg 12.5 mg BID. Should BP remain elevated would initiate Lisinopril 20 mg daily and increase to 40 if BP still not controlled. Plan for Spironolactone to be 3rd agent should this be needed.    # s/p Renal Artery Stent  - Can discontinue Plavix at this time until patient undergoes orthopedic procedures and resume once completed. Continue Aspirin 81 mg daily.  - Recheck unilateral renal duplex in 6 months.     45 minutes spent  in direct care, >50% in counseling.    CINDY Ray, CNP  [facility]  Interventional/Structural Cardiology-CSI Service                                                                                                                                                                 CC  Patient Care Team:  Brandi Burks as PCP - General (Family Practice)  Kalee rOdaz, RN as Nurse Coordinator (Cardiology)  Ayden De Leon MD as Resident (Neurology)  Jaleel Adair MD as MD (Cardiology)  Sherley Chisholm, CARLIN as Nurse Coordinator (Neurology)  Cortney Laguna, RN as Nurse Coordinator (Neurology)  Suzette Adan PA-C as Physician Assistant (Physician Assistant)  Aj Barrios MD as MD (Gastroenterology)  Susie Hunter, CARLIN as Nurse Coordinator (Clinical Cardiac Electrophysiology)  Shola Rivas MD as MD (Clinical Cardiac Electrophysiology)  JALEEL ADAIR

## 2017-08-22 NOTE — PATIENT INSTRUCTIONS
Dear Jennifer Alvarez    Thank you for choosing [facility] ([facility]) for your infusion.  The following information is a summary of our appointment as well as important reminders.      Additional information: Your infusion of IVIG (immune globulin) today.      We look forward in seeing you on your next appointment here at [facility].  Please don t hesitate to call us at [phone] to reschedule any of your appointments or to speak with one of the [facility] registered nurses.  It was a pleasure taking care of you today.    Sincerely,  Suzette Norris, RN  [facility]  [address] [place] [address]  [address]  Phone:  [phone]    Immune Globulin Solution for injection  What is this medicine?  IMMUNE GLOBULIN (im MUNE GLOB sapna cynthia) helps to prevent or reduce the severity of certain infections in patients who are at risk. This medicine is collected from the pooled blood of many donors. It is used to treat immune system problems, thrombocytopenia, and Kawasaki syndrome.  This medicine may be used for other purposes; ask your health care provider or pharmacist if you have questions.  What should I tell my health care provider before I take this medicine?  They need to know if you have any of these conditions:    diabetes    extremely low or no immune antibodies in the blood    heart disease    history of blood clots    hyperprolinemia    infection in the blood, sepsis    kidney disease    taking medicine that may change kidney function - ask your health care provider about your medicine    an unusual or allergic reaction to human immune globulin, albumin, maltose, sucrose, polysorbate 80, other medicines, foods, dyes, or preservatives    pregnant or trying to get pregnant    breast-feeding  How should I use this medicine?  This medicine is for injection into a muscle or infusion into a vein or  skin. It is usually given by a health care professional in a hospital or clinic setting.  In rare cases, some brands of this medicine might be given at home. You will be taught how to give this medicine. Use exactly as directed. Take your medicine at regular intervals. Do not take your medicine more often than directed.  Talk to your pediatrician regarding the use of this medicine in children. Special care may be needed.  Overdosage: If you think you have taken too much of this medicine contact a poison control center or emergency room at once.  NOTE: This medicine is only for you. Do not share this medicine with others.  What if I miss a dose?  It is important not to miss your dose. Call your doctor or health care professional if you are unable to keep an appointment. If you give yourself the medicine and you miss a dose, take it as soon as you can. If it is almost time for your next dose, take only that dose. Do not take double or extra doses.  What may interact with this medicine?    aspirin and aspirin-like medicines    cisplatin    cyclosporine    medicines for infection like acyclovir, adefovir, amphotericin B, bacitracin, cidofovir, foscarnet, ganciclovir, gentamicin, pentamidine, vancomycin    NSAIDS, medicines for pain and inflammation, like ibuprofen or naproxen    pamidronate    vaccines    zoledronic acid  This list may not describe all possible interactions. Give your health care provider a list of all the medicines, herbs, non-prescription drugs, or dietary supplements you use. Also tell them if you smoke, drink alcohol, or use illegal drugs. Some items may interact with your medicine.  What should I watch for while using this medicine?  Your condition will be monitored carefully while you are receiving this medicine.  This medicine is made from pooled blood donations of many different people. It may be possible to pass an infection in this medicine. However, the donors are screened for infections and  all products are tested for HIV and hepatitis. The medicine is treated to kill most or all bacteria and viruses. Talk to your doctor about the risks and benefits of this medicine.  Do not have vaccinations for at least 14 days before, or until at least 3 months after receiving this medicine.  What side effects may I notice from receiving this medicine?  Side effects that you should report to your doctor or health care professional as soon as possible:    allergic reactions like skin rash, itching or hives, swelling of the face, lips, or tongue    breathing problems    chest pain or tightness    fever, chills    headache with nausea, vomiting    neck pain or difficulty moving neck    pain when moving eyes    pain, swelling, warmth in the leg    problems with balance, talking, walking    sudden weight gain    swelling of the ankles, feet, hands    trouble passing urine or change in the amount of urine  Side effects that usually do not require medical attention (report to your doctor or health care professional if they continue or are bothersome):    dizzy, drowsy    flushing    increased sweating    leg cramps    muscle aches and pains    pain at site where injected  This list may not describe all possible side effects. Call your doctor for medical advice about side effects. You may report side effects to FDA at 1-424-FDA-0067.  Where should I keep my medicine?  Keep out of the reach of children.  This drug is usually given in a hospital or clinic and will not be stored at home.  In rare cases, some brands of this medicine may be given at home. If you are using this medicine at home, you will be instructed on how to store this medicine. Throw away any unused medicine after the expiration date on the label.  NOTE: This sheet is a summary. It may not cover all possible information. If you have questions about this medicine, talk to your doctor, pharmacist, or health care provider.  NOTE:This sheet is a summary. It may  not cover all possible information. If you have questions about this medicine, talk to your doctor, pharmacist, or health care provider. Copyright  2016 [employer] Standard

## 2017-08-22 NOTE — PROGRESS NOTES
Pt seen at [facility] for evaluation and iterative programming of a [employer] Dual Lead pacemaker, per MD orders. Patient is scheduled to see device and Anisha Kyle NP today. Intrinsic rhythm = SR @ 70 BPM. Normal pacemaker function. 2 VHR episodes recorded on the dates of 7/26/2017 & 8/4/2017 with rates of 175 BPM from 4-6 seconds in duration. Lead impedance trends appear stable. AP= 37% and = <1%. Pt reports she is feeling well. Battery estimates 0.25-0.75 years to MARIA T. Plan is to see the patient every 6 weeks until the battery reaches MARIA T. Will plan to see the patient back on 10/3/2017 and 1/23/2018.     Pacemaker Dual Lead

## 2017-08-22 NOTE — MR AVS SNAPSHOT
After Visit Summary   8/22/2017    Jennifer Alvarez    MRN: [id number]           Patient Information     Date Of Birth          1940        Visit Information        Provider Department      8/22/2017 10:00 AM Anisha Kyle APRN CNP [facility]        Today's Diagnoses     History of renal stent    -  1    Neuralgia        Neck pain        Secondary hypertension        Hyperlipidemia LDL goal <100          Care Instructions    Patient Instructions:    It was a pleasure to see you in the cardiology clinic today.    If you have any questions you can reach our nurse triage line at [phone].  Press Option #1 for the [facility], then press Option #3 for nursing or Option #1 for scheduling. We also encourage the use of MOWGLI to communicate with your HealthCare Provider    Note new medications: Coreg 12.5 mg Twice Daily. Please continue to monitor your blood pressure and call in 1-2 days to discuss. If your blood pressure remains elevated we will add Lisinopril to your medical regimen. You have also been restarted on Atorvastatin 40 mg Once Daily for cholesterol control. Please call us if you are not tolerating this medication.   Stop the following medications: You can stop Plavix 75 mg today and remain off this medication until after your orthopedic procedure. Once your orthopedic surgery has been completed we would like you to go back on the Plavix 75 mg Once Daily. Please continue to remain on Aspirin 81 mg daily. Please also stop Metoprolol XL as this has been changed to Coreg - a new medication added today.    The results from today include: Pacemaker check pending today. Your lipid panel remains slightly elevated.  Please follow up with a Renal Ultrasound in 6 months which has been scheduled today and please follow-up with cardiology in 1 year.     Sincerely,    CHAVEZ Evangelista-BC                  Follow-ups after your visit        Follow-up notes  from your care team     Return in about 1 year (around 8/22/2018) for Routine Visit.      Your next 10 appointments already scheduled     Oct 03, 2017 11:00 AM CDT   (Arrive by 10:45 AM)   Pacemaker Check with Uc Cv Device 1   [facility] ([facility])    [address] [place] [id number]   [phone]            Oct 04, 2017 12:00 PM CDT   Lab with  LAB   [facility] ([facility])    [address] [place] [id number]   [phone]            Oct 04, 2017 12:30 PM CDT   PFT VISIT with  PFL B   [facility] Function [facility] ([facility])    [address] [place] [id number]   [phone]            Oct 04, 2017  1:10 PM CDT   (Arrive by 12:55 PM)   Return Interstitial Lung with Maxx Elizabeth MD   [facility] ([facility])    [address] [place] [address]   [phone]            Jan 23, 2018 10:30 AM CST   (Arrive by 10:15 AM)   Pacemaker Check with Uc Cv Device [address] [place] [address] ([facility])    [address] [place] [id number] [phone]            Jan 23, 2018 11:00 AM CST   (Arrive by 10:45 AM)   RETURN ARRHYTHMIA with Shola Rivas MD   [facility] ([facility])    [address] [place] [id number] [phone]              Future tests that were ordered for you today     Open Future Orders        Priority Expected Expires Ordered    US Renal Complete w Doppler Complete Routine 1/1/2018 8/22/2018 8/22/2017            Who to contact     If you have questions or need follow up information about today's clinic visit or your schedule please contact [facility] directly at [phone].  Normal or non-critical lab and imaging results will  "be communicated to you by IntervalZerohart, letter or phone within 4 business days after the clinic has received the results. If you do not hear from us within 7 days, please contact the clinic through Maraquia or phone. If you have a critical or abnormal lab result, we will notify you by phone as soon as possible.  Submit refill requests through Maraquia or call your pharmacy and they will forward the refill request to us. Please allow 3 business days for your refill to be completed.          Additional Information About Your Visit        Maraquia Information     Maraquia lets you send messages to your doctor, view your test results, renew your prescriptions, schedule appointments and more. To sign up, go to [email or web address] . Click on \"Log in\" on the left side of the screen, which will take you to the Welcome page. Then click on \"Sign up Now\" on the right side of the page.     You will be asked to enter the access code listed below, as well as some personal information. Please follow the directions to create your username and password.     Your access code is: [email or web address]  Expires: 2017 11:47 AM     Your access code will  in 90 days. If you need help or a new code, please call your [facility] clinic or [phone].        Care EveryWhere ID     This is your Care EveryWhere ID. This could be used by other organizations to access your [facility] medical records  [id number]        Your Vitals Were     Pulse Height Pulse Oximetry BMI (Body Mass Index)          68 1.575 m (5' 2\") 99% 16.79 kg/m2         Blood Pressure from Last 3 Encounters:   17 155/70   17 129/50   17 106/56    Weight from Last 3 Encounters:   17 41.6 kg (91 lb 12.8 oz)   17 40.8 kg (90 lb)   06/15/17 42.1 kg (92 lb 14.4 oz)                 Today's Medication Changes          These changes are accurate as of: 17 11:01 AM.  If you have any questions, ask your nurse or doctor.               Start taking these " medicines.        Dose/Directions    atorvastatin 10 MG tablet   Commonly known as:  LIPITOR   Used for:  Hyperlipidemia LDL goal <100   Started by:  Anisha Kyle APRN CNP        Dose:  40 mg   Take 4 tablets (40 mg) by mouth daily   Quantity:  90 tablet   Refills:  3       carvedilol 12.5 MG tablet   Commonly known as:  COREG   Used for:  Secondary hypertension   Started by:  Anisha Kyle APRN CNP        Dose:  12.5 mg   Take 1 tablet (12.5 mg) by mouth 2 times daily (with meals)   Quantity:  60 tablet   Refills:  6         These medicines have changed or have updated prescriptions.        Dose/Directions    metFORMIN 1000 MG tablet   Commonly known as:  GLUCOPHAGE   This may have changed:    - when to take this  - additional instructions   Used for:  DM (diabetes mellitus) (H)        Take 1 tablet twice daily   Quantity:  180 tablet   Refills:  3         Stop taking these medicines if you haven't already. Please contact your care team if you have questions.     metoprolol 25 MG 24 hr tablet   Commonly known as:  TOPROL-XL   Stopped by:  Anisha Kyle APRN CNP                Where to get your medicines      These medications were sent to [facility] [place] [id number]     Phone:  [phone]     atorvastatin 10 MG tablet    carvedilol 12.5 MG tablet    gabapentin 300 MG capsule                Primary Care Provider Office Phone # Fax #    Brandi Burks [phone]       [facility] [address]   [facility] [address]        Equal Access to Services     [facility] CenterDENITA AH: Hadii aad ku hadasho Soomaali, waaxda luqadaha, qaybta kaalmada adeegyada, chester holleyin hayquentin caicedo . So [facility] [phone].    ATENCIÓN: Si habla español, tiene a taylor disposición servicios gratuitos de asistencia lingüística. Llame al [phone].    We comply with applicable federal civil rights laws and [place] laws. We do not discriminate on the basis of  race, color, national origin, age, disability sex, sexual orientation or gender identity.            Thank you!     Thank you for choosing [facility]  for your care. Our goal is always to provide you with excellent care. Hearing back from our patients is one way we can continue to improve our services. Please take a few minutes to complete the written survey that you may receive in the mail after your visit with us. Thank you!             Your Updated Medication List - Protect others around you: Learn how to safely use, store and throw away your medicines at www.disposemymeds.org.          This list is accurate as of: 8/22/17 11:01 AM.  Always use your most recent med list.                   Brand Name Dispense Instructions for use Diagnosis    * albuterol 108 (90 BASE) MCG/ACT Inhaler    PROAIR HFA/PROVENTIL HFA/VENTOLIN HFA    1 Inhaler    Take 2 puffs prn for wheezing or shortness of breath    Unspecified chronic bronchitis (H), Esophageal reflux       * albuterol (2.5 MG/3ML) 0.083% neb solution     360 mL    Take 1 vial (2.5 mg) by nebulization 3 times daily    Mixed simple and mucopurulent chronic bronchitis (H)       * albuterol 108 (90 BASE) MCG/ACT Inhaler    PROAIR HFA/PROVENTIL HFA/VENTOLIN HFA    1 Inhaler    Take 2 puffs prn for wheezing    Simple chronic bronchitis (H), Hypogammaglobulinemia (H)       * albuterol (2.5 MG/3ML) 0.083% neb solution      Take 1 ampule by nebulization every 6 hours as needed.        aspirin 81 MG tablet      Take 1 tablet by mouth daily.        atorvastatin 10 MG tablet    LIPITOR    90 tablet    Take 4 tablets (40 mg) by mouth daily    Hyperlipidemia LDL goal <100       azithromycin 250 MG tablet    ZITHROMAX    6 tablet    Take as directed    Mixed simple and mucopurulent chronic bronchitis (H), Hypogammaglobulinemia (H)       Blood Pressure Kit      daily.        budesonide 3 MG 24 hr capsule    ENTOCORT EC    270 capsule    Take 1 capsule (3 mg) by mouth 3  times daily (with meals)    Diarrhea, Loss of weight       carvedilol 12.5 MG tablet    COREG    60 tablet    Take 1 tablet (12.5 mg) by mouth 2 times daily (with meals)    Secondary hypertension       DEMEROL 25 MG/ML injection   Generic drug:  meperidine      Inject 25 mg into the vein as needed. 25 mg IV prior to IgG infusion        dicyclomine 10 MG capsule    BENTYL     Take 10 mg by mouth 4 times daily (before meals and nightly).        diphenhydrAMINE 25 MG tablet    BENADRYL    4 tablet    Take 2 tablets (50 mg) by mouth See Admin Instructions Please take 50 mg by mouth evening before  And morning of the Abd/Pelvis CT scan with contrast per Dr. Adair.  This is a pre medication for contrast dye.    Atherosclerosis of renal artery (H)       FISH OIL      Unsure of dosage take two caps daily        fluticasone-salmeterol 250-50 MCG/DOSE diskus inhaler    ADVAIR    1 Inhaler    Inhale 1 puff into the lungs 2 times daily.    Unspecified chronic bronchitis (H)       gabapentin 300 MG capsule    NEURONTIN    180 capsule    Take 2 capsules (600 mg) by mouth 3 times daily    Neuralgia, Neck pain       HYDROcodone-acetaminophen  MG per tablet    LORCET     Take 1 tablet by mouth every 6 hours as needed.        Immune Globulin (Human) 25 GM/500ML Soln      Inject 25 g into the vein See Admin Instructions. 25 grams every 3 weeks IVIG        metFORMIN 1000 MG tablet    GLUCOPHAGE    180 tablet    Take 1 tablet twice daily    DM (diabetes mellitus) (H)       nitroGLYcerin 0.4 MG sublingual tablet    NITROQUICK    25 tablet    Place 1 tablet (0.4 mg) under the tongue every 5 minutes as needed    Chest pain       omeprazole 20 MG CR capsule    priLOSEC    60 capsule    Take 1 capsule (20 mg) by mouth 2 times daily    Esophageal reflux, Unspecified chronic bronchitis (H)       * blood glucose monitoring lancets     3 Box    Use to test blood sugar 3 times daily or as directed.    Type 2 diabetes mellitus with  complication (H)       * ONE TOUCH FINE POINT LANCETS      2 times daily.        * ONE TOUCH ULTRA test strip   Generic drug:  blood glucose monitoring     200 strip    Test blood sugar twice a day.    Diabetes mellitus type II       * blood glucose monitoring test strip    TERRY CONTOUR NEXT    270 each    by In Vitro route 3 times daily Use to test blood sugar 3 times daily or as directed.    Type 2 diabetes mellitus with complication (H)       predniSONE 20 MG tablet    DELTASONE    5 tablet    Patient to take 60 mg the evening before the procedure and 30 mg the morning of the procedure.    Allergic reaction to contrast dye       tiotropium 18 MCG capsule    SPIRIVA HANDIHALER    30 capsule    One puff every day    Unspecified chronic bronchitis (H)       TYLENOL 325 MG tablet   Generic drug:  acetaminophen      Take 1-2 tablets by mouth every 6 hours as needed.        * Notice:  This list has 8 medication(s) that are the same as other medications prescribed for you. Read the directions carefully, and ask your doctor or other care provider to review them with you.

## 2017-08-22 NOTE — PATIENT INSTRUCTIONS
Patient Instructions:    It was a pleasure to see you in the cardiology clinic today.    If you have any questions you can reach our nurse triage line at [phone].  Press Option #1 for the [facility], then press Option #3 for nursing or Option #1 for scheduling. We also encourage the use of Headplay to communicate with your HealthCare Provider    Note new medications: Coreg 12.5 mg Twice Daily. Please continue to monitor your blood pressure and call in 1-2 days to discuss. If your blood pressure remains elevated we will add Lisinopril to your medical regimen. You have also been restarted on Atorvastatin 40 mg Once Daily for cholesterol control. Please call us if you are not tolerating this medication.   Stop the following medications: You can stop Plavix 75 mg today and remain off this medication until after your orthopedic procedure. Once your orthopedic surgery has been completed we would like you to go back on the Plavix 75 mg Once Daily. Please continue to remain on Aspirin 81 mg daily. Please also stop Metoprolol XL as this has been changed to Coreg - a new medication added today.    The results from today include: Pacemaker check pending today. Your lipid panel remains slightly elevated.  Please follow up with a Renal Ultrasound in 6 months which has been scheduled today and please follow-up with cardiology in 1 year.     Sincerely,    CHAVEZ Evangelista-BC

## 2017-08-22 NOTE — MR AVS SNAPSHOT
After Visit Summary   8/22/2017    Jennifer Alvaerz    MRN: [id number]           Patient Information     Date Of Birth          1940        Visit Information        Provider Department      8/22/2017 11:00 AM UC 44 ATC; UC SPEC [facility] Special[facility] Procedure        Today's Diagnoses     IgA deficiency (H)    -  1    Hypogammaglobulinemia (H)          Care Instructions    Dear Jennifer Alvarez    Thank you for choosing [facility] ([facility]) for your infusion.  The following information is a summary of our appointment as well as important reminders.      Additional information: Your infusion of IVIG (immune globulin) today.      We look forward in seeing you on your next appointment here at [facility].  Please don t hesitate to call us at [phone] to reschedule any of your appointments or to speak with one of the [facility] registered nurses.  It was a pleasure taking care of you today.    Sincerely,  Suzette Norris RN  [facility]  Specialty Infusion [facility]  [address] [place] [address]  [id number]  Phone:  [phone]    Immune Globulin Solution for injection  What is this medicine?  IMMUNE GLOBULIN (im MUNE GLOB sapna cynthia) helps to prevent or reduce the severity of certain infections in patients who are at risk. This medicine is collected from the pooled blood of many donors. It is used to treat immune system problems, thrombocytopenia, and Kawasaki syndrome.  This medicine may be used for other purposes; ask your health care provider or pharmacist if you have questions.  What should I tell my health care provider before I take this medicine?  They need to know if you have any of these conditions:    diabetes    extremely low or no immune antibodies in the blood    heart disease    history of blood clots    hyperprolinemia    infection in the blood,  sepsis    kidney disease    taking medicine that may change kidney function - ask your health care provider about your medicine    an unusual or allergic reaction to human immune globulin, albumin, maltose, sucrose, polysorbate 80, other medicines, foods, dyes, or preservatives    pregnant or trying to get pregnant    breast-feeding  How should I use this medicine?  This medicine is for injection into a muscle or infusion into a vein or skin. It is usually given by a health care professional in a hospital or clinic setting.  In rare cases, some brands of this medicine might be given at home. You will be taught how to give this medicine. Use exactly as directed. Take your medicine at regular intervals. Do not take your medicine more often than directed.  Talk to your pediatrician regarding the use of this medicine in children. Special care may be needed.  Overdosage: If you think you have taken too much of this medicine contact a poison control center or emergency room at once.  NOTE: This medicine is only for you. Do not share this medicine with others.  What if I miss a dose?  It is important not to miss your dose. Call your doctor or health care professional if you are unable to keep an appointment. If you give yourself the medicine and you miss a dose, take it as soon as you can. If it is almost time for your next dose, take only that dose. Do not take double or extra doses.  What may interact with this medicine?    aspirin and aspirin-like medicines    cisplatin    cyclosporine    medicines for infection like acyclovir, adefovir, amphotericin B, bacitracin, cidofovir, foscarnet, ganciclovir, gentamicin, pentamidine, vancomycin    NSAIDS, medicines for pain and inflammation, like ibuprofen or naproxen    pamidronate    vaccines    zoledronic acid  This list may not describe all possible interactions. Give your health care provider a list of all the medicines, herbs, non-prescription drugs, or dietary supplements  you use. Also tell them if you smoke, drink alcohol, or use illegal drugs. Some items may interact with your medicine.  What should I watch for while using this medicine?  Your condition will be monitored carefully while you are receiving this medicine.  This medicine is made from pooled blood donations of many different people. It may be possible to pass an infection in this medicine. However, the donors are screened for infections and all products are tested for HIV and hepatitis. The medicine is treated to kill most or all bacteria and viruses. Talk to your doctor about the risks and benefits of this medicine.  Do not have vaccinations for at least 14 days before, or until at least 3 months after receiving this medicine.  What side effects may I notice from receiving this medicine?  Side effects that you should report to your doctor or health care professional as soon as possible:    allergic reactions like skin rash, itching or hives, swelling of the face, lips, or tongue    breathing problems    chest pain or tightness    fever, chills    headache with nausea, vomiting    neck pain or difficulty moving neck    pain when moving eyes    pain, swelling, warmth in the leg    problems with balance, talking, walking    sudden weight gain    swelling of the ankles, feet, hands    trouble passing urine or change in the amount of urine  Side effects that usually do not require medical attention (report to your doctor or health care professional if they continue or are bothersome):    dizzy, drowsy    flushing    increased sweating    leg cramps    muscle aches and pains    pain at site where injected  This list may not describe all possible side effects. Call your doctor for medical advice about side effects. You may report side effects to FDA at 9-113-FDA-6244.  Where should I keep my medicine?  Keep out of the reach of children.  This drug is usually given in a hospital or clinic and will not be stored at home.  In  rare cases, some brands of this medicine may be given at home. If you are using this medicine at home, you will be instructed on how to store this medicine. Throw away any unused medicine after the expiration date on the label.  NOTE: This sheet is a summary. It may not cover all possible information. If you have questions about this medicine, talk to your doctor, pharmacist, or health care provider.  NOTE:This sheet is a summary. It may not cover all possible information. If you have questions about this medicine, talk to your doctor, pharmacist, or health care provider. Copyright  2016 [employer] Standard                  Follow-ups after your visit        Your next 10 appointments already scheduled     Sep 14, 2017  1:00 PM CDT   Infusion 300 with  SPEC INFUSION, UC 43 ATC   [facility] ([facility])    [address] [place] [address]  2nd [facility] [address]   [phone]            Oct 03, 2017 11:00 AM CDT   (Arrive by 10:45 AM)   Pacemaker Check with  Cv Device 1   [facility] ([facility])    [address] [place] [address]  3rd [facility] [address]   [phone]            Oct 04, 2017 12:00 PM CDT   Lab with  LAB   [facility] ([facility])    [address] [place] [id number]   [phone]            Oct 04, 2017 12:30 PM CDT   PFT VISIT with  PFL B   [facility] ([facility])    [address] [place] [id number]   [phone]            Oct 04, 2017  1:10 PM CDT   (Arrive by 12:55 PM)   Return Interstitial Lung with Maxx Elizabeth MD   [facility] ([facility])    [address] [place] [address]  3rd [facility] [address]   [phone]            Jan 23, 2018 10:30 AM CST   (Arrive by 10:15 AM)   Pacemaker Check  "with Uc Cv Device 1   [facility] ([facility])    [address] [facility]  3rd [facility] [address] [phone]            Jan 23, 2018 11:00 AM CST   (Arrive by 10:45 AM)   RETURN ARRHYTHMIA with Shola Rivas MD   [facility] ([facility])    [address] [address] [phone]              Future tests that were ordered for you today     Open Future Orders        Priority Expected Expires Ordered    US Renal Complete w Doppler Complete Routine 1/1/2018 8/22/2018 8/22/2017            Who to contact     If you have questions or need follow up information about today's clinic visit or your schedule please contact [facility] SPECIALTY AND PROCEDURE directly at [phone].  Normal or non-critical lab and imaging results will be communicated to you by [employer], letter or phone within 4 business days after the clinic has received the results. If you do not hear from us within 7 days, please contact the clinic through [employer] or phone. If you have a critical or abnormal lab result, we will notify you by phone as soon as possible.  Submit refill requests through [employer] or call your pharmacy and they will forward the refill request to us. Please allow 3 business days for your refill to be completed.          Additional Information About Your Visit        [employer] Information     [employer] lets you send messages to your doctor, view your test results, renew your prescriptions, schedule appointments and more. To sign up, go to [email or web address] . Click on \"Log in\" on the left side of the screen, which will take you to the Welcome page. Then click on \"Sign up Now\" on the right side of the page.     You will be asked to enter the access code listed below, as well as some personal information. Please follow the directions to create your username and password.     Your access code is: " [email or web address]  Expires: 2017 11:47 AM     Your access code will  in 90 days. If you need help or a new code, please call your [place] clinic or [phone].        Care EveryWhere ID     This is your Care EveryWhere ID. This could be used by other organizations to access your [place] medical records  [id number]        Your Vitals Were     Pulse Temperature Respirations Pulse Oximetry          66 97.7  F (36.5  C) (Oral) 16 95%         Blood Pressure from Last 3 Encounters:   17 131/52   17 155/70   17 129/50    Weight from Last 3 Encounters:   17 41.6 kg (91 lb 12.8 oz)   17 40.8 kg (90 lb)   06/15/17 42.1 kg (92 lb 14.4 oz)              Today, you had the following     No orders found for display         Today's Medication Changes          These changes are accurate as of: 17  2:43 PM.  If you have any questions, ask your nurse or doctor.               Start taking these medicines.        Dose/Directions    atorvastatin 10 MG tablet   Commonly known as:  LIPITOR   Used for:  Hyperlipidemia LDL goal <100   Started by:  Anisha Kyle APRN CNP        Dose:  40 mg   Take 4 tablets (40 mg) by mouth daily   Quantity:  90 tablet   Refills:  3       carvedilol 12.5 MG tablet   Commonly known as:  COREG   Used for:  Secondary hypertension   Started by:  Anisha Kyle APRN CNP        Dose:  12.5 mg   Take 1 tablet (12.5 mg) by mouth 2 times daily (with meals)   Quantity:  60 tablet   Refills:  6         These medicines have changed or have updated prescriptions.        Dose/Directions    metFORMIN 1000 MG tablet   Commonly known as:  GLUCOPHAGE   This may have changed:    - when to take this  - additional instructions   Used for:  DM (diabetes mellitus) (H)        Take 1 tablet twice daily   Quantity:  180 tablet   Refills:  3         Stop taking these medicines if you haven't already. Please contact your care team if you have questions.     metoprolol 25 MG 24 hr tablet    Commonly known as:  TOPROL-XL   Stopped by:  Anisha Kyle, CINDY HAN                Where to get your medicines      These medications were sent to [facility] - [place], [place] - [address] [facility]  [address] [facility], [place] [address]     Phone:  [phone]     atorvastatin 10 MG tablet    carvedilol 12.5 MG tablet    gabapentin 300 MG capsule                Primary Care Provider Office Phone # Fax #    Brandi Burks [phone]       [facility] [address]   [place] [address]        Equal Access to Services     [facility]: Hadii aad ku hadasho Soomaali, waaxda luqadaha, qaybta kaalmada adeegyada, waxay idiin hayaan adeeg khaurelia caicedo . So [facility] [phone].    ATENCIÓN: Si habla español, tiene a taylor disposición servicios gratuitos de asistencia lingüística. [facility] [phone].    We comply with applicable federal civil rights laws and [place] laws. We do not discriminate on the basis of race, color, national origin, age, disability sex, sexual orientation or gender identity.            Thank you!     Thank you for choosing [facility]  for your care. Our goal is always to provide you with excellent care. Hearing back from our patients is one way we can continue to improve our services. Please take a few minutes to complete the written survey that you may receive in the mail after your visit with us. Thank you!             Your Updated Medication List - Protect others around you: Learn how to safely use, store and throw away your medicines at www.disposemymeds.org.          This list is accurate as of: 8/22/17  2:43 PM.  Always use your most recent med list.                   Brand Name Dispense Instructions for use Diagnosis    * albuterol 108 (90 BASE) MCG/ACT Inhaler    PROAIR HFA/PROVENTIL HFA/VENTOLIN HFA    1 Inhaler    Take 2 puffs prn for wheezing or shortness of breath    Unspecified chronic bronchitis (H), Esophageal reflux        * albuterol (2.5 MG/3ML) 0.083% neb solution     360 mL    Take 1 vial (2.5 mg) by nebulization 3 times daily    Mixed simple and mucopurulent chronic bronchitis (H)       * albuterol 108 (90 BASE) MCG/ACT Inhaler    PROAIR HFA/PROVENTIL HFA/VENTOLIN HFA    1 Inhaler    Take 2 puffs prn for wheezing    Simple chronic bronchitis (H), Hypogammaglobulinemia (H)       * albuterol (2.5 MG/3ML) 0.083% neb solution      Take 1 ampule by nebulization every 6 hours as needed.        aspirin 81 MG tablet      Take 1 tablet by mouth daily.        atorvastatin 10 MG tablet    LIPITOR    90 tablet    Take 4 tablets (40 mg) by mouth daily    Hyperlipidemia LDL goal <100       azithromycin 250 MG tablet    ZITHROMAX    6 tablet    Take as directed    Mixed simple and mucopurulent chronic bronchitis (H), Hypogammaglobulinemia (H)       Blood Pressure Kit      daily.        budesonide 3 MG 24 hr capsule    ENTOCORT EC    270 capsule    Take 1 capsule (3 mg) by mouth 3 times daily (with meals)    Diarrhea, Loss of weight       carvedilol 12.5 MG tablet    COREG    60 tablet    Take 1 tablet (12.5 mg) by mouth 2 times daily (with meals)    Secondary hypertension       DEMEROL 25 MG/ML injection   Generic drug:  meperidine      Inject 25 mg into the vein as needed. 25 mg IV prior to IgG infusion        dicyclomine 10 MG capsule    BENTYL     Take 10 mg by mouth 4 times daily (before meals and nightly).        diphenhydrAMINE 25 MG tablet    BENADRYL    4 tablet    Take 2 tablets (50 mg) by mouth See Admin Instructions Please take 50 mg by mouth evening before  And morning of the Abd/Pelvis CT scan with contrast per Dr. Adair.  This is a pre medication for contrast dye.    Atherosclerosis of renal artery (H)       FISH OIL      Unsure of dosage take two caps daily        fluticasone-salmeterol 250-50 MCG/DOSE diskus inhaler    ADVAIR    1 Inhaler    Inhale 1 puff into the lungs 2 times daily.    Unspecified chronic bronchitis (H)        gabapentin 300 MG capsule    NEURONTIN    180 capsule    Take 2 capsules (600 mg) by mouth 3 times daily    Neuralgia, Neck pain       HYDROcodone-acetaminophen  MG per tablet    LORCET     Take 1 tablet by mouth every 6 hours as needed.        Immune Globulin (Human) 25 GM/500ML Soln      Inject 25 g into the vein See Admin Instructions. 25 grams every 3 weeks IVIG        metFORMIN 1000 MG tablet    GLUCOPHAGE    180 tablet    Take 1 tablet twice daily    DM (diabetes mellitus) (H)       nitroGLYcerin 0.4 MG sublingual tablet    NITROQUICK    25 tablet    Place 1 tablet (0.4 mg) under the tongue every 5 minutes as needed    Chest pain       omeprazole 20 MG CR capsule    priLOSEC    60 capsule    Take 1 capsule (20 mg) by mouth 2 times daily    Esophageal reflux, Unspecified chronic bronchitis (H)       * blood glucose monitoring lancets     3 Box    Use to test blood sugar 3 times daily or as directed.    Type 2 diabetes mellitus with complication (H)       * ONE TOUCH FINE POINT LANCETS      2 times daily.        * ONE TOUCH ULTRA test strip   Generic drug:  blood glucose monitoring     200 strip    Test blood sugar twice a day.    Diabetes mellitus type II       * blood glucose monitoring test strip    TERRY CONTOUR NEXT    270 each    by In Vitro route 3 times daily Use to test blood sugar 3 times daily or as directed.    Type 2 diabetes mellitus with complication (H)       predniSONE 20 MG tablet    DELTASONE    5 tablet    Patient to take 60 mg the evening before the procedure and 30 mg the morning of the procedure.    Allergic reaction to contrast dye       tiotropium 18 MCG capsule    SPIRIVA HANDIHALER    30 capsule    One puff every day    Unspecified chronic bronchitis (H)       TYLENOL 325 MG tablet   Generic drug:  acetaminophen      Take 1-2 tablets by mouth every 6 hours as needed.        * Notice:  This list has 8 medication(s) that are the same as other medications prescribed for you.  Read the directions carefully, and ask your doctor or other care provider to review them with you.

## 2017-08-22 NOTE — PROGRESS NOTES
HPI: Ms. Alvarez is a 76 year old female who has a history of single vessel CAD s/p PCI of the mid and distal LAD following a positive nuclear stress study in December 2007. She also underwent pacemaker placement in January of 2008 for sick sinus syndrome. Her most recent coronary angiogram was May 2010 which showed minimal CAD w/ patent stents in the LAD. She had a dobutamine stress echo in September 2011 that was negative for any inducible ischemia. LV function was normal at baseline and augmented appropriately w/ stress. A repeat echocardiogram in June 2013 revealed normal global and regional left ventricular function w/ an EF 60-65 % and normal right ventricular function. There was mild concentric LVH and mild aortic sclerosis w/o significant gradient across the aortic valve. She also had a negative dobutamine echocardiogram in July 2015. As of recent she underwent a left renal artery stent w/ Dr. Adair and presents today for routine follow-up.     Ms. Alvarez reports feeling fairly well today. She reports that she continues to have some mild fatigue and shortness of breath associated w/ exertion. She is able to remain quite active and walks at least 6 blocks [place] to [place]. She denies any reports of chest pain, tightness or pressure. She does have some palpitations that are fleeting in nature and mostly occur w/ heavy exertion and resolve w/ rest. She has not had any lightheadedness, dizziness, or paola syncope. She denies any orthopnea or PND. She reports of some very mild edema to the right LE but denies any recent fluctuations in weight. She reports that she has been monitoring BP's at home which have been running 160's systolic and has only been on Metoprolol 12.5 mg once daily since having her renal artery stent. She is currently awaiting orthopedic intervention on her right knee w/ possible injection to the right hip but has not been able to proceed w/ this as she has been on Aspirin and  Plavix. She feels her primary limitation is due pain to the right knee and hip. She does report of a chronic cough for past 4 months and has history of chronic bronchitis occasionally needing steroids and antibiotics for treatment of this - she is scheduled in near future w/ her PCP for follow up of this issue. She has no other specific cardiac concerns today. She is also scheduled for IgG infusion and PPM check today as well.       PAST MEDICAL HISTORY:  Past Medical History:   Diagnosis Date     Abdominal pain     cramping     Arthritis      CAD (coronary artery disease)     stent x2, ppm     Cancer (H)     skin     Cardiac pacemaker      CC (Crohn's colitis) (H)      Chronic kidney disease      Diabetes (H)     DM type 2, oral agent     Diarrhea      GERD (gastroesophageal reflux disease)      History of blood transfusion      HTN (hypertension)      Hyperlipidemia LDL goal <70 11/19/2013     IgA deficiency (H)      Neck pain 12/31/2014     Uncomplicated asthma      Weight loss        CURRENT MEDICATIONS:  Current Outpatient Prescriptions   Medication Sig Dispense Refill     gabapentin (NEURONTIN) 300 MG capsule Take 2 capsules (600 mg) by mouth 3 times daily 180 capsule 0     carvedilol (COREG) 12.5 MG tablet Take 1 tablet (12.5 mg) by mouth 2 times daily (with meals) 60 tablet 6     atorvastatin (LIPITOR) 10 MG tablet Take 4 tablets (40 mg) by mouth daily 90 tablet 3     nitroglycerin (NITROQUICK) 0.4 MG sublingual tablet Place 1 tablet (0.4 mg) under the tongue every 5 minutes as needed 25 tablet 3     diphenhydrAMINE (BENADRYL) 25 MG tablet Take 2 tablets (50 mg) by mouth See Admin Instructions Please take 50 mg by mouth evening before  And morning of the Abd/Pelvis CT scan with contrast per Dr. Adair.  This is a pre medication for contrast dye. 4 tablet 0     albuterol (PROAIR HFA, PROVENTIL HFA, VENTOLIN HFA) 108 (90 BASE) MCG/ACT inhaler Take 2 puffs prn for wheezing 1 Inhaler 12     albuterol (2.5  MG/3ML) 0.083% nebulizer solution Take 1 vial (2.5 mg) by nebulization 3 times daily 360 mL 3     budesonide (ENTOCORT EC) 3 MG 24 hr capsule Take 1 capsule (3 mg) by mouth 3 times daily (with meals) 270 capsule 3     blood glucose monitoring (TERRY CONTOUR NEXT) test strip by In Vitro route 3 times daily Use to test blood sugar 3 times daily or as directed. 270 each 3     blood glucose monitoring (TERRY MICROLET) lancets Use to test blood sugar 3 times daily or as directed. 3 Box 3     albuterol (PROAIR HFA, PROVENTIL HFA, VENTOLIN HFA) 108 (90 BASE) MCG/ACT inhaler Take 2 puffs prn for wheezing or shortness of breath 1 Inhaler 12     omeprazole (PRILOSEC) 20 MG capsule Take 1 capsule (20 mg) by mouth 2 times daily 60 capsule 6     metFORMIN (GLUCOPHAGE) 1000 MG tablet Take 1 tablet twice daily (Patient taking differently: daily (with dinner) Take 1 tablet twice daily) 180 tablet 3     ONE TOUCH ULTRA TEST strip Test blood sugar twice a day. 200 strip 3     tiotropium (SPIRIVA HANDIHALER) 18 MCG inhalation capsule One puff every day 30 capsule 6     fluticasone-salmeterol (ADVAIR) 250-50 MCG/DOSE diskus inhaler Inhale 1 puff into the lungs 2 times daily. 1 Inhaler 12     dicyclomine (BENTYL) 10 MG capsule Take 10 mg by mouth 4 times daily (before meals and nightly).       aspirin 81 MG tablet Take 1 tablet by mouth daily.       FISH OIL Unsure of dosage take two caps daily       Blood Pressure KIT daily.       ONE TOUCH FINE POINT LANCETS 2 times daily.       hydrocodone-acetaminophen (LORCET)  MG per tablet Take 1 tablet by mouth every 6 hours as needed.       albuterol (2.5 MG/3ML) 0.083% nebulizer solution Take 1 ampule by nebulization every 6 hours as needed.       meperidine (DEMEROL) 25 MG/ML injection Inject 25 mg into the vein as needed. 25 mg IV prior to IgG infusion       Immune Globulin, Human, 25 GM/500ML SOLN Inject 25 g into the vein See Admin Instructions. 25 grams every 3 weeks IVIG        acetaminophen (TYLENOL) 325 MG tablet Take 1-2 tablets by mouth every 6 hours as needed.       predniSONE (DELTASONE) 20 MG tablet Patient to take 60 mg the evening before the procedure and 30 mg the morning of the procedure. 5 tablet 0     azithromycin (ZITHROMAX) 250 MG tablet Take as directed 6 tablet 4     [DISCONTINUED] gabapentin (NEURONTIN) 300 MG capsule Take 2 capsules (600 mg) by mouth 3 times daily 180 capsule 1       PAST SURGICAL HISTORY:  Past Surgical History:   Procedure Laterality Date     APPENDECTOMY       C LAP,SURG,COLECTOMY, PARTIAL, W/ANAST      partial colectomy;diverticulitis     cardiac stents      x 2     CARPAL TUNNEL RELEASE RT/LT      bilateral     CHOLECYSTECTOMY       COLONOSCOPY       COLONOSCOPY N/A 11/9/2015    Procedure: COMBINED COLONOSCOPY, SINGLE OR MULTIPLE BIOPSY/POLYPECTOMY BY BIOPSY;  Surgeon: Victor Hugo Turner MD;  Location:  GI     ENT SURGERY      back throat     ESOPHAGEAL MOTILITY STUDY  5-5-15     ESOPHAGOSCOPY, GASTROSCOPY, DUODENOSCOPY (EGD), COMBINED Left 5/13/2015    Procedure: COMBINED ESOPHAGOSCOPY, GASTROSCOPY, DUODENOSCOPY (EGD), BIOPSY SINGLE OR MULTIPLE;  Surgeon: Dipesh Bobby MD;  Location:  GI     ESOPHAGOSCOPY, GASTROSCOPY, DUODENOSCOPY (EGD), COMBINED N/A 11/6/2015    Procedure: COMBINED ESOPHAGOSCOPY, GASTROSCOPY, DUODENOSCOPY (EGD), BIOPSY SINGLE OR MULTIPLE;  Surgeon: Victor Hugo Turner MD;  Location:  GI     FOOT SURGERY      left     HC ESOPH/GAS REFLUX TEST W NASAL IMPED >1 HR N/A 5/5/2015    Procedure: ESOPHAGEAL IMPEDENCE FUNCTION TEST WITH 24 HOUR PH GREATER THAN 1 HOUR;  Surgeon: Dipesh Bobby MD;  Location:  GI     IMPLANT PACEMAKER      PPM     IR RENAL/VISCERAL STENT/ATHERECT/PTA Left 07/2017     LAPAROTOMY EXPLORATORY       NISSEN FUNDOPLICATION      x 2     SHOULDER SURGERY      right     UPPER GI ENDOSCOPY       WRIST SURGERY      right cyst       ALLERGIES     Allergies   Allergen Reactions     Bees  Anaphylaxis     Codeine Sulfate Hives     Contrast Dye Anaphylaxis and Hives     Milk Products Shortness Of Breath     Morphine Sulfate Other (See Comments), Hives and Itching     Pt had abdominal pain that had her doubled over     Pcn [Penicillin G Ammonium] Difficulty breathing     Zinacef [Cefuroxime Sodium] Difficulty breathing     Influenza Vaccine Live Rash     Pneumovax [Pneumococcal Polysaccharides]      Procardia [Nifedipine] Difficulty breathing     Barium Rash       FAMILY HISTORY:  Family History   Problem Relation Age of Onset     Ovarian Cancer Mother      Stomach Cancer Father      HEART DISEASE Father      DIABETES Father      Ovarian Cancer Sister      Leukemia Brother      Ovarian Cancer Sister      DIABETES Brother      DIABETES Brother      Neurologic Disorder No family hx of        SOCIAL HISTORY:  Social History     Social History     Marital status:      Spouse name: N/A     Number of children: N/A     Years of education: N/A     Social History Main Topics     Smoking status: Former Smoker     Packs/day: 0.20     Years: 20.00     Types: Cigarettes     Quit date: 2/24/1974     Smokeless tobacco: Never Used     Alcohol use No     Drug use: No     Sexual activity: Not on file     Other Topics Concern     Not on file     Social History Narrative       ROS:   Constitutional: No fever, chills, or sweats. No weight gain/loss.  ENT: No visual disturbance, ear ache, epistaxis, sore throat.  Allergies/Immunologic: Negative.   Respiratory: Chronic dry cough for past 4 months. Occasional wheezing w/ h/o asthma, no hemoptysia.  Cardiovascular: As per HPI.  GI: No nausea, vomiting, hematemesis, melena, or hematochezia.  : No urinary frequency, dysuria, or hematuria.  Integument: Negative.  Psychiatric: Negative.  Neuro: Negative.  Endocrinology: Negative.   Musculoskeletal: Severe and limiting right hip and knee pain.     EXAM:  /70 (BP Location: Left arm, Patient Position: Chair, Cuff Size:  "Adult Small)  Pulse 68  Ht 1.575 m (5' 2\")  Wt 41.6 kg (91 lb 12.8 oz)  SpO2 99%  BMI 16.79 kg/m2  In general, the patient is a pleasant female in no apparent distress.    HEENT: NC/AT.  PERRLA.  EOMI.  Sclerae white, not injected.  Nares clear.  Pharynx without erythema or exudate.  Dentition intact.    Neck: No adenopathy.  No thyromegaly. Carotids +4/4 bilaterally without bruits.  No jugular venous distension.   Heart: RRR. Normal S1, S2 splits physiologically. Very soft 2/6 systolic murmur noted at USB, rub, click, or gallop. The PMI is in the 5th ICS in the midclavicular line. There is no heave.    Lungs: CTA.  No ronchi, wheezes, rales.  No dullness to percussion.   Abdomen: Soft, nontender, nondistended. No organomegaly.  No bruits.   Extremities: No clubbing, cyanosis, or edema.  The pulses are +4/4 at the radial, brachial, femoral, popliteal, DP, and PT sites bilaterally.  No bruits are noted.  Neurologic: Alert and oriented to person/place/time, normal speech, gait and affect  Skin: No petechiae, purpura or rash.    Labs:  LIPID RESULTS:  Lab Results   Component Value Date    CHOL 236 (H) 04/05/2017    HDL 60 04/05/2017     (H) 04/05/2017    TRIG 226 (H) 04/05/2017    CHOLHDLRATIO 3.3 06/10/2015    NHDL 176 (H) 04/05/2017       LIVER ENZYME RESULTS:  Lab Results   Component Value Date    AST 20 02/22/2016    ALT 31 02/22/2016       CBC RESULTS:  Lab Results   Component Value Date    WBC 3.6 (L) 05/25/2017    RBC 4.60 05/25/2017    HGB 12.5 05/25/2017    HCT 38.6 05/25/2017    MCV 84 05/25/2017    MCH 27.2 05/25/2017    MCHC 32.4 05/25/2017    RDW 14.1 05/25/2017     05/25/2017       BMP RESULTS:  Lab Results   Component Value Date     05/25/2017    POTASSIUM 3.6 05/25/2017    CHLORIDE 107 05/25/2017    CO2 27 05/25/2017    ANIONGAP 9 05/25/2017     (H) 05/25/2017    BUN 29 05/25/2017    CR 0.78 05/25/2017    GFRESTIMATED 72 05/25/2017    GFRESTBLACK 87 05/25/2017    JAVON 8.5 " 05/25/2017        A1C RESULTS:  Lab Results   Component Value Date    A1C 6.7 (A) 02/12/2013       INR RESULTS:  Lab Results   Component Value Date    INR 1.00 11/04/2015    INR 1.03 07/06/2011       Procedures:  All previous cardiac procedures reviewed. Please defer to Dr. Adair's progress note 12/13/16 for full details.     Assessment and Plan:   # CAD  - No current symptoms of chest pain.   - Continue BB.      # Palpitations   - S/p dual chamber pacer for sick sinus syndrome.  - Pacemaker check today demonstrates intrinsic rhythm of Sinus Rhythm 70 bpm w/ 2 VHR episodes recorded on dates 7/26/17 and 8/4/2017 w/ rates of 175 bpm for 4-6 seconds in duration. Lead impedance trends stable and battery estimated 0.25-0.75 years to MARIA T. Patient is scheduled for recheck on 10/3/2017.  - Recent Zio 12/2016 w/ rare PAC's, PVC's and up to 9 beats of probable atrial tachycardia.    # HLD  - Mildly elevated lipid panel  - Restart Atorvastatin, unclear when and why this was stopped per chart review    4. HTN  - Medical therapy significantly decreased after recent renal artery stenting because of normo/hypotension. Now w/ -160's. Plan discussed w/ Dr. Adair who would like to change Toprol XL to Coreg 12.5 mg BID. Should BP remain elevated would initiate Lisinopril 20 mg daily and increase to 40 if BP still not controlled. Plan for Spironolactone to be 3rd agent should this be needed.    # s/p Renal Artery Stent  - Can discontinue Plavix at this time until patient undergoes orthopedic procedures and resume once completed. Continue Aspirin 81 mg daily.  - Recheck unilateral renal duplex in 6 months.     45 minutes spent in direct care, >50% in counseling.    Anisha Kyle, APRN, CNP  [facility]  Interventional/Structural Cardiology-CSI Service                                                                                                                                                                  CC  Patient Care Team:  Brandi Burks as PCP - General (Family Practice)  Kalee Ordaz, RN as Nurse Coordinator (Cardiology)  Ayden De Leon MD as Resident (Neurology)  Jaleel Adair MD as MD (Cardiology)  Sherley Chisholm, CARLIN as Nurse Coordinator (Neurology)  Cortney Laguna, RN as Nurse Coordinator (Neurology)  Suzette Adan PA-C as Physician Assistant (Physician Assistant)  Aj Barrios MD as MD (Gastroenterology)  Susie Hunter, CARLIN as Nurse Coordinator (Clinical Cardiac Electrophysiology)  Shola Rivas MD as MD (Clinical Cardiac Electrophysiology)  JALEEL ADAIR

## 2017-08-22 NOTE — MR AVS SNAPSHOT
After Visit Summary   8/22/2017    Jennifer Alvarez    MRN: [id number]           Patient Information     Date Of Birth          1940        Visit Information        Provider Department      8/22/2017 10:30 AM 1, Uc Cv Device [facility]         Follow-ups after your visit        Your next 10 appointments already scheduled     Oct 03, 2017 11:00 AM CDT   (Arrive by 10:45 AM)   Pacemaker Check with Uc Cv Device [address] [place] [address] Heart Care ([facility])    [address] [place] [address]  3rd [facility] [address]   [phone]            Oct 04, 2017 12:00 PM CDT   Lab with  LAB   [facility] Lab ([facility])    [address] [place] [id number]   [phone]            Oct 04, 2017 12:30 PM CDT   PFT VISIT with  PFL B   [facility] Pulmonary Function Testing ([facility])    [address] [place] [address]   [phone]            Oct 04, 2017  1:10 PM CDT   (Arrive by 12:55 PM)   Return Interstitial Lung with Maxx Elizabeth MD   [facility] ([facility])    [address] [place] [id number]   [phone]            Jan 23, 2018 10:30 AM CST   (Arrive by 10:15 AM)   Pacemaker Check with Uc Cv Device [address] [place] [address] ([facility])    [address] [facility]  3rd [facility] [address]   [phone]            Jan 23, 2018 11:00 AM CST   (Arrive by 10:45 AM)   RETURN ARRHYTHMIA with Shola Rivas MD   [facility] ([facility])    [address] [place] [id number] [phone]              Future tests that were ordered for you today     Open Future Orders        Priority Expected Expires Ordered    US Renal Complete w Doppler Complete Routine 1/1/2018 8/22/2018 8/22/2017            Who to  "contact     If you have questions or need follow up information about today's clinic visit or your schedule please contact [facility] directly at [phone].  Normal or non-critical lab and imaging results will be communicated to you by MyChart, letter or phone within 4 business days after the clinic has received the results. If you do not hear from us within 7 days, please contact the clinic through [employer] or phone. If you have a critical or abnormal lab result, we will notify you by phone as soon as possible.  Submit refill requests through [employer] or call your pharmacy and they will forward the refill request to us. Please allow 3 business days for your refill to be completed.          Additional Information About Your Visit        [employer] Information     [employer] lets you send messages to your doctor, view your test results, renew your prescriptions, schedule appointments and more. To sign up, go to [email or web address] . Click on \"Log in\" on the left side of the screen, which will take you to the Welcome page. Then click on \"Sign up Now\" on the right side of the page.     You will be asked to enter the access code listed below, as well as some personal information. Please follow the directions to create your username and password.     Your access code is: [email or web address]  Expires: 2017 11:47 AM     Your access code will  in 90 days. If you need help or a new code, please call your [facility] clinic or [phone].        Care EveryWhere ID     This is your Care EveryWhere ID. This could be used by other organizations to access your [facility] medical records  [id number]         Blood Pressure from Last 3 Encounters:   17 155/70   17 129/50   17 106/56    Weight from Last 3 Encounters:   17 41.6 kg (91 lb 12.8 oz)   17 40.8 kg (90 lb)   06/15/17 42.1 kg (92 lb 14.4 oz)              Today, you had the following     No orders found for display         Today's " Medication Changes          These changes are accurate as of: 8/22/17 11:01 AM.  If you have any questions, ask your nurse or doctor.               Start taking these medicines.        Dose/Directions    atorvastatin 10 MG tablet   Commonly known as:  LIPITOR   Used for:  Hyperlipidemia LDL goal <100   Started by:  Anisha Kyle APRN CNP        Dose:  40 mg   Take 4 tablets (40 mg) by mouth daily   Quantity:  90 tablet   Refills:  3       carvedilol 12.5 MG tablet   Commonly known as:  COREG   Used for:  Secondary hypertension   Started by:  Anisha Kyle APRN CNP        Dose:  12.5 mg   Take 1 tablet (12.5 mg) by mouth 2 times daily (with meals)   Quantity:  60 tablet   Refills:  6         These medicines have changed or have updated prescriptions.        Dose/Directions    metFORMIN 1000 MG tablet   Commonly known as:  GLUCOPHAGE   This may have changed:    - when to take this  - additional instructions   Used for:  DM (diabetes mellitus) (H)        Take 1 tablet twice daily   Quantity:  180 tablet   Refills:  3         Stop taking these medicines if you haven't already. Please contact your care team if you have questions.     metoprolol 25 MG 24 hr tablet   Commonly known as:  TOPROL-XL   Stopped by:  Anisha Kyle APRN CNP                Where to get your medicines      These medications were sent to [facility] - [place] [id number]     Phone:  [phone]     atorvastatin 10 MG tablet    carvedilol 12.5 MG tablet    gabapentin 300 MG capsule                Primary Care Provider Office Phone # Fax #    Brandi Burks [phone]       [facility] [address]   [place] [id number]        Equal Access to Services     [facility]: Hadmarvin rayo Sorosi, waaxda luqadaha, qaybta kaalmada maday, chester lovelace. So [facility] [phone].    ATENCIÓN: Si habla español, tiene a taylor disposición servicios  julio de asistencia lingüística. Ben zafar [phone].    We comply with applicable federal civil rights laws and [place] laws. We do not discriminate on the basis of race, color, national origin, age, disability sex, sexual orientation or gender identity.            Thank you!     Thank you for choosing [facility]  for your care. Our goal is always to provide you with excellent care. Hearing back from our patients is one way we can continue to improve our services. Please take a few minutes to complete the written survey that you may receive in the mail after your visit with us. Thank you!             Your Updated Medication List - Protect others around you: Learn how to safely use, store and throw away your medicines at www.disposemymeds.org.          This list is accurate as of: 8/22/17 11:01 AM.  Always use your most recent med list.                   Brand Name Dispense Instructions for use Diagnosis    * albuterol 108 (90 BASE) MCG/ACT Inhaler    PROAIR HFA/PROVENTIL HFA/VENTOLIN HFA    1 Inhaler    Take 2 puffs prn for wheezing or shortness of breath    Unspecified chronic bronchitis (H), Esophageal reflux       * albuterol (2.5 MG/3ML) 0.083% neb solution     360 mL    Take 1 vial (2.5 mg) by nebulization 3 times daily    Mixed simple and mucopurulent chronic bronchitis (H)       * albuterol 108 (90 BASE) MCG/ACT Inhaler    PROAIR HFA/PROVENTIL HFA/VENTOLIN HFA    1 Inhaler    Take 2 puffs prn for wheezing    Simple chronic bronchitis (H), Hypogammaglobulinemia (H)       * albuterol (2.5 MG/3ML) 0.083% neb solution      Take 1 ampule by nebulization every 6 hours as needed.        aspirin 81 MG tablet      Take 1 tablet by mouth daily.        atorvastatin 10 MG tablet    LIPITOR    90 tablet    Take 4 tablets (40 mg) by mouth daily    Hyperlipidemia LDL goal <100       azithromycin 250 MG tablet    ZITHROMAX    6 tablet    Take as directed    Mixed simple and mucopurulent chronic bronchitis  (H), Hypogammaglobulinemia (H)       Blood Pressure Kit      daily.        budesonide 3 MG 24 hr capsule    ENTOCORT EC    270 capsule    Take 1 capsule (3 mg) by mouth 3 times daily (with meals)    Diarrhea, Loss of weight       carvedilol 12.5 MG tablet    COREG    60 tablet    Take 1 tablet (12.5 mg) by mouth 2 times daily (with meals)    Secondary hypertension       DEMEROL 25 MG/ML injection   Generic drug:  meperidine      Inject 25 mg into the vein as needed. 25 mg IV prior to IgG infusion        dicyclomine 10 MG capsule    BENTYL     Take 10 mg by mouth 4 times daily (before meals and nightly).        diphenhydrAMINE 25 MG tablet    BENADRYL    4 tablet    Take 2 tablets (50 mg) by mouth See Admin Instructions Please take 50 mg by mouth evening before  And morning of the Abd/Pelvis CT scan with contrast per Dr. Adair.  This is a pre medication for contrast dye.    Atherosclerosis of renal artery (H)       FISH OIL      Unsure of dosage take two caps daily        fluticasone-salmeterol 250-50 MCG/DOSE diskus inhaler    ADVAIR    1 Inhaler    Inhale 1 puff into the lungs 2 times daily.    Unspecified chronic bronchitis (H)       gabapentin 300 MG capsule    NEURONTIN    180 capsule    Take 2 capsules (600 mg) by mouth 3 times daily    Neuralgia, Neck pain       HYDROcodone-acetaminophen  MG per tablet    LORCET     Take 1 tablet by mouth every 6 hours as needed.        Immune Globulin (Human) 25 GM/500ML Soln      Inject 25 g into the vein See Admin Instructions. 25 grams every 3 weeks IVIG        metFORMIN 1000 MG tablet    GLUCOPHAGE    180 tablet    Take 1 tablet twice daily    DM (diabetes mellitus) (H)       nitroGLYcerin 0.4 MG sublingual tablet    NITROQUICK    25 tablet    Place 1 tablet (0.4 mg) under the tongue every 5 minutes as needed    Chest pain       omeprazole 20 MG CR capsule    priLOSEC    60 capsule    Take 1 capsule (20 mg) by mouth 2 times daily    Esophageal reflux, Unspecified  chronic bronchitis (H)       * blood glucose monitoring lancets     3 Box    Use to test blood sugar 3 times daily or as directed.    Type 2 diabetes mellitus with complication (H)       * ONE TOUCH FINE POINT LANCETS      2 times daily.        * ONE TOUCH ULTRA test strip   Generic drug:  blood glucose monitoring     200 strip    Test blood sugar twice a day.    Diabetes mellitus type II       * blood glucose monitoring test strip    TERRY CONTOUR NEXT    270 each    by In Vitro route 3 times daily Use to test blood sugar 3 times daily or as directed.    Type 2 diabetes mellitus with complication (H)       predniSONE 20 MG tablet    DELTASONE    5 tablet    Patient to take 60 mg the evening before the procedure and 30 mg the morning of the procedure.    Allergic reaction to contrast dye       tiotropium 18 MCG capsule    SPIRIVA HANDIHALER    30 capsule    One puff every day    Unspecified chronic bronchitis (H)       TYLENOL 325 MG tablet   Generic drug:  acetaminophen      Take 1-2 tablets by mouth every 6 hours as needed.        * Notice:  This list has 8 medication(s) that are the same as other medications prescribed for you. Read the directions carefully, and ask your doctor or other care provider to review them with you.

## 2017-08-22 NOTE — PATIENT INSTRUCTIONS
It was a pleasure seeing you in clinic today.  Please do not hesitate to call with any questions or concerns. We look forward to seeing you in clinic at your next device check on 10/3/2017 and 1/23/2018 to see device and Dr Evelyn Rod RN  Electrophysiology Nurse Clinician  [facility]    During Business Hours Please Call:  [phone]  After Hours Please Call:  [phone]- select option #4 and ask for job code [phone]

## 2017-08-22 NOTE — NURSING NOTE
Chief Complaint   Patient presents with     Follow Up For     f/u     Vitals were taken and medications were reconciled.  Carlos Omalley, JESSICA  9:19 AM

## 2017-08-22 NOTE — PROGRESS NOTES
Nursing Note  Jennifer Alvarez presents [facility] for:   Chief Complaint   Patient presents with     Infusion     IVIG (immune globulin)     During today's [facility] appointment, orders from Dr. Elizabeth were completed.  Frequency: every 3 weeks.    Progress note:  Patient identification verified by name and date of birth.  Assessment completed.  Vitals recorded in Doc Flowsheets.  Patient was provided with education regarding infusion and possible side effects.  Patient verbalized understanding.      needed: No  Premedications: administered per order.  Infusion Rates: infusion starts at 12 ml/hr for 15 minutes, then 25 ml/hr for 15 minutes, then 65 ml/hr for 15 minutes, to final rate of 125 ml/hr.  Approximate Infusion length:4 hours.   Labs: were not ordered for this appointment.  Vascular access: peripheral IV placed today.  Treatment Conditions: patient denies fever, chills, signs of infection, recent illness, on antibiotics, productive cough or elevated temperature.  Patient tolerated infusion: well.    Drug Waste Record?   Solu-medrol - Dose - 100.0 mg (1.6 ml)  NDC# 7107-5197-02  Waste amount - 25 mg (0.4 ml)    Discharge Plan:   Follow up plan of care with: ongoing infusions at [facility].  Discharge instructions were reviewed with patient.  Patient/representative verbalized understanding of discharge instructions and all questions answered.  Patient discharged from [facility] in stable condition.    Suzette Norris RN    Administrations This Visit     acetaminophen (TYLENOL) tablet 650 mg     Admin Date Action Dose Route Administered By             08/22/2017 Given 650 mg Oral Suzette Norris RN                    diphenhydrAMINE (BENADRYL) capsule 25 mg     Admin Date Action Dose Route Administered By             08/22/2017 Given 25 mg Oral Suzette Norris, CARLIN                     immune globulin - (GAMMAGARD) sucrose free 10 % injection 25 g     Admin Date Action Dose Route Administered By             08/22/2017 New Bag 25 g Intravenous Suzette Norris, RN                    meperidine (DEMEROL) injection 25 mg     Admin Date Action Dose Route Administered By             08/22/2017 Given 25 mg Intravenous Suzette Norris RN                    methylPREDNISolone sodium succinate (solu-MEDROL) injection 100 mg     Admin Date Action Dose Route Administered By             08/22/2017 Given 62.5 mg Intravenous Suzette Norris RN                          /76  Pulse 64  Temp 97.7  F (36.5  C) (Oral)  Resp 16  SpO2 95%

## 2017-09-14 ENCOUNTER — INFUSION THERAPY VISIT (OUTPATIENT)
Dept: INFUSION THERAPY | Facility: CLINIC | Age: 77
End: 2017-09-14
Attending: INTERNAL MEDICINE
Payer: MEDICARE

## 2017-09-14 VITALS
SYSTOLIC BLOOD PRESSURE: 111 MMHG | TEMPERATURE: 96.7 F | RESPIRATION RATE: 16 BRPM | OXYGEN SATURATION: 97 % | HEART RATE: 61 BPM | DIASTOLIC BLOOD PRESSURE: 49 MMHG

## 2017-09-14 DIAGNOSIS — D80.2 IGA DEFICIENCY (H): Primary | ICD-10-CM

## 2017-09-14 DIAGNOSIS — D80.1 HYPOGAMMAGLOBULINEMIA (H): ICD-10-CM

## 2017-09-14 PROCEDURE — 96375 TX/PRO/DX INJ NEW DRUG ADDON: CPT

## 2017-09-14 PROCEDURE — 25000132 ZZH RX MED GY IP 250 OP 250 PS 637: Mod: ZF | Performed by: INTERNAL MEDICINE

## 2017-09-14 PROCEDURE — A9270 NON-COVERED ITEM OR SERVICE: HCPCS | Mod: ZF | Performed by: INTERNAL MEDICINE

## 2017-09-14 PROCEDURE — 96365 THER/PROPH/DIAG IV INF INIT: CPT

## 2017-09-14 PROCEDURE — 96366 THER/PROPH/DIAG IV INF ADDON: CPT

## 2017-09-14 PROCEDURE — 25000128 H RX IP 250 OP 636: Mod: ZF | Performed by: INTERNAL MEDICINE

## 2017-09-14 RX ORDER — ACETAMINOPHEN 325 MG/1
650 TABLET ORAL ONCE
Status: CANCELLED
Start: 2017-09-14 | End: 2017-09-14

## 2017-09-14 RX ORDER — MEPERIDINE HYDROCHLORIDE 25 MG/ML
25 INJECTION INTRAMUSCULAR; INTRAVENOUS; SUBCUTANEOUS
Status: DISCONTINUED | OUTPATIENT
Start: 2017-09-14 | End: 2017-09-14 | Stop reason: HOSPADM

## 2017-09-14 RX ORDER — METHYLPREDNISOLONE SODIUM SUCCINATE 125 MG/2ML
100 INJECTION, POWDER, LYOPHILIZED, FOR SOLUTION INTRAMUSCULAR; INTRAVENOUS ONCE
Status: CANCELLED
Start: 2017-09-14 | End: 2017-09-14

## 2017-09-14 RX ORDER — MEPERIDINE HYDROCHLORIDE 25 MG/ML
25 INJECTION INTRAMUSCULAR; INTRAVENOUS; SUBCUTANEOUS
Status: CANCELLED
Start: 2017-09-14

## 2017-09-14 RX ORDER — METHYLPREDNISOLONE SODIUM SUCCINATE 125 MG/2ML
100 INJECTION, POWDER, LYOPHILIZED, FOR SOLUTION INTRAMUSCULAR; INTRAVENOUS ONCE
Status: COMPLETED | OUTPATIENT
Start: 2017-09-14 | End: 2017-09-14

## 2017-09-14 RX ORDER — DIPHENHYDRAMINE HCL 25 MG
25 CAPSULE ORAL ONCE
Status: CANCELLED
Start: 2017-09-14 | End: 2017-09-14

## 2017-09-14 RX ORDER — ACETAMINOPHEN 325 MG/1
650 TABLET ORAL ONCE
Status: COMPLETED | OUTPATIENT
Start: 2017-09-14 | End: 2017-09-14

## 2017-09-14 RX ORDER — DIPHENHYDRAMINE HCL 25 MG
25 CAPSULE ORAL ONCE
Status: COMPLETED | OUTPATIENT
Start: 2017-09-14 | End: 2017-09-14

## 2017-09-14 RX ADMIN — DIPHENHYDRAMINE HYDROCHLORIDE 25 MG: 25 CAPSULE ORAL at 12:56

## 2017-09-14 RX ADMIN — DEXTROSE MONOHYDRATE 50 ML: 50 INJECTION, SOLUTION INTRAVENOUS at 13:06

## 2017-09-14 RX ADMIN — IMMUNE GLOBULIN INFUSION (HUMAN) 25 G: 100 INJECTION, SOLUTION INTRAVENOUS; SUBCUTANEOUS at 13:06

## 2017-09-14 RX ADMIN — ACETAMINOPHEN 650 MG: 325 TABLET ORAL at 12:56

## 2017-09-14 RX ADMIN — METHYLPREDNISOLONE SODIUM SUCCINATE 100 MG: 125 INJECTION, POWDER, FOR SOLUTION INTRAMUSCULAR; INTRAVENOUS at 12:54

## 2017-09-14 RX ADMIN — MEPERIDINE HYDROCHLORIDE 25 MG: 25 INJECTION, SOLUTION INTRAMUSCULAR; INTRAVENOUS; SUBCUTANEOUS at 12:57

## 2017-09-14 NOTE — PROGRESS NOTES
Nursing Note  Jennifer Alvarez presents today to [facility] for:   Chief Complaint   Patient presents with     Infusion     IVIG     During [facility]'s [facility] appointment, orders from Dr. Elizabeth were completed.  Frequency: every 3 weeks.     Progress note:  Patient identification verified by name and date of birth.  Assessment completed.  Vitals recorded in Doc Flowsheets.  Patient was provided with education regarding infusion and possible side effects.  Patient verbalized understanding.      needed: No  Premedications: administered per order.  Infusion Rates:  12 cc/hour x 15 min  25 cc/hour x 15 min  65 cc/hour x 15 min  125 cc/hour for the remainder of the infusion.     Approximate Infusion length:3 hours.   Labs: were not ordered for this appointment.  Vascular access: peripheral IV was placed today  Treatment Conditions: patient denies fever, chills, signs of infection, recent illness, on antibiotics, productive cough or elevated temperature.  Patient tolerated infusion: well.    Drug Waste Record    Drug Name: Solu Medrol  Dose: 100 mg  Route administered: IV  NDC #: 5217-4846-95  Amount of waste(mL):0.4 ml  Reason for waste: Single use vial      Discharge Plan:   Follow up plan of care with: ongoing infusions at [facility].  Discharge instructions were reviewed with patient.  Patient/representative verbalized understanding of discharge instructions and all questions answered.  Patient discharged from [facility] in stable condition.    Kita Barcenas RN    Administrations This Visit     acetaminophen (TYLENOL) tablet 650 mg     Admin Date Action Dose Route Administered By             09/14/2017 Given 650 mg Oral Kita Barcenas RN                    dextrose 5% BOLUS     Admin Date Action Dose Route Administered By             09/14/2017 New Bag 50 mL Intravenous Swapna  Kita BERNSTEIN RN                    diphenhydrAMINE (BENADRYL) capsule 25 mg     Admin Date Action Dose Route Administered By             09/14/2017 Given 25 mg Oral Kita Barcenas RN                    immune globulin (Gammagard)- sucrose free 10 % injection 25 g     Admin Date Action Dose Route Administered By             09/14/2017 New Bag 25 g Intravenous Kita Barcenas RN                    meperidine (DEMEROL) injection 25 mg     Admin Date Action Dose Route Administered By             09/14/2017 Given 25 mg Intravenous Kita Barcenas RN                    methylPREDNISolone sodium succinate (solu-MEDROL) injection 100 mg     Admin Date Action Dose Route Administered By             09/14/2017 Given 100 mg Intravenous Kita Barcenas RN                          /62  Pulse 77  Temp 96.7  F (35.9  C) (Oral)  Resp 16  SpO2 97%  IVIG

## 2017-09-14 NOTE — MR AVS SNAPSHOT
After Visit Summary   9/14/2017    Jennifer Alvarez    MRN: [id number]           Patient Information     Date Of Birth          1940        Visit Information        Provider Department      9/14/2017 1:00 PM UC 43 ATC; UC SPEC INFUSION [facility] Specialty and Procedure        Today's Diagnoses     IgA deficiency (H)    -  1    Hypogammaglobulinemia (H)           Follow-ups after your visit        Your next 10 appointments already scheduled     Oct 04, 2017 11:00 AM CDT   (Arrive by 10:45 AM)   Pacemaker Check with Uc Cv Device [address] [place] [address] ([facility])    [address] [place] [address]   [phone]            Oct 04, 2017 12:00 PM CDT   Lab with  LAB   [facility] ([facility])    [address] [place] [id number]   [phone]            Oct 04, 2017 12:30 PM CDT   PFT VISIT with  PFL B   [facility] Pulmonary Function Testing ([facility])    [address] [place] [address]   [phone]            Oct 04, 2017  1:10 PM CDT   (Arrive by 12:55 PM)   Return Interstitial Lung with Maxx Elizabeth MD   [facility] ([facility])    [address] [place] [address]   [phone]            Jan 23, 2018 10:30 AM CST   (Arrive by 10:15 AM)   Pacemaker Check with Uc Cv Device [address] [place] [address] ([facility])    [address] [place] [address]   [phone]            Jan 23, 2018 11:00 AM CST   (Arrive by 10:45 AM)   RETURN ARRHYTHMIA with Shola Rivas MD   [facility] ([facility])    [address] [place] [id number] [phone]              Who to contact     If you have questions or need follow up  information about today's clinic visit or your schedule please contact [facility] SPECIALTY [facility] PROCEDURE directly at [phone].  Normal or non-critical lab and imaging results will be communicated to you by [employer], letter or phone within 4 business days after the clinic has received the results. If you do not hear from us within 7 days, please contact the clinic through Boatbound[employer] or phone. If you have a critical or abnormal lab result, we will notify you by phone as soon as possible.  Submit refill requests through [employer] or call your pharmacy and they will forward the refill request to us. Please allow 3 business days for your refill to be completed.          Additional Information About Your Visit        [email or web address] Information     [employer] gives you secure access to your electronic health record. If you see a primary care provider, you can also send messages to your care team and make appointments. If you have questions, please call your primary care clinic.  If you do not have a primary care provider, please call [phone] and they will assist you.        Care EveryWhere ID     This is your Care EveryWhere ID. This could be used by other organizations to access your [facility] medical records  [id number]        Your Vitals Were     Pulse Temperature Respirations Pulse Oximetry          61 96.7  F (35.9  C) (Oral) 16 97%         Blood Pressure from Last 3 Encounters:   09/14/17 111/49   08/22/17 131/52   08/22/17 155/70    Weight from Last 3 Encounters:   08/22/17 41.6 kg (91 lb 12.8 oz)   07/19/17 40.8 kg (90 lb)   06/15/17 42.1 kg (92 lb 14.4 oz)              Today, you had the following     No orders found for display         Today's Medication Changes          These changes are accurate as of: 9/14/17  4:31 PM.  If you have any questions, ask your nurse or doctor.               These medicines have changed or have updated prescriptions.        Dose/Directions    metFORMIN 1000 MG  tablet   Commonly known as:  GLUCOPHAGE   This may have changed:    - when to take this  - additional instructions   Used for:  DM (diabetes mellitus) (H)        Take 1 tablet twice daily   Quantity:  180 tablet   Refills:  3                Primary Care Provider Office Phone # Fax #    Brandi Burks [phone]       [facility] [address]   [facility] [place] [address]        Equal Access to Services     [facility] Center[facility] : Hadii aad ku hadasho Soomaali, waaxda luqadaha, qaybta kaalmada adeegyada, waxay idiin hayaan adeeg khaurelia lalianetn ah. So [facility] [phone].    ATENCIÓN: Si habla español, tiene a taylor disposición servicios gratuitos de asistencia lingüística. [facility] [phone].    We comply with applicable federal civil rights laws and [place] laws. We do not discriminate on the basis of race, color, national origin, age, disability sex, sexual orientation or gender identity.            Thank you!     Thank you for choosing [facility] SPECIALTY AND PROCEDURE  for your care. Our goal is always to provide you with excellent care. Hearing back from our patients is one way we can continue to improve our services. Please take a few minutes to complete the written survey that you may receive in the mail after your visit with us. Thank you!             Your Updated Medication List - Protect others around you: Learn how to safely use, store and throw away your medicines at www.disposemymeds.org.          This list is accurate as of: 9/14/17  4:31 PM.  Always use your most recent med list.                   Brand Name Dispense Instructions for use Diagnosis    * albuterol 108 (90 BASE) MCG/ACT Inhaler    PROAIR HFA/PROVENTIL HFA/VENTOLIN HFA    1 Inhaler    Take 2 puffs prn for wheezing or shortness of breath    Unspecified chronic bronchitis (H), Esophageal reflux       * albuterol (2.5 MG/3ML) 0.083% neb solution     360 mL    Take 1 vial (2.5 mg) by nebulization 3 times daily    Mixed  simple and mucopurulent chronic bronchitis (H)       * albuterol 108 (90 BASE) MCG/ACT Inhaler    PROAIR HFA/PROVENTIL HFA/VENTOLIN HFA    1 Inhaler    Take 2 puffs prn for wheezing    Simple chronic bronchitis (H), Hypogammaglobulinemia (H)       * albuterol (2.5 MG/3ML) 0.083% neb solution      Take 1 ampule by nebulization every 6 hours as needed.        aspirin 81 MG tablet      Take 1 tablet by mouth daily.        atorvastatin 10 MG tablet    LIPITOR    90 tablet    Take 4 tablets (40 mg) by mouth daily    Hyperlipidemia LDL goal <100       azithromycin 250 MG tablet    ZITHROMAX    6 tablet    Take as directed    Mixed simple and mucopurulent chronic bronchitis (H), Hypogammaglobulinemia (H)       Blood Pressure Kit      daily.        budesonide 3 MG 24 hr capsule    ENTOCORT EC    270 capsule    Take 1 capsule (3 mg) by mouth 3 times daily (with meals)    Diarrhea, Loss of weight       carvedilol 12.5 MG tablet    COREG    60 tablet    Take 1 tablet (12.5 mg) by mouth 2 times daily (with meals)    Secondary hypertension       DEMEROL 25 MG/ML injection   Generic drug:  meperidine      Inject 25 mg into the vein as needed. 25 mg IV prior to IgG infusion        dicyclomine 10 MG capsule    BENTYL     Take 10 mg by mouth 4 times daily (before meals and nightly).        diphenhydrAMINE 25 MG tablet    BENADRYL    4 tablet    Take 2 tablets (50 mg) by mouth See Admin Instructions Please take 50 mg by mouth evening before  And morning of the Abd/Pelvis CT scan with contrast per Dr. Adair.  This is a pre medication for contrast dye.    Atherosclerosis of renal artery (H)       FISH OIL      Unsure of dosage take two caps daily        fluticasone-salmeterol 250-50 MCG/DOSE diskus inhaler    ADVAIR    1 Inhaler    Inhale 1 puff into the lungs 2 times daily.    Unspecified chronic bronchitis (H)       gabapentin 300 MG capsule    NEURONTIN    180 capsule    Take 2 capsules (600 mg) by mouth 3 times daily    Neuralgia,  Neck pain       HYDROcodone-acetaminophen  MG per tablet    LORCET     Take 1 tablet by mouth every 6 hours as needed.        Immune Globulin (Human) 25 GM/500ML Soln      Inject 25 g into the vein See Admin Instructions. 25 grams every 3 weeks IVIG        metFORMIN 1000 MG tablet    GLUCOPHAGE    180 tablet    Take 1 tablet twice daily    DM (diabetes mellitus) (H)       nitroGLYcerin 0.4 MG sublingual tablet    NITROQUICK    25 tablet    Place 1 tablet (0.4 mg) under the tongue every 5 minutes as needed    Chest pain       omeprazole 20 MG CR capsule    priLOSEC    60 capsule    Take 1 capsule (20 mg) by mouth 2 times daily    Esophageal reflux, Unspecified chronic bronchitis (H)       * blood glucose monitoring lancets     3 Box    Use to test blood sugar 3 times daily or as directed.    Type 2 diabetes mellitus with complication (H)       * ONE TOUCH FINE POINT LANCETS      2 times daily.        * ONE TOUCH ULTRA test strip   Generic drug:  blood glucose monitoring     200 strip    Test blood sugar twice a day.    Diabetes mellitus type II       * blood glucose monitoring test strip    TERRY CONTOUR NEXT    270 each    by In Vitro route 3 times daily Use to test blood sugar 3 times daily or as directed.    Type 2 diabetes mellitus with complication (H)       predniSONE 20 MG tablet    DELTASONE    5 tablet    Patient to take 60 mg the evening before the procedure and 30 mg the morning of the procedure.    Allergic reaction to contrast dye       tiotropium 18 MCG capsule    SPIRIVA HANDIHALER    30 capsule    One puff every day    Unspecified chronic bronchitis (H)       TYLENOL 325 MG tablet   Generic drug:  acetaminophen      Take 1-2 tablets by mouth every 6 hours as needed.        * Notice:  This list has 8 medication(s) that are the same as other medications prescribed for you. Read the directions carefully, and ask your doctor or other care provider to review them with you.

## 2017-10-04 ENCOUNTER — OFFICE VISIT (OUTPATIENT)
Dept: PULMONOLOGY | Facility: CLINIC | Age: 77
End: 2017-10-04
Attending: INTERNAL MEDICINE
Payer: MEDICARE

## 2017-10-04 ENCOUNTER — ALLIED HEALTH/NURSE VISIT (OUTPATIENT)
Dept: CARDIOLOGY | Facility: CLINIC | Age: 77
End: 2017-10-04
Attending: INTERNAL MEDICINE
Payer: MEDICARE

## 2017-10-04 VITALS
HEIGHT: 62 IN | SYSTOLIC BLOOD PRESSURE: 127 MMHG | OXYGEN SATURATION: 96 % | DIASTOLIC BLOOD PRESSURE: 73 MMHG | WEIGHT: 93.5 LBS | RESPIRATION RATE: 16 BRPM | HEART RATE: 63 BPM | BODY MASS INDEX: 17.21 KG/M2

## 2017-10-04 DIAGNOSIS — I25.10 CAD (CORONARY ARTERY DISEASE): ICD-10-CM

## 2017-10-04 DIAGNOSIS — R00.1 SINUS BRADYCARDIA: Primary | ICD-10-CM

## 2017-10-04 DIAGNOSIS — J41.8 MIXED SIMPLE AND MUCOPURULENT CHRONIC BRONCHITIS (H): ICD-10-CM

## 2017-10-04 DIAGNOSIS — J42 CHRONIC BRONCHITIS (H): Primary | ICD-10-CM

## 2017-10-04 DIAGNOSIS — J41.0 SIMPLE CHRONIC BRONCHITIS (H): Primary | ICD-10-CM

## 2017-10-04 DIAGNOSIS — D80.1 HYPOGAMMAGLOBULINEMIA (H): ICD-10-CM

## 2017-10-04 LAB
ANION GAP SERPL CALCULATED.3IONS-SCNC: 7 MMOL/L (ref 3–14)
BUN SERPL-MCNC: 20 MG/DL (ref 7–30)
CALCIUM SERPL-MCNC: 8.6 MG/DL (ref 8.5–10.1)
CHLORIDE SERPL-SCNC: 100 MMOL/L (ref 94–109)
CO2 SERPL-SCNC: 30 MMOL/L (ref 20–32)
CREAT SERPL-MCNC: 1.33 MG/DL (ref 0.52–1.04)
ERYTHROCYTE [DISTWIDTH] IN BLOOD BY AUTOMATED COUNT: 14.3 % (ref 10–15)
GFR SERPL CREATININE-BSD FRML MDRD: 39 ML/MIN/1.7M2
GLUCOSE SERPL-MCNC: 108 MG/DL (ref 70–99)
HCT VFR BLD AUTO: 36.8 % (ref 35–47)
HGB BLD-MCNC: 11.6 G/DL (ref 11.7–15.7)
MCH RBC QN AUTO: 26.2 PG (ref 26.5–33)
MCHC RBC AUTO-ENTMCNC: 31.5 G/DL (ref 31.5–36.5)
MCV RBC AUTO: 83 FL (ref 78–100)
PLATELET # BLD AUTO: 255 10E9/L (ref 150–450)
POTASSIUM SERPL-SCNC: 4.3 MMOL/L (ref 3.4–5.3)
RBC # BLD AUTO: 4.42 10E12/L (ref 3.8–5.2)
SODIUM SERPL-SCNC: 137 MMOL/L (ref 133–144)
WBC # BLD AUTO: 4.4 10E9/L (ref 4–11)

## 2017-10-04 PROCEDURE — 82784 ASSAY IGA/IGD/IGG/IGM EACH: CPT | Performed by: INTERNAL MEDICINE

## 2017-10-04 PROCEDURE — 82787 IGG 1 2 3 OR 4 EACH: CPT | Performed by: INTERNAL MEDICINE

## 2017-10-04 PROCEDURE — 93288 INTERROG EVL PM/LDLS PM IP: CPT | Mod: 26 | Performed by: INTERNAL MEDICINE

## 2017-10-04 PROCEDURE — 99212 OFFICE O/P EST SF 10 MIN: CPT | Mod: ZF

## 2017-10-04 PROCEDURE — 36415 COLL VENOUS BLD VENIPUNCTURE: CPT | Performed by: INTERNAL MEDICINE

## 2017-10-04 PROCEDURE — 93280 PM DEVICE PROGR EVAL DUAL: CPT | Mod: ZF

## 2017-10-04 ASSESSMENT — PAIN SCALES - GENERAL: PAINLEVEL: MILD PAIN (3)

## 2017-10-04 NOTE — LETTER
10/4/2017       RE: [facility]  [facility] [address]     Dear Colleague,    Thank you for referring your patient, Jennifer Alvarez, to the [facility] at [facility]. Please see a copy of my visit note below.    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  Jennifer suffers from numerous respiratory tract infections and has chronic bronchitis.  She is receiving IVIG therapy approximately every 3 weeks.  In addition, Jennifer has significant reflux for which she takes Prilosec.  Jennifer returns today stating that recently she had surgery on her right knee, and her right leg has been a bit more swollen.  Apparently there was an infection for which she was treated with antibiotics which she is just finishing.  Several weeks ago Jennifer felt like she was coming down with a cold with some sinusitis.  She was having to use her albuterol nebulizer and Advair inhaler a little bit more.  Over the last 3 days she has noted slight increase in some shortness of breath.  She has a dry cough for reflux.  Overall, though, she states that her breathing is relatively well.  She denies fevers or chills and denies current respiratory tract infection.       For her chronic bronchitis, Jennifer is being treated with an albuterol inhaler, an Advair inhaler, and also has albuterol nebulizer that she uses at home.  For her reflux, she is being treated with Prilosec.        REVIEW OF SYSTEMS:     CONSTITUTIONAL:  Her weight has been stable around 90-93 pounds.  She continues to use Ensure.     GASTROINTESTINAL:  She still continues to have problems with reflux for which she is taking Prilosec.     CARDIAC:  She is being followed in Cardiology Clinic for atypical chest pain.  She also has a history of renal artery stenosis, status post stent placement, and is on antihypertensive medications.    MUSCULOSKELETAL:  She has noticed increased edema in the right leg since her surgery.       PHYSICAL EXAMINATION:   VITAL SIGNS:  Stable.  O2 sats are 96% on room air.  Weight is 93 pounds.   HEENT:  Unremarkable.  She has an upper plate.  No thrush is noted.   NECK:  No thyromegaly or adenopathy is noted.    CHEST:  Chest is clear today.  I do not hear wheezing and do not hear rhonchi.   HEART:  Regular rate and rhythm, normal S1, S2, and a systolic ejection murmur which she has had previously.    ABDOMEN:  Nontender.   EXTREMITIES:  The left extremity is without edema.  The right extremity has a brace on it which I did not remove.  There is some edema around the right ankle.       PULMONARY FUNCTION TESTS:  The patient had PFTs today.  She was unable to perform the procedure because of some nausea and discomfort.       ASSESSMENT AND PLAN:     1.  Recurrent respiratory tract infections, chronic bronchitis and immunoglobulin deficiency.  She has had no recent respiratory tract infections.  She has just continued to use her albuterol inhaler, her Advair inhaler and an Albuterol nebulizer as needed.  I have rewritten her prescriptions for her immunoglobulin therapy.    2.  Right leg being swollen.  I have instructed her to contact the surgeon who did the surgery on her leg.  I am concerned about the swelling and the swelling not going down and whether or not she would need ultrasound of her leg to evaluate for a possible DVT.        I plan to follow the patient up in 6 months with labs and PFTs.     Maxx Elizabeth  Pulmonary/Critical Care  October 4, 2017 3:58 PM

## 2017-10-04 NOTE — PROGRESS NOTES
Pt seen in the [facility] for evaluation and iterative programming of an Abbott (SJM), dual lead pacemaker, per MD orders. Today her intrinsic rhythm is SR 68 bpm. Normal pacemaker function. 2 VHR episodes recorded. There is no episode log nor any EGMs. Pt states she has occasional episodes of lightheadedness. Lead trends appear stable. AP= 33% and = 0%. Battery estimates 0.25- 0.5 years to MARIA T. Plan for pt to RTC in 6 weeks. After reviewing interrogation with Dr. Rivas, he has ordered a TTE to be done with his next clinic visit. Pt notified via , she will call to schedule.  Dual lead pacemaker

## 2017-10-04 NOTE — NURSING NOTE
Chief Complaint   Patient presents with     Interstitial Lung Disease (ILD)     Follow up on Jennifer and her ILD     Keith Park CMA at 12:47 PM on 10/4/2017

## 2017-10-04 NOTE — MR AVS SNAPSHOT
After Visit Summary   10/4/2017    Jennifer Alvarez    MRN: [id number]           Patient Information     Date Of Birth          1940        Visit Information        Provider Department      10/4/2017 1:10 PM Maxx Elizabeth MD [facility]        Today's Diagnoses     Simple chronic bronchitis (H)    -  1       Follow-ups after your visit        Follow-up notes from your care team     Return in about 6 months (around 4/4/2018).      Your next 10 appointments already scheduled     Oct 05, 2017  7:00 AM CDT   Infusion 300 with UC SPEC INFUSION, UC 50 ATC   [facility] ([facility])    [address] [place] [address]  2nd [facility] [id number] [phone]            Jan 23, 2018 10:30 AM CST   (Arrive by 10:15 AM)   Pacemaker Check with Uc Cv Device 1   [facility] ([facility])    [address] [place] [address]  3rd [facility] [address] [phone]            Jan 23, 2018 11:00 AM CST   (Arrive by 10:45 AM)   RETURN ARRHYTHMIA with Shola Rivas MD   [facility] ([facility])    [address] [place] [address]  3rd [facility] [address] [phone]            Apr 04, 2018 12:15 PM CDT   Lab with UC LAB   [facility] ([facility])    [address] [place] [address]  [address] [facility] [address]   [phone]            Apr 04, 2018 12:30 PM CDT   PFT VISIT with  PFL A   [facility] Pulmonary Function Testing ([facility])    [address] [place] [address]  [address]rd [facility] [address] [phone]            Apr 04, 2018  1:10 PM CDT   (Arrive by 12:55 PM)   Return Interstitial Lung with Maxx Elizabeth MD   [facility] ([facility])    [address] [place] [id number]   [phone]               Future tests that were ordered for you today     Open Future Orders        Priority Expected Expires Ordered    Spirometry, Breathing Capacity Routine 4/4/2018 10/4/2018 10/4/2017    HC DIFFUSING CAPACITY Routine 4/4/2018 10/4/2018 10/4/2017    Immunoglobulin G subclasses Routine 4/4/2018 10/4/2018 10/4/2017    IgG Routine 4/4/2018 10/4/2018 10/4/2017    CBC with platelets Routine 4/4/2018 10/4/2018 10/4/2017    Basic metabolic panel Routine 4/4/2018 10/4/2018 10/4/2017    Echocardiogram Complete Routine  10/4/2018 10/4/2017            Who to contact     If you have questions or need follow up information about today's clinic visit or your schedule please contact [facility] directly at [phone].  Normal or non-critical lab and imaging results will be communicated to you by MeMedhart, letter or phone within 4 business days after the clinic has received the results. If you do not hear from us within 7 days, please contact the clinic through [email or web address] or phone. If you have a critical or abnormal lab result, we will notify you by phone as soon as possible.  Submit refill requests through [facility] or call your pharmacy and they will forward the refill request to us. Please allow 3 business days for your refill to be completed.          Additional Information About Your Visit        MyChart Information     [facility] gives you secure access to your electronic health record. If you see a primary care provider, you can also send messages to your care team and make appointments. If you have questions, please call your primary care clinic.  If you do not have a primary care provider, please call [phone] and they will assist you.        Care EveryWhere ID     This is your Care EveryWhere ID. This could be used by other organizations to access your [place] medical records  [id number]        Your Vitals Were     Pulse Respirations Height Pulse Oximetry BMI (Body Mass Index)       63 16  "1.575 m (5' 2\") 96% 17.1 kg/m2        Blood Pressure from Last 3 Encounters:   10/04/17 127/73   09/14/17 111/49   08/22/17 131/52    Weight from Last 3 Encounters:   10/04/17 42.4 kg (93 lb 8 oz)   08/22/17 41.6 kg (91 lb 12.8 oz)   07/19/17 40.8 kg (90 lb)                 Today's Medication Changes          These changes are accurate as of: 10/4/17  1:19 PM.  If you have any questions, ask your nurse or doctor.               These medicines have changed or have updated prescriptions.        Dose/Directions    * fluticasone-salmeterol 250-50 MCG/DOSE diskus inhaler   Commonly known as:  ADVAIR   This may have changed:  Another medication with the same name was added. Make sure you understand how and when to take each.   Used for:  Unspecified chronic bronchitis   Changed by:  Maxx Elizabeth MD        Dose:  1 puff   Inhale 1 puff into the lungs 2 times daily.   Quantity:  1 Inhaler   Refills:  12       * fluticasone-salmeterol 250-50 MCG/DOSE diskus inhaler   Commonly known as:  ADVAIR   This may have changed:  You were already taking a medication with the same name, and this prescription was added. Make sure you understand how and when to take each.   Used for:  Simple chronic bronchitis (H)   Changed by:  Maxx Elizabeth MD        Dose:  1 puff   Inhale 1 puff into the lungs 2 times daily   Quantity:  1 Inhaler   Refills:  12       metFORMIN 1000 MG tablet   Commonly known as:  GLUCOPHAGE   This may have changed:    - when to take this  - additional instructions   Used for:  DM (diabetes mellitus) (H)        Take 1 tablet twice daily   Quantity:  180 tablet   Refills:  3       * Notice:  This list has 2 medication(s) that are the same as other medications prescribed for you. Read the directions carefully, and ask your doctor or other care provider to review them with you.         Where to get your medicines      These medications were sent to [place], [place] - [address] [place] [address] , [facility] [address]     Phone:  [phone]     fluticasone-salmeterol 250-50 MCG/DOSE diskus inhaler                Primary Care Provider Office Phone # Fax #    Brandi Burks [phone]       [facility] [address]   [facility] [address]        Equal Access to Services     [facility] : Hadii aad ku hadasho Soomaali, waaxda luqadaha, qaybta kaalmada adeegyada, waxay idiin hayaan adeeg khaaronsh la'aan . So [facility] [phone].    ATENCIÓN: Si habla español, tiene a taylor disposición servicios gratuitos de asistencia lingüística. Ll[facility] [phone].    We comply with applicable federal civil rights laws and [place] laws. We do not discriminate on the basis of race, color, national origin, age, disability, sex, sexual orientation, or gender identity.            Thank you!     Thank you for choosing [facility]  for your care. Our goal is always to provide you with excellent care. Hearing back from our patients is one way we can continue to improve our services. Please take a few minutes to complete the written survey that you may receive in the mail after your visit with us. Thank you!             Your Updated Medication List - Protect others around you: Learn how to safely use, store and throw away your medicines at www.disposemymeds.org.          This list is accurate as of: 10/4/17  1:19 PM.  Always use your most recent med list.                   Brand Name Dispense Instructions for use Diagnosis    * albuterol 108 (90 BASE) MCG/ACT Inhaler    PROAIR HFA/PROVENTIL HFA/VENTOLIN HFA    1 Inhaler    Take 2 puffs prn for wheezing or shortness of breath    Unspecified chronic bronchitis, Esophageal reflux       * albuterol (2.5 MG/3ML) 0.083% neb solution     360 mL    Take 1 vial (2.5 mg) by nebulization 3 times daily    Mixed simple and mucopurulent chronic bronchitis (H)       * albuterol 108 (90 BASE) MCG/ACT Inhaler    PROAIR HFA/PROVENTIL HFA/VENTOLIN  HFA    1 Inhaler    Take 2 puffs prn for wheezing    Simple chronic bronchitis (H), Hypogammaglobulinemia (H)       * albuterol (2.5 MG/3ML) 0.083% neb solution      Take 1 ampule by nebulization every 6 hours as needed.        aspirin 81 MG tablet      Take 1 tablet by mouth daily.        atorvastatin 10 MG tablet    LIPITOR    90 tablet    Take 4 tablets (40 mg) by mouth daily    Hyperlipidemia LDL goal <100       azithromycin 250 MG tablet    ZITHROMAX    6 tablet    Take as directed    Mixed simple and mucopurulent chronic bronchitis (H), Hypogammaglobulinemia (H)       Blood Pressure Kit      daily.        budesonide 3 MG 24 hr capsule    ENTOCORT EC    270 capsule    Take 1 capsule (3 mg) by mouth 3 times daily (with meals)    Diarrhea, Loss of weight       carvedilol 12.5 MG tablet    COREG    60 tablet    Take 1 tablet (12.5 mg) by mouth 2 times daily (with meals)    Secondary hypertension       DEMEROL 25 MG/ML injection   Generic drug:  meperidine      Inject 25 mg into the vein as needed. 25 mg IV prior to IgG infusion        dicyclomine 10 MG capsule    BENTYL     Take 10 mg by mouth 4 times daily (before meals and nightly).        diphenhydrAMINE 25 MG tablet    BENADRYL    4 tablet    Take 2 tablets (50 mg) by mouth See Admin Instructions Please take 50 mg by mouth evening before  And morning of the Abd/Pelvis CT scan with contrast per Dr. Adair.  This is a pre medication for contrast dye.    Atherosclerosis of renal artery (H)       FISH OIL      Unsure of dosage take two caps daily        * fluticasone-salmeterol 250-50 MCG/DOSE diskus inhaler    ADVAIR    1 Inhaler    Inhale 1 puff into the lungs 2 times daily.    Unspecified chronic bronchitis       * fluticasone-salmeterol 250-50 MCG/DOSE diskus inhaler    ADVAIR    1 Inhaler    Inhale 1 puff into the lungs 2 times daily    Simple chronic bronchitis (H)       gabapentin 300 MG capsule    NEURONTIN    180 capsule    Take 2 capsules (600 mg) by  mouth 3 times daily    Neuralgia, Neck pain       HYDROcodone-acetaminophen  MG per tablet    LORCET     Take 1 tablet by mouth every 6 hours as needed.        Immune Globulin (Human) 25 GM/500ML Soln      Inject 25 g into the vein See Admin Instructions. 25 grams every 3 weeks IVIG        metFORMIN 1000 MG tablet    GLUCOPHAGE    180 tablet    Take 1 tablet twice daily    DM (diabetes mellitus) (H)       nitroGLYcerin 0.4 MG sublingual tablet    NITROQUICK    25 tablet    Place 1 tablet (0.4 mg) under the tongue every 5 minutes as needed    Chest pain       omeprazole 20 MG CR capsule    priLOSEC    60 capsule    Take 1 capsule (20 mg) by mouth 2 times daily    Esophageal reflux, Unspecified chronic bronchitis       * blood glucose monitoring lancets     3 Box    Use to test blood sugar 3 times daily or as directed.    Type 2 diabetes mellitus with complication (H)       * ONE TOUCH FINE POINT LANCETS      2 times daily.        * ONE TOUCH ULTRA test strip   Generic drug:  blood glucose monitoring     200 strip    Test blood sugar twice a day.    Diabetes mellitus type II       * blood glucose monitoring test strip    TERRY CONTOUR NEXT    270 each    by In Vitro route 3 times daily Use to test blood sugar 3 times daily or as directed.    Type 2 diabetes mellitus with complication (H)       predniSONE 20 MG tablet    DELTASONE    5 tablet    Patient to take 60 mg the evening before the procedure and 30 mg the morning of the procedure.    Allergic reaction to contrast dye       tiotropium 18 MCG capsule    SPIRIVA HANDIHALER    30 capsule    One puff every day    Unspecified chronic bronchitis       TYLENOL 325 MG tablet   Generic drug:  acetaminophen      Take 1-2 tablets by mouth every 6 hours as needed.        * Notice:  This list has 10 medication(s) that are the same as other medications prescribed for you. Read the directions carefully, and ask your doctor or other care provider to review them with you.

## 2017-10-04 NOTE — PROGRESS NOTES
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  Jennifer suffers from numerous respiratory tract infections and has chronic bronchitis.  She is receiving IVIG therapy approximately every 3 weeks.  In addition, Jennifer has significant reflux for which she takes Prilosec.  Jennifer returns today stating that recently she had surgery on her right knee, and her right leg has been a bit more swollen.  Apparently there was an infection for which she was treated with antibiotics which she is just finishing.  Several weeks ago Jennifer felt like she was coming down with a cold with some sinusitis.  She was having to use her albuterol nebulizer and Advair inhaler a little bit more.  Over the last 3 days she has noted slight increase in some shortness of breath.  She has a dry cough for reflux.  Overall, though, she states that her breathing is relatively well.  She denies fevers or chills and denies current respiratory tract infection.       For her chronic bronchitis, Jennifer is being treated with an albuterol inhaler, an Advair inhaler, and also has albuterol nebulizer that she uses at home.  For her reflux, she is being treated with Prilosec.        REVIEW OF SYSTEMS:     CONSTITUTIONAL:  Her weight has been stable around 90-93 pounds.  She continues to use Ensure.     GASTROINTESTINAL:  She still continues to have problems with reflux for which she is taking Prilosec.     CARDIAC:  She is being followed in Cardiology Clinic for atypical chest pain.  She also has a history of renal artery stenosis, status post stent placement, and is on antihypertensive medications.   MUSCULOSKELETAL:  She has noticed increased edema in the right leg since her surgery.       PHYSICAL EXAMINATION:   VITAL SIGNS:  Stable.  O2 sats are 96% on room air.  Weight is 93 pounds.   HEENT:  Unremarkable.  She has an upper plate.  No thrush is noted.   NECK:  No thyromegaly or adenopathy is noted.    CHEST:   Chest is clear today.  I do not hear wheezing and do not hear rhonchi.   HEART:  Regular rate and rhythm, normal S1, S2, and a systolic ejection murmur which she has had previously.    ABDOMEN:  Nontender.   EXTREMITIES:  The left extremity is without edema.  The right extremity has a brace on it which I did not remove.  There is some edema around the right ankle.       PULMONARY FUNCTION TESTS:  The patient had PFTs today.  She was unable to perform the procedure because of some nausea and discomfort.       ASSESSMENT AND PLAN:     1.  Recurrent respiratory tract infections, chronic bronchitis and immunoglobulin deficiency.  She has had no recent respiratory tract infections.  She has just continued to use her albuterol inhaler, her Advair inhaler and an Albuterol nebulizer as needed.  I have rewritten her prescriptions for her immunoglobulin therapy.    2.  Right leg being swollen.  I have instructed her to contact the surgeon who did the surgery on her leg.  I am concerned about the swelling and the swelling not going down and whether or not she would need ultrasound of her leg to evaluate for a possible DVT.        I plan to follow the patient up in 6 months with labs and PFTs.     Maxx Elizbaeth  Pulmonary/Critical Care  October 4, 2017 3:58 PM

## 2017-10-04 NOTE — PATIENT INSTRUCTIONS
It was a pleasure to see you in clinic today. Please do not hesitate to call with any questions or concerns. We look forward to seeing you in clinic at your next device check in 6 weeks.    Elba Encarnacion RN  Electrophysiology Nurse Clinician  [facility]  During business hours call:  [phone]  After business hours please call: [phone]- select option #4 and ask for job code [id number].

## 2017-10-04 NOTE — MR AVS SNAPSHOT
After Visit Summary   10/4/2017    Jennifer Alvarez    MRN: [id number]           Patient Information     Date Of Birth          1940        Visit Information        Provider Department      10/4/2017 11:00 AM 1, Fartun Cv Device [facility]        Today's Diagnoses     Sinus bradycardia    -  1    CAD (coronary artery disease)          Care Instructions    It was a pleasure to see you in clinic today. Please do not hesitate to call with any questions or concerns. We look forward to seeing you in clinic at your next device check in 6 weeks.    Elba Encarnacion, RN  Electrophysiology Nurse Clinician  [facility]  During business hours call:  [phone]  After business hours please call: [phone]- select option #4 and ask for job code [phone].            Follow-ups after your visit        Follow-up notes from your care team     Return in about 6 weeks (around 11/15/2017) for Pacemaker check.      Your next 10 appointments already scheduled     Oct 04, 2017 12:00 PM CDT   Lab with  LAB   [facility] ([facility])    [address] [place] [id number] [phone]            Oct 04, 2017 12:30 PM CDT   PFT VISIT with  PFL B   [facility] Pulmonary Function Testing ([facility])    [address] [place] [id number]   [phone]            Oct 04, 2017  1:10 PM CDT   (Arrive by 12:55 PM)   Return Interstitial Lung with Maxx Elizabeth MD   [facility] ([facility])    [address] [place] [id number]   [phone]            Oct 05, 2017  7:00 AM CDT   Infusion 300 with FARTUN SPEC INFUSION, UC 50 ATC   [facility] ([facility])    [address] [place] [id number] [phone]             Jan 23, 2018 10:30 AM CST   (Arrive by 10:15 AM)   Pacemaker Check with Uc Cv Device 1   [facility] ([facility])    [address]  [address] [facility] [address] [phone]            Jan 23, 2018 11:00 AM CST   (Arrive by 10:45 AM)   RETURN ARRHYTHMIA with Shola Rivas MD   [facility] ([facility])    [address]  [address] [facility] [address] [phone]              Future tests that were ordered for you today     Open Future Orders        Priority Expected Expires Ordered    Echocardiogram Complete Routine  10/4/2018 10/4/2017            Who to contact     If you have questions or need follow up information about today's clinic visit or your schedule please contact [facility] directly at [phone].  Normal or non-critical lab and imaging results will be communicated to you by [employer], letter or phone within 4 business days after the clinic has received the results. If you do not hear from us within 7 days, please contact the clinic through [employer] or phone. If you have a critical or abnormal lab result, we will notify you by phone as soon as possible.  Submit refill requests through [employer] or call your pharmacy and they will forward the refill request to us. Please allow 3 business days for your refill to be completed.          Additional Information About Your Visit        [employer] Information     [employer] gives you secure access to your electronic health record. If you see a primary care provider, you can also send messages to your care team and make appointments. If you have questions, please call your primary care clinic.  If you do not have a primary care provider, please call [phone] and they will assist you.        Care EveryWhere ID     This is your Care EveryWhere ID. This could be used by other organizations to access your [facility] medical records  [id number]         Blood Pressure  from Last 3 Encounters:   09/14/17 111/49   08/22/17 131/52   08/22/17 155/70    Weight from Last 3 Encounters:   08/22/17 41.6 kg (91 lb 12.8 oz)   07/19/17 40.8 kg (90 lb)   06/15/17 42.1 kg (92 lb 14.4 oz)              We Performed the Following     PM DEVICE PROGRAMMING EVAL, DUAL LEAD PACER          Today's Medication Changes          These changes are accurate as of: 10/4/17 11:18 AM.  If you have any questions, ask your nurse or doctor.               These medicines have changed or have updated prescriptions.        Dose/Directions    metFORMIN 1000 MG tablet   Commonly known as:  GLUCOPHAGE   This may have changed:    - when to take this  - additional instructions   Used for:  DM (diabetes mellitus) (H)        Take 1 tablet twice daily   Quantity:  180 tablet   Refills:  3                Primary Care Provider Office Phone # Fax #    Brandi Burks [phone]       [facility] [address]   [facility] [address]        Equal Access to Services     SUJATHA HOLT AH: Hadii aad ku hadasho Soomaali, waaxda luqadaha, qaybta kaalmada adeegyada, waxay kishanin hayquentin caicedo . So [facility] [phone].    ATENCIÓN: Si habla español, tiene a taylor disposición servicios gratuitos de asistencia lingüística. Ll[facility] [phone].    We comply with applicable federal civil rights laws and [place] laws. We do not discriminate on the basis of race, color, national origin, age, disability, sex, sexual orientation, or gender identity.            Thank you!     Thank you for choosing [facility]  for your care. Our goal is always to provide you with excellent care. Hearing back from our patients is one way we can continue to improve our services. Please take a few minutes to complete the written survey that you may receive in the mail after your visit with us. Thank you!             Your Updated Medication List - Protect others around you: Learn how to safely use, store and throw away your medicines  at www.disposemymeds.org.          This list is accurate as of: 10/4/17 11:18 AM.  Always use your most recent med list.                   Brand Name Dispense Instructions for use Diagnosis    * albuterol 108 (90 BASE) MCG/ACT Inhaler    PROAIR HFA/PROVENTIL HFA/VENTOLIN HFA    1 Inhaler    Take 2 puffs prn for wheezing or shortness of breath    Unspecified chronic bronchitis, Esophageal reflux       * albuterol (2.5 MG/3ML) 0.083% neb solution     360 mL    Take 1 vial (2.5 mg) by nebulization 3 times daily    Mixed simple and mucopurulent chronic bronchitis (H)       * albuterol 108 (90 BASE) MCG/ACT Inhaler    PROAIR HFA/PROVENTIL HFA/VENTOLIN HFA    1 Inhaler    Take 2 puffs prn for wheezing    Simple chronic bronchitis (H), Hypogammaglobulinemia (H)       * albuterol (2.5 MG/3ML) 0.083% neb solution      Take 1 ampule by nebulization every 6 hours as needed.        aspirin 81 MG tablet      Take 1 tablet by mouth daily.        atorvastatin 10 MG tablet    LIPITOR    90 tablet    Take 4 tablets (40 mg) by mouth daily    Hyperlipidemia LDL goal <100       azithromycin 250 MG tablet    ZITHROMAX    6 tablet    Take as directed    Mixed simple and mucopurulent chronic bronchitis (H), Hypogammaglobulinemia (H)       Blood Pressure Kit      daily.        budesonide 3 MG 24 hr capsule    ENTOCORT EC    270 capsule    Take 1 capsule (3 mg) by mouth 3 times daily (with meals)    Diarrhea, Loss of weight       carvedilol 12.5 MG tablet    COREG    60 tablet    Take 1 tablet (12.5 mg) by mouth 2 times daily (with meals)    Secondary hypertension       DEMEROL 25 MG/ML injection   Generic drug:  meperidine      Inject 25 mg into the vein as needed. 25 mg IV prior to IgG infusion        dicyclomine 10 MG capsule    BENTYL     Take 10 mg by mouth 4 times daily (before meals and nightly).        diphenhydrAMINE 25 MG tablet    BENADRYL    4 tablet    Take 2 tablets (50 mg) by mouth See Admin Instructions Please take 50 mg by  mouth evening before  And morning of the Abd/Pelvis CT scan with contrast per Dr. Adair.  This is a pre medication for contrast dye.    Atherosclerosis of renal artery (H)       FISH OIL      Unsure of dosage take two caps daily        fluticasone-salmeterol 250-50 MCG/DOSE diskus inhaler    ADVAIR    1 Inhaler    Inhale 1 puff into the lungs 2 times daily.    Unspecified chronic bronchitis       gabapentin 300 MG capsule    NEURONTIN    180 capsule    Take 2 capsules (600 mg) by mouth 3 times daily    Neuralgia, Neck pain       HYDROcodone-acetaminophen  MG per tablet    LORCET     Take 1 tablet by mouth every 6 hours as needed.        Immune Globulin (Human) 25 GM/500ML Soln      Inject 25 g into the vein See Admin Instructions. 25 grams every 3 weeks IVIG        metFORMIN 1000 MG tablet    GLUCOPHAGE    180 tablet    Take 1 tablet twice daily    DM (diabetes mellitus) (H)       nitroGLYcerin 0.4 MG sublingual tablet    NITROQUICK    25 tablet    Place 1 tablet (0.4 mg) under the tongue every 5 minutes as needed    Chest pain       omeprazole 20 MG CR capsule    priLOSEC    60 capsule    Take 1 capsule (20 mg) by mouth 2 times daily    Esophageal reflux, Unspecified chronic bronchitis       * blood glucose monitoring lancets     3 Box    Use to test blood sugar 3 times daily or as directed.    Type 2 diabetes mellitus with complication (H)       * ONE TOUCH FINE POINT LANCETS      2 times daily.        * ONE TOUCH ULTRA test strip   Generic drug:  blood glucose monitoring     200 strip    Test blood sugar twice a day.    Diabetes mellitus type II       * blood glucose monitoring test strip    TERRY CONTOUR NEXT    270 each    by In Vitro route 3 times daily Use to test blood sugar 3 times daily or as directed.    Type 2 diabetes mellitus with complication (H)       predniSONE 20 MG tablet    DELTASONE    5 tablet    Patient to take 60 mg the evening before the procedure and 30 mg the morning of the  procedure.    Allergic reaction to contrast dye       tiotropium 18 MCG capsule    SPIRIVA HANDIHALER    30 capsule    One puff every day    Unspecified chronic bronchitis       TYLENOL 325 MG tablet   Generic drug:  acetaminophen      Take 1-2 tablets by mouth every 6 hours as needed.        * Notice:  This list has 8 medication(s) that are the same as other medications prescribed for you. Read the directions carefully, and ask your doctor or other care provider to review them with you.

## 2017-10-05 ENCOUNTER — INFUSION THERAPY VISIT (OUTPATIENT)
Dept: INFUSION THERAPY | Facility: CLINIC | Age: 77
End: 2017-10-05
Attending: INTERNAL MEDICINE
Payer: MEDICARE

## 2017-10-05 VITALS
SYSTOLIC BLOOD PRESSURE: 123 MMHG | DIASTOLIC BLOOD PRESSURE: 32 MMHG | RESPIRATION RATE: 16 BRPM | HEART RATE: 61 BPM | TEMPERATURE: 98.9 F

## 2017-10-05 DIAGNOSIS — D80.1 HYPOGAMMAGLOBULINEMIA (H): ICD-10-CM

## 2017-10-05 DIAGNOSIS — D80.2 IGA DEFICIENCY (H): Primary | ICD-10-CM

## 2017-10-05 PROCEDURE — 25000132 ZZH RX MED GY IP 250 OP 250 PS 637: Mod: ZF | Performed by: INTERNAL MEDICINE

## 2017-10-05 PROCEDURE — 96365 THER/PROPH/DIAG IV INF INIT: CPT

## 2017-10-05 PROCEDURE — A9270 NON-COVERED ITEM OR SERVICE: HCPCS | Mod: ZF | Performed by: INTERNAL MEDICINE

## 2017-10-05 PROCEDURE — 25000128 H RX IP 250 OP 636: Mod: ZF | Performed by: INTERNAL MEDICINE

## 2017-10-05 PROCEDURE — 96366 THER/PROPH/DIAG IV INF ADDON: CPT

## 2017-10-05 PROCEDURE — 96375 TX/PRO/DX INJ NEW DRUG ADDON: CPT

## 2017-10-05 RX ORDER — METHYLPREDNISOLONE SODIUM SUCCINATE 125 MG/2ML
100 INJECTION, POWDER, LYOPHILIZED, FOR SOLUTION INTRAMUSCULAR; INTRAVENOUS ONCE
Status: COMPLETED | OUTPATIENT
Start: 2017-10-05 | End: 2017-10-05

## 2017-10-05 RX ORDER — MEPERIDINE HYDROCHLORIDE 25 MG/ML
25 INJECTION INTRAMUSCULAR; INTRAVENOUS; SUBCUTANEOUS
Status: CANCELLED
Start: 2017-10-05

## 2017-10-05 RX ORDER — DIPHENHYDRAMINE HCL 25 MG
25 CAPSULE ORAL ONCE
Status: CANCELLED
Start: 2017-10-05 | End: 2017-10-05

## 2017-10-05 RX ORDER — MEPERIDINE HYDROCHLORIDE 25 MG/ML
25 INJECTION INTRAMUSCULAR; INTRAVENOUS; SUBCUTANEOUS
Status: DISCONTINUED | OUTPATIENT
Start: 2017-10-05 | End: 2017-10-05 | Stop reason: HOSPADM

## 2017-10-05 RX ORDER — ACETAMINOPHEN 325 MG/1
650 TABLET ORAL ONCE
Status: COMPLETED | OUTPATIENT
Start: 2017-10-05 | End: 2017-10-05

## 2017-10-05 RX ORDER — METHYLPREDNISOLONE SODIUM SUCCINATE 125 MG/2ML
100 INJECTION, POWDER, LYOPHILIZED, FOR SOLUTION INTRAMUSCULAR; INTRAVENOUS ONCE
Status: CANCELLED
Start: 2017-10-05 | End: 2017-10-05

## 2017-10-05 RX ORDER — ACETAMINOPHEN 325 MG/1
650 TABLET ORAL ONCE
Status: CANCELLED
Start: 2017-10-05 | End: 2017-10-05

## 2017-10-05 RX ORDER — DIPHENHYDRAMINE HCL 25 MG
25 CAPSULE ORAL ONCE
Status: COMPLETED | OUTPATIENT
Start: 2017-10-05 | End: 2017-10-05

## 2017-10-05 RX ADMIN — DIPHENHYDRAMINE HYDROCHLORIDE 25 MG: 25 CAPSULE ORAL at 07:14

## 2017-10-05 RX ADMIN — ACETAMINOPHEN 650 MG: 325 TABLET ORAL at 07:14

## 2017-10-05 RX ADMIN — IMMUNE GLOBULIN INFUSION (HUMAN) 25 G: 100 INJECTION, SOLUTION INTRAVENOUS; SUBCUTANEOUS at 07:26

## 2017-10-05 RX ADMIN — MEPERIDINE HYDROCHLORIDE 25 MG: 25 INJECTION, SOLUTION INTRAMUSCULAR; INTRAVENOUS; SUBCUTANEOUS at 07:18

## 2017-10-05 RX ADMIN — METHYLPREDNISOLONE SODIUM SUCCINATE 100 MG: 125 INJECTION, POWDER, FOR SOLUTION INTRAMUSCULAR; INTRAVENOUS at 07:15

## 2017-10-05 NOTE — PROGRESS NOTES
Nursing Note  Jennifer Alvarez presents [facility] for:   Chief Complaint   Patient presents with     Infusion     IVIG     During today's [facility] appointment, orders from Dr. Elizabeth were completed.  Frequency: every 3 weeks    Progress note:  Patient identification verified by name and date of birth.  Assessment completed.  Vitals recorded in Doc Flowsheets.  Patient was provided with education regarding infusion and possible side effects.  Patient verbalized understanding.      needed: No  Premedications: administered per order.  Infusion Rates: Gammagard rates started at 12cc/hour x15min; 25cc/hour x15min; 65cc/hour x15min; 125cc/hour until dose completed. D5W flush provided post infusion.  Infusion given over approximately 3 hours.  Solu-Cortef and Demerol 25 mg IV push as pre-medication.   Approximate Infusion length: 3 hours   Labs: were not ordered for this appointment.  Vascular access: peripheral IV placed today.  Treatment Conditions: patient denies fever, chills, signs of infection, recent illness, on antibiotics, productive cough or elevated temperature.  Patient tolerated infusion: well.    Drug Administration Record    Drug Name: Solumedrol  Dose: 100 mg  Route Administered: IV  NDC#: 9538-3158-17  Amount of waste (mL): 0.4 ml  Reason for waste: single use vial    Discharge Plan:   Follow up plan of care with: ongoing infusions at [facility] and primary medical doctor.  Discharge instructions were reviewed with patient.  Patient/representative verbalized understanding of discharge instructions and all questions answered.  Patient discharged from [facility] in stable condition.    Kecia Condon RN    Administrations This Visit     acetaminophen (TYLENOL) tablet 650 mg     Admin Date Action Dose Route Administered By             10/05/2017 Given 650 mg Oral Shannan  Deidre BERNSTEIN RN                    diphenhydrAMINE (BENADRYL) capsule 25 mg     Admin Date Action Dose Route Administered By             10/05/2017 Given 25 mg Oral Deidre Campbell RN                    immune globulin (Gammagard)- sucrose free 10 % injection 25 g     Admin Date Action Dose Route Administered By             10/05/2017 New Bag 25 g Intravenous Deidre Campbell RN                    meperidine (DEMEROL) injection 25 mg     Admin Date Action Dose Route Administered By             10/05/2017 Given 25 mg Intravenous Deidre Campbell RN                    methylPREDNISolone sodium succinate (solu-MEDROL) injection 100 mg     Admin Date Action Dose Route Administered By             10/05/2017 Given 100 mg Intravenous Deidre Campbell RN                          BP (!) 123/32  Pulse 61  Temp 98.9  F (37.2  C) (Oral)  Resp 16

## 2017-10-05 NOTE — MR AVS SNAPSHOT
After Visit Summary   10/5/2017    Jennifer Alvarez    MRN: [id number]           Patient Information     Date Of Birth          1940        Visit Information        Provider Department      10/5/2017 7:00 AM UC 50 ATC; UC SPEC INFUSION [facility] Specialty [facility] Procedure        Today's Diagnoses     IgA deficiency (H)    -  1    Hypogammaglobulinemia (H)          Care Instructions    Dear Jennifer Alvarez    Thank you for choosing [facility] Special[facility]fusion [facility] ([facility]) for your infusion.  The following information is a summary of our appointment as well as important reminders.        Immune Globulin Solution for injection  What is this medicine?  IMMUNE GLOBULIN (im MUNE GLOB sapna cynthia) helps to prevent or reduce the severity of certain infections in patients who are at risk. This medicine is collected from the pooled blood of many donors. It is used to treat immune system problems, thrombocytopenia, and Kawasaki syndrome.  This medicine may be used for other purposes; ask your health care provider or pharmacist if you have questions.  What should I tell my health care provider before I take this medicine?  They need to know if you have any of these conditions:    diabetes    extremely low or no immune antibodies in the blood    heart disease    history of blood clots    hyperprolinemia    infection in the blood, sepsis    kidney disease    taking medicine that may change kidney function - ask your health care provider about your medicine    an unusual or allergic reaction to human immune globulin, albumin, maltose, sucrose, polysorbate 80, other medicines, foods, dyes, or preservatives    pregnant or trying to get pregnant    breast-feeding  How should I use this medicine?  This medicine is for injection into a muscle or infusion into a vein or skin. It is usually given by a health care professional in a hospital or clinic  setting.  In rare cases, some brands of this medicine might be given at home. You will be taught how to give this medicine. Use exactly as directed. Take your medicine at regular intervals. Do not take your medicine more often than directed.  Talk to your pediatrician regarding the use of this medicine in children. Special care may be needed.  Overdosage: If you think you have taken too much of this medicine contact a poison control center or emergency room at once.  NOTE: This medicine is only for you. Do not share this medicine with others.  What if I miss a dose?  It is important not to miss your dose. Call your doctor or health care professional if you are unable to keep an appointment. If you give yourself the medicine and you miss a dose, take it as soon as you can. If it is almost time for your next dose, take only that dose. Do not take double or extra doses.  What may interact with this medicine?    aspirin and aspirin-like medicines    cisplatin    cyclosporine    medicines for infection like acyclovir, adefovir, amphotericin B, bacitracin, cidofovir, foscarnet, ganciclovir, gentamicin, pentamidine, vancomycin    NSAIDS, medicines for pain and inflammation, like ibuprofen or naproxen    pamidronate    vaccines    zoledronic acid  This list may not describe all possible interactions. Give your health care provider a list of all the medicines, herbs, non-prescription drugs, or dietary supplements you use. Also tell them if you smoke, drink alcohol, or use illegal drugs. Some items may interact with your medicine.  What should I watch for while using this medicine?  Your condition will be monitored carefully while you are receiving this medicine.  This medicine is made from pooled blood donations of many different people. It may be possible to pass an infection in this medicine. However, the donors are screened for infections and all products are tested for HIV and hepatitis. The medicine is treated to kill  most or all bacteria and viruses. Talk to your doctor about the risks and benefits of this medicine.  Do not have vaccinations for at least 14 days before, or until at least 3 months after receiving this medicine.  What side effects may I notice from receiving this medicine?  Side effects that you should report to your doctor or health care professional as soon as possible:    allergic reactions like skin rash, itching or hives, swelling of the face, lips, or tongue    breathing problems    chest pain or tightness    fever, chills    headache with nausea, vomiting    neck pain or difficulty moving neck    pain when moving eyes    pain, swelling, warmth in the leg    problems with balance, talking, walking    sudden weight gain    swelling of the ankles, feet, hands    trouble passing urine or change in the amount of urine  Side effects that usually do not require medical attention (report to your doctor or health care professional if they continue or are bothersome):    dizzy, drowsy    flushing    increased sweating    leg cramps    muscle aches and pains    pain at site where injected  This list may not describe all possible side effects. Call your doctor for medical advice about side effects. You may report side effects to FDA at 7-705-FDA-3144.  Where should I keep my medicine?  Keep out of the reach of children.  This drug is usually given in a hospital or clinic and will not be stored at home.  In rare cases, some brands of this medicine may be given at home. If you are using this medicine at home, you will be instructed on how to store this medicine. Throw away any unused medicine after the expiration date on the label.  NOTE: This sheet is a summary. It may not cover all possible information. If you have questions about this medicine, talk to your doctor, pharmacist, or health care provider.  NOTE:This sheet is a summary. It may not cover all possible information. If you have questions about this medicine,  talk to your doctor, pharmacist, or health care provider. Copyright  2016 Gold Standard          Additional information: you had your infusion of Gammagard 25 grams, Demerol 25 mg, Solumedrol 100 mg via IV today, along with oral Tylenol 650 mg and Benadryl 25 mg.       We look forward in seeing you on your next appointment here at [facility].  Please don t hesitate to call us at [phone] to reschedule any of your appointments or to speak with one of the [facility] registered nurses.  It was a pleasure taking care of you today.    Sincerely,  Kecia Condon, RN  [facility]  Specialty Infusion & Procedure Center  [address] [place] [address]  Phone:  [phone]          Follow-ups after your visit        Your next 10 appointments already scheduled     Oct 26, 2017 11:00 AM CDT   Infusion 300 with UC SPEC INFUSION, UC 44 ATC   [facility] Specialty and Procedure ([facility])    [address] [place] [id number] [phone]            Nov 16, 2017 11:00 AM CST   Infusion 300 with UC SPEC INFUSION, UC 44 ATC   [facility] Specialty and Procedure ([facility])    [address] [place] [id number] [phone]            Dec 07, 2017 12:00 PM CST   Infusion 300 with UC SPEC INFUSION, UC 44 ATC   [facility] Specialty and Procedure ([facility])    [address] [place] [id number] [phone]            Dec 28, 2017 11:00 AM CST   Infusion 300 with UC SPEC INFUSION, UC 44 ATC   [facility] Specialty and Procedure ([facility])    [address] [place] [id number] [phone]            Jan 18, 2018 11:00 AM CST   Infusion 300 with UC SPEC INFUSION, UC 44 ATC   [facility] Specialty [facility] Procedure ([facility])    [address] [facility]  2nd Floor  [facility] [id number] [phone]              Future tests that were ordered for you today     Open Future Orders        Priority Expected Expires Ordered    Spirometry, Breathing Capacity Routine 4/4/2018 10/4/2018 10/4/2017    HC DIFFUSING CAPACITY Routine 4/4/2018 10/4/2018 10/4/2017    Immunoglobulin G subclasses Routine 4/4/2018 10/4/2018 10/4/2017    IgG Routine 4/4/2018 10/4/2018 10/4/2017    CBC with platelets Routine 4/4/2018 10/4/2018 10/4/2017    Basic metabolic panel Routine 4/4/2018 10/4/2018 10/4/2017    Echocardiogram Complete Routine  10/4/2018 10/4/2017            Who to contact     If you have questions or need follow up information about today's clinic visit or your schedule please contact [facility] [place] SPECIALTY AND PROCEDURE directly at [phone].  Normal or non-critical lab and imaging results will be communicated to you by Sidestagehart, letter or phone within 4 business days after the clinic has received the results. If you do not hear from us within 7 days, please contact the clinic through Mojivat or phone. If you have a critical or abnormal lab result, we will notify you by phone as soon as possible.  Submit refill requests through DZZOM or call your pharmacy and they will forward the refill request to us. Please allow 3 business days for your refill to be completed.          Additional Information About Your Visit        Sidestageharmarkedup Information     DZZOM gives you secure access to your electronic health record. If you see a primary care provider, you can also send messages to your care team and make appointments. If you have questions, please call your primary care clinic.  If you do not have a primary care provider, please call [phone] and they will assist you.        Care EveryWhere ID     This is your Care EveryWhere ID. This could be used by other  organizations to access your [facility] medical records  [id number]        Your Vitals Were     Pulse Temperature Respirations             67 98.9  F (37.2  C) (Oral) 16          Blood Pressure from Last 3 Encounters:   10/05/17 150/56   10/04/17 127/73   09/14/17 111/49    Weight from Last 3 Encounters:   10/04/17 42.4 kg (93 lb 8 oz)   08/22/17 41.6 kg (91 lb 12.8 oz)   07/19/17 40.8 kg (90 lb)              Today, you had the following     No orders found for display         Today's Medication Changes          These changes are accurate as of: 10/5/17  9:56 AM.  If you have any questions, ask your nurse or doctor.               These medicines have changed or have updated prescriptions.        Dose/Directions    metFORMIN 1000 MG tablet   Commonly known as:  GLUCOPHAGE   This may have changed:    - when to take this  - additional instructions   Used for:  DM (diabetes mellitus) (H)        Take 1 tablet twice daily   Quantity:  180 tablet   Refills:  3                Primary Care Provider Office Phone # Fax #    Brandi DILLON Vitaliy [phone]       [facility] [address]   [facility] [address]        Equal Access to Services     SUJATHA HOLT AH: Hadii adolph rayo Sorosi, waaxda luqadaha, qaybta kaalmada adeegyada, chester caicedo . So [facility] [phone].    ATENCIÓN: Si habla español, tiene a taylor disposición servicios gratuitos de asistencia lingüística. Llame al [phone].    We comply with applicable federal civil rights laws and [place] laws. We do not discriminate on the basis of race, color, national origin, age, disability, sex, sexual orientation, or gender identity.            Thank you!     Thank you for choosing [facility] SPECIALTY AND PROCEDURE  for your care. Our goal is always to provide you with excellent care. Hearing back from our patients is one way we can continue to improve our services. Please take a few minutes to complete the  written survey that you may receive in the mail after your visit with us. Thank you!             Your Updated Medication List - Protect others around you: Learn how to safely use, store and throw away your medicines at [email or web address].          This list is accurate as of: 10/5/17  9:56 AM.  Always use your most recent med list.                   Brand Name Dispense Instructions for use Diagnosis    * albuterol 108 (90 BASE) MCG/ACT Inhaler    PROAIR HFA/PROVENTIL HFA/VENTOLIN HFA    1 Inhaler    Take 2 puffs prn for wheezing or shortness of breath    Unspecified chronic bronchitis, Esophageal reflux       * albuterol (2.5 MG/3ML) 0.083% neb solution     360 mL    Take 1 vial (2.5 mg) by nebulization 3 times daily    Mixed simple and mucopurulent chronic bronchitis (H)       * albuterol 108 (90 BASE) MCG/ACT Inhaler    PROAIR HFA/PROVENTIL HFA/VENTOLIN HFA    1 Inhaler    Take 2 puffs prn for wheezing    Simple chronic bronchitis (H), Hypogammaglobulinemia (H)       * albuterol (2.5 MG/3ML) 0.083% neb solution      Take 1 ampule by nebulization every 6 hours as needed.        aspirin 81 MG tablet      Take 1 tablet by mouth daily.        atorvastatin 10 MG tablet    LIPITOR    90 tablet    Take 4 tablets (40 mg) by mouth daily    Hyperlipidemia LDL goal <100       azithromycin 250 MG tablet    ZITHROMAX    6 tablet    Take as directed    Mixed simple and mucopurulent chronic bronchitis (H), Hypogammaglobulinemia (H)       Blood Pressure Kit      daily.        budesonide 3 MG 24 hr capsule    ENTOCORT EC    270 capsule    Take 1 capsule (3 mg) by mouth 3 times daily (with meals)    Diarrhea, Loss of weight       carvedilol 12.5 MG tablet    COREG    60 tablet    Take 1 tablet (12.5 mg) by mouth 2 times daily (with meals)    Secondary hypertension       DEMEROL 25 MG/ML injection   Generic drug:  meperidine      Inject 25 mg into the vein as needed. 25 mg IV prior to IgG infusion        dicyclomine 10 MG capsule     BENTYL     Take 10 mg by mouth 4 times daily (before meals and nightly).        diphenhydrAMINE 25 MG tablet    BENADRYL    4 tablet    Take 2 tablets (50 mg) by mouth See Admin Instructions Please take 50 mg by mouth evening before  And morning of the Abd/Pelvis CT scan with contrast per Dr. Adair.  This is a pre medication for contrast dye.    Atherosclerosis of renal artery (H)       FISH OIL      Unsure of dosage take two caps daily        * fluticasone-salmeterol 250-50 MCG/DOSE diskus inhaler    ADVAIR    1 Inhaler    Inhale 1 puff into the lungs 2 times daily.    Unspecified chronic bronchitis       * fluticasone-salmeterol 250-50 MCG/DOSE diskus inhaler    ADVAIR    1 Inhaler    Inhale 1 puff into the lungs 2 times daily    Simple chronic bronchitis (H)       gabapentin 300 MG capsule    NEURONTIN    180 capsule    Take 2 capsules (600 mg) by mouth 3 times daily    Neuralgia, Neck pain       HYDROcodone-acetaminophen  MG per tablet    LORCET     Take 1 tablet by mouth every 6 hours as needed.        Immune Globulin (Human) 25 GM/500ML Soln      Inject 25 g into the vein See Admin Instructions. 25 grams every 3 weeks IVIG        metFORMIN 1000 MG tablet    GLUCOPHAGE    180 tablet    Take 1 tablet twice daily    DM (diabetes mellitus) (H)       nitroGLYcerin 0.4 MG sublingual tablet    NITROQUICK    25 tablet    Place 1 tablet (0.4 mg) under the tongue every 5 minutes as needed    Chest pain       omeprazole 20 MG CR capsule    priLOSEC    60 capsule    Take 1 capsule (20 mg) by mouth 2 times daily    Esophageal reflux, Unspecified chronic bronchitis       * blood glucose monitoring lancets     3 Box    Use to test blood sugar 3 times daily or as directed.    Type 2 diabetes mellitus with complication (H)       * ONE TOUCH FINE POINT LANCETS      2 times daily.        * ONE TOUCH ULTRA test strip   Generic drug:  blood glucose monitoring     200 strip    Test blood sugar twice a day.    Diabetes  mellitus type II       * blood glucose monitoring test strip    TERRY CONTOUR NEXT    270 each    by In Vitro route 3 times daily Use to test blood sugar 3 times daily or as directed.    Type 2 diabetes mellitus with complication (H)       predniSONE 20 MG tablet    DELTASONE    5 tablet    Patient to take 60 mg the evening before the procedure and 30 mg the morning of the procedure.    Allergic reaction to contrast dye       tiotropium 18 MCG capsule    SPIRIVA HANDIHALER    30 capsule    One puff every day    Unspecified chronic bronchitis       TYLENOL 325 MG tablet   Generic drug:  acetaminophen      Take 1-2 tablets by mouth every 6 hours as needed.        * Notice:  This list has 10 medication(s) that are the same as other medications prescribed for you. Read the directions carefully, and ask your doctor or other care provider to review them with you.

## 2017-10-05 NOTE — PATIENT INSTRUCTIONS
Dear Jennifer Alvarez    Thank you for choosing [facility] Specialty Infusion and Procedure Center ([facility]) for your infusion.  The following information is a summary of our appointment as well as important reminders.        Immune Globulin Solution for injection  What is this medicine?  IMMUNE GLOBULIN (im MUNE GLOB sapna cynthia) helps to prevent or reduce the severity of certain infections in patients who are at risk. This medicine is collected from the pooled blood of many donors. It is used to treat immune system problems, thrombocytopenia, and Kawasaki syndrome.  This medicine may be used for other purposes; ask your health care provider or pharmacist if you have questions.  What should I tell my health care provider before I take this medicine?  They need to know if you have any of these conditions:    diabetes    extremely low or no immune antibodies in the blood    heart disease    history of blood clots    hyperprolinemia    infection in the blood, sepsis    kidney disease    taking medicine that may change kidney function - ask your health care provider about your medicine    an unusual or allergic reaction to human immune globulin, albumin, maltose, sucrose, polysorbate 80, other medicines, foods, dyes, or preservatives    pregnant or trying to get pregnant    breast-feeding  How should I use this medicine?  This medicine is for injection into a muscle or infusion into a vein or skin. It is usually given by a health care professional in a hospital or clinic setting.  In rare cases, some brands of this medicine might be given at home. You will be taught how to give this medicine. Use exactly as directed. Take your medicine at regular intervals. Do not take your medicine more often than directed.  Talk to your pediatrician regarding the use of this medicine in children. Special care may be needed.  Overdosage: If you think you have taken too much of this medicine contact a poison control  center or emergency room at once.  NOTE: This medicine is only for you. Do not share this medicine with others.  What if I miss a dose?  It is important not to miss your dose. Call your doctor or health care professional if you are unable to keep an appointment. If you give yourself the medicine and you miss a dose, take it as soon as you can. If it is almost time for your next dose, take only that dose. Do not take double or extra doses.  What may interact with this medicine?    aspirin and aspirin-like medicines    cisplatin    cyclosporine    medicines for infection like acyclovir, adefovir, amphotericin B, bacitracin, cidofovir, foscarnet, ganciclovir, gentamicin, pentamidine, vancomycin    NSAIDS, medicines for pain and inflammation, like ibuprofen or naproxen    pamidronate    vaccines    zoledronic acid  This list may not describe all possible interactions. Give your health care provider a list of all the medicines, herbs, non-prescription drugs, or dietary supplements you use. Also tell them if you smoke, drink alcohol, or use illegal drugs. Some items may interact with your medicine.  What should I watch for while using this medicine?  Your condition will be monitored carefully while you are receiving this medicine.  This medicine is made from pooled blood donations of many different people. It may be possible to pass an infection in this medicine. However, the donors are screened for infections and all products are tested for HIV and hepatitis. The medicine is treated to kill most or all bacteria and viruses. Talk to your doctor about the risks and benefits of this medicine.  Do not have vaccinations for at least 14 days before, or until at least 3 months after receiving this medicine.  What side effects may I notice from receiving this medicine?  Side effects that you should report to your doctor or health care professional as soon as possible:    allergic reactions like skin rash, itching or hives,  swelling of the face, lips, or tongue    breathing problems    chest pain or tightness    fever, chills    headache with nausea, vomiting    neck pain or difficulty moving neck    pain when moving eyes    pain, swelling, warmth in the leg    problems with balance, talking, walking    sudden weight gain    swelling of the ankles, feet, hands    trouble passing urine or change in the amount of urine  Side effects that usually do not require medical attention (report to your doctor or health care professional if they continue or are bothersome):    dizzy, drowsy    flushing    increased sweating    leg cramps    muscle aches and pains    pain at site where injected  This list may not describe all possible side effects. Call your doctor for medical advice about side effects. You may report side effects to FDA at 6-182-MIQ-9462.  Where should I keep my medicine?  Keep out of the reach of children.  This drug is usually given in a hospital or clinic and will not be stored at home.  In rare cases, some brands of this medicine may be given at home. If you are using this medicine at home, you will be instructed on how to store this medicine. Throw away any unused medicine after the expiration date on the label.  NOTE: This sheet is a summary. It may not cover all possible information. If you have questions about this medicine, talk to your doctor, pharmacist, or health care provider.  NOTE:This sheet is a summary. It may not cover all possible information. If you have questions about this medicine, talk to your doctor, pharmacist, or health care provider. Copyright  2016 Gold Standard          Additional information: you had your infusion of Gammagard 25 grams, Demerol 25 mg, Solumedrol 100 mg via IV today, along with oral Tylenol 650 mg and Benadryl 25 mg.       We look forward in seeing you on your next appointment here at [facility].  Please don t hesitate to call us at [phone] to reschedule any of your appointments or to  speak with one of the [facility] registered nurses.  It was a pleasure taking care of you today.    Sincerely,  Kecia Condon RN  [facility]  Specialty Infusion & Procedure Center  [address] [place] [address]  [id number]  Phone:  [phone]

## 2017-10-06 LAB
IGG SERPL-MCNC: 1080 MG/DL (ref 695–1620)
IGG1 SER-MCNC: 479 MG/DL (ref 300–856)
IGG2 SER-MCNC: 377 MG/DL (ref 158–761)
IGG3 SER-MCNC: 58 MG/DL (ref 24–192)
IGG4 SER-MCNC: 19 MG/DL (ref 11–86)

## 2017-10-11 ENCOUNTER — CARE COORDINATION (OUTPATIENT)
Dept: CARDIOLOGY | Facility: CLINIC | Age: 77
End: 2017-10-11

## 2017-10-11 NOTE — PROGRESS NOTES
"S-(situation): patient had seen Dr. Elizabeth for pulmonary evaluation and he noted that her RLE was swollen. She had recent knee surgery on 9/20.Dr. Elizabeth was concerned for DVT. She had seen her surgeon who placed her antibiotics for cellulitis.  In the meantime her BP's have been running 120-160 systolic. She states, \"they are all over the place\". She has not had any BP's with a systolic over 170. States that she no longer has headaches although she does have a sinus infection presently. Only hypertensive medication she is on presently is Coreg 12.5 mg bid.    B-(background): Ms. Jennifer Alvarez is a 74 yo woman with a Hx of single vessel CAD (LAD), s/p stenting of the mid and distal LAD following a positive stress nuclear study (December 2007), s/p dual chamber pacemaker (January 2008) for sick sinus syndrome, who returns to the clinic today for ongoing evaluation of CAD. Her cardiovascular risk factors include (+) HTN, (+) Type 2 DM, (+) hyperlipidemia, (+) prior smoking [quit 40 yrs ago], (+) Family hx of CAD.     A-(assessment): BP labile    R-(recommendations): patient to contact me if she continues to consistently stay at 150-160 systolic. Will initiate lisinopril 20 mg if needed.  Will need renal duplex early 2018 and an appointment with Dr. Adair for follow up.    "

## 2017-10-26 ENCOUNTER — INFUSION THERAPY VISIT (OUTPATIENT)
Dept: INFUSION THERAPY | Facility: CLINIC | Age: 77
End: 2017-10-26
Attending: INTERNAL MEDICINE
Payer: MEDICARE

## 2017-10-26 VITALS
DIASTOLIC BLOOD PRESSURE: 71 MMHG | SYSTOLIC BLOOD PRESSURE: 152 MMHG | BODY MASS INDEX: 17.12 KG/M2 | HEART RATE: 86 BPM | WEIGHT: 93.6 LBS | TEMPERATURE: 99.2 F | OXYGEN SATURATION: 97 %

## 2017-10-26 DIAGNOSIS — D80.2 IGA DEFICIENCY (H): Primary | ICD-10-CM

## 2017-10-26 DIAGNOSIS — D80.1 HYPOGAMMAGLOBULINEMIA (H): ICD-10-CM

## 2017-10-26 PROCEDURE — A9270 NON-COVERED ITEM OR SERVICE: HCPCS | Mod: ZF | Performed by: INTERNAL MEDICINE

## 2017-10-26 PROCEDURE — 96375 TX/PRO/DX INJ NEW DRUG ADDON: CPT

## 2017-10-26 PROCEDURE — 96365 THER/PROPH/DIAG IV INF INIT: CPT

## 2017-10-26 PROCEDURE — 25000132 ZZH RX MED GY IP 250 OP 250 PS 637: Mod: ZF | Performed by: INTERNAL MEDICINE

## 2017-10-26 PROCEDURE — 96366 THER/PROPH/DIAG IV INF ADDON: CPT

## 2017-10-26 PROCEDURE — 25000128 H RX IP 250 OP 636: Mod: ZF | Performed by: INTERNAL MEDICINE

## 2017-10-26 RX ORDER — ACETAMINOPHEN 325 MG/1
650 TABLET ORAL ONCE
Status: CANCELLED
Start: 2017-10-26 | End: 2017-10-26

## 2017-10-26 RX ORDER — METHYLPREDNISOLONE SODIUM SUCCINATE 125 MG/2ML
100 INJECTION, POWDER, LYOPHILIZED, FOR SOLUTION INTRAMUSCULAR; INTRAVENOUS ONCE
Status: CANCELLED
Start: 2017-10-26 | End: 2017-10-26

## 2017-10-26 RX ORDER — DIPHENHYDRAMINE HCL 25 MG
25 CAPSULE ORAL ONCE
Status: CANCELLED
Start: 2017-10-26 | End: 2017-10-26

## 2017-10-26 RX ORDER — MEPERIDINE HYDROCHLORIDE 25 MG/ML
25 INJECTION INTRAMUSCULAR; INTRAVENOUS; SUBCUTANEOUS
Status: DISCONTINUED | OUTPATIENT
Start: 2017-10-26 | End: 2017-10-26 | Stop reason: HOSPADM

## 2017-10-26 RX ORDER — MEPERIDINE HYDROCHLORIDE 25 MG/ML
25 INJECTION INTRAMUSCULAR; INTRAVENOUS; SUBCUTANEOUS
Status: CANCELLED
Start: 2017-10-26

## 2017-10-26 RX ORDER — DIPHENHYDRAMINE HCL 25 MG
25 CAPSULE ORAL ONCE
Status: COMPLETED | OUTPATIENT
Start: 2017-10-26 | End: 2017-10-26

## 2017-10-26 RX ORDER — ACETAMINOPHEN 325 MG/1
650 TABLET ORAL ONCE
Status: COMPLETED | OUTPATIENT
Start: 2017-10-26 | End: 2017-10-26

## 2017-10-26 RX ORDER — METHYLPREDNISOLONE SODIUM SUCCINATE 125 MG/2ML
100 INJECTION, POWDER, LYOPHILIZED, FOR SOLUTION INTRAMUSCULAR; INTRAVENOUS ONCE
Status: COMPLETED | OUTPATIENT
Start: 2017-10-26 | End: 2017-10-26

## 2017-10-26 RX ADMIN — IMMUNE GLOBULIN INFUSION (HUMAN) 25 G: 100 INJECTION, SOLUTION INTRAVENOUS; SUBCUTANEOUS at 11:50

## 2017-10-26 RX ADMIN — METHYLPREDNISOLONE SODIUM SUCCINATE 100 MG: 125 INJECTION, POWDER, FOR SOLUTION INTRAMUSCULAR; INTRAVENOUS at 11:38

## 2017-10-26 RX ADMIN — DIPHENHYDRAMINE HYDROCHLORIDE 25 MG: 25 CAPSULE ORAL at 11:37

## 2017-10-26 RX ADMIN — DEXTROSE MONOHYDRATE 50 ML: 50 INJECTION, SOLUTION INTRAVENOUS at 11:48

## 2017-10-26 RX ADMIN — MEPERIDINE HYDROCHLORIDE 25 MG: 25 INJECTION, SOLUTION INTRAMUSCULAR; INTRAVENOUS; SUBCUTANEOUS at 11:42

## 2017-10-26 RX ADMIN — ACETAMINOPHEN 650 MG: 325 TABLET ORAL at 11:37

## 2017-10-26 NOTE — MR AVS SNAPSHOT
After Visit Summary   10/26/2017    Jennifer Alvarez    MRN: [id number]           Patient Information     Date Of Birth          1940        Visit Information        Provider Department      10/26/2017 11:00 AM UC 44 ATC; UC SPEC INFUSION [facility] Specialty [facility] Procedure        Today's Diagnoses     IgA deficiency (H)    -  1    Hypogammaglobulinemia (H)          Care Instructions    Dear Jennifer Alvarez    Thank you for choosing [facility] ([facility]) for your infusion.  The following information is a summary of our appointment as well as important reminders.      Immune Globulin Solution for injection  What is this medicine?  IMMUNE GLOBULIN (im MUNE GLOB sapna cynthia) helps to prevent or reduce the severity of certain infections in patients who are at risk. This medicine is collected from the pooled blood of many donors. It is used to treat immune system problems, thrombocytopenia, and Kawasaki syndrome.  This medicine may be used for other purposes; ask your health care provider or pharmacist if you have questions.  What should I tell my health care provider before I take this medicine?  They need to know if you have any of these conditions:    diabetes    extremely low or no immune antibodies in the blood    heart disease    history of blood clots    hyperprolinemia    infection in the blood, sepsis    kidney disease    taking medicine that may change kidney function - ask your health care provider about your medicine    an unusual or allergic reaction to human immune globulin, albumin, maltose, sucrose, polysorbate 80, other medicines, foods, dyes, or preservatives    pregnant or trying to get pregnant    breast-feeding  How should I use this medicine?  This medicine is for injection into a muscle or infusion into a vein or skin. It is usually given by a health care professional in a hospital or clinic  setting.  In rare cases, some brands of this medicine might be given at home. You will be taught how to give this medicine. Use exactly as directed. Take your medicine at regular intervals. Do not take your medicine more often than directed.  Talk to your pediatrician regarding the use of this medicine in children. Special care may be needed.  Overdosage: If you think you have taken too much of this medicine contact a poison control center or emergency room at once.  NOTE: This medicine is only for you. Do not share this medicine with others.  What if I miss a dose?  It is important not to miss your dose. Call your doctor or health care professional if you are unable to keep an appointment. If you give yourself the medicine and you miss a dose, take it as soon as you can. If it is almost time for your next dose, take only that dose. Do not take double or extra doses.  What may interact with this medicine?    aspirin and aspirin-like medicines    cisplatin    cyclosporine    medicines for infection like acyclovir, adefovir, amphotericin B, bacitracin, cidofovir, foscarnet, ganciclovir, gentamicin, pentamidine, vancomycin    NSAIDS, medicines for pain and inflammation, like ibuprofen or naproxen    pamidronate    vaccines    zoledronic acid  This list may not describe all possible interactions. Give your health care provider a list of all the medicines, herbs, non-prescription drugs, or dietary supplements you use. Also tell them if you smoke, drink alcohol, or use illegal drugs. Some items may interact with your medicine.  What should I watch for while using this medicine?  Your condition will be monitored carefully while you are receiving this medicine.  This medicine is made from pooled blood donations of many different people. It may be possible to pass an infection in this medicine. However, the donors are screened for infections and all products are tested for HIV and hepatitis. The medicine is treated to kill  most or all bacteria and viruses. Talk to your doctor about the risks and benefits of this medicine.  Do not have vaccinations for at least 14 days before, or until at least 3 months after receiving this medicine.  What side effects may I notice from receiving this medicine?  Side effects that you should report to your doctor or health care professional as soon as possible:    allergic reactions like skin rash, itching or hives, swelling of the face, lips, or tongue    breathing problems    chest pain or tightness    fever, chills    headache with nausea, vomiting    neck pain or difficulty moving neck    pain when moving eyes    pain, swelling, warmth in the leg    problems with balance, talking, walking    sudden weight gain    swelling of the ankles, feet, hands    trouble passing urine or change in the amount of urine  Side effects that usually do not require medical attention (report to your doctor or health care professional if they continue or are bothersome):    dizzy, drowsy    flushing    increased sweating    leg cramps    muscle aches and pains    pain at site where injected  This list may not describe all possible side effects. Call your doctor for medical advice about side effects. You may report side effects to FDA at 4-578-FDA-3670.  Where should I keep my medicine?  Keep out of the reach of children.  This drug is usually given in a hospital or clinic and will not be stored at home.  In rare cases, some brands of this medicine may be given at home. If you are using this medicine at home, you will be instructed on how to store this medicine. Throw away any unused medicine after the expiration date on the label.  NOTE: This sheet is a summary. It may not cover all possible information. If you have questions about this medicine, talk to your doctor, pharmacist, or health care provider.  NOTE:This sheet is a summary. It may not cover all possible information. If you have questions about this medicine,  talk to your doctor, pharmacist, or health care provider. Copyright  2016 Gold Standard        We look forward in seeing you on your next appointment here at [facility].  Please don t hesitate to call us at [phone] to reschedule any of your appointments or to speak with one of the [facility] registered nurses.  It was a pleasure taking care of you today.    Sincerely,    [facility]  [address] [place] [address]  [address]  Phone:  [phone]            Follow-ups after your visit        Your next 10 appointments already scheduled     Nov 16, 2017 11:00 AM CST   Infusion 300 with UC SPEC INFUSION, UC 44 ATC   [facility] Specialty and Procedure ([facility] [place])    [address] [id number] [phone]            Dec 07, 2017 12:00 PM CST   Infusion 300 with UC SPEC INFUSION, UC 44 ATC   [facility] Specialty and Procedure ([facility] [place])    [address] [place] [id number] [phone]            Dec 28, 2017 11:00 AM CST   Infusion 300 with UC SPEC INFUSION, UC 44 ATC   [facility] Specialty and Procedure ([facility] [place])    [address] [id number] [phone]            Jan 18, 2018 11:00 AM CST   Infusion 300 with UC SPEC INFUSION, UC 44 ATC   [facility] Specialty and Procedure ([facility] [place])    [address] [id number] [phone]            Jan 23, 2018 10:30 AM CST   (Arrive by 10:15 AM)   Pacemaker Check with Uc Cv Device 1   [facility] ([facility])    [address] [place] [address]  [address] [facility] [id number] [phone]            Jan 23, 2018 11:00 AM CST   (Arrive by 10:45 AM)   RETURN  ARRHYTHMIA with Shola Rivas MD   [facility] ([facility])    [address] [facility]  3rd Floor  [facility] [address] [phone]              Who to contact     If you have questions or need follow up information about today's clinic visit or your schedule please contact [facility] SPECIAL[facility] AND PROCEDURE directly at [phone].  Normal or non-critical lab and imaging results will be communicated to you by [employer], letter or phone within 4 business days after the clinic has received the results. If you do not hear from us within 7 days, please contact the clinic through [employer] or phone. If you have a critical or abnormal lab result, we will notify you by phone as soon as possible.  Submit refill requests through [employer] or call your pharmacy and they will forward the refill request to us. Please allow 3 business days for your refill to be completed.          Additional Information About Your Visit        [employer] Information     [employer] gives you secure access to your electronic health record. If you see a primary care provider, you can also send messages to your care team and make appointments. If you have questions, please call your primary care clinic.  If you do not have a primary care provider, please call [phone] and they will assist you.        Care EveryWhere ID     This is your Care EveryWhere ID. This could be used by other organizations to access your [place] medical records  [id number]        Your Vitals Were     Pulse Temperature Pulse Oximetry BMI (Body Mass Index)          86 99.2  F (37.3  C) (Oral) 97% 17.12 kg/m2         Blood Pressure from Last 3 Encounters:   10/26/17 152/71   10/05/17 (!) 123/32   10/04/17 127/73    Weight from Last 3 Encounters:   10/26/17 42.5 kg (93 lb 9.6 oz)   10/04/17 42.4 kg (93 lb 8 oz)   08/22/17 41.6 kg (91 lb 12.8 oz)              Today, you had the following     No orders found for display          Today's Medication Changes          These changes are accurate as of: 10/26/17  2:19 PM.  If you have any questions, ask your nurse or doctor.               These medicines have changed or have updated prescriptions.        Dose/Directions    metFORMIN 1000 MG tablet   Commonly known as:  GLUCOPHAGE   This may have changed:    - when to take this  - additional instructions   Used for:  DM (diabetes mellitus) (H)        Take 1 tablet twice daily   Quantity:  180 tablet   Refills:  3                Primary Care Provider Office Phone # Fax #    Brandi Burks [phone]       [facility] [address]   [facility] [address]        Equal Access to Services     [facility]: Hadii adolph sims hadasho Soomaali, waaxda luqadaha, qaybta kaalmada maday, chester caicedo . So [facility] [phone].    ATENCIÓN: Si habla español, tiene a taylor disposición servicios gratuitos de asistencia lingüística. [facility] [phone].    We comply with applicable federal civil rights laws and [place] laws. We do not discriminate on the basis of race, color, national origin, age, disability, sex, sexual orientation, or gender identity.            Thank you!     Thank you for choosing [facility] SPECIALTY AND PROCEDURE  for your care. Our goal is always to provide you with excellent care. Hearing back from our patients is one way we can continue to improve our services. Please take a few minutes to complete the written survey that you may receive in the mail after your visit with us. Thank you!             Your Updated Medication List - Protect others around you: Learn how to safely use, store and throw away your medicines at www.disposemymeds.org.          This list is accurate as of: 10/26/17  2:19 PM.  Always use your most recent med list.                   Brand Name Dispense Instructions for use Diagnosis    * albuterol 108 (90 BASE) MCG/ACT Inhaler    PROAIR HFA/PROVENTIL  HFA/VENTOLIN HFA    1 Inhaler    Take 2 puffs prn for wheezing or shortness of breath    Unspecified chronic bronchitis, Esophageal reflux       * albuterol (2.5 MG/3ML) 0.083% neb solution     360 mL    Take 1 vial (2.5 mg) by nebulization 3 times daily    Mixed simple and mucopurulent chronic bronchitis (H)       * albuterol 108 (90 BASE) MCG/ACT Inhaler    PROAIR HFA/PROVENTIL HFA/VENTOLIN HFA    1 Inhaler    Take 2 puffs prn for wheezing    Simple chronic bronchitis (H), Hypogammaglobulinemia (H)       * albuterol (2.5 MG/3ML) 0.083% neb solution      Take 1 ampule by nebulization every 6 hours as needed.        aspirin 81 MG tablet      Take 1 tablet by mouth daily.        atorvastatin 10 MG tablet    LIPITOR    90 tablet    Take 4 tablets (40 mg) by mouth daily    Hyperlipidemia LDL goal <100       azithromycin 250 MG tablet    ZITHROMAX    6 tablet    Take as directed    Mixed simple and mucopurulent chronic bronchitis (H), Hypogammaglobulinemia (H)       Blood Pressure Kit      daily.        budesonide 3 MG 24 hr capsule    ENTOCORT EC    270 capsule    Take 1 capsule (3 mg) by mouth 3 times daily (with meals)    Diarrhea, Loss of weight       carvedilol 12.5 MG tablet    COREG    60 tablet    Take 1 tablet (12.5 mg) by mouth 2 times daily (with meals)    Secondary hypertension       DEMEROL 25 MG/ML injection   Generic drug:  meperidine      Inject 25 mg into the vein as needed. 25 mg IV prior to IgG infusion        dicyclomine 10 MG capsule    BENTYL     Take 10 mg by mouth 4 times daily (before meals and nightly).        diphenhydrAMINE 25 MG tablet    BENADRYL    4 tablet    Take 2 tablets (50 mg) by mouth See Admin Instructions Please take 50 mg by mouth evening before  And morning of the Abd/Pelvis CT scan with contrast per Dr. Adair.  This is a pre medication for contrast dye.    Atherosclerosis of renal artery (H)       FISH OIL      Unsure of dosage take two caps daily        *  fluticasone-salmeterol 250-50 MCG/DOSE diskus inhaler    ADVAIR    1 Inhaler    Inhale 1 puff into the lungs 2 times daily.    Unspecified chronic bronchitis       * fluticasone-salmeterol 250-50 MCG/DOSE diskus inhaler    ADVAIR    1 Inhaler    Inhale 1 puff into the lungs 2 times daily    Simple chronic bronchitis (H)       gabapentin 300 MG capsule    NEURONTIN    180 capsule    Take 2 capsules (600 mg) by mouth 3 times daily    Neuralgia, Neck pain       HYDROcodone-acetaminophen  MG per tablet    LORCET     Take 1 tablet by mouth every 6 hours as needed.        Immune Globulin (Human) 25 GM/500ML Soln      Inject 25 g into the vein See Admin Instructions. 25 grams every 3 weeks IVIG        metFORMIN 1000 MG tablet    GLUCOPHAGE    180 tablet    Take 1 tablet twice daily    DM (diabetes mellitus) (H)       nitroGLYcerin 0.4 MG sublingual tablet    NITROQUICK    25 tablet    Place 1 tablet (0.4 mg) under the tongue every 5 minutes as needed    Chest pain       omeprazole 20 MG CR capsule    priLOSEC    60 capsule    Take 1 capsule (20 mg) by mouth 2 times daily    Esophageal reflux, Unspecified chronic bronchitis       * blood glucose monitoring lancets     3 Box    Use to test blood sugar 3 times daily or as directed.    Type 2 diabetes mellitus with complication (H)       * ONE TOUCH FINE POINT LANCETS      2 times daily.        * ONE TOUCH ULTRA test strip   Generic drug:  blood glucose monitoring     200 strip    Test blood sugar twice a day.    Diabetes mellitus type II       * blood glucose monitoring test strip    TERRY CONTOUR NEXT    270 each    by In Vitro route 3 times daily Use to test blood sugar 3 times daily or as directed.    Type 2 diabetes mellitus with complication (H)       predniSONE 20 MG tablet    DELTASONE    5 tablet    Patient to take 60 mg the evening before the procedure and 30 mg the morning of the procedure.    Allergic reaction to contrast dye       tiotropium 18 MCG capsule     SPIRIVA HANDIHALER    30 capsule    One puff every day    Unspecified chronic bronchitis       TYLENOL 325 MG tablet   Generic drug:  acetaminophen      Take 1-2 tablets by mouth every 6 hours as needed.        * Notice:  This list has 10 medication(s) that are the same as other medications prescribed for you. Read the directions carefully, and ask your doctor or other care provider to review them with you.

## 2017-10-26 NOTE — PROGRESS NOTES
Nursing Note  Jennifer Alvarez [facility] for:   Chief Complaint   Patient presents with     Infusion     IVIG for IgA deficiency     During today's [facility] appointment, orders from Dr. Elizabeth were completed.  Frequency: every 3 weeks    Progress note:  Patient identification verified by name and date of birth.  Assessment completed.  Vitals recorded in Doc Flowsheets.  Patient was provided with education regarding infusion and possible side effects.  Patient verbalized understanding.      needed: No  Premedications: administered per order. Demerol given as pre-meds via IV push over 3 minutes.   Infusion Rates: infusion starts at 12 ml/hr x 15 minutes, 25 ml/hr x 15 minutes, 65 ml/hr x 15 minutes, 125 ml/hr for remainder of infusion. Line flushed with D5 pre and post Gammaguard.  Approximate Infusion length:3 hours.   Labs: were not ordered for this appointment.  Vascular access: peripheral IV placed today.  Treatment Conditions: patient denies fever, chills, signs of infection, recent illness, on antibiotics, productive cough or elevated temperature.  Patient tolerated infusion: well.    Drug Waste Record    Drug Name: Solu-Medrol  Dose: 100 mg  Route administered: IV  NDC #: 5132-5047-26  Amount of waste(mL): 0.4 ml  Reason for waste: Single use vial      Discharge Plan:   Follow up plan of care with: ongoing infusions at [facility].  Discharge instructions were reviewed with patient.  Patient/representative verbalized understanding of discharge instructions and all questions answered.  Patient discharged from [facility] in stable condition.    Roxie Tamez RN       Administrations This Visit     acetaminophen (TYLENOL) tablet 650 mg     Admin Date Action Dose Route Administered By             10/26/2017 Given 650 mg Oral Roxie Tamez RN                     dextrose 5% BOLUS     Admin Date Action Dose Route Administered By             10/26/2017 New Bag 50 mL Intravenous Roxie Tamez RN                    diphenhydrAMINE (BENADRYL) capsule 25 mg     Admin Date Action Dose Route Administered By             10/26/2017 Given 25 mg Oral Roxie Tamez RN                    immune globulin -(GAMMAGARD) sucrose free 10 % injection 25 g     Admin Date Action Dose Route Administered By             10/26/2017 New Bag 25 g Intravenous Roxie Tamez RN                    meperidine (DEMEROL) injection 25 mg     Admin Date Action Dose Route Administered By             10/26/2017 Given 25 mg Intravenous Roxie Tamez RN                    methylPREDNISolone sodium succinate (solu-MEDROL) injection 100 mg     Admin Date Action Dose Route Administered By             10/26/2017 Given 100 mg Intravenous Roxie Tamez RN                          /71  Pulse 86  Temp 99.2  F (37.3  C) (Oral)  Wt 42.5 kg (93 lb 9.6 oz)  SpO2 97%  BMI 17.12 kg/m2

## 2017-10-26 NOTE — PATIENT INSTRUCTIONS
Dear Jennifer Alvarez    Thank you for choosing [facility] ([facility]) for your infusion.  The following information is a summary of our appointment as well as important reminders.      Immune Globulin Solution for injection  What is this medicine?  IMMUNE GLOBULIN (im MUNE GLOB sapna cynthia) helps to prevent or reduce the severity of certain infections in patients who are at risk. This medicine is collected from the pooled blood of many donors. It is used to treat immune system problems, thrombocytopenia, and Kawasaki syndrome.  This medicine may be used for other purposes; ask your health care provider or pharmacist if you have questions.  What should I tell my health care provider before I take this medicine?  They need to know if you have any of these conditions:    diabetes    extremely low or no immune antibodies in the blood    heart disease    history of blood clots    hyperprolinemia    infection in the blood, sepsis    kidney disease    taking medicine that may change kidney function - ask your health care provider about your medicine    an unusual or allergic reaction to human immune globulin, albumin, maltose, sucrose, polysorbate 80, other medicines, foods, dyes, or preservatives    pregnant or trying to get pregnant    breast-feeding  How should I use this medicine?  This medicine is for injection into a muscle or infusion into a vein or skin. It is usually given by a health care professional in a hospital or clinic setting.  In rare cases, some brands of this medicine might be given at home. You will be taught how to give this medicine. Use exactly as directed. Take your medicine at regular intervals. Do not take your medicine more often than directed.  Talk to your pediatrician regarding the use of this medicine in children. Special care may be needed.  Overdosage: If you think you have taken too much of this medicine contact a poison control  center or emergency room at once.  NOTE: This medicine is only for you. Do not share this medicine with others.  What if I miss a dose?  It is important not to miss your dose. Call your doctor or health care professional if you are unable to keep an appointment. If you give yourself the medicine and you miss a dose, take it as soon as you can. If it is almost time for your next dose, take only that dose. Do not take double or extra doses.  What may interact with this medicine?    aspirin and aspirin-like medicines    cisplatin    cyclosporine    medicines for infection like acyclovir, adefovir, amphotericin B, bacitracin, cidofovir, foscarnet, ganciclovir, gentamicin, pentamidine, vancomycin    NSAIDS, medicines for pain and inflammation, like ibuprofen or naproxen    pamidronate    vaccines    zoledronic acid  This list may not describe all possible interactions. Give your health care provider a list of all the medicines, herbs, non-prescription drugs, or dietary supplements you use. Also tell them if you smoke, drink alcohol, or use illegal drugs. Some items may interact with your medicine.  What should I watch for while using this medicine?  Your condition will be monitored carefully while you are receiving this medicine.  This medicine is made from pooled blood donations of many different people. It may be possible to pass an infection in this medicine. However, the donors are screened for infections and all products are tested for HIV and hepatitis. The medicine is treated to kill most or all bacteria and viruses. Talk to your doctor about the risks and benefits of this medicine.  Do not have vaccinations for at least 14 days before, or until at least 3 months after receiving this medicine.  What side effects may I notice from receiving this medicine?  Side effects that you should report to your doctor or health care professional as soon as possible:    allergic reactions like skin rash, itching or hives,  swelling of the face, lips, or tongue    breathing problems    chest pain or tightness    fever, chills    headache with nausea, vomiting    neck pain or difficulty moving neck    pain when moving eyes    pain, swelling, warmth in the leg    problems with balance, talking, walking    sudden weight gain    swelling of the ankles, feet, hands    trouble passing urine or change in the amount of urine  Side effects that usually do not require medical attention (report to your doctor or health care professional if they continue or are bothersome):    dizzy, drowsy    flushing    increased sweating    leg cramps    muscle aches and pains    pain at site where injected  This list may not describe all possible side effects. Call your doctor for medical advice about side effects. You may report side effects to FDA at 0-868-HTI-1439.  Where should I keep my medicine?  Keep out of the reach of children.  This drug is usually given in a hospital or clinic and will not be stored at home.  In rare cases, some brands of this medicine may be given at home. If you are using this medicine at home, you will be instructed on how to store this medicine. Throw away any unused medicine after the expiration date on the label.  NOTE: This sheet is a summary. It may not cover all possible information. If you have questions about this medicine, talk to your doctor, pharmacist, or health care provider.  NOTE:This sheet is a summary. It may not cover all possible information. If you have questions about this medicine, talk to your doctor, pharmacist, or health care provider. Copyright  2016 Gold Standard        We look forward in seeing you on your next appointment here at [facility].  Please don t hesitate to call us at [phone] to reschedule any of your appointments or to speak with one of the [facility] registered nurses.  It was a pleasure taking care of you today.    Sincerely,    [facility]  Specialty Infusion & Procedure  [address]  [address]  Phone:  [phone]

## 2017-11-09 LAB
EXPTIME-PRE: 5.57 SEC
FEF2575-%PRED-PRE: 33 %
FEF2575-PRE: 0.53 L/SEC
FEF2575-PRED: 1.57 L/SEC
FEFMAX-%PRED-PRE: 22 %
FEFMAX-PRE: 1.08 L/SEC
FEFMAX-PRED: 4.78 L/SEC
FEV1-%PRED-PRE: 37 %
FEV1-PRE: 0.72 L
FEV1FEV6-PRE: 70 %
FEV1FEV6-PRED: 78 %
FEV1FVC-PRE: 70 %
FEV1FVC-PRED: 78 %
FIFMAX-PRE: 0.9 L/SEC
FVC-%PRED-PRE: 41 %
FVC-PRE: 1.02 L
FVC-PRED: 2.45 L

## 2017-11-16 ENCOUNTER — INFUSION THERAPY VISIT (OUTPATIENT)
Dept: INFUSION THERAPY | Facility: CLINIC | Age: 77
End: 2017-11-16
Attending: INTERNAL MEDICINE
Payer: MEDICARE

## 2017-11-16 VITALS
RESPIRATION RATE: 16 BRPM | TEMPERATURE: 99.6 F | OXYGEN SATURATION: 95 % | DIASTOLIC BLOOD PRESSURE: 44 MMHG | HEART RATE: 58 BPM | SYSTOLIC BLOOD PRESSURE: 114 MMHG

## 2017-11-16 DIAGNOSIS — D80.1 HYPOGAMMAGLOBULINEMIA (H): ICD-10-CM

## 2017-11-16 DIAGNOSIS — D80.2 IGA DEFICIENCY (H): Primary | ICD-10-CM

## 2017-11-16 PROCEDURE — 96375 TX/PRO/DX INJ NEW DRUG ADDON: CPT

## 2017-11-16 PROCEDURE — A9270 NON-COVERED ITEM OR SERVICE: HCPCS | Mod: ZF | Performed by: INTERNAL MEDICINE

## 2017-11-16 PROCEDURE — 25000132 ZZH RX MED GY IP 250 OP 250 PS 637: Mod: ZF | Performed by: INTERNAL MEDICINE

## 2017-11-16 PROCEDURE — 96366 THER/PROPH/DIAG IV INF ADDON: CPT

## 2017-11-16 PROCEDURE — 25000128 H RX IP 250 OP 636: Mod: ZF | Performed by: INTERNAL MEDICINE

## 2017-11-16 PROCEDURE — 96365 THER/PROPH/DIAG IV INF INIT: CPT

## 2017-11-16 RX ORDER — DIPHENHYDRAMINE HCL 25 MG
25 CAPSULE ORAL ONCE
Status: COMPLETED | OUTPATIENT
Start: 2017-11-16 | End: 2017-11-16

## 2017-11-16 RX ORDER — MEPERIDINE HYDROCHLORIDE 25 MG/ML
25 INJECTION INTRAMUSCULAR; INTRAVENOUS; SUBCUTANEOUS
Status: CANCELLED
Start: 2017-11-16

## 2017-11-16 RX ORDER — ACETAMINOPHEN 325 MG/1
650 TABLET ORAL ONCE
Status: CANCELLED
Start: 2017-11-16 | End: 2017-11-16

## 2017-11-16 RX ORDER — METHYLPREDNISOLONE SODIUM SUCCINATE 125 MG/2ML
100 INJECTION, POWDER, LYOPHILIZED, FOR SOLUTION INTRAMUSCULAR; INTRAVENOUS ONCE
Status: COMPLETED | OUTPATIENT
Start: 2017-11-16 | End: 2017-11-16

## 2017-11-16 RX ORDER — MEPERIDINE HYDROCHLORIDE 25 MG/ML
25 INJECTION INTRAMUSCULAR; INTRAVENOUS; SUBCUTANEOUS
Status: DISCONTINUED | OUTPATIENT
Start: 2017-11-16 | End: 2017-11-16 | Stop reason: HOSPADM

## 2017-11-16 RX ORDER — ACETAMINOPHEN 325 MG/1
650 TABLET ORAL ONCE
Status: COMPLETED | OUTPATIENT
Start: 2017-11-16 | End: 2017-11-16

## 2017-11-16 RX ORDER — DIPHENHYDRAMINE HCL 25 MG
25 CAPSULE ORAL ONCE
Status: CANCELLED
Start: 2017-11-16 | End: 2017-11-16

## 2017-11-16 RX ORDER — METHYLPREDNISOLONE SODIUM SUCCINATE 125 MG/2ML
100 INJECTION, POWDER, LYOPHILIZED, FOR SOLUTION INTRAMUSCULAR; INTRAVENOUS ONCE
Status: CANCELLED
Start: 2017-11-16 | End: 2017-11-16

## 2017-11-16 RX ADMIN — METHYLPREDNISOLONE SODIUM SUCCINATE 100 MG: 125 INJECTION, POWDER, FOR SOLUTION INTRAMUSCULAR; INTRAVENOUS at 11:03

## 2017-11-16 RX ADMIN — DIPHENHYDRAMINE HYDROCHLORIDE 25 MG: 25 CAPSULE ORAL at 11:02

## 2017-11-16 RX ADMIN — IMMUNE GLOBULIN INFUSION (HUMAN) 25 G: 100 INJECTION, SOLUTION INTRAVENOUS; SUBCUTANEOUS at 11:15

## 2017-11-16 RX ADMIN — DEXTROSE MONOHYDRATE 100 ML: 50 INJECTION, SOLUTION INTRAVENOUS at 11:14

## 2017-11-16 RX ADMIN — ACETAMINOPHEN 650 MG: 325 TABLET ORAL at 11:02

## 2017-11-16 RX ADMIN — MEPERIDINE HYDROCHLORIDE 25 MG: 25 INJECTION, SOLUTION INTRAMUSCULAR; INTRAVENOUS; SUBCUTANEOUS at 11:06

## 2017-11-16 NOTE — MR AVS SNAPSHOT
After Visit Summary   11/16/2017    Jennifer Alvarez    MRN: [id number]           Patient Information     Date Of Birth          1940        Visit Information        Provider Department      11/16/2017 11:00 AM UC 44 ATC; UC SPEC INFUSION [facility] Specialty and Procedure        Today's Diagnoses     IgA deficiency (H)    -  1    Hypogammaglobulinemia (H)          Care Instructions    Dear Jennifer Alvarez    Thank you for choosing [facility] ([facility]) for your infusion.  The following information is a summary of our appointment as well as important reminders.        Immune Globulin Solution for injection  What is this medicine?  IMMUNE GLOBULIN (im MUNE GLOB sapna cynthia) helps to prevent or reduce the severity of certain infections in patients who are at risk. This medicine is collected from the pooled blood of many donors. It is used to treat immune system problems, thrombocytopenia, and Kawasaki syndrome.  This medicine may be used for other purposes; ask your health care provider or pharmacist if you have questions.  What should I tell my health care provider before I take this medicine?  They need to know if you have any of these conditions:    diabetes    extremely low or no immune antibodies in the blood    heart disease    history of blood clots    hyperprolinemia    infection in the blood, sepsis    kidney disease    taking medicine that may change kidney function - ask your health care provider about your medicine    an unusual or allergic reaction to human immune globulin, albumin, maltose, sucrose, polysorbate 80, other medicines, foods, dyes, or preservatives    pregnant or trying to get pregnant    breast-feeding  How should I use this medicine?  This medicine is for injection into a muscle or infusion into a vein or skin. It is usually given by a health care professional in a hospital or clinic  setting.  In rare cases, some brands of this medicine might be given at home. You will be taught how to give this medicine. Use exactly as directed. Take your medicine at regular intervals. Do not take your medicine more often than directed.  Talk to your pediatrician regarding the use of this medicine in children. Special care may be needed.  Overdosage: If you think you have taken too much of this medicine contact a poison control center or emergency room at once.  NOTE: This medicine is only for you. Do not share this medicine with others.  What if I miss a dose?  It is important not to miss your dose. Call your doctor or health care professional if you are unable to keep an appointment. If you give yourself the medicine and you miss a dose, take it as soon as you can. If it is almost time for your next dose, take only that dose. Do not take double or extra doses.  What may interact with this medicine?    aspirin and aspirin-like medicines    cisplatin    cyclosporine    medicines for infection like acyclovir, adefovir, amphotericin B, bacitracin, cidofovir, foscarnet, ganciclovir, gentamicin, pentamidine, vancomycin    NSAIDS, medicines for pain and inflammation, like ibuprofen or naproxen    pamidronate    vaccines    zoledronic acid  This list may not describe all possible interactions. Give your health care provider a list of all the medicines, herbs, non-prescription drugs, or dietary supplements you use. Also tell them if you smoke, drink alcohol, or use illegal drugs. Some items may interact with your medicine.  What should I watch for while using this medicine?  Your condition will be monitored carefully while you are receiving this medicine.  This medicine is made from pooled blood donations of many different people. It may be possible to pass an infection in this medicine. However, the donors are screened for infections and all products are tested for HIV and hepatitis. The medicine is treated to kill  most or all bacteria and viruses. Talk to your doctor about the risks and benefits of this medicine.  Do not have vaccinations for at least 14 days before, or until at least 3 months after receiving this medicine.  What side effects may I notice from receiving this medicine?  Side effects that you should report to your doctor or health care professional as soon as possible:    allergic reactions like skin rash, itching or hives, swelling of the face, lips, or tongue    breathing problems    chest pain or tightness    fever, chills    headache with nausea, vomiting    neck pain or difficulty moving neck    pain when moving eyes    pain, swelling, warmth in the leg    problems with balance, talking, walking    sudden weight gain    swelling of the ankles, feet, hands    trouble passing urine or change in the amount of urine  Side effects that usually do not require medical attention (report to your doctor or health care professional if they continue or are bothersome):    dizzy, drowsy    flushing    increased sweating    leg cramps    muscle aches and pains    pain at site where injected  This list may not describe all possible side effects. Call your doctor for medical advice about side effects. You may report side effects to FDA at 8-701-FDA-5864.  Where should I keep my medicine?  Keep out of the reach of children.  This drug is usually given in a hospital or clinic and will not be stored at home.  In rare cases, some brands of this medicine may be given at home. If you are using this medicine at home, you will be instructed on how to store this medicine. Throw away any unused medicine after the expiration date on the label.  NOTE: This sheet is a summary. It may not cover all possible information. If you have questions about this medicine, talk to your doctor, pharmacist, or health care provider.  NOTE:This sheet is a summary. It may not cover all possible information. If you have questions about this medicine,  talk to your doctor, pharmacist, or health care provider. Copyright  2016 Gold Standard        We look forward in seeing you on your next appointment [facility] at [facility].  Please don t hesitate to call us at [phone] to reschedule any of your appointments or to speak with one of the [facility] registered nurses.  It was a pleasure taking care of you today.    Sincerely,    [facility]  Specialty Infusion & Procedure Center  [address] [place] [address]  [address]  Phone:  [phone]            Follow-ups after your visit        Your next 10 appointments already scheduled     Dec 07, 2017 12:00 PM CST   Infusion 300 with UC SPEC INFUSION, UC 44 ATC   [facility] Specialty and Procedure ([facility])    [address] [place] [id number] [phone]            Dec 28, 2017 11:00 AM CST   Infusion 300 with UC SPEC INFUSION, UC 44 ATC   [facility] Specialty and Procedure ([facility])    [address] [place] [id number] [phone]            Jan 18, 2018 11:00 AM CST   Infusion 300 with UC SPEC INFUSION, UC 44 ATC   [facility] Specialty and Procedure ([facility])    [address] [place] [id number] [phone]            Jan 23, 2018 10:30 AM CST   (Arrive by 10:15 AM)   Pacemaker Check with Uc Cv Device 1   [facility] ([facility])    [address] [place] [id number] [phone]            Jan 23, 2018 11:00 AM CST   (Arrive by 10:45 AM)   RETURN ARRHYTHMIA with Shola Rivas MD   [facility] ([facility])    [address] [place] [id number] [phone]            Feb 08, 2018 11:00 AM CST   Infusion 300 with UC SPEC INFUSION, UC 44 ATC   [facility] Specialty and Procedure ([facility])    [address]  [address]  [facility] [address] [phone]              Who to contact     If you have questions or need follow up information about today's clinic visit or your schedule please contact [facility] SPECIALTY AND PROCEDURE directly at [phone].  Normal or non-critical lab and imaging results will be communicated to you by MyChart, letter or phone within 4 business days after the clinic has received the results. If you do not hear from us within 7 days, please contact the clinic through [facility] or phone. If you have a critical or abnormal lab result, we will notify you by phone as soon as possible.  Submit refill requests through [facility] or call your pharmacy and they will forward the refill request to us. Please allow 3 business days for your refill to be completed.          Additional Information About Your Visit        [facility] Information     [facility] gives you secure access to your electronic health record. If you see a primary care provider, you can also send messages to your care team and make appointments. If you have questions, please call your primary care clinic.  If you do not have a primary care provider, please call [phone] and they will assist you.        Care EveryWhere ID     This is your Care EveryWhere ID. This could be used by other organizations to access your [facility] medical records  [id number]        Your Vitals Were     Pulse Temperature Respirations Pulse Oximetry          58 99.6  F (37.6  C) (Oral) 16 95%         Blood Pressure from Last 3 Encounters:   11/16/17 114/44   10/26/17 (P) 120/51   10/05/17 (!) 123/32    Weight from Last 3 Encounters:   10/26/17 42.5 kg (93 lb 9.6 oz)   10/04/17 42.4 kg (93 lb 8 oz)   08/22/17 41.6 kg (91 lb 12.8 oz)              Today, you had the following     No orders found for display         Today's  Medication Changes          These changes are accurate as of: 11/16/17  2:25 PM.  If you have any questions, ask your nurse or doctor.               These medicines have changed or have updated prescriptions.        Dose/Directions    metFORMIN 1000 MG tablet   Commonly known as:  GLUCOPHAGE   This may have changed:    - when to take this  - additional instructions   Used for:  DM (diabetes mellitus) (H)        Take 1 tablet twice daily   Quantity:  180 tablet   Refills:  3                Primary Care Provider Office Phone # Fax #    Brandi Burks [phone]       [facility] [address]   [facility] [address]        Equal Access to Services     [facility]: Hadii adolph sims hadasho Soomaali, waaxda luqadaha, qaybta kaalmada adejuan, chester caicedo . So [facility] [phone].    ATENCIÓN: Si habla español, tiene a taylor disposición servicios gratuitos de asistencia lingüística. [facility] [phone].    We comply with applicable federal civil rights laws and [place] laws. We do not discriminate on the basis of race, color, national origin, age, disability, sex, sexual orientation, or gender identity.            Thank you!     Thank you for choosing [facility] SPECIALTY AND PROCEDURE  for your care. Our goal is always to provide you with excellent care. Hearing back from our patients is one way we can continue to improve our services. Please take a few minutes to complete the written survey that you may receive in the mail after your visit with us. Thank you!             Your Updated Medication List - Protect others around you: Learn how to safely use, store and throw away your medicines at www.disposemymeds.org.          This list is accurate as of: 11/16/17  2:25 PM.  Always use your most recent med list.                   Brand Name Dispense Instructions for use Diagnosis    * albuterol 108 (90 BASE) MCG/ACT Inhaler    PROAIR HFA/PROVENTIL HFA/VENTOLIN  HFA    1 Inhaler    Take 2 puffs prn for wheezing or shortness of breath    Unspecified chronic bronchitis, Esophageal reflux       * albuterol (2.5 MG/3ML) 0.083% neb solution     360 mL    Take 1 vial (2.5 mg) by nebulization 3 times daily    Mixed simple and mucopurulent chronic bronchitis (H)       * albuterol 108 (90 BASE) MCG/ACT Inhaler    PROAIR HFA/PROVENTIL HFA/VENTOLIN HFA    1 Inhaler    Take 2 puffs prn for wheezing    Simple chronic bronchitis (H), Hypogammaglobulinemia (H)       * albuterol (2.5 MG/3ML) 0.083% neb solution      Take 1 ampule by nebulization every 6 hours as needed.        aspirin 81 MG tablet      Take 1 tablet by mouth daily.        atorvastatin 10 MG tablet    LIPITOR    90 tablet    Take 4 tablets (40 mg) by mouth daily    Hyperlipidemia LDL goal <100       azithromycin 250 MG tablet    ZITHROMAX    6 tablet    Take as directed    Mixed simple and mucopurulent chronic bronchitis (H), Hypogammaglobulinemia (H)       Blood Pressure Kit      daily.        budesonide 3 MG 24 hr capsule    ENTOCORT EC    270 capsule    Take 1 capsule (3 mg) by mouth 3 times daily (with meals)    Diarrhea, Loss of weight       carvedilol 12.5 MG tablet    COREG    60 tablet    Take 1 tablet (12.5 mg) by mouth 2 times daily (with meals)    Secondary hypertension       DEMEROL 25 MG/ML injection   Generic drug:  meperidine      Inject 25 mg into the vein as needed. 25 mg IV prior to IgG infusion        dicyclomine 10 MG capsule    BENTYL     Take 10 mg by mouth 4 times daily (before meals and nightly).        diphenhydrAMINE 25 MG tablet    BENADRYL    4 tablet    Take 2 tablets (50 mg) by mouth See Admin Instructions Please take 50 mg by mouth evening before  And morning of the Abd/Pelvis CT scan with contrast per Dr. Adair.  This is a pre medication for contrast dye.    Atherosclerosis of renal artery (H)       FISH OIL      Unsure of dosage take two caps daily        * fluticasone-salmeterol 250-50  MCG/DOSE diskus inhaler    ADVAIR    1 Inhaler    Inhale 1 puff into the lungs 2 times daily.    Unspecified chronic bronchitis       * fluticasone-salmeterol 250-50 MCG/DOSE diskus inhaler    ADVAIR    1 Inhaler    Inhale 1 puff into the lungs 2 times daily    Simple chronic bronchitis (H)       gabapentin 300 MG capsule    NEURONTIN    180 capsule    Take 2 capsules (600 mg) by mouth 3 times daily    Neuralgia, Neck pain       HYDROcodone-acetaminophen  MG per tablet    LORCET     Take 1 tablet by mouth every 6 hours as needed.        Immune Globulin (Human) 25 GM/500ML Soln      Inject 25 g into the vein See Admin Instructions. 25 grams every 3 weeks IVIG        metFORMIN 1000 MG tablet    GLUCOPHAGE    180 tablet    Take 1 tablet twice daily    DM (diabetes mellitus) (H)       nitroGLYcerin 0.4 MG sublingual tablet    NITROQUICK    25 tablet    Place 1 tablet (0.4 mg) under the tongue every 5 minutes as needed    Chest pain       omeprazole 20 MG CR capsule    priLOSEC    60 capsule    Take 1 capsule (20 mg) by mouth 2 times daily    Esophageal reflux, Unspecified chronic bronchitis       * blood glucose monitoring lancets     3 Box    Use to test blood sugar 3 times daily or as directed.    Type 2 diabetes mellitus with complication (H)       * ONE TOUCH FINE POINT LANCETS      2 times daily.        * ONETOUCH ULTRA test strip   Generic drug:  blood glucose monitoring     200 strip    Test blood sugar twice a day.    Diabetes mellitus type II       * blood glucose monitoring test strip    TERRY CONTOUR NEXT    270 each    by In Vitro route 3 times daily Use to test blood sugar 3 times daily or as directed.    Type 2 diabetes mellitus with complication (H)       predniSONE 20 MG tablet    DELTASONE    5 tablet    Patient to take 60 mg the evening before the procedure and 30 mg the morning of the procedure.    Allergic reaction to contrast dye       tiotropium 18 MCG capsule    SPIRIVA HANDIHALER    30  capsule    One puff every day    Unspecified chronic bronchitis       TYLENOL 325 MG tablet   Generic drug:  acetaminophen      Take 1-2 tablets by mouth every 6 hours as needed.        * Notice:  This list has 10 medication(s) that are the same as other medications prescribed for you. Read the directions carefully, and ask your doctor or other care provider to review them with you.

## 2017-11-16 NOTE — PROGRESS NOTES
Nursing Note  Jennifer Alvarez presents [facility] for:   Chief Complaint   Patient presents with     Infusion     IVIG     During [facility]'s [facility] appointment, orders from Dr. Elizabeth were completed.  Frequency: every 3 weeks    Progress note:  Patient identification verified by name and date of birth.  Assessment completed.  Vitals recorded in Doc Flowsheets.  Patient was provided with education regarding infusion and possible side effects.  Patient verbalized understanding.      needed: No  Premedications: administered per order.  Infusion Rates: Gammagard rates started at 12cc/hour x15min; 25cc/hour x15min; 65cc/hour x15min; 125cc/hour until dose completed. D5W flush provided pre and post infusion.  Approximate Infusion length:3 hours.   Labs: were not ordered for this appointment.  Vascular access: peripheral IV placed today.  Treatment Conditions: patient denies fever, chills, signs of infection, recent illness, on antibiotics, productive cough or elevated temperature.  Patient tolerated infusion: well.    Drug Waste Record    Drug Name: Solu-Medrol  Dose: 100 mg  Route administered: IV  NDC #: NDC 4553-3350-84  Amount of waste(mL): 0.4 ml  Reason for waste: Single use vial      Discharge Plan:   Follow up plan of care with: ongoing infusions at [facility].  Discharge instructions were reviewed with patient.  Patient/representative verbalized understanding of discharge instructions and all questions answered.  Patient discharged [facility] in stable condition.    Roxie Tamez RN       Administrations This Visit     acetaminophen (TYLENOL) tablet 650 mg     Admin Date Action Dose Route Administered By             11/16/2017 Given 650 mg Oral Roxie Tamez RN                    dextrose 5% BOLUS     Admin Date Action Dose Route Administered By             11/16/2017  New Bag 100 mL Intravenous Roxie Tamez RN                    diphenhydrAMINE (BENADRYL) capsule 25 mg     Admin Date Action Dose Route Administered By             11/16/2017 Given 25 mg Oral Roxie Tamez RN                    immune globulin (GAMMAGARD) sucrose free 10 % injection 25 g     Admin Date Action Dose Route Administered By             11/16/2017 New Bag 25 g Intravenous Roxie Tamez RN                    meperidine (DEMEROL) injection 25 mg     Admin Date Action Dose Route Administered By             11/16/2017 Given 25 mg Intravenous Roxie Tamez RN                    methylPREDNISolone sodium succinate (solu-MEDROL) injection 100 mg     Admin Date Action Dose Route Administered By             11/16/2017 Given 100 mg Intravenous Roxie Tamez RN                          /64  Pulse 71  Temp 99.6  F (37.6  C) (Oral)  Resp 16  SpO2 95%

## 2017-11-16 NOTE — PATIENT INSTRUCTIONS
Dear Jennifer Alvarez    Thank you for choosing [facility] ([facility]) for your infusion.  The following information is a summary of our appointment as well as important reminders.        Immune Globulin Solution for injection  What is this medicine?  IMMUNE GLOBULIN (im MUNE GLOB sapna cynthia) helps to prevent or reduce the severity of certain infections in patients who are at risk. This medicine is collected from the pooled blood of many donors. It is used to treat immune system problems, thrombocytopenia, and Kawasaki syndrome.  This medicine may be used for other purposes; ask your health care provider or pharmacist if you have questions.  What should I tell my health care provider before I take this medicine?  They need to know if you have any of these conditions:    diabetes    extremely low or no immune antibodies in the blood    heart disease    history of blood clots    hyperprolinemia    infection in the blood, sepsis    kidney disease    taking medicine that may change kidney function - ask your health care provider about your medicine    an unusual or allergic reaction to human immune globulin, albumin, maltose, sucrose, polysorbate 80, other medicines, foods, dyes, or preservatives    pregnant or trying to get pregnant    breast-feeding  How should I use this medicine?  This medicine is for injection into a muscle or infusion into a vein or skin. It is usually given by a health care professional in a hospital or clinic setting.  In rare cases, some brands of this medicine might be given at home. You will be taught how to give this medicine. Use exactly as directed. Take your medicine at regular intervals. Do not take your medicine more often than directed.  Talk to your pediatrician regarding the use of this medicine in children. Special care may be needed.  Overdosage: If you think you have taken too much of this medicine contact a poison control  center or emergency room at once.  NOTE: This medicine is only for you. Do not share this medicine with others.  What if I miss a dose?  It is important not to miss your dose. Call your doctor or health care professional if you are unable to keep an appointment. If you give yourself the medicine and you miss a dose, take it as soon as you can. If it is almost time for your next dose, take only that dose. Do not take double or extra doses.  What may interact with this medicine?    aspirin and aspirin-like medicines    cisplatin    cyclosporine    medicines for infection like acyclovir, adefovir, amphotericin B, bacitracin, cidofovir, foscarnet, ganciclovir, gentamicin, pentamidine, vancomycin    NSAIDS, medicines for pain and inflammation, like ibuprofen or naproxen    pamidronate    vaccines    zoledronic acid  This list may not describe all possible interactions. Give your health care provider a list of all the medicines, herbs, non-prescription drugs, or dietary supplements you use. Also tell them if you smoke, drink alcohol, or use illegal drugs. Some items may interact with your medicine.  What should I watch for while using this medicine?  Your condition will be monitored carefully while you are receiving this medicine.  This medicine is made from pooled blood donations of many different people. It may be possible to pass an infection in this medicine. However, the donors are screened for infections and all products are tested for HIV and hepatitis. The medicine is treated to kill most or all bacteria and viruses. Talk to your doctor about the risks and benefits of this medicine.  Do not have vaccinations for at least 14 days before, or until at least 3 months after receiving this medicine.  What side effects may I notice from receiving this medicine?  Side effects that you should report to your doctor or health care professional as soon as possible:    allergic reactions like skin rash, itching or hives,  swelling of the face, lips, or tongue    breathing problems    chest pain or tightness    fever, chills    headache with nausea, vomiting    neck pain or difficulty moving neck    pain when moving eyes    pain, swelling, warmth in the leg    problems with balance, talking, walking    sudden weight gain    swelling of the ankles, feet, hands    trouble passing urine or change in the amount of urine  Side effects that usually do not require medical attention (report to your doctor or health care professional if they continue or are bothersome):    dizzy, drowsy    flushing    increased sweating    leg cramps    muscle aches and pains    pain at site where injected  This list may not describe all possible side effects. Call your doctor for medical advice about side effects. You may report side effects to FDA at 6-700-DCL-6269.  Where should I keep my medicine?  Keep out of the reach of children.  This drug is usually given in a hospital or clinic and will not be stored at home.  In rare cases, some brands of this medicine may be given at home. If you are using this medicine at home, you will be instructed on how to store this medicine. Throw away any unused medicine after the expiration date on the label.  NOTE: This sheet is a summary. It may not cover all possible information. If you have questions about this medicine, talk to your doctor, pharmacist, or health care provider.  NOTE:This sheet is a summary. It may not cover all possible information. If you have questions about this medicine, talk to your doctor, pharmacist, or health care provider. Copyright  2016 Gold Standard        We look forward in seeing you on your next appointment here at [facility].  Please don t hesitate to call us at [phone] to reschedule any of your appointments or to speak with one of the [facility] registered nurses.  It was a pleasure taking care of you today.    Sincerely,    [facility]  [facility]  [address]  [address]  Phone:  [phone]

## 2017-11-29 ENCOUNTER — TELEPHONE (OUTPATIENT)
Dept: CARDIOLOGY | Facility: CLINIC | Age: 77
End: 2017-11-29

## 2017-11-29 DIAGNOSIS — I10 HTN (HYPERTENSION): Primary | ICD-10-CM

## 2017-11-29 NOTE — TELEPHONE ENCOUNTER
Pharmacy requesting to refill isosorbide mn er 60 mg     Not on pt med list    [facility]    [address]

## 2017-12-01 NOTE — TELEPHONE ENCOUNTER
Called to find out if Jennifer was still taking Imdur. We had taken her off when we did the renal artery stenting.

## 2017-12-07 ENCOUNTER — INFUSION THERAPY VISIT (OUTPATIENT)
Dept: INFUSION THERAPY | Facility: CLINIC | Age: 77
End: 2017-12-07
Attending: INTERNAL MEDICINE
Payer: MEDICARE

## 2017-12-07 VITALS
HEART RATE: 67 BPM | BODY MASS INDEX: 17.08 KG/M2 | OXYGEN SATURATION: 98 % | WEIGHT: 93.4 LBS | DIASTOLIC BLOOD PRESSURE: 59 MMHG | SYSTOLIC BLOOD PRESSURE: 125 MMHG

## 2017-12-07 DIAGNOSIS — D80.2 IGA DEFICIENCY (H): Primary | ICD-10-CM

## 2017-12-07 DIAGNOSIS — D80.1 HYPOGAMMAGLOBULINEMIA (H): ICD-10-CM

## 2017-12-07 PROCEDURE — 25000128 H RX IP 250 OP 636: Mod: ZF | Performed by: INTERNAL MEDICINE

## 2017-12-07 PROCEDURE — 96366 THER/PROPH/DIAG IV INF ADDON: CPT

## 2017-12-07 PROCEDURE — 25000132 ZZH RX MED GY IP 250 OP 250 PS 637: Mod: ZF | Performed by: INTERNAL MEDICINE

## 2017-12-07 PROCEDURE — A9270 NON-COVERED ITEM OR SERVICE: HCPCS | Mod: ZF | Performed by: INTERNAL MEDICINE

## 2017-12-07 PROCEDURE — 96375 TX/PRO/DX INJ NEW DRUG ADDON: CPT

## 2017-12-07 PROCEDURE — 96365 THER/PROPH/DIAG IV INF INIT: CPT

## 2017-12-07 RX ORDER — MEPERIDINE HYDROCHLORIDE 25 MG/ML
25 INJECTION INTRAMUSCULAR; INTRAVENOUS; SUBCUTANEOUS
Status: DISCONTINUED | OUTPATIENT
Start: 2017-12-07 | End: 2017-12-07 | Stop reason: HOSPADM

## 2017-12-07 RX ORDER — METHYLPREDNISOLONE SODIUM SUCCINATE 125 MG/2ML
100 INJECTION, POWDER, LYOPHILIZED, FOR SOLUTION INTRAMUSCULAR; INTRAVENOUS ONCE
Status: COMPLETED | OUTPATIENT
Start: 2017-12-07 | End: 2017-12-07

## 2017-12-07 RX ORDER — ACETAMINOPHEN 325 MG/1
650 TABLET ORAL ONCE
Status: COMPLETED | OUTPATIENT
Start: 2017-12-07 | End: 2017-12-07

## 2017-12-07 RX ORDER — DIPHENHYDRAMINE HCL 25 MG
25 CAPSULE ORAL ONCE
Status: COMPLETED | OUTPATIENT
Start: 2017-12-07 | End: 2017-12-07

## 2017-12-07 RX ORDER — DIPHENHYDRAMINE HCL 25 MG
25 CAPSULE ORAL ONCE
Status: CANCELLED
Start: 2017-12-07 | End: 2017-12-07

## 2017-12-07 RX ORDER — MEPERIDINE HYDROCHLORIDE 25 MG/ML
25 INJECTION INTRAMUSCULAR; INTRAVENOUS; SUBCUTANEOUS
Status: CANCELLED
Start: 2017-12-07

## 2017-12-07 RX ORDER — ACETAMINOPHEN 325 MG/1
650 TABLET ORAL ONCE
Status: CANCELLED
Start: 2017-12-07 | End: 2017-12-07

## 2017-12-07 RX ORDER — METHYLPREDNISOLONE SODIUM SUCCINATE 125 MG/2ML
100 INJECTION, POWDER, LYOPHILIZED, FOR SOLUTION INTRAMUSCULAR; INTRAVENOUS ONCE
Status: CANCELLED
Start: 2017-12-07 | End: 2017-12-07

## 2017-12-07 RX ADMIN — MEPERIDINE HYDROCHLORIDE 25 MG: 25 INJECTION, SOLUTION INTRAMUSCULAR; INTRAVENOUS; SUBCUTANEOUS at 12:29

## 2017-12-07 RX ADMIN — ACETAMINOPHEN 650 MG: 325 TABLET ORAL at 12:02

## 2017-12-07 RX ADMIN — DIPHENHYDRAMINE HYDROCHLORIDE 25 MG: 25 CAPSULE ORAL at 12:02

## 2017-12-07 RX ADMIN — IMMUNE GLOBULIN INFUSION (HUMAN) 25 G: 100 INJECTION, SOLUTION INTRAVENOUS; SUBCUTANEOUS at 12:42

## 2017-12-07 RX ADMIN — DEXTROSE MONOHYDRATE 50 ML: 5 INJECTION INTRAVENOUS at 15:09

## 2017-12-07 RX ADMIN — METHYLPREDNISOLONE SODIUM SUCCINATE 100 MG: 125 INJECTION, POWDER, FOR SOLUTION INTRAMUSCULAR; INTRAVENOUS at 12:28

## 2017-12-07 NOTE — PROGRESS NOTES
Nursing Note  Jennifer Alvarez [facility] for:   Chief Complaint   Patient presents with     Infusion     IVIG (gammagard), pre-medications     During [facility]'s [facility] appointment, orders from Dr. Elizabeth were completed.  Frequency: every 3 weeks.    Progress note:  Patient identification verified by name and date of birth.  Assessment completed.  Vitals recorded in Doc Flowsheets.  Patient was provided with education regarding infusion and possible side effects.  Patient verbalized understanding.      needed: No  Premedications: administered per order.  Infusion Rates: infusion starts at 12 ml/hr for 15 minutes, 25 ml/hr for 15 minutes, 65 ml/hr for 15 minutes, 125 ml/hr until completed.  Approximate Infusion length:3 hours.   Labs: were not ordered for this appointment.  Vascular access: peripheral IV placed today.  Treatment Conditions: patient denies fever, chills, signs of infection, recent illness, on antibiotics, productive cough or elevated temperature.  Patient tolerated infusion: well.    Drug Waste Record? Yes      Drug Name: solu-medrol  Dose: 100 mg (1.6 ml)  Route administered: IV  NDC #: 5533-1877-37  Amount of waste(mL):25 mg (0.4 ml)  Reason for waste: Single use vial       Discharge Plan:   Follow up plan of care with: ongoing infusions at [facility].  Discharge instructions were reviewed with patient.  Patient/representative verbalized understanding of discharge instructions and all questions answered.  Patient discharged [facility] in stable condition.  Suzette Norris RN    Administrations This Visit     acetaminophen (TYLENOL) tablet 650 mg     Admin Date Action Dose Route Administered By             12/07/2017 Given 650 mg Oral Suzette Norris RN                    dextrose 5% BOLUS     Admin Date Action Dose Route Administered By              12/07/2017 New Bag 50 mL Intravenous Suzette Norris, CARLIN                    diphenhydrAMINE (BENADRYL) capsule 25 mg     Admin Date Action Dose Route Administered By             12/07/2017 Given 25 mg Oral Suzette Norris RN                    immune globulin - (Gammagard) sucrose free 10 % injection 25 g     Admin Date Action Dose Route Administered By             12/07/2017 New Bag 25 g Intravenous Suzette Norris, CARLIN                    meperidine (DEMEROL) injection 25 mg     Admin Date Action Dose Route Administered By             12/07/2017 Given 25 mg Intravenous Suzette Norris RN                    methylPREDNISolone sodium succinate (solu-MEDROL) injection 100 mg     Admin Date Action Dose Route Administered By             12/07/2017 Given 100 mg Intravenous Suzette Norris RN                          /88  Pulse 87  Wt 42.4 kg (93 lb 6.4 oz)  SpO2 98%  BMI 17.08 kg/m2

## 2017-12-07 NOTE — MR AVS SNAPSHOT
After Visit Summary   12/7/2017    Jennifer Alvarez    MRN: [id number]           Patient Information     Date Of Birth          1940        Visit Information        Provider Department      12/7/2017 12:00 PM UC 44 ATC; UC SPEC INFUSION [facility]        Today's Diagnoses     IgA deficiency (H)    -  1    Hypogammaglobulinemia (H)           Follow-ups after your visit        Your next 10 appointments already scheduled     Dec 28, 2017 11:00 AM CST   Infusion 300 with UC SPEC INFUSION, UC 44 ATC   [facility] ([facility])    [address] [place] [address]   [phone]            Jan 18, 2018 11:00 AM CST   Infusion 300 with UC SPEC INFUSION, UC 44 ATC   [facility] ([facility])    [address] [place] [address]   [phone]            Jan 23, 2018 10:30 AM CST   (Arrive by 10:15 AM)   Pacemaker Check with Uc Cv Device 1   [facility] ([facility])    [address] [place] [address]   [phone]            Jan 23, 2018 11:00 AM CST   (Arrive by 10:45 AM)   RETURN ARRHYTHMIA with Shola Rivas MD   [facility] ([facility])    [address] [place] [address]   [phone]            Feb 08, 2018 11:00 AM CST   Infusion 300 with UC SPEC INFUSION, UC 44 ATC   [facility] ([facility])    [address] [place] [address]   [phone]            Apr 04, 2018 12:15 PM CDT   Lab with UC LAB   [facility] ([facility])    [address] [place] [address]   [phone]            Apr 04,  2018 12:30 PM CDT   PFT VISIT with  PFL MITCH   [facility] Pulmonary Function Testing ([facility])    [address] [facility]  3rd Floor  [facility] [address] [phone]              Who to contact     If you have questions or need follow up information about today's clinic visit or your schedule please contact [facility] directly at [phone].  Normal or non-critical lab and imaging results will be communicated to you by [employer], letter or phone within 4 business days after the clinic has received the results. If you do not hear from us within 7 days, please contact the clinic through Narrative or phone. If you have a critical or abnormal lab result, we will notify you by phone as soon as possible.  Submit refill requests through Narrative or call your pharmacy and they will forward the refill request to us. Please allow 3 business days for your refill to be completed.          Additional Information About Your Visit        Narrative Information     Narrative gives you secure access to your electronic health record. If you see a primary care provider, you can also send messages to your care team and make appointments. If you have questions, please call your primary care clinic.  If you do not have a primary care provider, please call [phone] and they will assist you.        Care EveryWhere ID     This is your Care EveryWhere ID. This could be used by other organizations to access your [place] medical records  [id number]        Your Vitals Were     Pulse Pulse Oximetry BMI (Body Mass Index)             67 98% 17.08 kg/m2          Blood Pressure from Last 3 Encounters:   12/07/17 125/59   11/16/17 114/44   10/26/17 (P) 120/51    Weight from Last 3 Encounters:   12/07/17 42.4 kg (93 lb 6.4 oz)   10/26/17 42.5 kg (93 lb 9.6 oz)   10/04/17 42.4 kg (93 lb 8 oz)              Today, you had the following     No orders found for display          Today's Medication Changes          These changes are accurate as of: 12/7/17  5:27 PM.  If you have any questions, ask your nurse or doctor.               These medicines have changed or have updated prescriptions.        Dose/Directions    metFORMIN 1000 MG tablet   Commonly known as:  GLUCOPHAGE   This may have changed:    - when to take this  - additional instructions   Used for:  DM (diabetes mellitus) (H)        Take 1 tablet twice daily   Quantity:  180 tablet   Refills:  3                Primary Care Provider Office Phone # Fax #    Brandi Burks [phone]       [facility] [address]   [facility] [address]        Equal Access to Services     [facility]: Hadii adolph sims hadasho Soomaali, waaxda luqadaha, qaybta kaalmada maday, chester caicedo . So [facility] [phone].    ATENCIÓN: Si habla español, tiene a taylor disposición servicios gratuitos de asistencia lingüística. [facility] [phone].    We comply with applicable federal civil rights laws and [place] laws. We do not discriminate on the basis of race, color, national origin, age, disability, sex, sexual orientation, or gender identity.            Thank you!     Thank you for choosing [facility]  for your care. Our goal is always to provide you with excellent care. Hearing back from our patients is one way we can continue to improve our services. Please take a few minutes to complete the written survey that you may receive in the mail after your visit with us. Thank you!             Your Updated Medication List - Protect others around you: Learn how to safely use, store and throw away your medicines at www.disposemymeds.org.          This list is accurate as of: 12/7/17  5:27 PM.  Always use your most recent med list.                   Brand Name Dispense Instructions for use Diagnosis    * albuterol 108 (90 BASE) MCG/ACT Inhaler    PROAIR HFA/PROVENTIL  HFA/VENTOLIN HFA    1 Inhaler    Take 2 puffs prn for wheezing or shortness of breath    Unspecified chronic bronchitis, Esophageal reflux       * albuterol (2.5 MG/3ML) 0.083% neb solution     360 mL    Take 1 vial (2.5 mg) by nebulization 3 times daily    Mixed simple and mucopurulent chronic bronchitis (H)       * albuterol 108 (90 BASE) MCG/ACT Inhaler    PROAIR HFA/PROVENTIL HFA/VENTOLIN HFA    1 Inhaler    Take 2 puffs prn for wheezing    Simple chronic bronchitis (H), Hypogammaglobulinemia (H)       * albuterol (2.5 MG/3ML) 0.083% neb solution      Take 1 ampule by nebulization every 6 hours as needed.        aspirin 81 MG tablet      Take 1 tablet by mouth daily.        atorvastatin 10 MG tablet    LIPITOR    90 tablet    Take 4 tablets (40 mg) by mouth daily    Hyperlipidemia LDL goal <100       azithromycin 250 MG tablet    ZITHROMAX    6 tablet    Take as directed    Mixed simple and mucopurulent chronic bronchitis (H), Hypogammaglobulinemia (H)       Blood Pressure Kit      daily.        budesonide 3 MG 24 hr capsule    ENTOCORT EC    270 capsule    Take 1 capsule (3 mg) by mouth 3 times daily (with meals)    Diarrhea, Loss of weight       carvedilol 12.5 MG tablet    COREG    60 tablet    Take 1 tablet (12.5 mg) by mouth 2 times daily (with meals)    Secondary hypertension       DEMEROL 25 MG/ML injection   Generic drug:  meperidine      Inject 25 mg into the vein as needed. 25 mg IV prior to IgG infusion        dicyclomine 10 MG capsule    BENTYL     Take 10 mg by mouth 4 times daily (before meals and nightly).        diphenhydrAMINE 25 MG tablet    BENADRYL    4 tablet    Take 2 tablets (50 mg) by mouth See Admin Instructions Please take 50 mg by mouth evening before  And morning of the Abd/Pelvis CT scan with contrast per Dr. Adair.  This is a pre medication for contrast dye.    Atherosclerosis of renal artery (H)       FISH OIL      Unsure of dosage take two caps daily        *  fluticasone-salmeterol 250-50 MCG/DOSE diskus inhaler    ADVAIR    1 Inhaler    Inhale 1 puff into the lungs 2 times daily.    Unspecified chronic bronchitis       * fluticasone-salmeterol 250-50 MCG/DOSE diskus inhaler    ADVAIR    1 Inhaler    Inhale 1 puff into the lungs 2 times daily    Simple chronic bronchitis (H)       gabapentin 300 MG capsule    NEURONTIN    180 capsule    Take 2 capsules (600 mg) by mouth 3 times daily    Neuralgia, Neck pain       HYDROcodone-acetaminophen  MG per tablet    LORCET     Take 1 tablet by mouth every 6 hours as needed.        Immune Globulin (Human) 25 GM/500ML Soln      Inject 25 g into the vein See Admin Instructions. 25 grams every 3 weeks IVIG        metFORMIN 1000 MG tablet    GLUCOPHAGE    180 tablet    Take 1 tablet twice daily    DM (diabetes mellitus) (H)       nitroGLYcerin 0.4 MG sublingual tablet    NITROQUICK    25 tablet    Place 1 tablet (0.4 mg) under the tongue every 5 minutes as needed    Chest pain       omeprazole 20 MG CR capsule    priLOSEC    60 capsule    Take 1 capsule (20 mg) by mouth 2 times daily    Esophageal reflux, Unspecified chronic bronchitis       * blood glucose monitoring lancets     3 Box    Use to test blood sugar 3 times daily or as directed.    Type 2 diabetes mellitus with complication (H)       * ONE TOUCH FINE POINT LANCETS      2 times daily.        * ONETOUCH ULTRA test strip   Generic drug:  blood glucose monitoring     200 strip    Test blood sugar twice a day.    Diabetes mellitus type II       * blood glucose monitoring test strip    TERRY CONTOUR NEXT    270 each    by In Vitro route 3 times daily Use to test blood sugar 3 times daily or as directed.    Type 2 diabetes mellitus with complication (H)       predniSONE 20 MG tablet    DELTASONE    5 tablet    Patient to take 60 mg the evening before the procedure and 30 mg the morning of the procedure.    Allergic reaction to contrast dye       tiotropium 18 MCG capsule     SPIRIVA HANDIHALER    30 capsule    One puff every day    Unspecified chronic bronchitis       TYLENOL 325 MG tablet   Generic drug:  acetaminophen      Take 1-2 tablets by mouth every 6 hours as needed.        * Notice:  This list has 10 medication(s) that are the same as other medications prescribed for you. Read the directions carefully, and ask your doctor or other care provider to review them with you.

## 2017-12-08 RX ORDER — ISOSORBIDE MONONITRATE 60 MG/1
60 TABLET, EXTENDED RELEASE ORAL DAILY
Qty: 30 TABLET | Refills: 1 | Status: SHIPPED | OUTPATIENT
Start: 2017-12-08 | End: 2018-03-21

## 2017-12-08 NOTE — TELEPHONE ENCOUNTER
Called patient about medications. She seems confused about which medications she is on and why she takes them. She states that [place] has added Spironolactone to her medication list. She takes it as 25 mg twice daily.  She states she is taking carvedilol twice daily and that she is also taking Imdur, which Dr. Adair had discontinued at one time. I will re order the medication for now but Jennifer has been instructed to bring all her medications to her next visit in January with Dr. Rivas for reconciliation.

## 2017-12-28 ENCOUNTER — INFUSION THERAPY VISIT (OUTPATIENT)
Dept: INFUSION THERAPY | Facility: CLINIC | Age: 77
End: 2017-12-28
Attending: INTERNAL MEDICINE
Payer: MEDICARE

## 2017-12-28 VITALS
TEMPERATURE: 99.1 F | OXYGEN SATURATION: 96 % | HEART RATE: 74 BPM | WEIGHT: 93.1 LBS | BODY MASS INDEX: 17.03 KG/M2 | RESPIRATION RATE: 18 BRPM | DIASTOLIC BLOOD PRESSURE: 65 MMHG | SYSTOLIC BLOOD PRESSURE: 127 MMHG

## 2017-12-28 DIAGNOSIS — D80.1 HYPOGAMMAGLOBULINEMIA (H): ICD-10-CM

## 2017-12-28 DIAGNOSIS — D80.2 IGA DEFICIENCY (H): Primary | ICD-10-CM

## 2017-12-28 PROCEDURE — 96365 THER/PROPH/DIAG IV INF INIT: CPT

## 2017-12-28 PROCEDURE — 96375 TX/PRO/DX INJ NEW DRUG ADDON: CPT

## 2017-12-28 PROCEDURE — 25000128 H RX IP 250 OP 636: Mod: ZF | Performed by: INTERNAL MEDICINE

## 2017-12-28 PROCEDURE — 25000132 ZZH RX MED GY IP 250 OP 250 PS 637: Mod: ZF | Performed by: INTERNAL MEDICINE

## 2017-12-28 PROCEDURE — 96366 THER/PROPH/DIAG IV INF ADDON: CPT

## 2017-12-28 PROCEDURE — A9270 NON-COVERED ITEM OR SERVICE: HCPCS | Mod: ZF | Performed by: INTERNAL MEDICINE

## 2017-12-28 RX ORDER — MEPERIDINE HYDROCHLORIDE 25 MG/ML
25 INJECTION INTRAMUSCULAR; INTRAVENOUS; SUBCUTANEOUS
Status: CANCELLED
Start: 2017-12-28

## 2017-12-28 RX ORDER — DIPHENHYDRAMINE HCL 25 MG
25 CAPSULE ORAL ONCE
Status: CANCELLED
Start: 2017-12-28 | End: 2017-12-28

## 2017-12-28 RX ORDER — ACETAMINOPHEN 325 MG/1
650 TABLET ORAL ONCE
Status: COMPLETED | OUTPATIENT
Start: 2017-12-28 | End: 2017-12-28

## 2017-12-28 RX ORDER — METHYLPREDNISOLONE SODIUM SUCCINATE 125 MG/2ML
100 INJECTION, POWDER, LYOPHILIZED, FOR SOLUTION INTRAMUSCULAR; INTRAVENOUS ONCE
Status: CANCELLED
Start: 2017-12-28 | End: 2017-12-28

## 2017-12-28 RX ORDER — METHYLPREDNISOLONE SODIUM SUCCINATE 125 MG/2ML
100 INJECTION, POWDER, LYOPHILIZED, FOR SOLUTION INTRAMUSCULAR; INTRAVENOUS ONCE
Status: COMPLETED | OUTPATIENT
Start: 2017-12-28 | End: 2017-12-28

## 2017-12-28 RX ORDER — MEPERIDINE HYDROCHLORIDE 25 MG/ML
25 INJECTION INTRAMUSCULAR; INTRAVENOUS; SUBCUTANEOUS
Status: DISCONTINUED | OUTPATIENT
Start: 2017-12-28 | End: 2017-12-28 | Stop reason: HOSPADM

## 2017-12-28 RX ORDER — ACETAMINOPHEN 325 MG/1
650 TABLET ORAL ONCE
Status: CANCELLED
Start: 2017-12-28 | End: 2017-12-28

## 2017-12-28 RX ORDER — DIPHENHYDRAMINE HCL 25 MG
25 CAPSULE ORAL ONCE
Status: COMPLETED | OUTPATIENT
Start: 2017-12-28 | End: 2017-12-28

## 2017-12-28 RX ADMIN — MEPERIDINE HYDROCHLORIDE 25 MG: 25 INJECTION, SOLUTION INTRAMUSCULAR; INTRAVENOUS; SUBCUTANEOUS at 11:32

## 2017-12-28 RX ADMIN — METHYLPREDNISOLONE SODIUM SUCCINATE 100 MG: 125 INJECTION, POWDER, FOR SOLUTION INTRAMUSCULAR; INTRAVENOUS at 11:27

## 2017-12-28 RX ADMIN — DIPHENHYDRAMINE HYDROCHLORIDE 25 MG: 25 CAPSULE ORAL at 11:27

## 2017-12-28 RX ADMIN — ACETAMINOPHEN 650 MG: 325 TABLET ORAL at 11:27

## 2017-12-28 RX ADMIN — DEXTROSE MONOHYDRATE 100 ML: 50 INJECTION, SOLUTION INTRAVENOUS at 11:44

## 2017-12-28 RX ADMIN — IMMUNE GLOBULIN INFUSION (HUMAN) 25 G: 100 INJECTION, SOLUTION INTRAVENOUS; SUBCUTANEOUS at 11:49

## 2017-12-28 NOTE — PATIENT INSTRUCTIONS
Dear Jennifer Alvarez    Thank you for choosing [facility] ([facility]) for your infusion.  The following information is a summary of our appointment as well as important reminders.        Immune Globulin Solution for injection  What is this medicine?  IMMUNE GLOBULIN (im MUNE GLOB sapna cynthia) helps to prevent or reduce the severity of certain infections in patients who are at risk. This medicine is collected from the pooled blood of many donors. It is used to treat immune system problems, thrombocytopenia, and Kawasaki syndrome.  This medicine may be used for other purposes; ask your health care provider or pharmacist if you have questions.  What should I tell my health care provider before I take this medicine?  They need to know if you have any of these conditions:    diabetes    extremely low or no immune antibodies in the blood    heart disease    history of blood clots    hyperprolinemia    infection in the blood, sepsis    kidney disease    taking medicine that may change kidney function - ask your health care provider about your medicine    an unusual or allergic reaction to human immune globulin, albumin, maltose, sucrose, polysorbate 80, other medicines, foods, dyes, or preservatives    pregnant or trying to get pregnant    breast-feeding  How should I use this medicine?  This medicine is for injection into a muscle or infusion into a vein or skin. It is usually given by a health care professional in a hospital or clinic setting.  In rare cases, some brands of this medicine might be given at home. You will be taught how to give this medicine. Use exactly as directed. Take your medicine at regular intervals. Do not take your medicine more often than directed.  Talk to your pediatrician regarding the use of this medicine in children. Special care may be needed.  Overdosage: If you think you have taken too much of this medicine contact a poison control  center or emergency room at once.  NOTE: This medicine is only for you. Do not share this medicine with others.  What if I miss a dose?  It is important not to miss your dose. Call your doctor or health care professional if you are unable to keep an appointment. If you give yourself the medicine and you miss a dose, take it as soon as you can. If it is almost time for your next dose, take only that dose. Do not take double or extra doses.  What may interact with this medicine?    aspirin and aspirin-like medicines    cisplatin    cyclosporine    medicines for infection like acyclovir, adefovir, amphotericin B, bacitracin, cidofovir, foscarnet, ganciclovir, gentamicin, pentamidine, vancomycin    NSAIDS, medicines for pain and inflammation, like ibuprofen or naproxen    pamidronate    vaccines    zoledronic acid  This list may not describe all possible interactions. Give your health care provider a list of all the medicines, herbs, non-prescription drugs, or dietary supplements you use. Also tell them if you smoke, drink alcohol, or use illegal drugs. Some items may interact with your medicine.  What should I watch for while using this medicine?  Your condition will be monitored carefully while you are receiving this medicine.  This medicine is made from pooled blood donations of many different people. It may be possible to pass an infection in this medicine. However, the donors are screened for infections and all products are tested for HIV and hepatitis. The medicine is treated to kill most or all bacteria and viruses. Talk to your doctor about the risks and benefits of this medicine.  Do not have vaccinations for at least 14 days before, or until at least 3 months after receiving this medicine.  What side effects may I notice from receiving this medicine?  Side effects that you should report to your doctor or health care professional as soon as possible:    allergic reactions like skin rash, itching or hives,  swelling of the face, lips, or tongue    breathing problems    chest pain or tightness    fever, chills    headache with nausea, vomiting    neck pain or difficulty moving neck    pain when moving eyes    pain, swelling, warmth in the leg    problems with balance, talking, walking    sudden weight gain    swelling of the ankles, feet, hands    trouble passing urine or change in the amount of urine  Side effects that usually do not require medical attention (report to your doctor or health care professional if they continue or are bothersome):    dizzy, drowsy    flushing    increased sweating    leg cramps    muscle aches and pains    pain at site where injected  This list may not describe all possible side effects. Call your doctor for medical advice about side effects. You may report side effects to FDA at 9-244-WPR-8612.  Where should I keep my medicine?  Keep out of the reach of children.  This drug is usually given in a hospital or clinic and will not be stored at home.  In rare cases, some brands of this medicine may be given at home. If you are using this medicine at home, you will be instructed on how to store this medicine. Throw away any unused medicine after the expiration date on the label.  NOTE: This sheet is a summary. It may not cover all possible information. If you have questions about this medicine, talk to your doctor, pharmacist, or health care provider.  NOTE:This sheet is a summary. It may not cover all possible information. If you have questions about this medicine, talk to your doctor, pharmacist, or health care provider. Copyright  2016 Gold Standard          Additional information: you had your infusion of Gammagard 25 grams, Demerol 25 mg, Solumedrol 100 mg via IV today, along with oral Tylenol 650 mg and Benadryl 25 mg.       We look forward in seeing you on your next appointment here at [facility].  Please don t hesitate to call us at [phone] to reschedule any of your appointments or to  speak with one of the [facility] registered nurses.  It was a pleasure taking care of you today.    Sincerely,  Kecia Condon RN  [facility]  [address] [place] [address]  [id number]  Phone:  [phone]

## 2017-12-28 NOTE — PROGRESS NOTES
Nursing Note  Jennifer Alvarez presents [facility] for:   Chief Complaint   Patient presents with     Infusion     IVIG-Gammagard     During today's [facility] appointment, orders from Dr. Elizabeth were completed.  Frequency: every 3 weeks    Progress note:  Patient identification verified by name and date of birth.  Assessment completed.  Vitals recorded in Doc Flowsheets.  Patient was provided with education regarding infusion and possible side effects.  Patient verbalized understanding.      needed: No  Premedications: administered per order.  Infusion Rates: Gammagard rates started at 12cc/hour x15min; 25cc/hour x15min; 65cc/hour x15min; 125cc/hour until dose completed. D5W flush provided post infusion.  Infusion given over approximately 3 hours.  Solu-Cortef and Demerol 25 mg IV push as pre-medication.   Approximate Infusion length: 3 hours   Labs: were not ordered for this appointment.  Vascular access: peripheral IV placed today.  Treatment Conditions: patient denies fever, chills, signs of infection, recent illness, on antibiotics, productive cough or elevated temperature.  Patient tolerated infusion: well.    Drug Administration Record    Drug Name: Solumedrol  Dose: 100 mg  Route Administered: IV  NDC#: 1193-2729-83  Amount of waste (mL): 0.4 ml  Reason for waste: single use vial    Discharge Plan:   Follow up plan of care with: ongoing infusions at [facility] and primary medical doctor.  Discharge instructions were reviewed with patient.  Patient/representative verbalized understanding of discharge instructions and all questions answered.  Patient discharged from [facility] in stable condition.    Kecia Condon RN    Administrations This Visit     acetaminophen (TYLENOL) tablet 650 mg     Admin Date Action Dose Route Administered By             12/28/2017 Given 650 mg  Oral Kecia Condon RN                    dextrose 5% BOLUS     Admin Date Action Dose Route Administered By             12/28/2017 New Bag 100 mL Intravenous Kecia Condon RN                    diphenhydrAMINE (BENADRYL) capsule 25 mg     Admin Date Action Dose Route Administered By             12/28/2017 Given 25 mg Oral Kecia Condon RN                    immune globulin - (Gammagard) sucrose free 10 % injection 25 g     Admin Date Action Dose Route Administered By             12/28/2017 New Bag 25 g Intravenous Kecia Condon RN                    meperidine (DEMEROL) injection 25 mg     Admin Date Action Dose Route Administered By             12/28/2017 Given 25 mg Intravenous Kecia Condon RN                    methylPREDNISolone sodium succinate (solu-MEDROL) injection 100 mg     Admin Date Action Dose Route Administered By             12/28/2017 Given 100 mg Intravenous Kecia Condon RN                          /74  Pulse 87  Temp 99.1  F (37.3  C) (Oral)  Resp 18  Wt 42.2 kg (93 lb 1.6 oz)  SpO2 96%  BMI 17.03 kg/m2

## 2017-12-28 NOTE — MR AVS SNAPSHOT
After Visit Summary   12/28/2017    Jennifer Alvarez    MRN: [id number]           Patient Information     Date Of Birth          1940        Visit Information        Provider Department      12/28/2017 11:00 AM UC 44 ATC; UC SPEC INFUSION [facility] Specialty [facility] Procedure        Today's Diagnoses     IgA deficiency (H)    -  1    Hypogammaglobulinemia (H)          Care Instructions    Dear Jennifer Alvarez    Thank you for choosing [facility] ([facility]) for your infusion.  The following information is a summary of our appointment as well as important reminders.        Immune Globulin Solution for injection  What is this medicine?  IMMUNE GLOBULIN (im MUNE GLOB sapna cynthia) helps to prevent or reduce the severity of certain infections in patients who are at risk. This medicine is collected from the pooled blood of many donors. It is used to treat immune system problems, thrombocytopenia, and Kawasaki syndrome.  This medicine may be used for other purposes; ask your health care provider or pharmacist if you have questions.  What should I tell my health care provider before I take this medicine?  They need to know if you have any of these conditions:    diabetes    extremely low or no immune antibodies in the blood    heart disease    history of blood clots    hyperprolinemia    infection in the blood, sepsis    kidney disease    taking medicine that may change kidney function - ask your health care provider about your medicine    an unusual or allergic reaction to human immune globulin, albumin, maltose, sucrose, polysorbate 80, other medicines, foods, dyes, or preservatives    pregnant or trying to get pregnant    breast-feeding  How should I use this medicine?  This medicine is for injection into a muscle or infusion into a vein or skin. It is usually given by a health care professional in a hospital or clinic  setting.  In rare cases, some brands of this medicine might be given at home. You will be taught how to give this medicine. Use exactly as directed. Take your medicine at regular intervals. Do not take your medicine more often than directed.  Talk to your pediatrician regarding the use of this medicine in children. Special care may be needed.  Overdosage: If you think you have taken too much of this medicine contact a poison control center or emergency room at once.  NOTE: This medicine is only for you. Do not share this medicine with others.  What if I miss a dose?  It is important not to miss your dose. Call your doctor or health care professional if you are unable to keep an appointment. If you give yourself the medicine and you miss a dose, take it as soon as you can. If it is almost time for your next dose, take only that dose. Do not take double or extra doses.  What may interact with this medicine?    aspirin and aspirin-like medicines    cisplatin    cyclosporine    medicines for infection like acyclovir, adefovir, amphotericin B, bacitracin, cidofovir, foscarnet, ganciclovir, gentamicin, pentamidine, vancomycin    NSAIDS, medicines for pain and inflammation, like ibuprofen or naproxen    pamidronate    vaccines    zoledronic acid  This list may not describe all possible interactions. Give your health care provider a list of all the medicines, herbs, non-prescription drugs, or dietary supplements you use. Also tell them if you smoke, drink alcohol, or use illegal drugs. Some items may interact with your medicine.  What should I watch for while using this medicine?  Your condition will be monitored carefully while you are receiving this medicine.  This medicine is made from pooled blood donations of many different people. It may be possible to pass an infection in this medicine. However, the donors are screened for infections and all products are tested for HIV and hepatitis. The medicine is treated to kill  most or all bacteria and viruses. Talk to your doctor about the risks and benefits of this medicine.  Do not have vaccinations for at least 14 days before, or until at least 3 months after receiving this medicine.  What side effects may I notice from receiving this medicine?  Side effects that you should report to your doctor or health care professional as soon as possible:    allergic reactions like skin rash, itching or hives, swelling of the face, lips, or tongue    breathing problems    chest pain or tightness    fever, chills    headache with nausea, vomiting    neck pain or difficulty moving neck    pain when moving eyes    pain, swelling, warmth in the leg    problems with balance, talking, walking    sudden weight gain    swelling of the ankles, feet, hands    trouble passing urine or change in the amount of urine  Side effects that usually do not require medical attention (report to your doctor or health care professional if they continue or are bothersome):    dizzy, drowsy    flushing    increased sweating    leg cramps    muscle aches and pains    pain at site where injected  This list may not describe all possible side effects. Call your doctor for medical advice about side effects. You may report side effects to FDA at 7-489-FDA-6619.  Where should I keep my medicine?  Keep out of the reach of children.  This drug is usually given in a hospital or clinic and will not be stored at home.  In rare cases, some brands of this medicine may be given at home. If you are using this medicine at home, you will be instructed on how to store this medicine. Throw away any unused medicine after the expiration date on the label.  NOTE: This sheet is a summary. It may not cover all possible information. If you have questions about this medicine, talk to your doctor, pharmacist, or health care provider.  NOTE:This sheet is a summary. It may not cover all possible information. If you have questions about this medicine,  talk to your doctor, pharmacist, or health care provider. Copyright  2016 Gold Standard          Additional information: you had your infusion of Gammagard 25 grams, Demerol 25 mg, Solumedrol 100 mg via IV today, along with oral Tylenol 650 mg and Benadryl 25 mg.       We look forward in seeing you on your next appointment here at [facility].  Please don t hesitate to call us at [phone] to reschedule any of your appointments or to speak with one of the [facility] registered nurses.  It was a pleasure taking care of you today.    Sincerely,  Kecia Condon, RN  [facility]  Specialty Infusion & Procedure Center  [address] [place] [address]  [address]  Phone:  [phone]          Follow-ups after your visit        Your next 10 appointments already scheduled     Jan 18, 2018 11:00 AM CST   Infusion 300 with UC SPEC INFUSION, UC 44 ATC   [facility] Specialty and Procedure ([facility])    [address] [place] [id number] [phone]            Jan 23, 2018 10:30 AM CST   (Arrive by 10:15 AM)   Pacemaker Check with Uc Cv Device 1   [facility] ([facility])    [address] [place] [id number] [phone]            Jan 23, 2018 11:00 AM CST   (Arrive by 10:45 AM)   RETURN ARRHYTHMIA with Shola Rivas MD   [facility] ([facility])    [address] [place] [address]   [phone]            Feb 08, 2018 11:00 AM CST   Infusion 300 with UC SPEC INFUSION, UC 44 ATC   [facility] Specialty and Procedure ([facility])    [address] [place] [address]   [phone]            Apr 04, 2018 12:15 PM CDT   Lab with UC LAB   [facility] ([facility])    [address] [place] [address]  [address]   [phone]            Apr 04, 2018 12:30 PM CDT   PFT VISIT with FARTUN PFL MITCH   [facility] Pulmonary Function Testing ([facility])    [address] [id number] [phone]            Apr 04, 2018  1:10 PM CDT   (Arrive by 12:55 PM)   Return Interstitial Lung with Maxx Elizabeth MD   [facility] ([facility])    [address] [place] [id number] [phone]              Who to contact     If you have questions or need follow up information about today's clinic visit or your schedule please contact [facility] SPECIALTY AND PROCEDURE directly at [phone].  Normal or non-critical lab and imaging results will be communicated to you by Fareyehart, letter or phone within 4 business days after the clinic has received the results. If you do not hear from us within 7 days, please contact the clinic through Fareyehart or phone. If you have a critical or abnormal lab result, we will notify you by phone as soon as possible.  Submit refill requests through Keegy or call your pharmacy and they will forward the refill request to us. Please allow 3 business days for your refill to be completed.          Additional Information About Your Visit        [employer] Information     Keegy gives you secure access to your electronic health record. If you see a primary care provider, you can also send messages to your care team and make appointments. If you have questions, please call your primary care clinic.  If you do not have a primary care provider, please call [phone] and they will assist you.        Care EveryWhere ID     This is your Care EveryWhere ID. This could be used by other organizations to access your [employer] medical records  [id number]        Your Vitals Were     Pulse Temperature Respirations Pulse Oximetry BMI (Body Mass Index)       87  99.1  F (37.3  C) (Oral) 18 96% 17.03 kg/m2        Blood Pressure from Last 3 Encounters:   12/28/17 139/74   12/07/17 125/59   11/16/17 114/44    Weight from Last 3 Encounters:   12/28/17 42.2 kg (93 lb 1.6 oz)   12/07/17 42.4 kg (93 lb 6.4 oz)   10/26/17 42.5 kg (93 lb 9.6 oz)              Today, you had the following     No orders found for display         Today's Medication Changes          These changes are accurate as of: 12/28/17  2:29 PM.  If you have any questions, ask your nurse or doctor.               These medicines have changed or have updated prescriptions.        Dose/Directions    metFORMIN 1000 MG tablet   Commonly known as:  GLUCOPHAGE   This may have changed:    - when to take this  - additional instructions   Used for:  DM (diabetes mellitus) (H)        Take 1 tablet twice daily   Quantity:  180 tablet   Refills:  3                Primary Care Provider Office Phone # Fax #    Brandi DILLON Vitaliy [phone]       [facility] [address]   [facility] [address]        Equal Access to Services     SUJATHA HOLT AH: Hadii adolph Altamirano, walisandra coffman, qamyles nassar, chester lovelace. So [facility] [phone].    ATENCIÓN: Si habla español, tiene a taylor disposición servicios gratuitos de asistencia lingüística. [facility] [phone].    We comply with applicable federal civil rights laws and [place] laws. We do not discriminate on the basis of race, color, national origin, age, disability, sex, sexual orientation, or gender identity.            Thank you!     Thank you for choosing [facility] SPECIALTY AND PROCEDURE  for your care. Our goal is always to provide you with excellent care. Hearing back from our patients is one way we can continue to improve our services. Please take a few minutes to complete the written survey that you may receive in the mail after your visit with us. Thank you!             Your Updated  Medication List - Protect others around you: Learn how to safely use, store and throw away your medicines at www.disposemymeds.org.          This list is accurate as of: 12/28/17  2:29 PM.  Always use your most recent med list.                   Brand Name Dispense Instructions for use Diagnosis    * albuterol 108 (90 BASE) MCG/ACT Inhaler    PROAIR HFA/PROVENTIL HFA/VENTOLIN HFA    1 Inhaler    Take 2 puffs prn for wheezing or shortness of breath    Unspecified chronic bronchitis, Esophageal reflux       * albuterol (2.5 MG/3ML) 0.083% neb solution     360 mL    Take 1 vial (2.5 mg) by nebulization 3 times daily    Mixed simple and mucopurulent chronic bronchitis (H)       * albuterol 108 (90 BASE) MCG/ACT Inhaler    PROAIR HFA/PROVENTIL HFA/VENTOLIN HFA    1 Inhaler    Take 2 puffs prn for wheezing    Simple chronic bronchitis (H), Hypogammaglobulinemia (H)       * albuterol (2.5 MG/3ML) 0.083% neb solution      Take 1 ampule by nebulization every 6 hours as needed.        aspirin 81 MG tablet      Take 1 tablet by mouth daily.        atorvastatin 10 MG tablet    LIPITOR    90 tablet    Take 4 tablets (40 mg) by mouth daily    Hyperlipidemia LDL goal <100       azithromycin 250 MG tablet    ZITHROMAX    6 tablet    Take as directed    Mixed simple and mucopurulent chronic bronchitis (H), Hypogammaglobulinemia (H)       Blood Pressure Kit      daily.        budesonide 3 MG 24 hr capsule    ENTOCORT EC    270 capsule    Take 1 capsule (3 mg) by mouth 3 times daily (with meals)    Diarrhea, Loss of weight       carvedilol 12.5 MG tablet    COREG    60 tablet    Take 1 tablet (12.5 mg) by mouth 2 times daily (with meals)    Secondary hypertension       DEMEROL 25 MG/ML injection   Generic drug:  meperidine      Inject 25 mg into the vein as needed. 25 mg IV prior to IgG infusion        dicyclomine 10 MG capsule    BENTYL     Take 10 mg by mouth 4 times daily (before meals and nightly).        diphenhydrAMINE 25 MG  tablet    BENADRYL    4 tablet    Take 2 tablets (50 mg) by mouth See Admin Instructions Please take 50 mg by mouth evening before  And morning of the Abd/Pelvis CT scan with contrast per Dr. Adair.  This is a pre medication for contrast dye.    Atherosclerosis of renal artery (H)       FISH OIL      Unsure of dosage take two caps daily        * fluticasone-salmeterol 250-50 MCG/DOSE diskus inhaler    ADVAIR    1 Inhaler    Inhale 1 puff into the lungs 2 times daily.    Unspecified chronic bronchitis       * fluticasone-salmeterol 250-50 MCG/DOSE diskus inhaler    ADVAIR    1 Inhaler    Inhale 1 puff into the lungs 2 times daily    Simple chronic bronchitis (H)       gabapentin 300 MG capsule    NEURONTIN    180 capsule    Take 2 capsules (600 mg) by mouth 3 times daily    Neuralgia, Neck pain       HYDROcodone-acetaminophen  MG per tablet    LORCET     Take 1 tablet by mouth every 6 hours as needed.        Immune Globulin (Human) 25 GM/500ML Soln      Inject 25 g into the vein See Admin Instructions. 25 grams every 3 weeks IVIG        isosorbide mononitrate 60 MG 24 hr tablet    IMDUR    30 tablet    Take 1 tablet (60 mg) by mouth daily    HTN (hypertension)       metFORMIN 1000 MG tablet    GLUCOPHAGE    180 tablet    Take 1 tablet twice daily    DM (diabetes mellitus) (H)       nitroGLYcerin 0.4 MG sublingual tablet    NITROQUICK    25 tablet    Place 1 tablet (0.4 mg) under the tongue every 5 minutes as needed    Chest pain       omeprazole 20 MG CR capsule    priLOSEC    60 capsule    Take 1 capsule (20 mg) by mouth 2 times daily    Esophageal reflux, Unspecified chronic bronchitis       * blood glucose monitoring lancets     3 Box    Use to test blood sugar 3 times daily or as directed.    Type 2 diabetes mellitus with complication (H)       * ONE TOUCH FINE POINT LANCETS      2 times daily.        * ONETOUCH ULTRA test strip   Generic drug:  blood glucose monitoring     200 strip    Test blood sugar  twice a day.    Diabetes mellitus type II       * blood glucose monitoring test strip    TERRY CONTOUR NEXT    270 each    by In Vitro route 3 times daily Use to test blood sugar 3 times daily or as directed.    Type 2 diabetes mellitus with complication (H)       predniSONE 20 MG tablet    DELTASONE    5 tablet    Patient to take 60 mg the evening before the procedure and 30 mg the morning of the procedure.    Allergic reaction to contrast dye       tiotropium 18 MCG capsule    SPIRIVA HANDIHALER    30 capsule    One puff every day    Unspecified chronic bronchitis       TYLENOL 325 MG tablet   Generic drug:  acetaminophen      Take 1-2 tablets by mouth every 6 hours as needed.        * Notice:  This list has 10 medication(s) that are the same as other medications prescribed for you. Read the directions carefully, and ask your doctor or other care provider to review them with you.

## 2018-01-01 ENCOUNTER — PATIENT OUTREACH (OUTPATIENT)
Dept: CARE COORDINATION | Facility: CLINIC | Age: 78
End: 2018-01-01

## 2018-01-01 ENCOUNTER — RADIANT APPOINTMENT (OUTPATIENT)
Dept: ULTRASOUND IMAGING | Facility: CLINIC | Age: 78
End: 2018-01-01
Attending: INTERNAL MEDICINE
Payer: MEDICARE

## 2018-01-01 ENCOUNTER — INFUSION THERAPY VISIT (OUTPATIENT)
Dept: INFUSION THERAPY | Facility: CLINIC | Age: 78
End: 2018-01-01
Attending: INTERNAL MEDICINE
Payer: MEDICARE

## 2018-01-01 ENCOUNTER — TELEPHONE (OUTPATIENT)
Dept: PULMONOLOGY | Facility: CLINIC | Age: 78
End: 2018-01-01

## 2018-01-01 ENCOUNTER — TRANSFERRED RECORDS (OUTPATIENT)
Dept: HEALTH INFORMATION MANAGEMENT | Facility: CLINIC | Age: 78
End: 2018-01-01

## 2018-01-01 ENCOUNTER — CARE COORDINATION (OUTPATIENT)
Dept: CARDIOLOGY | Facility: CLINIC | Age: 78
End: 2018-01-01

## 2018-01-01 ENCOUNTER — TELEPHONE (OUTPATIENT)
Dept: CARDIOLOGY | Facility: CLINIC | Age: 78
End: 2018-01-01

## 2018-01-01 ENCOUNTER — OFFICE VISIT (OUTPATIENT)
Dept: PULMONOLOGY | Facility: CLINIC | Age: 78
End: 2018-01-01
Attending: INTERNAL MEDICINE
Payer: MEDICARE

## 2018-01-01 ENCOUNTER — ALLIED HEALTH/NURSE VISIT (OUTPATIENT)
Dept: CARDIOLOGY | Facility: CLINIC | Age: 78
End: 2018-01-01
Attending: INTERNAL MEDICINE
Payer: MEDICARE

## 2018-01-01 VITALS
WEIGHT: 90.4 LBS | BODY MASS INDEX: 17.08 KG/M2 | DIASTOLIC BLOOD PRESSURE: 81 MMHG | TEMPERATURE: 97.9 F | OXYGEN SATURATION: 100 % | RESPIRATION RATE: 18 BRPM | HEART RATE: 79 BPM | SYSTOLIC BLOOD PRESSURE: 149 MMHG

## 2018-01-01 VITALS
SYSTOLIC BLOOD PRESSURE: 145 MMHG | OXYGEN SATURATION: 99 % | WEIGHT: 92 LBS | BODY MASS INDEX: 17.38 KG/M2 | RESPIRATION RATE: 16 BRPM | TEMPERATURE: 98.2 F | DIASTOLIC BLOOD PRESSURE: 80 MMHG | HEART RATE: 66 BPM

## 2018-01-01 VITALS
HEART RATE: 76 BPM | OXYGEN SATURATION: 94 % | SYSTOLIC BLOOD PRESSURE: 189 MMHG | HEIGHT: 61 IN | BODY MASS INDEX: 16.99 KG/M2 | WEIGHT: 90 LBS | RESPIRATION RATE: 16 BRPM | DIASTOLIC BLOOD PRESSURE: 79 MMHG

## 2018-01-01 VITALS
BODY MASS INDEX: 17.08 KG/M2 | SYSTOLIC BLOOD PRESSURE: 151 MMHG | DIASTOLIC BLOOD PRESSURE: 53 MMHG | OXYGEN SATURATION: 100 % | TEMPERATURE: 98.2 F | RESPIRATION RATE: 16 BRPM | HEART RATE: 64 BPM | WEIGHT: 90.4 LBS

## 2018-01-01 VITALS
DIASTOLIC BLOOD PRESSURE: 71 MMHG | SYSTOLIC BLOOD PRESSURE: 149 MMHG | HEART RATE: 73 BPM | OXYGEN SATURATION: 98 % | TEMPERATURE: 98.2 F

## 2018-01-01 VITALS
SYSTOLIC BLOOD PRESSURE: 140 MMHG | OXYGEN SATURATION: 98 % | DIASTOLIC BLOOD PRESSURE: 70 MMHG | HEART RATE: 74 BPM | WEIGHT: 91 LBS | RESPIRATION RATE: 16 BRPM | TEMPERATURE: 98.2 F | BODY MASS INDEX: 17.19 KG/M2

## 2018-01-01 VITALS
SYSTOLIC BLOOD PRESSURE: 129 MMHG | DIASTOLIC BLOOD PRESSURE: 72 MMHG | WEIGHT: 90.2 LBS | HEART RATE: 72 BPM | BODY MASS INDEX: 17.05 KG/M2 | RESPIRATION RATE: 16 BRPM | TEMPERATURE: 98 F | OXYGEN SATURATION: 99 %

## 2018-01-01 DIAGNOSIS — D80.2 IGA DEFICIENCY (H): ICD-10-CM

## 2018-01-01 DIAGNOSIS — I10 ESSENTIAL HYPERTENSION: Primary | ICD-10-CM

## 2018-01-01 DIAGNOSIS — J41.8 MIXED SIMPLE AND MUCOPURULENT CHRONIC BRONCHITIS (H): ICD-10-CM

## 2018-01-01 DIAGNOSIS — D80.1 HYPOGAMMAGLOBULINEMIA (H): Primary | ICD-10-CM

## 2018-01-01 DIAGNOSIS — I70.1 RENAL ARTERY STENOSIS (H): Primary | ICD-10-CM

## 2018-01-01 DIAGNOSIS — K21.9 GASTROESOPHAGEAL REFLUX DISEASE WITHOUT ESOPHAGITIS: ICD-10-CM

## 2018-01-01 DIAGNOSIS — I70.1 RENAL ARTERY STENOSIS (H): ICD-10-CM

## 2018-01-01 DIAGNOSIS — T78.40XA ALLERGIC REACTION: Primary | ICD-10-CM

## 2018-01-01 DIAGNOSIS — J41.8 MIXED SIMPLE AND MUCOPURULENT CHRONIC BRONCHITIS (H): Primary | ICD-10-CM

## 2018-01-01 DIAGNOSIS — R00.1 SINUS BRADYCARDIA: Primary | ICD-10-CM

## 2018-01-01 DIAGNOSIS — D80.1 HYPOGAMMAGLOBULINEMIA (H): ICD-10-CM

## 2018-01-01 DIAGNOSIS — T50.8X5A ALLERGIC REACTION TO CONTRAST DYE: ICD-10-CM

## 2018-01-01 DIAGNOSIS — I49.5 SINOATRIAL NODE DYSFUNCTION (H): ICD-10-CM

## 2018-01-01 DIAGNOSIS — D80.2 IGA DEFICIENCY (H): Primary | ICD-10-CM

## 2018-01-01 DIAGNOSIS — I10 BENIGN ESSENTIAL HYPERTENSION: ICD-10-CM

## 2018-01-01 DIAGNOSIS — I10 ESSENTIAL HYPERTENSION: ICD-10-CM

## 2018-01-01 LAB
ALBUMIN SERPL-MCNC: 3.4 G/DL (ref 3.4–5)
ALP SERPL-CCNC: 54 U/L (ref 40–150)
ALT SERPL W P-5'-P-CCNC: 25 U/L (ref 0–50)
ANION GAP SERPL CALCULATED.3IONS-SCNC: 6 MMOL/L (ref 3–14)
AST SERPL W P-5'-P-CCNC: 25 U/L (ref 0–45)
BILIRUB DIRECT SERPL-MCNC: 0.1 MG/DL (ref 0–0.2)
BILIRUB SERPL-MCNC: 0.4 MG/DL (ref 0.2–1.3)
BUN SERPL-MCNC: 27 MG/DL (ref 7–30)
CALCIUM SERPL-MCNC: 8.6 MG/DL (ref 8.5–10.1)
CHLORIDE SERPL-SCNC: 102 MMOL/L (ref 94–109)
CHOLEST SERPL-MCNC: 217 MG/DL
CO2 SERPL-SCNC: 30 MMOL/L (ref 20–32)
CREAT SERPL-MCNC: 1.02 MG/DL (ref 0.52–1.04)
ERYTHROCYTE [DISTWIDTH] IN BLOOD BY AUTOMATED COUNT: 14.3 % (ref 10–15)
EXPTIME-PRE: 9.78 SEC
FEF2575-%PRED-PRE: 11 %
FEF2575-PRE: 0.18 L/SEC
FEF2575-PRED: 1.54 L/SEC
FEFMAX-%PRED-PRE: 16 %
FEFMAX-PRE: 0.78 L/SEC
FEFMAX-PRED: 4.68 L/SEC
FEV1-%PRED-PRE: 29 %
FEV1-PRE: 0.55 L
FEV1FEV6-PRE: 41 %
FEV1FEV6-PRED: 78 %
FEV1FVC-PRE: 39 %
FEV1FVC-PRED: 74 %
FIFMAX-PRE: 1.15 L/SEC
FVC-%PRED-PRE: 58 %
FVC-PRE: 1.42 L
FVC-PRED: 2.42 L
GFR SERPL CREATININE-BSD FRML MDRD: 52 ML/MIN/1.7M2
GLUCOSE SERPL-MCNC: 93 MG/DL (ref 70–99)
HCT VFR BLD AUTO: 41.4 % (ref 35–47)
HDLC SERPL-MCNC: 74 MG/DL
HGB BLD-MCNC: 13.2 G/DL (ref 11.7–15.7)
IGA SERPL-MCNC: 66 MG/DL (ref 70–380)
IGG SERPL-MCNC: 1670 MG/DL (ref 695–1620)
IGG1 SER-MCNC: 829 MG/DL (ref 300–856)
IGG2 SER-MCNC: 629 MG/DL (ref 158–761)
IGG3 SER-MCNC: 75 MG/DL (ref 24–192)
IGG4 SER-MCNC: 35 MG/DL (ref 11–86)
IGM SERPL-MCNC: 59 MG/DL (ref 60–265)
LDLC SERPL CALC-MCNC: 119 MG/DL
MCH RBC QN AUTO: 27.3 PG (ref 26.5–33)
MCHC RBC AUTO-ENTMCNC: 31.9 G/DL (ref 31.5–36.5)
MCV RBC AUTO: 86 FL (ref 78–100)
NONHDLC SERPL-MCNC: 143 MG/DL
PLATELET # BLD AUTO: 205 10E9/L (ref 150–450)
POTASSIUM SERPL-SCNC: 3.7 MMOL/L (ref 3.4–5.3)
PROT SERPL-MCNC: 7.3 G/DL (ref 6.8–8.8)
RBC # BLD AUTO: 4.83 10E12/L (ref 3.8–5.2)
SODIUM SERPL-SCNC: 138 MMOL/L (ref 133–144)
TRIGL SERPL-MCNC: 118 MG/DL
WBC # BLD AUTO: 3.8 10E9/L (ref 4–11)

## 2018-01-01 PROCEDURE — 25000132 ZZH RX MED GY IP 250 OP 250 PS 637: Mod: ZF | Performed by: INTERNAL MEDICINE

## 2018-01-01 PROCEDURE — A9270 NON-COVERED ITEM OR SERVICE: HCPCS | Mod: ZF | Performed by: INTERNAL MEDICINE

## 2018-01-01 PROCEDURE — 96375 TX/PRO/DX INJ NEW DRUG ADDON: CPT

## 2018-01-01 PROCEDURE — 25000128 H RX IP 250 OP 636: Mod: ZF | Performed by: INTERNAL MEDICINE

## 2018-01-01 PROCEDURE — 96365 THER/PROPH/DIAG IV INF INIT: CPT

## 2018-01-01 PROCEDURE — 80061 LIPID PANEL: CPT | Performed by: PHYSICIAN ASSISTANT

## 2018-01-01 PROCEDURE — 36415 COLL VENOUS BLD VENIPUNCTURE: CPT | Performed by: PHYSICIAN ASSISTANT

## 2018-01-01 PROCEDURE — 82784 ASSAY IGA/IGD/IGG/IGM EACH: CPT | Performed by: PHYSICIAN ASSISTANT

## 2018-01-01 PROCEDURE — 80053 COMPREHEN METABOLIC PANEL: CPT | Performed by: PHYSICIAN ASSISTANT

## 2018-01-01 PROCEDURE — 96366 THER/PROPH/DIAG IV INF ADDON: CPT

## 2018-01-01 PROCEDURE — 93281 PM DEVICE PROGR EVAL MULTI: CPT | Mod: 26 | Performed by: INTERNAL MEDICINE

## 2018-01-01 PROCEDURE — G0463 HOSPITAL OUTPT CLINIC VISIT: HCPCS | Mod: ZF

## 2018-01-01 PROCEDURE — 85027 COMPLETE CBC AUTOMATED: CPT | Performed by: PHYSICIAN ASSISTANT

## 2018-01-01 PROCEDURE — 82248 BILIRUBIN DIRECT: CPT | Performed by: PHYSICIAN ASSISTANT

## 2018-01-01 PROCEDURE — 93280 PM DEVICE PROGR EVAL DUAL: CPT

## 2018-01-01 PROCEDURE — 82787 IGG 1 2 3 OR 4 EACH: CPT | Performed by: PHYSICIAN ASSISTANT

## 2018-01-01 RX ORDER — ACETAMINOPHEN 325 MG/1
650 TABLET ORAL ONCE
Status: CANCELLED
Start: 2018-01-01

## 2018-01-01 RX ORDER — ACETAMINOPHEN 325 MG/1
650 TABLET ORAL ONCE
Status: COMPLETED | OUTPATIENT
Start: 2018-01-01 | End: 2018-01-01

## 2018-01-01 RX ORDER — DIPHENHYDRAMINE HCL 25 MG
25 CAPSULE ORAL ONCE
Status: CANCELLED | OUTPATIENT
Start: 2018-01-01

## 2018-01-01 RX ORDER — MEPERIDINE HYDROCHLORIDE 25 MG/ML
25 INJECTION INTRAMUSCULAR; INTRAVENOUS; SUBCUTANEOUS
Status: DISCONTINUED | OUTPATIENT
Start: 2018-01-01 | End: 2018-01-01 | Stop reason: HOSPADM

## 2018-01-01 RX ORDER — DIPHENHYDRAMINE HCL 25 MG
25 CAPSULE ORAL ONCE
Status: COMPLETED | OUTPATIENT
Start: 2018-01-01 | End: 2018-01-01

## 2018-01-01 RX ORDER — METHYLPREDNISOLONE SODIUM SUCCINATE 125 MG/2ML
100 INJECTION, POWDER, LYOPHILIZED, FOR SOLUTION INTRAMUSCULAR; INTRAVENOUS ONCE
Status: CANCELLED
Start: 2018-01-01 | End: 2018-01-01

## 2018-01-01 RX ORDER — DIPHENHYDRAMINE HCL 25 MG
25 CAPSULE ORAL ONCE
Status: CANCELLED
Start: 2018-01-01 | End: 2018-01-01

## 2018-01-01 RX ORDER — METHYLPREDNISOLONE SODIUM SUCCINATE 125 MG/2ML
100 INJECTION, POWDER, LYOPHILIZED, FOR SOLUTION INTRAMUSCULAR; INTRAVENOUS ONCE
Status: COMPLETED | OUTPATIENT
Start: 2018-01-01 | End: 2018-01-01

## 2018-01-01 RX ORDER — MEPERIDINE HYDROCHLORIDE 25 MG/ML
25 INJECTION INTRAMUSCULAR; INTRAVENOUS; SUBCUTANEOUS
Status: CANCELLED
Start: 2018-01-01

## 2018-01-01 RX ORDER — ACETAMINOPHEN 325 MG/1
650 TABLET ORAL ONCE
Status: CANCELLED
Start: 2018-01-01 | End: 2018-01-01

## 2018-01-01 RX ORDER — MEPERIDINE HYDROCHLORIDE 50 MG/ML
25 INJECTION INTRAMUSCULAR; INTRAVENOUS; SUBCUTANEOUS
Status: DISCONTINUED | OUTPATIENT
Start: 2018-01-01 | End: 2018-01-01 | Stop reason: HOSPADM

## 2018-01-01 RX ORDER — MEPERIDINE HYDROCHLORIDE 25 MG/ML
25 INJECTION INTRAMUSCULAR; INTRAVENOUS; SUBCUTANEOUS
Status: DISCONTINUED | OUTPATIENT
Start: 2018-01-01 | End: 2018-01-01

## 2018-01-01 RX ORDER — DIPHENHYDRAMINE HCL 25 MG
50 CAPSULE ORAL ONCE
Status: CANCELLED | OUTPATIENT
Start: 2018-01-01

## 2018-01-01 RX ORDER — LISINOPRIL 10 MG/1
10 TABLET ORAL DAILY
Qty: 90 TABLET | Refills: 3 | Status: SHIPPED | OUTPATIENT
Start: 2018-01-01 | End: 2018-01-01

## 2018-01-01 RX ORDER — DIPHENHYDRAMINE HCL 25 MG
50 CAPSULE ORAL ONCE
Status: COMPLETED | OUTPATIENT
Start: 2018-01-01 | End: 2018-01-01

## 2018-01-01 RX ORDER — PREDNISONE 10 MG/1
TABLET ORAL
Qty: 40 TABLET | Refills: 1 | Status: ON HOLD | OUTPATIENT
Start: 2018-01-01 | End: 2019-01-01

## 2018-01-01 RX ORDER — LISINOPRIL 10 MG/1
10 TABLET ORAL 2 TIMES DAILY
Qty: 180 TABLET | Refills: 3 | Status: SHIPPED | OUTPATIENT
Start: 2018-01-01 | End: 2019-01-01

## 2018-01-01 RX ORDER — PREDNISONE 10 MG/1
10 TABLET ORAL SEE ADMIN INSTRUCTIONS
Qty: 9 TABLET | Refills: 0 | Status: SHIPPED | OUTPATIENT
Start: 2018-01-01 | End: 2018-01-01

## 2018-01-01 RX ORDER — DIPHENHYDRAMINE HCL 50 MG
50 CAPSULE ORAL SEE ADMIN INSTRUCTIONS
Qty: 1 CAPSULE | Refills: 0 | Status: SHIPPED | OUTPATIENT
Start: 2018-01-01

## 2018-01-01 RX ORDER — LEVOFLOXACIN 500 MG/1
500 TABLET, FILM COATED ORAL DAILY
Qty: 14 TABLET | Refills: 3 | Status: ON HOLD | OUTPATIENT
Start: 2018-01-01 | End: 2019-01-01

## 2018-01-01 RX ORDER — PREDNISONE 20 MG/1
20 TABLET ORAL SEE ADMIN INSTRUCTIONS
Qty: 5 TABLET | Refills: 3 | Status: SHIPPED | OUTPATIENT
Start: 2018-01-01 | End: 2019-01-01

## 2018-01-01 RX ADMIN — ACETAMINOPHEN 650 MG: 325 TABLET ORAL at 11:01

## 2018-01-01 RX ADMIN — HYDROCORTISONE SODIUM SUCCINATE 100 MG: 100 INJECTION, POWDER, FOR SOLUTION INTRAMUSCULAR; INTRAVENOUS at 12:52

## 2018-01-01 RX ADMIN — DIPHENHYDRAMINE HYDROCHLORIDE 25 MG: 25 CAPSULE ORAL at 10:34

## 2018-01-01 RX ADMIN — METHYLPREDNISOLONE SODIUM SUCCINATE 125 MG: 125 INJECTION, POWDER, FOR SOLUTION INTRAMUSCULAR; INTRAVENOUS at 08:22

## 2018-01-01 RX ADMIN — MEPERIDINE HYDROCHLORIDE 25 MG: 50 INJECTION INTRAMUSCULAR; INTRAVENOUS; SUBCUTANEOUS at 11:56

## 2018-01-01 RX ADMIN — DIPHENHYDRAMINE HYDROCHLORIDE 25 MG: 25 CAPSULE ORAL at 11:23

## 2018-01-01 RX ADMIN — DIPHENHYDRAMINE HYDROCHLORIDE 25 MG: 25 CAPSULE ORAL at 11:33

## 2018-01-01 RX ADMIN — IMMUNE GLOBULIN INFUSION (HUMAN) 25 G: 100 INJECTION, SOLUTION INTRAVENOUS; SUBCUTANEOUS at 10:52

## 2018-01-01 RX ADMIN — ACETAMINOPHEN 650 MG: 325 TABLET ORAL at 08:13

## 2018-01-01 RX ADMIN — IMMUNE GLOBULIN INFUSION (HUMAN) 25 G: 100 INJECTION, SOLUTION INTRAVENOUS; SUBCUTANEOUS at 13:48

## 2018-01-01 RX ADMIN — IMMUNE GLOBULIN INFUSION (HUMAN) 25 G: 100 INJECTION, SOLUTION INTRAVENOUS; SUBCUTANEOUS at 08:59

## 2018-01-01 RX ADMIN — METHYLPREDNISOLONE SODIUM SUCCINATE 100 MG: 125 INJECTION, POWDER, FOR SOLUTION INTRAMUSCULAR; INTRAVENOUS at 11:27

## 2018-01-01 RX ADMIN — DEXTROSE MONOHYDRATE 250 ML: 50 INJECTION, SOLUTION INTRAVENOUS at 13:18

## 2018-01-01 RX ADMIN — MEPERIDINE HYDROCHLORIDE 25 MG: 25 INJECTION, SOLUTION INTRAMUSCULAR; INTRAVENOUS; SUBCUTANEOUS at 10:34

## 2018-01-01 RX ADMIN — METHYLPREDNISOLONE SODIUM SUCCINATE 100 MG: 125 INJECTION, POWDER, FOR SOLUTION INTRAMUSCULAR; INTRAVENOUS at 11:33

## 2018-01-01 RX ADMIN — IMMUNE GLOBULIN INFUSION (HUMAN) 25 G: 100 INJECTION, SOLUTION INTRAVENOUS; SUBCUTANEOUS at 11:49

## 2018-01-01 RX ADMIN — METHYLPREDNISOLONE 100 MG: 125 INJECTION, POWDER, LYOPHILIZED, FOR SOLUTION INTRAMUSCULAR; INTRAVENOUS at 10:34

## 2018-01-01 RX ADMIN — IMMUNE GLOBULIN INFUSION (HUMAN) 25 G: 100 INJECTION, SOLUTION INTRAVENOUS; SUBCUTANEOUS at 11:26

## 2018-01-01 RX ADMIN — ACETAMINOPHEN 650 MG: 325 TABLET ORAL at 12:51

## 2018-01-01 RX ADMIN — MEPERIDINE HYDROCHLORIDE 25 MG: 25 INJECTION, SOLUTION INTRAMUSCULAR; INTRAVENOUS; SUBCUTANEOUS at 08:14

## 2018-01-01 RX ADMIN — DEXTROSE MONOHYDRATE 150 ML: 50 INJECTION, SOLUTION INTRAVENOUS at 14:00

## 2018-01-01 RX ADMIN — METHYLPREDNISOLONE SODIUM SUCCINATE 100 MG: 125 INJECTION, POWDER, FOR SOLUTION INTRAMUSCULAR; INTRAVENOUS at 11:04

## 2018-01-01 RX ADMIN — IMMUNE GLOBULIN INFUSION (HUMAN) 25 G: 100 INJECTION, SOLUTION INTRAVENOUS; SUBCUTANEOUS at 11:57

## 2018-01-01 RX ADMIN — DIPHENHYDRAMINE HYDROCHLORIDE 25 MG: 25 CAPSULE ORAL at 11:01

## 2018-01-01 RX ADMIN — ACETAMINOPHEN 650 MG: 325 TABLET ORAL at 11:22

## 2018-01-01 RX ADMIN — ACETAMINOPHEN 650 MG: 325 TABLET ORAL at 11:33

## 2018-01-01 RX ADMIN — DIPHENHYDRAMINE HYDROCHLORIDE 25 MG: 25 CAPSULE ORAL at 08:13

## 2018-01-01 RX ADMIN — MEPERIDINE HYDROCHLORIDE 25 MG: 25 INJECTION, SOLUTION INTRAMUSCULAR; INTRAVENOUS; SUBCUTANEOUS at 13:34

## 2018-01-01 RX ADMIN — MEPERIDINE HYDROCHLORIDE 25 MG: 25 INJECTION INTRAMUSCULAR; INTRAVENOUS; SUBCUTANEOUS at 11:45

## 2018-01-01 RX ADMIN — MEPERIDINE HYDROCHLORIDE 25 MG: 25 INJECTION, SOLUTION INTRAMUSCULAR; INTRAVENOUS; SUBCUTANEOUS at 11:08

## 2018-01-01 RX ADMIN — DEXTROSE MONOHYDRATE 100 ML: 5 INJECTION, SOLUTION INTRAVENOUS at 11:29

## 2018-01-01 RX ADMIN — ACETAMINOPHEN 650 MG: 325 TABLET, FILM COATED ORAL at 10:33

## 2018-01-01 RX ADMIN — DIPHENHYDRAMINE HYDROCHLORIDE 50 MG: 25 CAPSULE ORAL at 12:52

## 2018-01-01 ASSESSMENT — PAIN DESCRIPTION - DESCRIPTORS
DESCRIPTORS: ACHING
DESCRIPTORS: ACHING
DESCRIPTORS: ACHING;CONSTANT

## 2018-01-01 ASSESSMENT — PAIN SCALES - GENERAL: PAINLEVEL: NO PAIN (0)

## 2018-01-18 ENCOUNTER — INFUSION THERAPY VISIT (OUTPATIENT)
Dept: INFUSION THERAPY | Facility: CLINIC | Age: 78
End: 2018-01-18
Attending: INTERNAL MEDICINE
Payer: MEDICARE

## 2018-01-18 VITALS
RESPIRATION RATE: 17 BRPM | BODY MASS INDEX: 17.03 KG/M2 | DIASTOLIC BLOOD PRESSURE: 62 MMHG | SYSTOLIC BLOOD PRESSURE: 117 MMHG | HEART RATE: 63 BPM | OXYGEN SATURATION: 96 % | WEIGHT: 93.1 LBS | TEMPERATURE: 96.9 F

## 2018-01-18 DIAGNOSIS — D80.2 IGA DEFICIENCY (H): Primary | ICD-10-CM

## 2018-01-18 DIAGNOSIS — D80.1 HYPOGAMMAGLOBULINEMIA (H): ICD-10-CM

## 2018-01-18 PROCEDURE — 96366 THER/PROPH/DIAG IV INF ADDON: CPT

## 2018-01-18 PROCEDURE — 25000132 ZZH RX MED GY IP 250 OP 250 PS 637: Mod: ZF | Performed by: INTERNAL MEDICINE

## 2018-01-18 PROCEDURE — A9270 NON-COVERED ITEM OR SERVICE: HCPCS | Mod: ZF | Performed by: INTERNAL MEDICINE

## 2018-01-18 PROCEDURE — 96365 THER/PROPH/DIAG IV INF INIT: CPT

## 2018-01-18 PROCEDURE — 96375 TX/PRO/DX INJ NEW DRUG ADDON: CPT

## 2018-01-18 PROCEDURE — 25000128 H RX IP 250 OP 636: Mod: ZF | Performed by: INTERNAL MEDICINE

## 2018-01-18 RX ORDER — ACETAMINOPHEN 325 MG/1
650 TABLET ORAL ONCE
Status: CANCELLED
Start: 2018-01-18 | End: 2018-01-18

## 2018-01-18 RX ORDER — METHYLPREDNISOLONE SODIUM SUCCINATE 125 MG/2ML
100 INJECTION, POWDER, LYOPHILIZED, FOR SOLUTION INTRAMUSCULAR; INTRAVENOUS ONCE
Status: CANCELLED
Start: 2018-01-18 | End: 2018-01-18

## 2018-01-18 RX ORDER — DIPHENHYDRAMINE HCL 25 MG
25 CAPSULE ORAL ONCE
Status: COMPLETED | OUTPATIENT
Start: 2018-01-18 | End: 2018-01-18

## 2018-01-18 RX ORDER — MEPERIDINE HYDROCHLORIDE 25 MG/ML
25 INJECTION INTRAMUSCULAR; INTRAVENOUS; SUBCUTANEOUS
Status: DISCONTINUED | OUTPATIENT
Start: 2018-01-18 | End: 2018-01-18 | Stop reason: HOSPADM

## 2018-01-18 RX ORDER — ACETAMINOPHEN 325 MG/1
650 TABLET ORAL ONCE
Status: COMPLETED | OUTPATIENT
Start: 2018-01-18 | End: 2018-01-18

## 2018-01-18 RX ORDER — DIPHENHYDRAMINE HCL 25 MG
25 CAPSULE ORAL ONCE
Status: CANCELLED
Start: 2018-01-18 | End: 2018-01-18

## 2018-01-18 RX ORDER — METHYLPREDNISOLONE SODIUM SUCCINATE 125 MG/2ML
100 INJECTION, POWDER, LYOPHILIZED, FOR SOLUTION INTRAMUSCULAR; INTRAVENOUS ONCE
Status: COMPLETED | OUTPATIENT
Start: 2018-01-18 | End: 2018-01-18

## 2018-01-18 RX ORDER — MEPERIDINE HYDROCHLORIDE 25 MG/ML
25 INJECTION INTRAMUSCULAR; INTRAVENOUS; SUBCUTANEOUS
Status: CANCELLED
Start: 2018-01-18

## 2018-01-18 RX ADMIN — ACETAMINOPHEN 650 MG: 325 TABLET, FILM COATED ORAL at 11:37

## 2018-01-18 RX ADMIN — DIPHENHYDRAMINE HYDROCHLORIDE 25 MG: 25 CAPSULE ORAL at 11:37

## 2018-01-18 RX ADMIN — METHYLPREDNISOLONE SODIUM SUCCINATE 100 MG: 125 INJECTION, POWDER, FOR SOLUTION INTRAMUSCULAR; INTRAVENOUS at 11:44

## 2018-01-18 RX ADMIN — DEXTROSE MONOHYDRATE 50 ML: 5 INJECTION INTRAVENOUS at 14:23

## 2018-01-18 RX ADMIN — MEPERIDINE HYDROCHLORIDE 25 MG: 25 INJECTION, SOLUTION INTRAMUSCULAR; INTRAVENOUS; SUBCUTANEOUS at 11:37

## 2018-01-18 RX ADMIN — IMMUNE GLOBULIN INFUSION (HUMAN) 25 G: 100 INJECTION, SOLUTION INTRAVENOUS; SUBCUTANEOUS at 11:54

## 2018-01-18 ASSESSMENT — PAIN SCALES - GENERAL: PAINLEVEL: SEVERE PAIN (6)

## 2018-01-18 NOTE — PATIENT INSTRUCTIONS
Dear Jennifer Alvarez    Thank you for choosing [facility] Specialty Infusion and Procedure Center ([facility]) for your infusion.  The following information is a summary of our appointment as well as important reminders.      Please refer to your hospital discharge instructions for details on home care services, future appointments, phone numbers, and diet/activity levels.    Additional information: Today you received IVIG infusion. You will return in 3 weeks for your next infusion.      We look forward in seeing you on your next appointment here at [facility].  Please don t hesitate to call us at [phone] to reschedule any of your appointments or to speak with one of the [facility] registered nurses.  It was a pleasure taking care of you today.    Sincerely,    [facility]  Specialty Infusion & Procedure Center  [address] [place]  [address]  Phone:  [phone]

## 2018-01-18 NOTE — MR AVS SNAPSHOT
After Visit Summary   1/18/2018    Jennifer Alvarez    MRN: [id number]           Patient Information     Date Of Birth          1940        Visit Information        Provider Department      1/18/2018 11:00 AM UC 44 ATC; UC SPEC INFUSION [facility] [place] [facility]        Today's Diagnoses     IgA deficiency (H)    -  1    Hypogammaglobulinemia (H)          Care Instructions    Dear Jennifer Alvarez    Thank you for choosing [facility] ([facility]) for your infusion.  The following information is a summary of our appointment as well as important reminders.      Please refer to your hospital discharge instructions for details on home care services, future appointments, phone numbers, and diet/activity levels.    Additional information: Today you received IVIG infusion. You will return in 3 weeks for your next infusion.      We look forward in seeing you on your next appointment here at [facility].  Please don t hesitate to call us at [phone] to reschedule any of your appointments or to speak with one of the [facility] registered nurses.  It was a pleasure taking care of you today.    Sincerely,    [facility]  [address] [place] [address]  Phone:  [phone]          Follow-ups after your visit        Your next 10 appointments already scheduled     Jan 23, 2018 10:30 AM CST   (Arrive by 10:15 AM)   Pacemaker Check with Uc Cv Device 1   [facility]    [address] [place] [address]  [address] [place] [id number] [phone]            Jan 23, 2018 11:00 AM CST   (Arrive by 10:45 AM)   RETURN ARRHYTHMIA with Shola Rivas MD   [facility])    [address] [place] [address]  [address] [place] [id number] [phone]            Feb 08, 2018  11:00 AM CST   Infusion 300 with UC SPEC INFUSION, UC 44 ATC   [facility] ([facility])    [address]  [address]  [facility] [id number] [phone]            Apr 04, 2018 12:15 PM CDT   Lab with  LAB   [facility] ([facility])    [address]  1st Floor  [facility] [id number] [phone]            Apr 04, 2018 12:30 PM CDT   PFT VISIT with  PFL A   [facility] Pulmonary Function Testing ([facility])    [address]  3rd Floor  [facility] [id number] [phone]            Apr 04, 2018  1:10 PM CDT   (Arrive by 12:55 PM)   Return Interstitial Lung with Maxx Elizabeth MD   [facility] ([facility])    [address]  [address]  [facility] [id number] [phone]              Who to contact     If you have questions or need follow up information about today's clinic visit or your schedule please contact [facility] directly at [phone].  Normal or non-critical lab and imaging results will be communicated to you by Desinohart, letter or phone within 4 business days after the clinic has received the results. If you do not hear from us within 7 days, please contact the clinic through Desinohart or phone. If you have a critical or abnormal lab result, we will notify you by phone as soon as possible.  Submit refill requests through Thoof or call your pharmacy and they will forward the refill request to us. Please allow 3 business days for your refill to be completed.          Additional Information About Your Visit        DesinoharGather App Information     Thoof gives you secure access to your electronic health record. If you see a primary care provider, you can also send messages to your care team and make appointments. If you have questions,  please call your primary care clinic.  If you do not have a primary care provider, please call [phone] and they will assist you.        Care EveryWhere ID     This is your Care EveryWhere ID. This could be used by other organizations to access your [employer] medical records  [id number]        Your Vitals Were     Pulse Temperature Respirations Pulse Oximetry BMI (Body Mass Index)       63 96.9  F (36.1  C) (Oral) 17 96% 17.03 kg/m2        Blood Pressure from Last 3 Encounters:   01/18/18 117/62   12/28/17 127/65   12/07/17 125/59    Weight from Last 3 Encounters:   01/18/18 42.2 kg (93 lb 1.6 oz)   12/28/17 42.2 kg (93 lb 1.6 oz)   12/07/17 42.4 kg (93 lb 6.4 oz)              Today, you had the following     No orders found for display         Today's Medication Changes          These changes are accurate as of: 1/18/18  2:28 PM.  If you have any questions, ask your nurse or doctor.               These medicines have changed or have updated prescriptions.        Dose/Directions    metFORMIN 1000 MG tablet   Commonly known as:  GLUCOPHAGE   This may have changed:    - when to take this  - additional instructions   Used for:  DM (diabetes mellitus) (H)        Take 1 tablet twice daily   Quantity:  180 tablet   Refills:  3                Primary Care Provider Office Phone # Fax #    Brandi Burks [phone]       [facility] [address] [place], [place] [id number]        Equal Access to Services     GUERA HOLT AH: Hadii adolph rayo Sorosi, waaxda luqadaha, qaybta kaalmada maday, chester ellis ademark lovelace. So [facility] [phone].    ATENCIÓN: Si habla español, tiene a taylor disposición servicios gratuitos de asistencia lingüística. Llame al [phone].    We comply with applicable federal civil rights laws and [place] laws. We do not discriminate on the basis of race, color, national origin, age, disability, sex, sexual orientation, or gender identity.            Thank you!      Thank you for choosing [facility]  for your care. Our goal is always to provide you with excellent care. Hearing back from our patients is one way we can continue to improve our services. Please take a few minutes to complete the written survey that you may receive in the mail after your visit with us. Thank you!             Your Updated Medication List - Protect others around you: Learn how to safely use, store and throw away your medicines at www.disposemymeds.org.          This list is accurate as of: 1/18/18  2:28 PM.  Always use your most recent med list.                   Brand Name Dispense Instructions for use Diagnosis    * albuterol 108 (90 BASE) MCG/ACT Inhaler    PROAIR HFA/PROVENTIL HFA/VENTOLIN HFA    1 Inhaler    Take 2 puffs prn for wheezing or shortness of breath    Unspecified chronic bronchitis, Esophageal reflux       * albuterol (2.5 MG/3ML) 0.083% neb solution     360 mL    Take 1 vial (2.5 mg) by nebulization 3 times daily    Mixed simple and mucopurulent chronic bronchitis (H)       * albuterol 108 (90 BASE) MCG/ACT Inhaler    PROAIR HFA/PROVENTIL HFA/VENTOLIN HFA    1 Inhaler    Take 2 puffs prn for wheezing    Simple chronic bronchitis (H), Hypogammaglobulinemia (H)       * albuterol (2.5 MG/3ML) 0.083% neb solution      Take 1 ampule by nebulization every 6 hours as needed.        aspirin 81 MG tablet      Take 1 tablet by mouth daily.        atorvastatin 10 MG tablet    LIPITOR    90 tablet    Take 4 tablets (40 mg) by mouth daily    Hyperlipidemia LDL goal <100       azithromycin 250 MG tablet    ZITHROMAX    6 tablet    Take as directed    Mixed simple and mucopurulent chronic bronchitis (H), Hypogammaglobulinemia (H)       Blood Pressure Kit      daily.        budesonide 3 MG 24 hr capsule    ENTOCORT EC    270 capsule    Take 1 capsule (3 mg) by mouth 3 times daily (with meals)    Diarrhea, Loss of weight       carvedilol 12.5 MG tablet     COREG    60 tablet    Take 1 tablet (12.5 mg) by mouth 2 times daily (with meals)    Secondary hypertension       DEMEROL 25 MG/ML injection   Generic drug:  meperidine      Inject 25 mg into the vein as needed. 25 mg IV prior to IgG infusion        dicyclomine 10 MG capsule    BENTYL     Take 10 mg by mouth 4 times daily (before meals and nightly).        diphenhydrAMINE 25 MG tablet    BENADRYL    4 tablet    Take 2 tablets (50 mg) by mouth See Admin Instructions Please take 50 mg by mouth evening before  And morning of the Abd/Pelvis CT scan with contrast per Dr. Adair.  This is a pre medication for contrast dye.    Atherosclerosis of renal artery (H)       FISH OIL      Unsure of dosage take two caps daily        * fluticasone-salmeterol 250-50 MCG/DOSE diskus inhaler    ADVAIR    1 Inhaler    Inhale 1 puff into the lungs 2 times daily.    Unspecified chronic bronchitis       * fluticasone-salmeterol 250-50 MCG/DOSE diskus inhaler    ADVAIR    1 Inhaler    Inhale 1 puff into the lungs 2 times daily    Simple chronic bronchitis (H)       gabapentin 300 MG capsule    NEURONTIN    180 capsule    Take 2 capsules (600 mg) by mouth 3 times daily    Neuralgia, Neck pain       HYDROcodone-acetaminophen  MG per tablet    LORCET     Take 1 tablet by mouth every 6 hours as needed.        Immune Globulin (Human) 25 GM/500ML Soln      Inject 25 g into the vein See Admin Instructions. 25 grams every 3 weeks IVIG        isosorbide mononitrate 60 MG 24 hr tablet    IMDUR    30 tablet    Take 1 tablet (60 mg) by mouth daily    HTN (hypertension)       metFORMIN 1000 MG tablet    GLUCOPHAGE    180 tablet    Take 1 tablet twice daily    DM (diabetes mellitus) (H)       nitroGLYcerin 0.4 MG sublingual tablet    NITROQUICK    25 tablet    Place 1 tablet (0.4 mg) under the tongue every 5 minutes as needed    Chest pain       omeprazole 20 MG CR capsule    priLOSEC    60 capsule    Take 1 capsule (20 mg) by mouth 2 times  daily    Esophageal reflux, Unspecified chronic bronchitis       * blood glucose monitoring lancets     3 Box    Use to test blood sugar 3 times daily or as directed.    Type 2 diabetes mellitus with complication (H)       * ONE TOUCH FINE POINT LANCETS      2 times daily.        * ONETOUCH ULTRA test strip   Generic drug:  blood glucose monitoring     200 strip    Test blood sugar twice a day.    Diabetes mellitus type II       * blood glucose monitoring test strip    TERRY CONTOUR NEXT    270 each    by In Vitro route 3 times daily Use to test blood sugar 3 times daily or as directed.    Type 2 diabetes mellitus with complication (H)       predniSONE 20 MG tablet    DELTASONE    5 tablet    Patient to take 60 mg the evening before the procedure and 30 mg the morning of the procedure.    Allergic reaction to contrast dye       tiotropium 18 MCG capsule    SPIRIVA HANDIHALER    30 capsule    One puff every day    Unspecified chronic bronchitis       TYLENOL 325 MG tablet   Generic drug:  acetaminophen      Take 1-2 tablets by mouth every 6 hours as needed.        * Notice:  This list has 10 medication(s) that are the same as other medications prescribed for you. Read the directions carefully, and ask your doctor or other care provider to review them with you.

## 2018-01-18 NOTE — PROGRESS NOTES
Nursing Note  Jennifer Alvarez [facility] for:   Chief Complaint   Patient presents with     Infusion     IVIG     During today's [facility] appointment, orders from Dr Elizabeth were completed.  Frequency: Every 3 weeks    Progress note:  Patient identification verified by name and date of birth.  Assessment completed.  Vitals recorded in Doc Flowsheets.  Patient was provided with education regarding infusion and possible side effects.  Patient verbalized understanding.      needed: No  Premedications: administered per order.  Infusion Rates: infusion starts at 21 ml/hr, then increased by 21 ml/hr every 15 minutes to final rate of 168 ml/hr.  Approximate Infusion length:3 hours.   Labs: were not ordered for this appointment.  Vascular access: peripheral IV placed today.  Treatment Conditions: non-applicable.  Patient tolerated infusion: well.    Drug Waste Record? Yes      Drug Name: Solu-Medrol  Dose: 100mg  Route administered: IV  NDC #: 6540-9313-37  Amount of waste(mL):0.4ml  Reason for waste: Single use vial       Discharge Plan:   Follow up plan of care with: ongoing infusions at [facility].  Discharge instructions were reviewed with patient.  Patient/representative verbalized understanding of discharge instructions and all questions answered.  Patient discharged from [facility] in stable condition.    Administrations This Visit     acetaminophen (TYLENOL) tablet 650 mg     Admin Date Action Dose Route Administered By             01/18/2018 Given 650 mg Oral Patsy Nelson RN                    dextrose 5% BOLUS     Admin Date Action Dose Route Administered By             01/18/2018 New Bag 50 mL Intravenous Patsy Nelson RN                    diphenhydrAMINE (BENADRYL) capsule 25 mg     Admin Date Action Dose Route Administered By             01/18/2018 Given  25 mg Oral Patsy Nelson RN                    immune globulin - (GAMMAGARD)sucrose free 10 % injection 25 g     Admin Date Action Dose Rate Route Administered By          01/18/2018 New Bag 25 g 21 mL/hr Intravenous Patsy Nelson RN                   meperidine (DEMEROL) injection 25 mg     Admin Date Action Dose Route Administered By             01/18/2018 Given 25 mg Intravenous Patsy Nelson RN                    methylPREDNISolone sodium succinate (solu-MEDROL) injection 100 mg     Admin Date Action Dose Route Administered By             01/18/2018 Given 100 mg Intravenous Patsy Nelson RN SHASTA, JOHNSTON, RN        /62 (BP Location: Right arm, Patient Position: Semi-Waters's, Cuff Size: Adult Small)  Pulse 63  Temp 96.9  F (36.1  C) (Oral)  Resp 17  Wt 42.2 kg (93 lb 1.6 oz)  SpO2 96%  BMI 17.03 kg/m2

## 2018-01-23 ENCOUNTER — OFFICE VISIT (OUTPATIENT)
Dept: CARDIOLOGY | Facility: CLINIC | Age: 78
End: 2018-01-23
Attending: INTERNAL MEDICINE
Payer: MEDICARE

## 2018-01-23 VITALS
HEART RATE: 69 BPM | BODY MASS INDEX: 17.33 KG/M2 | WEIGHT: 91.8 LBS | OXYGEN SATURATION: 96 % | DIASTOLIC BLOOD PRESSURE: 76 MMHG | SYSTOLIC BLOOD PRESSURE: 153 MMHG | HEIGHT: 61 IN

## 2018-01-23 DIAGNOSIS — I25.10 CAD (CORONARY ARTERY DISEASE): ICD-10-CM

## 2018-01-23 DIAGNOSIS — R00.1 SINUS BRADYCARDIA: Primary | ICD-10-CM

## 2018-01-23 DIAGNOSIS — Z45.010 PACEMAKER BATTERY DEPLETION: ICD-10-CM

## 2018-01-23 DIAGNOSIS — R06.2 WHEEZING: ICD-10-CM

## 2018-01-23 DIAGNOSIS — E11.9 DM (DIABETES MELLITUS) (H): ICD-10-CM

## 2018-01-23 DIAGNOSIS — I10 BENIGN ESSENTIAL HYPERTENSION: ICD-10-CM

## 2018-01-23 DIAGNOSIS — I10 BENIGN ESSENTIAL HYPERTENSION: Primary | ICD-10-CM

## 2018-01-23 LAB
ANION GAP SERPL CALCULATED.3IONS-SCNC: 4 MMOL/L (ref 3–14)
BUN SERPL-MCNC: 22 MG/DL (ref 7–30)
CALCIUM SERPL-MCNC: 8.8 MG/DL (ref 8.5–10.1)
CHLORIDE SERPL-SCNC: 102 MMOL/L (ref 94–109)
CO2 SERPL-SCNC: 31 MMOL/L (ref 20–32)
CREAT SERPL-MCNC: 0.9 MG/DL (ref 0.52–1.04)
GFR SERPL CREATININE-BSD FRML MDRD: 61 ML/MIN/1.7M2
GLUCOSE SERPL-MCNC: 111 MG/DL (ref 70–99)
POTASSIUM SERPL-SCNC: 3.9 MMOL/L (ref 3.4–5.3)
SODIUM SERPL-SCNC: 137 MMOL/L (ref 133–144)

## 2018-01-23 PROCEDURE — 93280 PM DEVICE PROGR EVAL DUAL: CPT

## 2018-01-23 PROCEDURE — 93280 PM DEVICE PROGR EVAL DUAL: CPT | Mod: 26 | Performed by: INTERNAL MEDICINE

## 2018-01-23 PROCEDURE — G0463 HOSPITAL OUTPT CLINIC VISIT: HCPCS | Mod: ZF

## 2018-01-23 PROCEDURE — 99215 OFFICE O/P EST HI 40 MIN: CPT | Mod: ZP | Performed by: INTERNAL MEDICINE

## 2018-01-23 RX ORDER — ALBUTEROL SULFATE 90 UG/1
AEROSOL, METERED RESPIRATORY (INHALATION)
Qty: 1 INHALER | Refills: 12 | COMMUNITY
Start: 2018-01-23

## 2018-01-23 RX ORDER — CLINDAMYCIN PHOSPHATE 900 MG/50ML
900 INJECTION, SOLUTION INTRAVENOUS
Status: CANCELLED | OUTPATIENT
Start: 2018-01-23

## 2018-01-23 RX ORDER — LIDOCAINE 40 MG/G
CREAM TOPICAL
Status: CANCELLED | OUTPATIENT
Start: 2018-01-23

## 2018-01-23 RX ORDER — ALBUTEROL SULFATE 0.83 MG/ML
1 SOLUTION RESPIRATORY (INHALATION) EVERY 6 HOURS PRN
Qty: 360 ML | Refills: 0 | COMMUNITY
Start: 2018-01-23 | End: 2018-05-16

## 2018-01-23 ASSESSMENT — PAIN SCALES - GENERAL: PAINLEVEL: NO PAIN (0)

## 2018-01-23 NOTE — PATIENT INSTRUCTIONS
"You are scheduled for a Pacemaker generator change, at [facility] [place] [facility] [place], on February 5, 2018, with .     You should arrive and check in at the [facility] waiting room, located in the [facility] hospital, at 6:30 am.  The address is: [address] [place] [id number].  If you need to cancel this procedure, please call [phone].    In preparation for your surgery please do the following:              Do not eat or drink anything after midnight.              Cleanse with Trey wipes the night before and the morning of the procedure            according to the attached written instructions.              Medication to hold: Hold your diuretic and oral diabetic medications if you feel you need to.              May take other medications with a sip of water in the morning.              You are unable to drive for 24 hours after the procedure.  Please arrange for                someone to drive you home from the hospital. You will be discharged the same day.              Please make sure there is a responsible adult to stay with you for 24 hours            after the procedure.    FOLLOW UP APPOINTMENT MAY 16, 2018 1:30 - Device nurses and 2pm Tona Bautista NP      If your question concerns the schedule/appointment times, contact:  [facility]   [phone]          Preparing the Skin Before Surgery  Preparing or \"prepping\" skin before surgery can reduce the risk of infection at the surgical site. To make the process easier, this facility has chosen disposable cloths moistened with rinse-free 2% Chlorhexidine Gluconate antiseptic solution designed to reduce the bacteria on the skin. The steps below outline the prepping process and should be carefully followed.    Prep the skin at the following time(s):    - If you wish to shower, bathe or shampoo your hair, do so the night before and prep your skin afterwards for the first time using one package of " cloths.    - Skin must be prepped the morning of the procedure using the second package of cloths.        Prep The Night Before Procedure    Do not allow this product to come in contact with your eyes, ears, mouth or mucous membranes.     Reach into one of the packages, remove the two cloths with the foam guerra and place onto a clean table.    Use one clean cloth to prep each are of the body. One cloth for #1 - neck & chest. One cloth for #2 & #3 - both arms, shoulder to fingertips including armpits. Wipe each area in a back and forth motion for 3 minutes. Be sure to wipe each area thoroughly.    Do not rinse or apply any lotions, moisturizer or make up after prepping.    Discard cloths in trash can.     Allow your skin to air dry. Dress in clean clothes/sleepwear      Prepping your skin on the morning of surgery:    Do not shower, bathe or shampoo hair.    Open a new package and follow the instructions listed above.

## 2018-01-23 NOTE — NURSING NOTE
Chief Complaint   Patient presents with     Follow Up For     fu-Sjm, Pacemaker check prior      Vitals were taken and medications were reconciled.  Cralos Omalley, JESSICA  11:07 AM

## 2018-01-23 NOTE — LETTER
1/23/2018      RE: Jennifer Alvarez  PO   [facility] [id number]       Dear Colleague,    Thank you for the opportunity to participate in the care of your patient, Jennifer Alvarez, at the [facility]. Please see a copy of my visit note below.    Electrophysiology Clinic Note  HPI:   Ms. Alvarez is a 77 year old female who has a past medical history significant for CAD s/p PCI LAD X2 2007, SSS s/p PPM implant 2008, CKD, IgA deficiency, renal artery stenosis s/p Left renal artery stent 7/2017, HLD, asthma, DM2, and GERD. She presents today to establish EP care.     Her cardiac history dates back to 2007 when she was found to have single vessel CAD with PCIX 2 to mid and distal LAD. A repeat coronary angiogram in 2008 revealed patent stents and non-obstructive CAD. Another coronary angiogram in May 2010 showed minimal CAD with patent stents. RHC showed mild elevation of PA pressures and left sided filling pressures. She had a dobutamine stress Echo in Sept 2011 that was negative for any inducible ischemia. LV function was normal at baseline and augmented appropriately with stress. A repeat echocardiogram in June 2013 revealed a normal global and regional left ventricular function with an EF of 60-65% and normal right ventricular function. There was mild concentric LVH and mild aortic sclerosis with no significant gradient across AV.  She had a negative dobutamine Echo in July 2015. She has been experiencing chest pain occurring 3-4X/week associated with activity. She is following with Dr. Adair for this who feels her symptoms are atypical and does not want to pursue further ischemic evaluation. Other work up revealed renal artery stenosis. Her device interrogation has showed she is nearing MARIA T for which she was sent to reestablish EP care. Her device checks have shown normal device function and stable lead parameters. She reports that she  occassionally has pain at pacemaker site when laying on that side.     She presents today for follow up. Since our last visit and she had renal artery stent placed and her BP meds were adjusted. Her device has now reached MARIA T. Device interrogation shows increasing RV thresholds. Today RV threshold 2.75@0.4 and 6 months ago RV threshold 1.75@0.4. 11 beats NSVT recorded. One episode of noise recorded on RV lead. RV lead sensing and impedence stable. AP 20%,  1%. She reports feeling fatigued. She is noted to be hypertensive 159/72. She denies any chest pain/pressures, dizziness, lightheadedness, worsening shortness of breath, leg/ankle swelling, PND, orthopnea, palpitations, or syncopal symptoms. Current cardiac medications include: Coreg, ASA, Lipitor,and Isosorbide.    PAST MEDICAL HISTORY:  Past Medical History:   Diagnosis Date     Abdominal pain     cramping     Arthritis      CAD (coronary artery disease)     stent x2, ppm     Cancer (H)     skin     Cardiac pacemaker      CC (Crohn's colitis) (H)      Chronic kidney disease      Diabetes (H)     DM type 2, oral agent     Diarrhea      GERD (gastroesophageal reflux disease)      History of blood transfusion      HTN (hypertension)      Hyperlipidemia LDL goal <70 11/19/2013     IgA deficiency (H)      Neck pain 12/31/2014     Uncomplicated asthma      Weight loss        CURRENT MEDICATIONS:  Current Outpatient Prescriptions   Medication Sig Dispense Refill     isosorbide mononitrate (IMDUR) 60 MG 24 hr tablet Take 1 tablet (60 mg) by mouth daily 30 tablet 1     gabapentin (NEURONTIN) 300 MG capsule Take 2 capsules (600 mg) by mouth 3 times daily 180 capsule 0     carvedilol (COREG) 12.5 MG tablet Take 1 tablet (12.5 mg) by mouth 2 times daily (with meals) 60 tablet 6     atorvastatin (LIPITOR) 10 MG tablet Take 4 tablets (40 mg) by mouth daily 90 tablet 3     nitroglycerin (NITROQUICK) 0.4 MG sublingual tablet Place 1 tablet (0.4 mg) under the tongue every 5  minutes as needed 25 tablet 3     diphenhydrAMINE (BENADRYL) 25 MG tablet Take 2 tablets (50 mg) by mouth See Admin Instructions Please take 50 mg by mouth evening before  And morning of the Abd/Pelvis CT scan with contrast per Dr. Adair.  This is a pre medication for contrast dye. 4 tablet 0     budesonide (ENTOCORT EC) 3 MG 24 hr capsule Take 1 capsule (3 mg) by mouth 3 times daily (with meals) 270 capsule 3     blood glucose monitoring (TERRY CONTOUR NEXT) test strip by In Vitro route 3 times daily Use to test blood sugar 3 times daily or as directed. 270 each 3     blood glucose monitoring (TERRY MICROLET) lancets Use to test blood sugar 3 times daily or as directed. 3 Box 3     albuterol (PROAIR HFA, PROVENTIL HFA, VENTOLIN HFA) 108 (90 BASE) MCG/ACT inhaler Take 2 puffs prn for wheezing or shortness of breath (Patient taking differently: Inhale 2 puffs into the lungs daily Take 2 puffs prn for wheezing or shortness of breath) 1 Inhaler 12     omeprazole (PRILOSEC) 20 MG capsule Take 1 capsule (20 mg) by mouth 2 times daily 60 capsule 6     metFORMIN (GLUCOPHAGE) 1000 MG tablet Take 1 tablet twice daily (Patient taking differently: daily (with dinner) Take 1 tablet twice daily) 180 tablet 3     ONE TOUCH ULTRA TEST strip Test blood sugar twice a day. 200 strip 3     tiotropium (SPIRIVA HANDIHALER) 18 MCG inhalation capsule One puff every day 30 capsule 6     fluticasone-salmeterol (ADVAIR) 250-50 MCG/DOSE diskus inhaler Inhale 1 puff into the lungs 2 times daily. 1 Inhaler 12     dicyclomine (BENTYL) 10 MG capsule Take 10 mg by mouth 4 times daily (before meals and nightly).       aspirin 81 MG tablet Take 1 tablet by mouth daily.       FISH OIL Unsure of dosage take two caps daily       Blood Pressure KIT daily.       ONE TOUCH FINE POINT LANCETS 2 times daily.       hydrocodone-acetaminophen (LORCET)  MG per tablet Take 1 tablet by mouth every 6 hours as needed.       albuterol (2.5 MG/3ML) 0.083%  nebulizer solution Take 1 ampule by nebulization every 6 hours as needed.       meperidine (DEMEROL) 25 MG/ML injection Inject 25 mg into the vein as needed. 25 mg IV prior to IgG infusion       Immune Globulin, Human, 25 GM/500ML SOLN Inject 25 g into the vein See Admin Instructions. 25 grams every 3 weeks IVIG       acetaminophen (TYLENOL) 325 MG tablet Take 1-2 tablets by mouth every 6 hours as needed.       [DISCONTINUED] fluticasone-salmeterol (ADVAIR) 250-50 MCG/DOSE diskus inhaler Inhale 1 puff into the lungs 2 times daily 1 Inhaler 12     predniSONE (DELTASONE) 20 MG tablet Patient to take 60 mg the evening before the procedure and 30 mg the morning of the procedure. (Patient not taking: Reported on 1/23/2018) 5 tablet 0     azithromycin (ZITHROMAX) 250 MG tablet Take as directed (Patient not taking: Reported on 1/23/2018) 6 tablet 4     [DISCONTINUED] albuterol (PROAIR HFA, PROVENTIL HFA, VENTOLIN HFA) 108 (90 BASE) MCG/ACT inhaler Take 2 puffs prn for wheezing 1 Inhaler 12     [DISCONTINUED] albuterol (2.5 MG/3ML) 0.083% nebulizer solution Take 1 vial (2.5 mg) by nebulization 3 times daily 360 mL 3       PAST SURGICAL HISTORY:  Past Surgical History:   Procedure Laterality Date     APPENDECTOMY       C LAP,SURG,COLECTOMY, PARTIAL, W/ANAST      partial colectomy;diverticulitis     cardiac stents      x 2     CARPAL TUNNEL RELEASE RT/LT      bilateral     CHOLECYSTECTOMY       COLONOSCOPY       COLONOSCOPY N/A 11/9/2015    Procedure: COMBINED COLONOSCOPY, SINGLE OR MULTIPLE BIOPSY/POLYPECTOMY BY BIOPSY;  Surgeon: Victor Hugo Turner MD;  Location:  GI     ENT SURGERY      back throat     ESOPHAGEAL MOTILITY STUDY  5-5-15     ESOPHAGOSCOPY, GASTROSCOPY, DUODENOSCOPY (EGD), COMBINED Left 5/13/2015    Procedure: COMBINED ESOPHAGOSCOPY, GASTROSCOPY, DUODENOSCOPY (EGD), BIOPSY SINGLE OR MULTIPLE;  Surgeon: Dipesh Bobby MD;  Location:  GI     ESOPHAGOSCOPY, GASTROSCOPY, DUODENOSCOPY (EGD), COMBINED  N/A 11/6/2015    Procedure: COMBINED ESOPHAGOSCOPY, GASTROSCOPY, DUODENOSCOPY (EGD), BIOPSY SINGLE OR MULTIPLE;  Surgeon: Victor Hugo Turner MD;  Location: [facility]     FOOT SURGERY      left     HC ESOPH/GAS REFLUX TEST W NASAL IMPED >1 HR N/A 5/5/2015    Procedure: ESOPHAGEAL IMPEDENCE FUNCTION TEST WITH 24 HOUR PH GREATER THAN 1 HOUR;  Surgeon: Dipesh Bobby MD;  Location:  GI     IMPLANT PACEMAKER      PPM     IR RENAL/VISCERAL STENT/ATHERECT/PTA Left 07/2017     LAPAROTOMY EXPLORATORY       NISSEN FUNDOPLICATION      x 2     SHOULDER SURGERY      right     UPPER GI ENDOSCOPY       WRIST SURGERY      right cyst       ALLERGIES:     Allergies   Allergen Reactions     Bees Anaphylaxis     Codeine Sulfate Hives     Contrast Dye Anaphylaxis and Hives     Milk Products Shortness Of Breath     Morphine Sulfate Other (See Comments), Hives and Itching     Pt had abdominal pain that had her doubled over     Pcn [Penicillin G Ammonium] Difficulty breathing     Zinacef [Cefuroxime Sodium] Difficulty breathing     Influenza Vaccine Live Rash     Pneumovax [Pneumococcal Polysaccharides]      Procardia [Nifedipine] Difficulty breathing     Barium Rash       FAMILY HISTORY:  Family History   Problem Relation Age of Onset     Ovarian Cancer Mother      Stomach Cancer Father      HEART DISEASE Father      DIABETES Father      Ovarian Cancer Sister      Leukemia Brother      Ovarian Cancer Sister      DIABETES Brother      DIABETES Brother      Neurologic Disorder No family hx of        SOCIAL HISTORY:  Social History   Substance Use Topics     Smoking status: Former Smoker     Packs/day: 0.20     Years: 20.00     Types: Cigarettes     Quit date: 2/24/1974     Smokeless tobacco: Never Used     Alcohol use No       ROS:   A comprehensive 10 point review of systems negative other than as mentioned in HPI.  Exam:  /76 (BP Location: Right arm, Patient Position: Chair, Cuff Size: Adult Small)  Pulse 69  Ht 1.549 m (5'  "1\")  Wt 41.6 kg (91 lb 12.8 oz)  SpO2 96%  BMI 17.35 kg/m2  GENERAL APPEARANCE: healthy, alert and no distress  HEENT: no icterus, no xanthelasmas, normal pupil size and reaction, normal palate, mucosa moist, no central cyanosis  NECK: no adenopathy, no asymmetry, masses, or scars, thyroid normal to palpation and no bruits, JVP not elevated  RESPIRATORY: lungs clear to auscultation - no rales, rhonchi or wheezes, no use of accessory muscles, no retractions, respirations are unlabored, normal respiratory rate  CARDIOVASCULAR: regular rhythm, normal S1 with physiologic split S2, no S3 or S4 and no murmur, click or rub, precordium quiet with normal PMI.  ABDOMEN: soft, non tender, without hepatosplenomegaly, no masses palpable, bowel sounds normal, aorta not enlarged by palpation, no abdominal bruits  EXTREMITIES: peripheral pulses normal, no edema, no bruits  NEURO: alert and oriented to person/place/time, normal speech, gait and affect  VASC: Radial, femoral, dorsalis pedis and posterior tibialis pulses are normal in volumes and symmetric bilaterally. No bruits are heard.  SKIN: no ecchymoses, no rashes    Labs:  Reviewed.     Testing/Procedures:  PULMONARY FUNCTION TESTS:   PFT Latest Ref Rng & Units 10/4/2017   FVC L 1.02   FEV1 L 0.72   FVC% % 41   FEV1% % 37         7/2015 STRESS ECHOCARDIOGRAM:     Interpretation Summary  Normal dobutamine stress echo at target heartrate.No significant valvular  abnormality.      Assessment and Plan:   Ms. Alvarez is a 77 year old female who has a past medical history significant for CAD s/p PCI LAD X2 2007, SSS s/p PPM implant 2008, CKD, IgA deficiency, renal artery stenosis s/p left renal artery stent 7/2017, HLD, asthma, DM2, and GERD. She presents today for follow up. Since our last visit and she had renal artery stent placed and her BP meds were adjusted. Her device has now reached MARIA T. Device interrogation shows increasing RV thresholds. Today RV threshold 2.75@0.4 and 6 " months ago RV threshold 1.75@0.4. 11 beats NSVT recorded. One episode of noise recorded on RV lead. RV lead sensing and impedence stable. AP 20%,  1%. She reports feeling fatigued. She is noted to be hypertensive 159/72.     Her device has now reached MARIA T. Her RV lead has also showed climbing thresholds. We discussed generator change in detail including indications, risks, and benefits. Since device pocket will be opened, and RV lead showing signs of dysfunction, we would prefer to replace at time of generator change. Per prior place her device is slightly more lateral with impingement when she leans on the left, so we plan to flip the device and move more medialy. We will schedule generator change in the near future. We will need to consider resuming prior lisinopril if ongoing HTN. We have asked her to monitor her BPs at home and send us log in 1 month. Follow up 3 months after generator change.     The patient states understanding and is agreeable with plan.   This patient was seen and evaluated with Dr. Rivas. The above note reflects our joint assessment and plan.   CINDY Bray CNP  Pager: [phone]    EP STAFF NOTE  I have seen and examined the patient as part of a shared visit with BERTA Bray NP.  I agree with the note above. I reviewed today's vital signs and medications. I have reviewed and discussed with the advanced practice provider their physical examination, assessment, and plan   Briefly, MARIA T and RV lead increasing thresholds over the last 6 months.  My key history/exam findings are: RRR.   The key management decisions made by me: gen change and RV lead replacement..    Shola Rivas MD [facility]  Cardiology - Electrophysiology    CC  MINDI RANGEL

## 2018-01-23 NOTE — PROGRESS NOTES
Patient seen in clinic for evaluation and iterative programming of  Her SJM dual lead pacemaker per MD orders.  Patient is scheduled to see Dr. Rivas today.  Normal pacemaker function.  4 episodes EGM of episodes 3 & 4 show 1 : 1 AV conduction. Episode #2 EGM shows 11 beats of NSVT recorded  Episode #4 shows noise in RV lead, ventricular capture today 2.75 V,  Aug 22 capture 1.75 V.  Sensing and Impedence remain he same. Intrinsic rhythm = SR @ 72 bpm.  AP = 1%.    Estimated battery longevity to [employer]  has been met on Nov 9, 2017. Patient reports that she is feeling  tired denies specific complaints.  Plan for patient to replace pacemaker generator see home physician one week after for incision check . Send remote transmission at that time.  Return to device clinic in 3 months after generator change.    dual lead pacemaker

## 2018-01-23 NOTE — MR AVS SNAPSHOT
"              After Visit Summary   1/23/2018    Jennifer Alvarez    MRN: [id number]           Patient Information     Date Of Birth          1940        Visit Information        Provider Department      1/23/2018 11:00 AM Shola Rivas MD [facility]        Today's Diagnoses     Benign essential hypertension    -  1    Pacemaker battery depletion          Care Instructions    You are scheduled for a Pacemaker generator change, at [facility] [place], on February 5, 2018, with .     You should arrive and check in at the [facility] waiting room, located in the [facility], at 6:30 am.  The address is: [address] [place] [address].  If you need to cancel this procedure, please call [phone].    In preparation for your surgery please do the following:              Do not eat or drink anything after midnight.              Cleanse with Trey wipes the night before and the morning of the procedure            according to the attached written instructions.              Medication to hold: Hold your diuretic and oral diabetic medications if you feel you need to.              May take other medications with a sip of water in the morning.              You are unable to drive for 24 hours after the procedure.  Please arrange for                someone to drive you home from the hospital. You will be discharged the same day.              Please make sure there is a responsible adult to stay with you for 24 hours            after the procedure.    FOLLOW UP APPOINTMENT MAY 16, 2018 1:30 - Device nurses and 2pm Tona Bautista NP      If your question concerns the schedule/appointment times, contact:  [facility]   [phone]          Preparing the Skin Before Surgery  Preparing or \"prepping\" skin before surgery can reduce the risk of infection at the surgical site. To make the process easier, this facility has chosen disposable cloths " moistened with rinse-free 2% Chlorhexidine Gluconate antiseptic solution designed to reduce the bacteria on the skin. The steps below outline the prepping process and should be carefully followed.    Prep the skin at the following time(s):    - If you wish to shower, bathe or shampoo your hair, do so the night before and prep your skin afterwards for the first time using one package of cloths.    - Skin must be prepped the morning of the procedure using the second package of cloths.        Prep The Night Before Procedure    Do not allow this product to come in contact with your eyes, ears, mouth or mucous membranes.     Reach into one of the packages, remove the two cloths with the foam guerra and place onto a clean table.    Use one clean cloth to prep each are of the body. One cloth for #1 - neck & chest. One cloth for #2 & #3 - both arms, shoulder to fingertips including armpits. Wipe each area in a back and forth motion for 3 minutes. Be sure to wipe each area thoroughly.    Do not rinse or apply any lotions, moisturizer or make up after prepping.    Discard cloths in trash can.     Allow your skin to air dry. Dress in clean clothes/sleepwear      Prepping your skin on the morning of surgery:    Do not shower, bathe or shampoo hair.    Open a new package and follow the instructions listed above.                               Follow-ups after your visit        Your next 10 appointments already scheduled     Feb 05, 2018  8:30 AM CST   Ep 90 Minute with [id number]   [facility]  Heart Cath Lab ([facility] [place])    [address] [facility] [address] [phone]            Feb 08, 2018 11:00 AM CST   Infusion 300 with  [facility],  [address] [facility] Procedure ([facility])    [address]  [address]  [facility] [id number] [phone]            Apr 04, 2018 12:15 PM CDT   Lab with FARTUN  LAB    Health Lab ([facility])    [address]  1st Floor  [facility] [address]   [phone]            Apr 04, 2018 12:30 PM CDT   PFT VISIT with  PFL MITCH   [facility] Pulmonary Function Testing ([facility])    [address]  3rd Floor  [facility] [address]   [phone]            Apr 04, 2018  1:10 PM CDT   (Arrive by 12:55 PM)   Return Interstitial Lung with Maxx Elizabeth MD   [facility] ([facility])    [address]  [address]  [facility] [address]   [phone]            May 16, 2018  1:30 PM CDT   (Arrive by 1:15 PM)   Pacemaker Check with  Cv Device 1   [facility] Heart Care ([facility])    [address]  [address]  [facility] [address] [phone]            May 16, 2018  2:00 PM CDT   (Arrive by 1:45 PM)   RETURN ARRHYTHMIA with CINDY Escudero CNP   [facility] ([facility])    [address]  [address]  [facility] [address] [phone]              Future tests that were ordered for you today     Open Future Orders        Priority Expected Expires Ordered    EP ICD/Pacemaker/Loop Recorder Routine  1/23/2019 1/23/2018    **Basic metabolic panel FUTURE anytime Routine 1/23/2018 1/23/2019 1/23/2018            Who to contact     If you have questions or need follow up information about today's clinic visit or your schedule please contact [facility] directly at [phone].  Normal or non-critical lab and imaging results will be communicated to you by MyChart, letter or phone within 4 business days after the clinic has received the results. If you do not hear from us within 7 days, please contact the clinic through MyChart or phone. If you have a critical or abnormal lab result, we will notify you by phone as soon as possible.  Submit refill requests  "through [employer] or call your pharmacy and they will forward the refill request to us. Please allow 3 business days for your refill to be completed.          Additional Information About Your Visit        [email or web address] Information     [employer] gives you secure access to your electronic health record. If you see a primary care provider, you can also send messages to your care team and make appointments. If you have questions, please call your primary care clinic.  If you do not have a primary care provider, please call [phone] and they will assist you.        Care EveryWhere ID     This is your Care EveryWhere ID. This could be used by other organizations to access your [facility] medical records  [id number]        Your Vitals Were     Pulse Height Pulse Oximetry BMI (Body Mass Index)          69 1.549 m (5' 1\") 96% 17.35 kg/m2         Blood Pressure from Last 3 Encounters:   01/23/18 153/76   01/18/18 117/62   12/28/17 127/65    Weight from Last 3 Encounters:   01/23/18 41.6 kg (91 lb 12.8 oz)   01/18/18 42.2 kg (93 lb 1.6 oz)   12/28/17 42.2 kg (93 lb 1.6 oz)                 Today's Medication Changes          These changes are accurate as of: 1/23/18 12:02 PM.  If you have any questions, ask your nurse or doctor.               These medicines have changed or have updated prescriptions.        Dose/Directions    * albuterol 108 (90 BASE) MCG/ACT Inhaler   Commonly known as:  PROAIR HFA/PROVENTIL HFA/VENTOLIN HFA   This may have changed:    - how much to take  - how to take this  - when to take this  - additional instructions   Used for:  Unspecified chronic bronchitis, Esophageal reflux   Changed by:  Maxx Elizabeth MD        Take 2 puffs prn for wheezing or shortness of breath   Quantity:  1 Inhaler   Refills:  12       * albuterol (2.5 MG/3ML) 0.083% neb solution   This may have changed:  Another medication with the same name was changed. Make sure you understand how and when to take each.   Changed by:  Mana" Jaleel Jones MD        Dose:  1 ampule   Take 1 ampule by nebulization every 6 hours as needed.   Refills:  0       metFORMIN 1000 MG tablet   Commonly known as:  GLUCOPHAGE   This may have changed:    - when to take this  - additional instructions   Used for:  DM (diabetes mellitus) (H)        Take 1 tablet twice daily   Quantity:  180 tablet   Refills:  3       * Notice:  This list has 2 medication(s) that are the same as other medications prescribed for you. Read the directions carefully, and ask your doctor or other care provider to review them with you.             Primary Care Provider Office Phone # Fax #    Brandi uBrks [phone]       [facility] [address]   [facility] [address]        Equal Access to Services     GUERA HOLT : Anshu Altamirano, walisandra coffman, qamyles kaalmada maday, chester lovelace. So [facility] [phone].    ATENCIÓN: Si habla español, tiene a taylor disposición servicios gratuitos de asistencia lingüística. Llame al [phone].    We comply with applicable federal civil rights laws and [place] laws. We do not discriminate on the basis of race, color, national origin, age, disability, sex, sexual orientation, or gender identity.            Thank you!     Thank you for choosing [facility]  for your care. Our goal is always to provide you with excellent care. Hearing back from our patients is one way we can continue to improve our services. Please take a few minutes to complete the written survey that you may receive in the mail after your visit with us. Thank you!             Your Updated Medication List - Protect others around you: Learn how to safely use, store and throw away your medicines at www.disposemymeds.org.          This list is accurate as of: 1/23/18 12:02 PM.  Always use your most recent med list.                   Brand Name Dispense Instructions for use Diagnosis    * albuterol 108 (90 BASE) MCG/ACT  Inhaler    PROAIR HFA/PROVENTIL HFA/VENTOLIN HFA    1 Inhaler    Take 2 puffs prn for wheezing or shortness of breath    Unspecified chronic bronchitis, Esophageal reflux       * albuterol (2.5 MG/3ML) 0.083% neb solution      Take 1 ampule by nebulization every 6 hours as needed.        aspirin 81 MG tablet      Take 1 tablet by mouth daily.        atorvastatin 10 MG tablet    LIPITOR    90 tablet    Take 4 tablets (40 mg) by mouth daily    Hyperlipidemia LDL goal <100       azithromycin 250 MG tablet    ZITHROMAX    6 tablet    Take as directed    Mixed simple and mucopurulent chronic bronchitis (H), Hypogammaglobulinemia (H)       Blood Pressure Kit      daily.        budesonide 3 MG 24 hr capsule    ENTOCORT EC    270 capsule    Take 1 capsule (3 mg) by mouth 3 times daily (with meals)    Diarrhea, Loss of weight       carvedilol 12.5 MG tablet    COREG    60 tablet    Take 1 tablet (12.5 mg) by mouth 2 times daily (with meals)    Secondary hypertension       DEMEROL 25 MG/ML injection   Generic drug:  meperidine      Inject 25 mg into the vein as needed. 25 mg IV prior to IgG infusion        dicyclomine 10 MG capsule    BENTYL     Take 10 mg by mouth 4 times daily (before meals and nightly).        diphenhydrAMINE 25 MG tablet    BENADRYL    4 tablet    Take 2 tablets (50 mg) by mouth See Admin Instructions Please take 50 mg by mouth evening before  And morning of the Abd/Pelvis CT scan with contrast per Dr. Adair.  This is a pre medication for contrast dye.    Atherosclerosis of renal artery (H)       FISH OIL      Unsure of dosage take two caps daily        fluticasone-salmeterol 250-50 MCG/DOSE diskus inhaler    ADVAIR    1 Inhaler    Inhale 1 puff into the lungs 2 times daily.    Unspecified chronic bronchitis       gabapentin 300 MG capsule    NEURONTIN    180 capsule    Take 2 capsules (600 mg) by mouth 3 times daily    Neuralgia, Neck pain       HYDROcodone-acetaminophen  MG per tablet    LORCET      Take 1 tablet by mouth every 6 hours as needed.        Immune Globulin (Human) 25 GM/500ML Soln      Inject 25 g into the vein See Admin Instructions. 25 grams every 3 weeks IVIG        isosorbide mononitrate 60 MG 24 hr tablet    IMDUR    30 tablet    Take 1 tablet (60 mg) by mouth daily    HTN (hypertension)       metFORMIN 1000 MG tablet    GLUCOPHAGE    180 tablet    Take 1 tablet twice daily    DM (diabetes mellitus) (H)       nitroGLYcerin 0.4 MG sublingual tablet    NITROQUICK    25 tablet    Place 1 tablet (0.4 mg) under the tongue every 5 minutes as needed    Chest pain       omeprazole 20 MG CR capsule    priLOSEC    60 capsule    Take 1 capsule (20 mg) by mouth 2 times daily    Esophageal reflux, Unspecified chronic bronchitis       * blood glucose monitoring lancets     3 Box    Use to test blood sugar 3 times daily or as directed.    Type 2 diabetes mellitus with complication (H)       * ONE TOUCH FINE POINT LANCETS      2 times daily.        * ONETOUCH ULTRA test strip   Generic drug:  blood glucose monitoring     200 strip    Test blood sugar twice a day.    Diabetes mellitus type II       * blood glucose monitoring test strip    TERRY CONTOUR NEXT    270 each    by In Vitro route 3 times daily Use to test blood sugar 3 times daily or as directed.    Type 2 diabetes mellitus with complication (H)       predniSONE 20 MG tablet    DELTASONE    5 tablet    Patient to take 60 mg the evening before the procedure and 30 mg the morning of the procedure.    Allergic reaction to contrast dye       tiotropium 18 MCG capsule    SPIRIVA HANDIHALER    30 capsule    One puff every day    Unspecified chronic bronchitis       TYLENOL 325 MG tablet   Generic drug:  acetaminophen      Take 1-2 tablets by mouth every 6 hours as needed.        * Notice:  This list has 6 medication(s) that are the same as other medications prescribed for you. Read the directions carefully, and ask your doctor or other care provider to  review them with you.

## 2018-01-23 NOTE — MR AVS SNAPSHOT
After Visit Summary   1/23/2018    Jennifer Alvarez    MRN: [id number]           Patient Information     Date Of Birth          1940        Visit Information        Provider Department      1/23/2018 10:30 AM 1, Uc Cv Device [facility]        Today's Diagnoses     Sinus bradycardia    -  1    CAD (coronary artery disease)          Care Instructions    Aracely Carballo, RN   Electrophysiology Nurse Clinician  [facility]    During Business Hours Please Call:  [phone]  After Hours Please Call:  [phone] - select option #4 and ask for job code [phone]                Follow-ups after your visit        Your next 10 appointments already scheduled     Feb 08, 2018 11:00 AM CST   Infusion 300 with UC SPEC INFUSION, UC 44 ATC   [facility] Advanced Treatment [place] [facility] ([facility])    [address]  [address]  [facility] [address]   [phone]            Apr 04, 2018 12:15 PM CDT   Lab with  LAB   [facility] Lab ([facility])    [address] [place] [address]  1st Floor  [facility] [address]   [phone]            Apr 04, 2018 12:30 PM CDT   PFT VISIT with  PFL A   [facility] Pulmonary Function Testing ([facility])    [address]  3rd Floor  [facility] [id number] [phone]            Apr 04, 2018  1:10 PM CDT   (Arrive by 12:55 PM)   Return Interstitial Lung with Maxx Elizabeth MD   [facility] ([facility])    [address] [place] [address]  [address]  [facility] [id number] [phone]              Future tests that were ordered for you today     Open Future Orders        Priority Expected Expires Ordered    EP ICD/Pacemaker/Loop Recorder Routine  1/23/2019 1/23/2018    **Basic metabolic panel FUTURE anytime Routine 1/23/2018 1/23/2019 1/23/2018            Who to contact     If you  have questions or need follow up information about today's clinic visit or your schedule please contact [facility] directly at [phone].  Normal or non-critical lab and imaging results will be communicated to you by [employer], letter or phone within 4 business days after the clinic has received the results. If you do not hear from us within 7 days, please contact the clinic through AIRTAMEt or phone. If you have a critical or abnormal lab result, we will notify you by phone as soon as possible.  Submit refill requests through [email or web address] or call your pharmacy and they will forward the refill request to us. Please allow 3 business days for your refill to be completed.          Additional Information About Your Visit        [email or web address] Information     [email or web address] gives you secure access to your electronic health record. If you see a primary care provider, you can also send messages to your care team and make appointments. If you have questions, please call your primary care clinic.  If you do not have a primary care provider, please call [phone] and they will assist you.        Care EveryWhere ID     This is your Care EveryWhere ID. This could be used by other organizations to access your [facility] medical records  [id number]         Blood Pressure from Last 3 Encounters:   01/23/18 153/76   01/18/18 117/62   12/28/17 127/65    Weight from Last 3 Encounters:   01/23/18 41.6 kg (91 lb 12.8 oz)   01/18/18 42.2 kg (93 lb 1.6 oz)   12/28/17 42.2 kg (93 lb 1.6 oz)              Today, you had the following     No orders found for display         Today's Medication Changes          These changes are accurate as of: 1/23/18 11:34 AM.  If you have any questions, ask your nurse or doctor.               These medicines have changed or have updated prescriptions.        Dose/Directions    * albuterol 108 (90 BASE) MCG/ACT Inhaler   Commonly known as:  PROAIR HFA/PROVENTIL HFA/VENTOLIN HFA   This may have changed:    - how  much to take  - how to take this  - when to take this  - additional instructions   Used for:  Unspecified chronic bronchitis, Esophageal reflux   Changed by:  Maxx Elizabeth MD        Take 2 puffs prn for wheezing or shortness of breath   Quantity:  1 Inhaler   Refills:  12       * albuterol (2.5 MG/3ML) 0.083% neb solution   This may have changed:  Another medication with the same name was changed. Make sure you understand how and when to take each.   Changed by:  Jaleel Adair MD        Dose:  1 ampule   Take 1 ampule by nebulization every 6 hours as needed.   Refills:  0       metFORMIN 1000 MG tablet   Commonly known as:  GLUCOPHAGE   This may have changed:    - when to take this  - additional instructions   Used for:  DM (diabetes mellitus) (H)        Take 1 tablet twice daily   Quantity:  180 tablet   Refills:  3       * Notice:  This list has 2 medication(s) that are the same as other medications prescribed for you. Read the directions carefully, and ask your doctor or other care provider to review them with you.             Primary Care Provider Office Phone # Fax #    Brandi DILLON Vitaliy [phone]       [facility] [address]   [facility] [address]        Equal Access to Services     [facility] : Hadii adolph sims hadasho Sosusyali, waaxda luqadaha, qaybta kaalmada adeegyada, chester lovelace. So [facility] [phone].    ATENCIÓN: Si habla español, tiene a taylor disposición servicios gratuitos de asistencia lingüística. Llame al [phone].    We comply with applicable federal civil rights laws and [place] laws. We do not discriminate on the basis of race, color, national origin, age, disability, sex, sexual orientation, or gender identity.            Thank you!     Thank you for choosing [facility]  for your care. Our goal is always to provide you with excellent care. Hearing back from our patients is one way we can continue to improve our services. Please  take a few minutes to complete the written survey that you may receive in the mail after your visit with us. Thank you!             Your Updated Medication List - Protect others around you: Learn how to safely use, store and throw away your medicines at www.disposemymeds.org.          This list is accurate as of: 1/23/18 11:34 AM.  Always use your most recent med list.                   Brand Name Dispense Instructions for use Diagnosis    * albuterol 108 (90 BASE) MCG/ACT Inhaler    PROAIR HFA/PROVENTIL HFA/VENTOLIN HFA    1 Inhaler    Take 2 puffs prn for wheezing or shortness of breath    Unspecified chronic bronchitis, Esophageal reflux       * albuterol (2.5 MG/3ML) 0.083% neb solution      Take 1 ampule by nebulization every 6 hours as needed.        aspirin 81 MG tablet      Take 1 tablet by mouth daily.        atorvastatin 10 MG tablet    LIPITOR    90 tablet    Take 4 tablets (40 mg) by mouth daily    Hyperlipidemia LDL goal <100       azithromycin 250 MG tablet    ZITHROMAX    6 tablet    Take as directed    Mixed simple and mucopurulent chronic bronchitis (H), Hypogammaglobulinemia (H)       Blood Pressure Kit      daily.        budesonide 3 MG 24 hr capsule    ENTOCORT EC    270 capsule    Take 1 capsule (3 mg) by mouth 3 times daily (with meals)    Diarrhea, Loss of weight       carvedilol 12.5 MG tablet    COREG    60 tablet    Take 1 tablet (12.5 mg) by mouth 2 times daily (with meals)    Secondary hypertension       DEMEROL 25 MG/ML injection   Generic drug:  meperidine      Inject 25 mg into the vein as needed. 25 mg IV prior to IgG infusion        dicyclomine 10 MG capsule    BENTYL     Take 10 mg by mouth 4 times daily (before meals and nightly).        diphenhydrAMINE 25 MG tablet    BENADRYL    4 tablet    Take 2 tablets (50 mg) by mouth See Admin Instructions Please take 50 mg by mouth evening before  And morning of the Abd/Pelvis CT scan with contrast per Dr. Adair.  This is a pre medication  for contrast dye.    Atherosclerosis of renal artery (H)       FISH OIL      Unsure of dosage take two caps daily        fluticasone-salmeterol 250-50 MCG/DOSE diskus inhaler    ADVAIR    1 Inhaler    Inhale 1 puff into the lungs 2 times daily.    Unspecified chronic bronchitis       gabapentin 300 MG capsule    NEURONTIN    180 capsule    Take 2 capsules (600 mg) by mouth 3 times daily    Neuralgia, Neck pain       HYDROcodone-acetaminophen  MG per tablet    LORCET     Take 1 tablet by mouth every 6 hours as needed.        Immune Globulin (Human) 25 GM/500ML Soln      Inject 25 g into the vein See Admin Instructions. 25 grams every 3 weeks IVIG        isosorbide mononitrate 60 MG 24 hr tablet    IMDUR    30 tablet    Take 1 tablet (60 mg) by mouth daily    HTN (hypertension)       metFORMIN 1000 MG tablet    GLUCOPHAGE    180 tablet    Take 1 tablet twice daily    DM (diabetes mellitus) (H)       nitroGLYcerin 0.4 MG sublingual tablet    NITROQUICK    25 tablet    Place 1 tablet (0.4 mg) under the tongue every 5 minutes as needed    Chest pain       omeprazole 20 MG CR capsule    priLOSEC    60 capsule    Take 1 capsule (20 mg) by mouth 2 times daily    Esophageal reflux, Unspecified chronic bronchitis       * blood glucose monitoring lancets     3 Box    Use to test blood sugar 3 times daily or as directed.    Type 2 diabetes mellitus with complication (H)       * ONE TOUCH FINE POINT LANCETS      2 times daily.        * ONETOUCH ULTRA test strip   Generic drug:  blood glucose monitoring     200 strip    Test blood sugar twice a day.    Diabetes mellitus type II       * blood glucose monitoring test strip    TERRY CONTOUR NEXT    270 each    by In Vitro route 3 times daily Use to test blood sugar 3 times daily or as directed.    Type 2 diabetes mellitus with complication (H)       predniSONE 20 MG tablet    DELTASONE    5 tablet    Patient to take 60 mg the evening before the procedure and 30 mg the morning  of the procedure.    Allergic reaction to contrast dye       tiotropium 18 MCG capsule    SPIRIVA HANDIHALER    30 capsule    One puff every day    Unspecified chronic bronchitis       TYLENOL 325 MG tablet   Generic drug:  acetaminophen      Take 1-2 tablets by mouth every 6 hours as needed.        * Notice:  This list has 6 medication(s) that are the same as other medications prescribed for you. Read the directions carefully, and ask your doctor or other care provider to review them with you.

## 2018-01-23 NOTE — PATIENT INSTRUCTIONS
Aracely Carballo, RN   Electrophysiology Nurse Clinician  [facility]    During Business Hours Please Call:  [phone]  After Hours Please Call:  [phone] - select option #4 and ask for job code [phone]

## 2018-01-23 NOTE — PROGRESS NOTES
Electrophysiology Clinic Note  HPI:   Ms. Alvarez is a 77 year old female who has a past medical history significant for CAD s/p PCI LAD X2 2007, SSS s/p PPM implant 2008, CKD, IgA deficiency, renal artery stenosis s/p Left renal artery stent 7/2017, HLD, asthma, DM2, and GERD. She presents today to establish EP care.     Her cardiac history dates back to 2007 when she was found to have single vessel CAD with PCIX 2 to mid and distal LAD. A repeat coronary angiogram in 2008 revealed patent stents and non-obstructive CAD. Another coronary angiogram in May 2010 showed minimal CAD with patent stents. RHC showed mild elevation of PA pressures and left sided filling pressures. She had a dobutamine stress Echo in Sept 2011 that was negative for any inducible ischemia. LV function was normal at baseline and augmented appropriately with stress. A repeat echocardiogram in June 2013 revealed a normal global and regional left ventricular function with an EF of 60-65% and normal right ventricular function. There was mild concentric LVH and mild aortic sclerosis with no significant gradient across AV.  She had a negative dobutamine Echo in July 2015. She has been experiencing chest pain occurring 3-4X/week associated with activity. She is following with Dr. Adair for this who feels her symptoms are atypical and does not want to pursue further ischemic evaluation. Other work up revealed renal artery stenosis. Her device interrogation has showed she is nearing MARIA T for which she was sent to reestablish EP care. Her device checks have shown normal device function and stable lead parameters. She reports that she occassionally has pain at pacemaker site when laying on that side.     She presents today for follow up. Since our last visit and she had renal artery stent placed and her BP meds were adjusted. Her device has now reached MARIA T. Device interrogation shows increasing RV thresholds. Today RV threshold 2.75@0.4 and 6 months ago RV  threshold 1.75@0.4. 11 beats NSVT recorded. One episode of noise recorded on RV lead. RV lead sensing and impedence stable. AP 20%,  1%. She reports feeling fatigued. She is noted to be hypertensive 159/72. She denies any chest pain/pressures, dizziness, lightheadedness, worsening shortness of breath, leg/ankle swelling, PND, orthopnea, palpitations, or syncopal symptoms. Current cardiac medications include: Coreg, ASA, Lipitor,and Isosorbide.    PAST MEDICAL HISTORY:  Past Medical History:   Diagnosis Date     Abdominal pain     cramping     Arthritis      CAD (coronary artery disease)     stent x2, ppm     Cancer (H)     skin     Cardiac pacemaker      CC (Crohn's colitis) (H)      Chronic kidney disease      Diabetes (H)     DM type 2, oral agent     Diarrhea      GERD (gastroesophageal reflux disease)      History of blood transfusion      HTN (hypertension)      Hyperlipidemia LDL goal <70 11/19/2013     IgA deficiency (H)      Neck pain 12/31/2014     Uncomplicated asthma      Weight loss        CURRENT MEDICATIONS:  Current Outpatient Prescriptions   Medication Sig Dispense Refill     isosorbide mononitrate (IMDUR) 60 MG 24 hr tablet Take 1 tablet (60 mg) by mouth daily 30 tablet 1     gabapentin (NEURONTIN) 300 MG capsule Take 2 capsules (600 mg) by mouth 3 times daily 180 capsule 0     carvedilol (COREG) 12.5 MG tablet Take 1 tablet (12.5 mg) by mouth 2 times daily (with meals) 60 tablet 6     atorvastatin (LIPITOR) 10 MG tablet Take 4 tablets (40 mg) by mouth daily 90 tablet 3     nitroglycerin (NITROQUICK) 0.4 MG sublingual tablet Place 1 tablet (0.4 mg) under the tongue every 5 minutes as needed 25 tablet 3     diphenhydrAMINE (BENADRYL) 25 MG tablet Take 2 tablets (50 mg) by mouth See Admin Instructions Please take 50 mg by mouth evening before  And morning of the Abd/Pelvis CT scan with contrast per Dr. Adair.  This is a pre medication for contrast dye. 4 tablet 0     budesonide (ENTOCORT EC) 3 MG  24 hr capsule Take 1 capsule (3 mg) by mouth 3 times daily (with meals) 270 capsule 3     blood glucose monitoring (TERRY CONTOUR NEXT) test strip by In Vitro route 3 times daily Use to test blood sugar 3 times daily or as directed. 270 each 3     blood glucose monitoring (TERRY MICROLET) lancets Use to test blood sugar 3 times daily or as directed. 3 Box 3     albuterol (PROAIR HFA, PROVENTIL HFA, VENTOLIN HFA) 108 (90 BASE) MCG/ACT inhaler Take 2 puffs prn for wheezing or shortness of breath (Patient taking differently: Inhale 2 puffs into the lungs daily Take 2 puffs prn for wheezing or shortness of breath) 1 Inhaler 12     omeprazole (PRILOSEC) 20 MG capsule Take 1 capsule (20 mg) by mouth 2 times daily 60 capsule 6     metFORMIN (GLUCOPHAGE) 1000 MG tablet Take 1 tablet twice daily (Patient taking differently: daily (with dinner) Take 1 tablet twice daily) 180 tablet 3     ONE TOUCH ULTRA TEST strip Test blood sugar twice a day. 200 strip 3     tiotropium (SPIRIVA HANDIHALER) 18 MCG inhalation capsule One puff every day 30 capsule 6     fluticasone-salmeterol (ADVAIR) 250-50 MCG/DOSE diskus inhaler Inhale 1 puff into the lungs 2 times daily. 1 Inhaler 12     dicyclomine (BENTYL) 10 MG capsule Take 10 mg by mouth 4 times daily (before meals and nightly).       aspirin 81 MG tablet Take 1 tablet by mouth daily.       FISH OIL Unsure of dosage take two caps daily       Blood Pressure KIT daily.       ONE TOUCH FINE POINT LANCETS 2 times daily.       hydrocodone-acetaminophen (LORCET)  MG per tablet Take 1 tablet by mouth every 6 hours as needed.       albuterol (2.5 MG/3ML) 0.083% nebulizer solution Take 1 ampule by nebulization every 6 hours as needed.       meperidine (DEMEROL) 25 MG/ML injection Inject 25 mg into the vein as needed. 25 mg IV prior to IgG infusion       Immune Globulin, Human, 25 GM/500ML SOLN Inject 25 g into the vein See Admin Instructions. 25 grams every 3 weeks IVIG       acetaminophen  (TYLENOL) 325 MG tablet Take 1-2 tablets by mouth every 6 hours as needed.       [DISCONTINUED] fluticasone-salmeterol (ADVAIR) 250-50 MCG/DOSE diskus inhaler Inhale 1 puff into the lungs 2 times daily 1 Inhaler 12     predniSONE (DELTASONE) 20 MG tablet Patient to take 60 mg the evening before the procedure and 30 mg the morning of the procedure. (Patient not taking: Reported on 1/23/2018) 5 tablet 0     azithromycin (ZITHROMAX) 250 MG tablet Take as directed (Patient not taking: Reported on 1/23/2018) 6 tablet 4     [DISCONTINUED] albuterol (PROAIR HFA, PROVENTIL HFA, VENTOLIN HFA) 108 (90 BASE) MCG/ACT inhaler Take 2 puffs prn for wheezing 1 Inhaler 12     [DISCONTINUED] albuterol (2.5 MG/3ML) 0.083% nebulizer solution Take 1 vial (2.5 mg) by nebulization 3 times daily 360 mL 3       PAST SURGICAL HISTORY:  Past Surgical History:   Procedure Laterality Date     APPENDECTOMY       C LAP,SURG,COLECTOMY, PARTIAL, W/ANAST      partial colectomy;diverticulitis     cardiac stents      x 2     CARPAL TUNNEL RELEASE RT/LT      bilateral     CHOLECYSTECTOMY       COLONOSCOPY       COLONOSCOPY N/A 11/9/2015    Procedure: COMBINED COLONOSCOPY, SINGLE OR MULTIPLE BIOPSY/POLYPECTOMY BY BIOPSY;  Surgeon: Victor Hugo Turner MD;  Location:  GI     ENT SURGERY      back throat     ESOPHAGEAL MOTILITY STUDY  5-5-15     ESOPHAGOSCOPY, GASTROSCOPY, DUODENOSCOPY (EGD), COMBINED Left 5/13/2015    Procedure: COMBINED ESOPHAGOSCOPY, GASTROSCOPY, DUODENOSCOPY (EGD), BIOPSY SINGLE OR MULTIPLE;  Surgeon: Dipesh Bobby MD;  Location:  GI     ESOPHAGOSCOPY, GASTROSCOPY, DUODENOSCOPY (EGD), COMBINED N/A 11/6/2015    Procedure: COMBINED ESOPHAGOSCOPY, GASTROSCOPY, DUODENOSCOPY (EGD), BIOPSY SINGLE OR MULTIPLE;  Surgeon: Victor Hugo Turner MD;  Location:  GI     FOOT SURGERY      left     HC ESOPH/GAS REFLUX TEST W NASAL IMPED >1 HR N/A 5/5/2015    Procedure: ESOPHAGEAL IMPEDENCE FUNCTION TEST WITH 24 HOUR PH GREATER THAN 1  "HOUR;  Surgeon: Dipesh Bobby MD;  Location: [facility]     IMPLANT PACEMAKER      PPM     IR RENAL/VISCERAL STENT/ATHERECT/PTA Left 07/2017     LAPAROTOMY EXPLORATORY       NISSEN FUNDOPLICATION      x 2     SHOULDER SURGERY      right     UPPER GI ENDOSCOPY       WRIST SURGERY      right cyst       ALLERGIES:     Allergies   Allergen Reactions     Bees Anaphylaxis     Codeine Sulfate Hives     Contrast Dye Anaphylaxis and Hives     Milk Products Shortness Of Breath     Morphine Sulfate Other (See Comments), Hives and Itching     Pt had abdominal pain that had her doubled over     Pcn [Penicillin G Ammonium] Difficulty breathing     Zinacef [Cefuroxime Sodium] Difficulty breathing     Influenza Vaccine Live Rash     Pneumovax [Pneumococcal Polysaccharides]      Procardia [Nifedipine] Difficulty breathing     Barium Rash       FAMILY HISTORY:  Family History   Problem Relation Age of Onset     Ovarian Cancer Mother      Stomach Cancer Father      HEART DISEASE Father      DIABETES Father      Ovarian Cancer Sister      Leukemia Brother      Ovarian Cancer Sister      DIABETES Brother      DIABETES Brother      Neurologic Disorder No family hx of        SOCIAL HISTORY:  Social History   Substance Use Topics     Smoking status: Former Smoker     Packs/day: 0.20     Years: 20.00     Types: Cigarettes     Quit date: 2/24/1974     Smokeless tobacco: Never Used     Alcohol use No       ROS:   A comprehensive 10 point review of systems negative other than as mentioned in HPI.  Exam:  /76 (BP Location: Right arm, Patient Position: Chair, Cuff Size: Adult Small)  Pulse 69  Ht 1.549 m (5' 1\")  Wt 41.6 kg (91 lb 12.8 oz)  SpO2 96%  BMI 17.35 kg/m2  GENERAL APPEARANCE: healthy, alert and no distress  HEENT: no icterus, no xanthelasmas, normal pupil size and reaction, normal palate, mucosa moist, no central cyanosis  NECK: no adenopathy, no asymmetry, masses, or scars, thyroid normal to palpation and no bruits, JVP " not elevated  RESPIRATORY: lungs clear to auscultation - no rales, rhonchi or wheezes, no use of accessory muscles, no retractions, respirations are unlabored, normal respiratory rate  CARDIOVASCULAR: regular rhythm, normal S1 with physiologic split S2, no S3 or S4 and no murmur, click or rub, precordium quiet with normal PMI.  ABDOMEN: soft, non tender, without hepatosplenomegaly, no masses palpable, bowel sounds normal, aorta not enlarged by palpation, no abdominal bruits  EXTREMITIES: peripheral pulses normal, no edema, no bruits  NEURO: alert and oriented to person/place/time, normal speech, gait and affect  VASC: Radial, femoral, dorsalis pedis and posterior tibialis pulses are normal in volumes and symmetric bilaterally. No bruits are heard.  SKIN: no ecchymoses, no rashes    Labs:  Reviewed.     Testing/Procedures:  PULMONARY FUNCTION TESTS:   PFT Latest Ref Rng & Units 10/4/2017   FVC L 1.02   FEV1 L 0.72   FVC% % 41   FEV1% % 37         7/2015 STRESS ECHOCARDIOGRAM:     Interpretation Summary  Normal dobutamine stress echo at target heartrate.No significant valvular  abnormality.      Assessment and Plan:   Ms. Alvarez is a 77 year old female who has a past medical history significant for CAD s/p PCI LAD X2 2007, SSS s/p PPM implant 2008, CKD, IgA deficiency, renal artery stenosis s/p left renal artery stent 7/2017, HLD, asthma, DM2, and GERD. She presents today for follow up. Since our last visit and she had renal artery stent placed and her BP meds were adjusted. Her device has now reached MARIA T. Device interrogation shows increasing RV thresholds. Today RV threshold 2.75@0.4 and 6 months ago RV threshold 1.75@0.4. 11 beats NSVT recorded. One episode of noise recorded on RV lead. RV lead sensing and impedence stable. AP 20%,  1%. She reports feeling fatigued. She is noted to be hypertensive 159/72.     Her device has now reached MARIA T. Her RV lead has also showed climbing thresholds. We discussed generator  change in detail including indications, risks, and benefits. Since device pocket will be opened, and RV lead showing signs of dysfunction, we would prefer to replace at time of generator change. Per prior place her device is slightly more lateral with impingement when she leans on the left, so we plan to flip the device and move more medialy. We will schedule generator change in the near future. We will need to consider resuming prior lisinopril if ongoing HTN. We have asked her to monitor her BPs at home and send us log in 1 month. Follow up 3 months after generator change.     The patient states understanding and is agreeable with plan.   This patient was seen and evaluated with Dr. Rivas. The above note reflects our joint assessment and plan.   CINDY Bray CNP  Pager: [phone]    EP STAFF NOTE  I have seen and examined the patient as part of a shared visit with BERTA Bray NP.  I agree with the note above. I reviewed today's vital signs and medications. I have reviewed and discussed with the advanced practice provider their physical examination, assessment, and plan   Briefly, MARIA T and RV lead increasing thresholds over the last 6 months.  My key history/exam findings are: RRR.   The key management decisions made by me: gen change and RV lead replacement..    Shola Rivas MD [facility]  Cardiology - Electrophysiology    CC  MINDI RANGEL

## 2018-02-08 ENCOUNTER — INFUSION THERAPY VISIT (OUTPATIENT)
Dept: INFUSION THERAPY | Facility: CLINIC | Age: 78
End: 2018-02-08
Attending: INTERNAL MEDICINE
Payer: MEDICARE

## 2018-02-08 VITALS
RESPIRATION RATE: 18 BRPM | SYSTOLIC BLOOD PRESSURE: 131 MMHG | BODY MASS INDEX: 17.44 KG/M2 | DIASTOLIC BLOOD PRESSURE: 67 MMHG | OXYGEN SATURATION: 98 % | HEART RATE: 68 BPM | WEIGHT: 92.3 LBS

## 2018-02-08 DIAGNOSIS — D80.2 IGA DEFICIENCY (H): Primary | ICD-10-CM

## 2018-02-08 DIAGNOSIS — D80.1 HYPOGAMMAGLOBULINEMIA (H): ICD-10-CM

## 2018-02-08 PROCEDURE — 25000128 H RX IP 250 OP 636: Mod: ZF | Performed by: INTERNAL MEDICINE

## 2018-02-08 PROCEDURE — 96366 THER/PROPH/DIAG IV INF ADDON: CPT

## 2018-02-08 PROCEDURE — 25000132 ZZH RX MED GY IP 250 OP 250 PS 637: Mod: ZF | Performed by: INTERNAL MEDICINE

## 2018-02-08 PROCEDURE — 96375 TX/PRO/DX INJ NEW DRUG ADDON: CPT

## 2018-02-08 PROCEDURE — A9270 NON-COVERED ITEM OR SERVICE: HCPCS | Mod: ZF | Performed by: INTERNAL MEDICINE

## 2018-02-08 PROCEDURE — 96365 THER/PROPH/DIAG IV INF INIT: CPT

## 2018-02-08 RX ORDER — DIPHENHYDRAMINE HCL 25 MG
25 CAPSULE ORAL ONCE
Status: CANCELLED
Start: 2018-02-08 | End: 2018-02-08

## 2018-02-08 RX ORDER — ACETAMINOPHEN 325 MG/1
650 TABLET ORAL ONCE
Status: CANCELLED
Start: 2018-02-08 | End: 2018-02-08

## 2018-02-08 RX ORDER — MEPERIDINE HYDROCHLORIDE 25 MG/ML
25 INJECTION INTRAMUSCULAR; INTRAVENOUS; SUBCUTANEOUS
Status: DISCONTINUED | OUTPATIENT
Start: 2018-02-08 | End: 2018-02-13 | Stop reason: HOSPADM

## 2018-02-08 RX ORDER — METHYLPREDNISOLONE SODIUM SUCCINATE 125 MG/2ML
100 INJECTION, POWDER, LYOPHILIZED, FOR SOLUTION INTRAMUSCULAR; INTRAVENOUS ONCE
Status: COMPLETED | OUTPATIENT
Start: 2018-02-08 | End: 2018-02-08

## 2018-02-08 RX ORDER — ACETAMINOPHEN 325 MG/1
650 TABLET ORAL ONCE
Status: COMPLETED | OUTPATIENT
Start: 2018-02-08 | End: 2018-02-08

## 2018-02-08 RX ORDER — METHYLPREDNISOLONE SODIUM SUCCINATE 125 MG/2ML
100 INJECTION, POWDER, LYOPHILIZED, FOR SOLUTION INTRAMUSCULAR; INTRAVENOUS ONCE
Status: CANCELLED
Start: 2018-02-08 | End: 2018-02-08

## 2018-02-08 RX ORDER — DIPHENHYDRAMINE HCL 25 MG
25 CAPSULE ORAL ONCE
Status: COMPLETED | OUTPATIENT
Start: 2018-02-08 | End: 2018-02-08

## 2018-02-08 RX ORDER — MEPERIDINE HYDROCHLORIDE 25 MG/ML
25 INJECTION INTRAMUSCULAR; INTRAVENOUS; SUBCUTANEOUS
Status: CANCELLED
Start: 2018-02-08

## 2018-02-08 RX ADMIN — DIPHENHYDRAMINE HYDROCHLORIDE 25 MG: 25 CAPSULE ORAL at 10:47

## 2018-02-08 RX ADMIN — DEXTROSE MONOHYDRATE 100 ML: 50 INJECTION, SOLUTION INTRAVENOUS at 11:11

## 2018-02-08 RX ADMIN — MEPERIDINE HYDROCHLORIDE 25 MG: 25 INJECTION, SOLUTION INTRAMUSCULAR; INTRAVENOUS; SUBCUTANEOUS at 10:57

## 2018-02-08 RX ADMIN — IMMUNE GLOBULIN INFUSION (HUMAN) 25 G: 100 INJECTION, SOLUTION INTRAVENOUS; SUBCUTANEOUS at 11:11

## 2018-02-08 RX ADMIN — ACETAMINOPHEN 650 MG: 325 TABLET, FILM COATED ORAL at 10:47

## 2018-02-08 RX ADMIN — METHYLPREDNISOLONE SODIUM SUCCINATE 100 MG: 125 INJECTION, POWDER, FOR SOLUTION INTRAMUSCULAR; INTRAVENOUS at 10:53

## 2018-02-08 NOTE — MR AVS SNAPSHOT
After Visit Summary   2/8/2018    Jennifer Alvarez    MRN: [id number]           Patient Information     Date Of Birth          1940        Visit Information        Provider Department      2/8/2018 11:00 AM UC 44 ATC; UC SPEC INFUSION [facility]        Today's Diagnoses     IgA deficiency (H)    -  1    Hypogammaglobulinemia (H)           Follow-ups after your visit        Your next 10 appointments already scheduled     Mar 02, 2018  9:00 AM CST   Infusion 300 with UC SPEC INFUSION, UC 46 ATC   [facility] ([facility])    [address]  [address]  [place] [address]   [phone]            Mar 23, 2018 11:00 AM CDT   Infusion 300 with UC SPEC INFUSION, UC 44 ATC   [facility] ([facility])    [address]  [address]  [place] [address]   [phone]            Apr 11, 2018  8:30 AM CDT   PFT VISIT with FARTUN PFL LIZANDRO   [facility] Pulmonary Function Testing ([facility])    [address]  3rd [address]  [place] [address]   [phone]            Apr 11, 2018  9:00 AM CDT   (Arrive by 8:45 AM)   Return Interstitial Lung with Maxx Elizabeth MD   [facility] ([facility])    [address]  [address]  [place] [address]   [phone]            Apr 13, 2018 11:00 AM CDT   Infusion 300 with UC SPEC INFUSION, UC 44 ATC   [facility] ([facility])    [address]  [address]  [place] [address]   [phone]            May 04, 2018 11:00 AM CDT   Infusion 300 with UC SPEC INFUSION, UC 44 ATC   [facility] ([facility])    [address]  [address]  [facility] [id number] [phone]              Future tests that were ordered for you today     Open Future Orders        Priority Expected Expires Ordered    XR Chest 2 Views Routine 2/13/2018 2/13/2019 2/13/2018            Who to contact     If you have questions or need follow up information about today's clinic visit or your schedule please contact [facility] directly at [phone].  Normal or non-critical lab and imaging results will be communicated to you by Telkonethart, letter or phone within 4 business days after the clinic has received the results. If you do not hear from us within 7 days, please contact the clinic through PhatNoise or phone. If you have a critical or abnormal lab result, we will notify you by phone as soon as possible.  Submit refill requests through PhatNoise or call your pharmacy and they will forward the refill request to us. Please allow 3 business days for your refill to be completed.          Additional Information About Your Visit        PhatNoise Information     P[employer]Noise gives you secure access to your electronic health record. If you see a primary care provider, you can also send messages to your care team and make appointments. If you have questions, please call your primary care clinic.  If you do not have a primary care provider, please call [phone] and they will assist you.        Care EveryWhere ID     This is your Care EveryWhere ID. This could be used by other organizations to access your [facility] medical records  [id number]        Your Vitals Were     Pulse Respirations Pulse Oximetry BMI (Body Mass Index)          68 18 98% 17.44 kg/m2         Blood Pressure from Last 3 Encounters:   02/12/18 123/80   02/08/18 131/67   01/23/18 153/76    Weight from Last 3 Encounters:   02/08/18 41.9 kg (92 lb 4.8 oz)   01/23/18 41.6 kg (91 lb 12.8 oz)   01/18/18 42.2 kg (93 lb 1.6 oz)              Today,  you had the following     No orders found for display       Primary Care Provider Office Phone # Fax #    Brandi Burks [phone]       [facility] [address]   [facility] [address]        Equal Access to Services     GUERA HOLT : Hadii aad ku hadlamonto Sosusyali, waaxda luqadaha, qaybta kaalmada adeegyada, chester hawthornen john garduno sascha lovelace. So [facility] [phone].    ATENCIÓN: Si habla español, tiene a taylor disposición servicios gratuitos de asistencia lingüística. Llame al [phone].    We comply with applicable federal civil rights laws and [place] laws. We do not discriminate on the basis of race, color, national origin, age, disability, sex, sexual orientation, or gender identity.            Thank you!     Thank you for choosing [facility]  for your care. Our goal is always to provide you with excellent care. Hearing back from our patients is one way we can continue to improve our services. Please take a few minutes to complete the written survey that you may receive in the mail after your visit with us. Thank you!             Your Updated Medication List - Protect others around you: Learn how to safely use, store and throw away your medicines at www.disposemymeds.org.          This list is accurate as of 2/8/18 11:59 PM.  Always use your most recent med list.                   Brand Name Dispense Instructions for use Diagnosis    * albuterol 108 (90 BASE) MCG/ACT Inhaler    PROAIR HFA/PROVENTIL HFA/VENTOLIN HFA    1 Inhaler    Take 2 puffs prn for wheezing or shortness of breath    Wheezing       * albuterol (2.5 MG/3ML) 0.083% neb solution     360 mL    Take 1 vial (2.5 mg) by nebulization every 6 hours as needed for shortness of breath / dyspnea or wheezing    Wheezing       * albuterol (2.5 MG/3ML) 0.083% neb solution      Take 1 ampule by nebulization every 6 hours as needed.        aspirin 81 MG tablet      Take 1 tablet by mouth  daily.        atorvastatin 10 MG tablet    LIPITOR    90 tablet    Take 4 tablets (40 mg) by mouth daily    Hyperlipidemia LDL goal <100       Blood Pressure Kit      daily.        carvedilol 12.5 MG tablet    COREG    60 tablet    Take 1 tablet (12.5 mg) by mouth 2 times daily (with meals)    Secondary hypertension       clindamycin 300 MG capsule    CLEOCIN    20 capsule    Take 1 capsule (300 mg) by mouth 4 times daily for 5 days    Pacemaker battery depletion       DEMEROL 25 MG/ML injection   Generic drug:  meperidine      Inject 25 mg into the vein as needed. 25 mg IV prior to IgG infusion        dicyclomine 10 MG capsule    BENTYL     Take 10 mg by mouth 4 times daily (before meals and nightly).        FISH OIL      Unsure of dosage take two caps daily        fluticasone-salmeterol 250-50 MCG/DOSE diskus inhaler    ADVAIR    1 Inhaler    Inhale 1 puff into the lungs 2 times daily.    Unspecified chronic bronchitis       HYDROcodone-acetaminophen  MG per tablet    LORCET     Take 1 tablet by mouth every 6 hours as needed.        Immune Globulin (Human) 25 GM/500ML Soln      Inject 25 g into the vein See Admin Instructions. 25 grams every 3 weeks IVIG        isosorbide mononitrate 60 MG 24 hr tablet    IMDUR    30 tablet    Take 1 tablet (60 mg) by mouth daily    HTN (hypertension)       metFORMIN 1000 MG tablet    GLUCOPHAGE     Take 1 tablet (1,000 mg) by mouth daily (with dinner) Take 1 tablet twice daily    DM (diabetes mellitus) (H)       nitroGLYcerin 0.4 MG sublingual tablet    NITROQUICK    25 tablet    Place 1 tablet (0.4 mg) under the tongue every 5 minutes as needed    Chest pain       omeprazole 20 MG CR capsule    priLOSEC    60 capsule    Take 1 capsule (20 mg) by mouth 2 times daily    Esophageal reflux, Unspecified chronic bronchitis       * blood glucose monitoring lancets     3 Box    Use to test blood sugar 3 times daily or as directed.    Type 2 diabetes mellitus with complication (H)        * ONE TOUCH FINE POINT LANCETS      2 times daily.        * ONETOUCH ULTRA test strip   Generic drug:  blood glucose monitoring     200 strip    Test blood sugar twice a day.    Diabetes mellitus type II       * blood glucose monitoring test strip    TERRY CONTOUR NEXT    270 each    by In Vitro route 3 times daily Use to test blood sugar 3 times daily or as directed.    Type 2 diabetes mellitus with complication (H)       predniSONE 20 MG tablet    DELTASONE    5 tablet    Patient to take 60 mg the evening before the procedure and 30 mg the morning of the procedure.    Allergic reaction to contrast dye       tiotropium 18 MCG capsule    SPIRIVA HANDIHALER    30 capsule    One puff every day    Unspecified chronic bronchitis       traMADol 50 MG tablet    ULTRAM    20 tablet    Take 1 tablet (50 mg) by mouth every 6 hours as needed for pain    Pacemaker battery depletion       TYLENOL 325 MG tablet   Generic drug:  acetaminophen      Take 1-2 tablets by mouth every 6 hours as needed.        * Notice:  This list has 7 medication(s) that are the same as other medications prescribed for you. Read the directions carefully, and ask your doctor or other care provider to review them with you.

## 2018-02-08 NOTE — PROGRESS NOTES
Nursing Note  Jennifer Alvarez [facility]: [facility]  During [facility]'s [facility] appointment, orders from Dr. Elizabeth were completed.  Frequency: every 3 weeks    Progress note:  Patient identification verified by name and date of birth.  Assessment completed.  Vitals recorded in Doc Flowsheets.  Patient was provided with education regarding infusion and possible side effects.  Patient verbalized understanding.      needed: No  Premedications: administered per order.  Infusion Rates: infusion starts at 21 ml/hr, then increased by 21 ml/hr every 15 minutes to final rate of 168 ml/hr.  Labs: were not ordered for this appointment.  Vascular access: peripheral IV placed today.  Treatment Conditions: non-applicable.  Patient tolerated infusion: well.    Drug Waste Record    Drug Name: solu-medrol  Dose: 100mg  Route administered: IV  NDC #: 4689-4869-55  Amount of waste(mL):0.4ml (25mg)  Reason for waste: Single use vial      Discharge Plan:   Follow up plan of care with: ongoing infusions [facility].  Discharge instructions were reviewed with patient.  Patient/representative verbalized understanding of discharge instructions and all questions answered.  [facility] discharged [facility] in stable condition.    Karely Davis RN  Administrations This Visit     acetaminophen (TYLENOL) tablet 650 mg     Admin Date Action Dose Route Administered By             02/08/2018 Given 650 mg Oral Karely Davis RN                    dextrose 5% BOLUS     Admin Date Action Dose Route Administered By             02/08/2018 New Bag 100 mL Intravenous Karely Davis RN                    diphenhydrAMINE (BENADRYL) capsule 25 mg     Admin Date Action Dose Route Administered By             02/08/2018 Given 25 mg Oral Karely Davis RN                    immune globulin (Gammagard)  - sucrose free 10 % injection 25 g     Admin Date Action Dose Route Administered By             02/08/2018 New Bag 25 g Intravenous Karely Davis RN                    meperidine (DEMEROL) injection 25 mg     Admin Date Action Dose Route Administered By             02/08/2018 Given 25 mg Intravenous Karely Davis RN                    methylPREDNISolone sodium succinate (solu-MEDROL) injection 100 mg     Admin Date Action Dose Route Administered By             02/08/2018 Given 100 mg Intravenous Karely Davis RN                            Wt 41.9 kg (92 lb 4.8 oz)  BMI 17.44 kg/m2

## 2018-02-12 ENCOUNTER — APPOINTMENT (OUTPATIENT)
Dept: CARDIOLOGY | Facility: CLINIC | Age: 78
End: 2018-02-12
Attending: NURSE PRACTITIONER
Payer: MEDICARE

## 2018-02-12 ENCOUNTER — APPOINTMENT (OUTPATIENT)
Dept: LAB | Facility: CLINIC | Age: 78
End: 2018-02-12
Attending: INTERNAL MEDICINE
Payer: MEDICARE

## 2018-02-12 ENCOUNTER — APPOINTMENT (OUTPATIENT)
Dept: MEDSURG UNIT | Facility: CLINIC | Age: 78
End: 2018-02-12
Attending: INTERNAL MEDICINE
Payer: MEDICARE

## 2018-02-12 ENCOUNTER — HOSPITAL ENCOUNTER (OUTPATIENT)
Facility: CLINIC | Age: 78
Discharge: HOME | End: 2018-02-12
Attending: INTERNAL MEDICINE | Admitting: INTERNAL MEDICINE
Payer: MEDICARE

## 2018-02-12 VITALS
RESPIRATION RATE: 20 BRPM | TEMPERATURE: 98.2 F | SYSTOLIC BLOOD PRESSURE: 123 MMHG | OXYGEN SATURATION: 98 % | HEART RATE: 69 BPM | DIASTOLIC BLOOD PRESSURE: 80 MMHG

## 2018-02-12 DIAGNOSIS — Z45.010 PACEMAKER BATTERY DEPLETION: ICD-10-CM

## 2018-02-12 PROBLEM — Z95.0 S/P CARDIAC PACEMAKER PROCEDURE: Status: ACTIVE | Noted: 2018-02-12

## 2018-02-12 LAB
ANION GAP SERPL CALCULATED.3IONS-SCNC: 6 MMOL/L (ref 3–14)
BUN SERPL-MCNC: 30 MG/DL (ref 7–30)
CALCIUM SERPL-MCNC: 8.4 MG/DL (ref 8.5–10.1)
CHLORIDE SERPL-SCNC: 108 MMOL/L (ref 94–109)
CO2 SERPL-SCNC: 29 MMOL/L (ref 20–32)
CREAT SERPL-MCNC: 0.9 MG/DL (ref 0.52–1.04)
ERYTHROCYTE [DISTWIDTH] IN BLOOD BY AUTOMATED COUNT: 15.1 % (ref 10–15)
GFR SERPL CREATININE-BSD FRML MDRD: 60 ML/MIN/1.7M2
GLUCOSE SERPL-MCNC: 124 MG/DL (ref 70–99)
HCT VFR BLD AUTO: 42.7 % (ref 35–47)
HGB BLD-MCNC: 13.3 G/DL (ref 11.7–15.7)
INTERPRETATION ECG - MUSE: NORMAL
MCH RBC QN AUTO: 26.7 PG (ref 26.5–33)
MCHC RBC AUTO-ENTMCNC: 31.1 G/DL (ref 31.5–36.5)
MCV RBC AUTO: 86 FL (ref 78–100)
PLATELET # BLD AUTO: 186 10E9/L (ref 150–450)
POTASSIUM SERPL-SCNC: 3.6 MMOL/L (ref 3.4–5.3)
RBC # BLD AUTO: 4.99 10E12/L (ref 3.8–5.2)
SODIUM SERPL-SCNC: 144 MMOL/L (ref 133–144)
WBC # BLD AUTO: 4.7 10E9/L (ref 4–11)

## 2018-02-12 PROCEDURE — C1785 PMKR, DUAL, RATE-RESP: HCPCS

## 2018-02-12 PROCEDURE — 27210784 ZZH KIT PACEMAKER CR8

## 2018-02-12 PROCEDURE — 36415 COLL VENOUS BLD VENIPUNCTURE: CPT | Performed by: NURSE PRACTITIONER

## 2018-02-12 PROCEDURE — 99152 MOD SED SAME PHYS/QHP 5/>YRS: CPT

## 2018-02-12 PROCEDURE — 25000125 ZZHC RX 250: Performed by: NURSE PRACTITIONER

## 2018-02-12 PROCEDURE — C1898 LEAD, PMKR, OTHER THAN TRANS: HCPCS

## 2018-02-12 PROCEDURE — 93005 ELECTROCARDIOGRAM TRACING: CPT

## 2018-02-12 PROCEDURE — 33217 INSERT 2 ELECTRODE PM-DEFIB: CPT

## 2018-02-12 PROCEDURE — 33233 REMOVAL OF PM GENERATOR: CPT

## 2018-02-12 PROCEDURE — 27210795 ZZH PAD DEFIB QUICK CR4

## 2018-02-12 PROCEDURE — 33233 REMOVAL OF PM GENERATOR: CPT | Performed by: INTERNAL MEDICINE

## 2018-02-12 PROCEDURE — 85027 COMPLETE CBC AUTOMATED: CPT | Performed by: NURSE PRACTITIONER

## 2018-02-12 PROCEDURE — 93010 ELECTROCARDIOGRAM REPORT: CPT | Mod: 59 | Performed by: INTERNAL MEDICINE

## 2018-02-12 PROCEDURE — 27210807 ZZH SHEATH CR6

## 2018-02-12 PROCEDURE — 80048 BASIC METABOLIC PNL TOTAL CA: CPT | Performed by: NURSE PRACTITIONER

## 2018-02-12 PROCEDURE — 99153 MOD SED SAME PHYS/QHP EA: CPT

## 2018-02-12 PROCEDURE — 25000128 H RX IP 250 OP 636: Performed by: NURSE PRACTITIONER

## 2018-02-12 PROCEDURE — 93010 ELECTROCARDIOGRAM REPORT: CPT | Mod: 77 | Performed by: INTERNAL MEDICINE

## 2018-02-12 PROCEDURE — 33207 INSERT HEART PM VENTRICULAR: CPT | Mod: KX

## 2018-02-12 PROCEDURE — 99152 MOD SED SAME PHYS/QHP 5/>YRS: CPT | Performed by: INTERNAL MEDICINE

## 2018-02-12 PROCEDURE — C1892 INTRO/SHEATH,FIXED,PEEL-AWAY: HCPCS

## 2018-02-12 PROCEDURE — 40000065 ZZH STATISTIC EKG NON-CHARGEABLE

## 2018-02-12 PROCEDURE — 40000172 ZZH STATISTIC PROCEDURE PREP ONLY

## 2018-02-12 PROCEDURE — 33207 INSERT HEART PM VENTRICULAR: CPT | Mod: GC | Performed by: INTERNAL MEDICINE

## 2018-02-12 RX ORDER — DIPHENHYDRAMINE HYDROCHLORIDE 50 MG/ML
25-50 INJECTION INTRAMUSCULAR; INTRAVENOUS
Status: DISCONTINUED | OUTPATIENT
Start: 2018-02-12 | End: 2018-02-12

## 2018-02-12 RX ORDER — FENTANYL CITRATE 50 UG/ML
25-50 INJECTION, SOLUTION INTRAMUSCULAR; INTRAVENOUS
Status: DISCONTINUED | OUTPATIENT
Start: 2018-02-12 | End: 2018-02-12

## 2018-02-12 RX ORDER — LORAZEPAM 2 MG/ML
.5-2 INJECTION INTRAMUSCULAR EVERY 10 MIN PRN
Status: DISCONTINUED | OUTPATIENT
Start: 2018-02-12 | End: 2018-02-12

## 2018-02-12 RX ORDER — NALOXONE HYDROCHLORIDE 0.4 MG/ML
.1-.4 INJECTION, SOLUTION INTRAMUSCULAR; INTRAVENOUS; SUBCUTANEOUS
Status: DISCONTINUED | OUTPATIENT
Start: 2018-02-12 | End: 2018-02-12 | Stop reason: HOSPADM

## 2018-02-12 RX ORDER — CLINDAMYCIN PHOSPHATE 900 MG/50ML
900 INJECTION, SOLUTION INTRAVENOUS
Status: COMPLETED | OUTPATIENT
Start: 2018-02-12 | End: 2018-02-12

## 2018-02-12 RX ORDER — TRAMADOL HYDROCHLORIDE 50 MG/1
50 TABLET ORAL EVERY 6 HOURS PRN
Status: DISCONTINUED | OUTPATIENT
Start: 2018-02-12 | End: 2018-02-12 | Stop reason: HOSPADM

## 2018-02-12 RX ORDER — PROMETHAZINE HYDROCHLORIDE 25 MG/ML
6.25-25 INJECTION, SOLUTION INTRAMUSCULAR; INTRAVENOUS EVERY 4 HOURS PRN
Status: DISCONTINUED | OUTPATIENT
Start: 2018-02-12 | End: 2018-02-12

## 2018-02-12 RX ORDER — PROTAMINE SULFATE 10 MG/ML
5-40 INJECTION, SOLUTION INTRAVENOUS EVERY 10 MIN PRN
Status: DISCONTINUED | OUTPATIENT
Start: 2018-02-12 | End: 2018-02-12

## 2018-02-12 RX ORDER — DOBUTAMINE HYDROCHLORIDE 200 MG/100ML
5-40 INJECTION INTRAVENOUS CONTINUOUS PRN
Status: DISCONTINUED | OUTPATIENT
Start: 2018-02-12 | End: 2018-02-12

## 2018-02-12 RX ORDER — ATROPINE SULFATE 0.1 MG/ML
.5-1 INJECTION INTRAVENOUS
Status: DISCONTINUED | OUTPATIENT
Start: 2018-02-12 | End: 2018-02-12

## 2018-02-12 RX ORDER — ONDANSETRON 2 MG/ML
4 INJECTION INTRAMUSCULAR; INTRAVENOUS EVERY 4 HOURS PRN
Status: DISCONTINUED | OUTPATIENT
Start: 2018-02-12 | End: 2018-02-12

## 2018-02-12 RX ORDER — CLINDAMYCIN HCL 300 MG
300 CAPSULE ORAL 4 TIMES DAILY
Qty: 20 CAPSULE | Refills: 0 | Status: SHIPPED | OUTPATIENT
Start: 2018-02-12 | End: 2018-02-17

## 2018-02-12 RX ORDER — KETOROLAC TROMETHAMINE 30 MG/ML
15 INJECTION, SOLUTION INTRAMUSCULAR; INTRAVENOUS
Status: DISCONTINUED | OUTPATIENT
Start: 2018-02-12 | End: 2018-02-12

## 2018-02-12 RX ORDER — DIPHENHYDRAMINE HYDROCHLORIDE 50 MG/ML
25 INJECTION INTRAMUSCULAR; INTRAVENOUS ONCE
Status: COMPLETED | OUTPATIENT
Start: 2018-02-12 | End: 2018-02-12

## 2018-02-12 RX ORDER — LIDOCAINE 40 MG/G
CREAM TOPICAL
Status: DISCONTINUED | OUTPATIENT
Start: 2018-02-12 | End: 2018-02-12

## 2018-02-12 RX ORDER — METHYLPREDNISOLONE SODIUM SUCCINATE 125 MG/2ML
125 INJECTION, POWDER, LYOPHILIZED, FOR SOLUTION INTRAMUSCULAR; INTRAVENOUS ONCE
Status: COMPLETED | OUTPATIENT
Start: 2018-02-12 | End: 2018-02-12

## 2018-02-12 RX ORDER — HEPARIN SODIUM 1000 [USP'U]/ML
1000-10000 INJECTION, SOLUTION INTRAVENOUS; SUBCUTANEOUS EVERY 5 MIN PRN
Status: DISCONTINUED | OUTPATIENT
Start: 2018-02-12 | End: 2018-02-12

## 2018-02-12 RX ORDER — LIDOCAINE 40 MG/G
CREAM TOPICAL
Status: DISCONTINUED | OUTPATIENT
Start: 2018-02-12 | End: 2018-02-12 | Stop reason: HOSPADM

## 2018-02-12 RX ORDER — NALOXONE HYDROCHLORIDE 0.4 MG/ML
0.4 INJECTION, SOLUTION INTRAMUSCULAR; INTRAVENOUS; SUBCUTANEOUS EVERY 5 MIN PRN
Status: DISCONTINUED | OUTPATIENT
Start: 2018-02-12 | End: 2018-02-12

## 2018-02-12 RX ORDER — FLUMAZENIL 0.1 MG/ML
0.2 INJECTION, SOLUTION INTRAVENOUS
Status: DISCONTINUED | OUTPATIENT
Start: 2018-02-12 | End: 2018-02-12

## 2018-02-12 RX ORDER — TRAMADOL HYDROCHLORIDE 50 MG/1
50 TABLET ORAL EVERY 6 HOURS PRN
Qty: 20 TABLET | Refills: 0 | Status: SHIPPED | OUTPATIENT
Start: 2018-02-12 | End: 2018-01-01

## 2018-02-12 RX ORDER — FUROSEMIDE 10 MG/ML
20-100 INJECTION INTRAMUSCULAR; INTRAVENOUS
Status: DISCONTINUED | OUTPATIENT
Start: 2018-02-12 | End: 2018-02-12

## 2018-02-12 RX ORDER — PROTAMINE SULFATE 10 MG/ML
1-5 INJECTION, SOLUTION INTRAVENOUS
Status: DISCONTINUED | OUTPATIENT
Start: 2018-02-12 | End: 2018-02-12

## 2018-02-12 RX ORDER — ADENOSINE 3 MG/ML
6-12 INJECTION, SOLUTION INTRAVENOUS EVERY 5 MIN PRN
Status: DISCONTINUED | OUTPATIENT
Start: 2018-02-12 | End: 2018-02-12

## 2018-02-12 RX ADMIN — FENTANYL CITRATE 25 MCG: 50 INJECTION, SOLUTION INTRAMUSCULAR; INTRAVENOUS at 10:28

## 2018-02-12 RX ADMIN — CLINDAMYCIN PHOSPHATE 900 MG: 18 INJECTION, SOLUTION INTRAVENOUS at 08:45

## 2018-02-12 RX ADMIN — MIDAZOLAM 0.5 MG: 1 INJECTION INTRAMUSCULAR; INTRAVENOUS at 10:28

## 2018-02-12 RX ADMIN — FENTANYL CITRATE 25 MCG: 50 INJECTION, SOLUTION INTRAMUSCULAR; INTRAVENOUS at 10:19

## 2018-02-12 RX ADMIN — MIDAZOLAM 0.5 MG: 1 INJECTION INTRAMUSCULAR; INTRAVENOUS at 09:43

## 2018-02-12 RX ADMIN — FENTANYL CITRATE 25 MCG: 50 INJECTION, SOLUTION INTRAMUSCULAR; INTRAVENOUS at 09:43

## 2018-02-12 RX ADMIN — METHYLPREDNISOLONE SODIUM SUCCINATE 125 MG: 125 INJECTION, POWDER, FOR SOLUTION INTRAMUSCULAR; INTRAVENOUS at 08:47

## 2018-02-12 RX ADMIN — MIDAZOLAM 0.5 MG: 1 INJECTION INTRAMUSCULAR; INTRAVENOUS at 09:48

## 2018-02-12 RX ADMIN — DIPHENHYDRAMINE HYDROCHLORIDE 25 MG: 50 INJECTION, SOLUTION INTRAMUSCULAR; INTRAVENOUS at 08:47

## 2018-02-12 NOTE — PROGRESS NOTES
ONE TIME   Discharge patient after   1-patient is tolerating liquids and foods-yes  2- ambulating-yes  3- urinating-yes  4- puncture sites are stable ( no bleeding and no hematoma)-yes  5- and patient has a -yes  6-IF Vital Signs are within the patient's normal limits or systolic blood pressure greater than 90 mmHg and less than 160 mmHg-yes  7-Patient is alert and oriented-yes  8-If diabetic, blood glucose is in patient's usual normal range-NA    May Discharge when Answer: the following anesthesia criteria are met (list in comments)

## 2018-02-12 NOTE — IP AVS SNAPSHOT
MRN:[id number]                      After Visit Summary   2/12/2018    Jennifer Alvarez    MRN: [id number]           Thank you!     Thank you for choosing [facility] for your care. Our goal is always to provide you with excellent care. Hearing back from our patients is one way we can continue to improve our services. Please take a few minutes to complete the written survey that you may receive in the mail after you visit with us. Thank you!        Patient Information     Date Of Birth          1940        About your hospital stay     You were admitted on:  February 12, 2018 You last received care in the:  [facility]    You were discharged on:  February 12, 2018       Who to Call     For medical emergencies, please call 911.  For non-urgent questions about your medical care, please call your primary care provider or clinic, [phone]          Attending Provider     Provider Shola Barahona MD Clinical Cardiac Electrophysiology       Primary Care Provider Office Phone # Fax #    Brandi Burks [phone]      After Care Instructions     Discharge Instructions - Follow up with Device Check RN        Follow up with Device Check RN in 7-10 days.            Discharge Instructions - Keep incision dry for 72 hours       Keep incision dry for 72 hours (unless Derma Austin was applied)                  Your next 10 appointments already scheduled     Mar 02, 2018  9:00 AM CST   Infusion 300 with UC SPEC INFUSION, UC 46 ATC   [facility] ([facility])    [address] [place] [address]  [address] [place] [address] [phone]            Mar 23, 2018 11:00 AM CDT   Infusion 300 with UC SPEC INFUSION, UC 44 ATC   [facility] ([facility])    [address] [place] [address]  [address]  [facility] [address]    [phone]            Apr 11, 2018  8:30 AM CDT   PFT VISIT with UC PFL B   [facility] Function Testing ([facility])    [address]  3rd Floor  [facility] [address]   [phone]            Apr 11, 2018  9:00 AM CDT   (Arrive by 8:45 AM)   Return Interstitial Lung with Maxx Elizabeth MD   [facility] ([facility])    [address]  [address]  [facility] [address]   [phone]            Apr 13, 2018 11:00 AM CDT   Infusion 300 with UC SPEC INFUSION, UC 44 ATC   [facility] ([facility])    [address]  [address]  [facility] [address]   [phone]            May 04, 2018 11:00 AM CDT   Infusion 300 with UC SPEC INFUSION, UC 44 ATC   [facility] ([facility])    [address]  [address]  [facility] [address]   [phone]              Further instructions from your care team         Home Care after a Pacemaker Implant    Wound care:  Keep your incision (surgery wound) dry for 3 days.  After 3 days, you may remove the outer bandage.  Keep the strips of tape on.  They will be removed at your clinic visit.  Check for signs of infection each day.  These include increased redness, swelling, drainage, bleeding or a fever over 101 F (38.3 C).  Call us immediately if you see any of these signs.  If there are no signs of infection, you may shower in 3 days.  Do not submerge the incision (in a bath tub, hot tub, or swimming pool) until fully healed.    Pain:   You may have mild to moderate pain for 3 to 5 days.  Take acetaminophen (Tylenol) or ibuprofen (Advil) for the pain.  Call us if the pain is severe or lasts more than 5 days.    Activity:  After 24 hours, slowly return to your normal activities.   Healing will take 4 to 6  weeks.  No driving for 3 days  Avoid climbing a ladder alone.  It is best to stay within 4 feet of the ground.  Avoid anything that may cause rough contact or a hard hit to your chest.  This includes football, hockey, and other contact sports.  For at least 4 weeks:  Do not raise your affected arm above your shoulder.  Do not use your affected arm to push, pull, or lift anything over 10 pounds.  Avoid repetitive upper body activities for 6 weeks (ie: golf, swimming, and weight lifting)    Follow Up Visits:  Return to the clinic in 7 to 10 days to have your device and wound checked.      Telling others about your device:  Before you have any medical tests or treatments, tell the doctors, dentists, and other care providers about your device.  There are a few tests and treatments that may interfere with your device.  (These include MRI, radiation therapy, electrocautery, and others.)  Your care team may need to take special steps to keep you safe.  Before you leave the hospital, you will receive a temporary ID card.  A permanent card will be mailed to you about 6 to 8 weeks later.  Always carry the ID card with you.  It has important details about your device.  You should also get a [employer] ID.  Please ask us for a [employer] brochure, or go to [email or web address].    Safety near electrical equipment:  All of these are safe to use when in good repair:    Microwaves    Radios    Cordless phone    Remote controls    Small electrical tools  Cell phones: Keep cell phones at least 6 inches from your device.  Do not carry the phone in a pocket near your device.  Security jaimes: It is okay to walk through security jaimes at the airports and department stores.  Tell airport security that you have a pacemaker.  They should keep the screening wand at least 6 inches from your device.  Full-body scanners are safe.  Avoid the following:    MRI tests in the hospital unless you have a MRI safe pacemaker.           Arc welding, chain  saws and high-powered industrial or commercial tools.    Power lines, power plants and large power generators.    Electric body fat scales.    Magnetic mattress pads or pillow.    Questions?  Please call [facility].    Device Nurse:          Business Hours:  [phone]                       After Hours:  [phone]   Choose option 4, then ask for the                                                  on-call device nurse at job code [id number].    Your next device clinic appointment is scheduled on:     ___________________________ at _____________.            [facility]  [facility] - Clinic [address]  [address] [place] [address]  [phone]      Pending Results     Date and Time Order Name Status Description    2/12/2018 1137 EKG 12-lead, tracing only Preliminary     2/12/2018 0721 EKG 12-lead, tracing only Preliminary             Admission Information     Date & Time Provider Department Dept. Phone    2/12/2018 Shola Rivas MD Unit 6D Observation [facility] [phone]      Your Vitals Were     Blood Pressure Pulse Temperature Respirations Pulse Oximetry       120/68 69 98.2  F (36.8  C) (Oral) 20 96%       MyChart Information     [employer] gives you secure access to your electronic health record. If you see a primary care provider, you can also send messages to your care team and make appointments. If you have questions, please call your primary care clinic.  If you do not have a primary care provider, please call [phone] and they will assist you.        Care EveryWhere ID     This is your Care EveryWhere ID. This could be used by other organizations to access your [facility] medical records  [id number]        Equal Access to Services     GUERA HOLT : virgil Diez, chester hurtado. So [facility] [phone].    ATENCIÓN: Si habla español, tiene a taylor disposición  servicios gratuitos de asistencia lingüística. Ben zafar [phone].    We comply with applicable federal civil rights laws and [place] laws. We do not discriminate on the basis of race, color, national origin, age, disability, sex, sexual orientation, or gender identity.               Review of your medicines      START taking        Dose / Directions    clindamycin 300 MG capsule   Commonly known as:  CLEOCIN   Used for:  Pacemaker battery depletion        Dose:  300 mg   Take 1 capsule (300 mg) by mouth 4 times daily for 5 days   Quantity:  20 capsule   Refills:  0       traMADol 50 MG tablet   Commonly known as:  ULTRAM   Used for:  Pacemaker battery depletion        Dose:  50 mg   Take 1 tablet (50 mg) by mouth every 6 hours as needed for pain   Quantity:  20 tablet   Refills:  0         CONTINUE these medicines which have NOT CHANGED        Dose / Directions    * albuterol 108 (90 BASE) MCG/ACT Inhaler   Commonly known as:  PROAIR HFA/PROVENTIL HFA/VENTOLIN HFA   Used for:  Wheezing        Take 2 puffs prn for wheezing or shortness of breath   Quantity:  1 Inhaler   Refills:  12       * albuterol (2.5 MG/3ML) 0.083% neb solution   Used for:  Wheezing        Dose:  1 vial   Take 1 vial (2.5 mg) by nebulization every 6 hours as needed for shortness of breath / dyspnea or wheezing   Quantity:  360 mL   Refills:  0       * albuterol (2.5 MG/3ML) 0.083% neb solution        Dose:  1 ampule   Take 1 ampule by nebulization every 6 hours as needed.   Refills:  0       aspirin 81 MG tablet        Dose:  1 tablet   Take 1 tablet by mouth daily.   Refills:  0       atorvastatin 10 MG tablet   Commonly known as:  LIPITOR   Used for:  Hyperlipidemia LDL goal <100        Dose:  40 mg   Take 4 tablets (40 mg) by mouth daily   Quantity:  90 tablet   Refills:  3       Blood Pressure Kit        daily.   Refills:  0       carvedilol 12.5 MG tablet   Commonly known as:  COREG   Used for:  Secondary hypertension        Dose:   12.5 mg   Take 1 tablet (12.5 mg) by mouth 2 times daily (with meals)   Quantity:  60 tablet   Refills:  6       DEMEROL 25 MG/ML injection   Generic drug:  meperidine        Dose:  25 mg   Inject 25 mg into the vein as needed. 25 mg IV prior to IgG infusion   Refills:  0       dicyclomine 10 MG capsule   Commonly known as:  BENTYL        Dose:  10 mg   Take 10 mg by mouth 4 times daily (before meals and nightly).   Refills:  0       FISH OIL        Unsure of dosage take two caps daily   Refills:  0       fluticasone-salmeterol 250-50 MCG/DOSE diskus inhaler   Commonly known as:  ADVAIR   Used for:  Unspecified chronic bronchitis        Dose:  1 puff   Inhale 1 puff into the lungs 2 times daily.   Quantity:  1 Inhaler   Refills:  12       HYDROcodone-acetaminophen  MG per tablet   Commonly known as:  LORCET        Dose:  1 tablet   Take 1 tablet by mouth every 6 hours as needed.   Refills:  0       Immune Globulin (Human) 25 GM/500ML Soln        Dose:  25 g   Inject 25 g into the vein See Admin Instructions. 25 grams every 3 weeks IVIG   Refills:  0       isosorbide mononitrate 60 MG 24 hr tablet   Commonly known as:  IMDUR   Used for:  HTN (hypertension)        Dose:  60 mg   Take 1 tablet (60 mg) by mouth daily   Quantity:  30 tablet   Refills:  1       metFORMIN 1000 MG tablet   Commonly known as:  GLUCOPHAGE   Used for:  DM (diabetes mellitus) (H)        Dose:  1000 mg   Take 1 tablet (1,000 mg) by mouth daily (with dinner) Take 1 tablet twice daily   Refills:  0       nitroGLYcerin 0.4 MG sublingual tablet   Commonly known as:  NITROQUICK   Used for:  Chest pain        Dose:  0.4 mg   Place 1 tablet (0.4 mg) under the tongue every 5 minutes as needed   Quantity:  25 tablet   Refills:  3       omeprazole 20 MG CR capsule   Commonly known as:  priLOSEC   Used for:  Esophageal reflux, Unspecified chronic bronchitis        Dose:  20 mg   Take 1 capsule (20 mg) by mouth 2 times daily   Quantity:  60 capsule    Refills:  6       * blood glucose monitoring lancets   Used for:  Type 2 diabetes mellitus with complication (H)        Use to test blood sugar 3 times daily or as directed.   Quantity:  3 Box   Refills:  3       * ONE TOUCH FINE POINT LANCETS        2 times daily.   Refills:  0       * ONETOUCH ULTRA test strip   Used for:  Diabetes mellitus type II   Generic drug:  blood glucose monitoring        Test blood sugar twice a day.   Quantity:  200 strip   Refills:  3       * blood glucose monitoring test strip   Commonly known as:  TERRY CONTOUR NEXT   Used for:  Type 2 diabetes mellitus with complication (H)        by In Vitro route 3 times daily Use to test blood sugar 3 times daily or as directed.   Quantity:  270 each   Refills:  3       predniSONE 20 MG tablet   Commonly known as:  DELTASONE   Used for:  Allergic reaction to contrast dye        Patient to take 60 mg the evening before the procedure and 30 mg the morning of the procedure.   Quantity:  5 tablet   Refills:  0       tiotropium 18 MCG capsule   Commonly known as:  SPIRIVA HANDIHALER   Used for:  Unspecified chronic bronchitis        One puff every day   Quantity:  30 capsule   Refills:  6       TYLENOL 325 MG tablet   Generic drug:  acetaminophen        Dose:  1-2 tablet   Take 1-2 tablets by mouth every 6 hours as needed.   Refills:  0       * Notice:  This list has 7 medication(s) that are the same as other medications prescribed for you. Read the directions carefully, and ask your doctor or other care provider to review them with you.         Where to get your medicines      Some of these will need a paper prescription and others can be bought over the counter. Ask your nurse if you have questions.     Bring a paper prescription for each of these medications     clindamycin 300 MG capsule    traMADol 50 MG tablet                Protect others around you: Learn how to safely use, store and throw away your medicines at www.disposemymeds.org.         ANTIBIOTIC INSTRUCTION     You've Been Prescribed an Antibiotic - Now What?  Your healthcare team thinks that you or your loved one might have an infection. Some infections can be treated with antibiotics, which are powerful, life-saving drugs. Like all medications, antibiotics have side effects and should only be used when necessary. There are some important things you should know about your antibiotic treatment.      Your healthcare team may run tests before you start taking an antibiotic.    Your team may take samples (e.g., from your blood, urine or other areas) to run tests to look for bacteria. These test can be important to determine if you need an antibiotic at all and, if you do, which antibiotic will work best.      Within a few days, your healthcare team might change or even stop your antibiotic.    Your team may start you on an antibiotic while they are working to find out what is making you sick.    Your team might change your antibiotic because test results show that a different antibiotic would be better to treat your infection.    In some cases, once your team has more information, they learn that you do not need an antibiotic at all. They may find out that you don't have an infection, or that the antibiotic you're taking won't work against your infection. For example, an infection caused by a virus can't be treated with antibiotics. Staying on an antibiotic when you don't need it is more likely to be harmful than helpful.      You may experience side effects from your antibiotic.    Like all medications, antibiotics have side effects. Some of these can be serious.    Let you healthcare team know if you have any known allergies when you are admitted to the hospital.    One significant side effect of nearly all antibiotics is the risk of severe and sometimes deadly diarrhea caused by Clostridium difficile (C. Difficile). This occurs when a person takes antibiotics because some good germs are  destroyed. Antibiotic use allows C. diificile to take over, putting patients at high risk for this serious infection.    As a patient or caregiver, it is important to understand your or your loved one's antibiotic treatment. It is especially important for caregivers to speak up when patients can't speak for themselves. Here are some important questions to ask your healthcare team.    What infection is this antibiotic treating and how do you know I have that infection?    What side effects might occur from this antibiotic?    How long will I need to take this antibiotic?    Is it safe to take this antibiotic with other medications or supplements (e.g., vitamins) that I am taking?     Are there any special directions I need to know about taking this antibiotic? For example, should I take it with food?    How will I be monitored to know whether my infection is responding to the antibiotic?    What tests may help to make sure the right antibiotic is prescribed for me?      Information provided by:  www.cdc.gov/getsmart  U.S. Department of Health and Human Services  Centers for disease Control and Prevention  National Center for Emerging and Zoonotic Infectious Diseases  Division of Healthcare Quality Promotion        Information about OPIOIDS     PRESCRIPTION OPIOIDS: WHAT YOU NEED TO KNOW    Prescription opioids can be used to help relieve moderate to severe pain and are often prescribed following a surgery or injury, or for certain health conditions. These medications can be an important part of treatment but also come with serious risks. It is important to work with your health care provider to make sure you are getting the safest, most effective care.    WHAT ARE THE RISKS AND SIDE EFFECTS OF OPIOID USE?  Prescription opioids carry serious risks of addiction and overdose, especially with prolonged use. An opioid overdose, often marked by slowed breathing can cause sudden death. The use of prescription opioids can  have a number of side effects as well, even when taken as directed:      Tolerance - meaning you might need to take more of a medication for the same pain relief    Physical dependence - meaning you have symptoms of withdrawal when a medication is stopped    Increased sensitivity to pain    Constipation    Nausea, vomiting, and dry mouth    Sleepiness and dizziness    Confusion    Depression    Low levels of testosterone that can result in lower sex drive, energy, and strength    Itching and sweating    RISKS ARE GREATER WITH:    History of drug misuse, substance use disorder, or overdose    Mental health conditions (such as depression or anxiety)    Sleep apnea    Older age (65 years or older)    Pregnancy    Avoid alcohol while taking prescription opioids.   Also, unless specifically advised by your health care provider, medications to avoid include:    Benzodiazepines (such as Xanax or Valium)    Muscle relaxants (such as Soma or Flexeril)    Hypnotics (such as Ambien or Lunesta)    Other prescription opioids    KNOW YOUR OPTIONS:  Talk to your health care provider about ways to manage your pain that do not involve prescription opioids. Some of these options may actually work better and have fewer risks and side effects:    Pain relievers such as acetaminophen, ibuprofen, and naproxen    Some medications that are also used for depression or seizures    Physical therapy and exercise    Cognitive behavioral therapy, a psychological, goal-directed approach, in which patients learn how to modify physical, behavioral, and emotional triggers of pain and stress    IF YOU ARE PRESCRIBED OPIOIDS FOR PAIN:    Never take opioids in greater amounts or more often than prescribed    Follow up with your primary health care provider and work together to create a plan on how to manage your pain.    Talk about ways to help manage your pain that do not involve prescription opioids    Talk about all concerns and side effects    Help  prevent misuse and abuse    Never sell or share prescription opioids    Never use another person's prescription opioids    Store prescription opioids in a secure place and out of reach of others (this may include visitors, children, friends, and family)    Visit www.cdc.gov/drugoverdose to learn about risks of opioid abuse and overdose    If you believe you may be struggling with addiction, tell your health care provider and ask for guidance or call [facility]'s National Helpline at 0-219-387-HELP    LEARN MORE / www.cdc.gov/drugoverdose/prescribing/guideline.html    Safely dispose of unused prescription opioids: Find your local drug take-back programs and more information about the importance of safe disposal at www.doseofreality.mn.gov             Medication List: This is a list of all your medications and when to take them. Check marks below indicate your daily home schedule. Keep this list as a reference.      Medications           Morning Afternoon Evening Bedtime As Needed    * albuterol 108 (90 BASE) MCG/ACT Inhaler   Commonly known as:  PROAIR HFA/PROVENTIL HFA/VENTOLIN HFA   Take 2 puffs prn for wheezing or shortness of breath                                * albuterol (2.5 MG/3ML) 0.083% neb solution   Take 1 vial (2.5 mg) by nebulization every 6 hours as needed for shortness of breath / dyspnea or wheezing                                * albuterol (2.5 MG/3ML) 0.083% neb solution   Take 1 ampule by nebulization every 6 hours as needed.                                aspirin 81 MG tablet   Take 1 tablet by mouth daily.                                atorvastatin 10 MG tablet   Commonly known as:  LIPITOR   Take 4 tablets (40 mg) by mouth daily                                Blood Pressure Kit   daily.                                carvedilol 12.5 MG tablet   Commonly known as:  COREG   Take 1 tablet (12.5 mg) by mouth 2 times daily (with meals)                                clindamycin 300 MG capsule    Commonly known as:  CLEOCIN   Take 1 capsule (300 mg) by mouth 4 times daily for 5 days                                DEMEROL 25 MG/ML injection   Inject 25 mg into the vein as needed. 25 mg IV prior to IgG infusion   Generic drug:  meperidine                                dicyclomine 10 MG capsule   Commonly known as:  BENTYL   Take 10 mg by mouth 4 times daily (before meals and nightly).                                FISH OIL   Unsure of dosage take two caps daily                                fluticasone-salmeterol 250-50 MCG/DOSE diskus inhaler   Commonly known as:  ADVAIR   Inhale 1 puff into the lungs 2 times daily.                                HYDROcodone-acetaminophen  MG per tablet   Commonly known as:  LORCET   Take 1 tablet by mouth every 6 hours as needed.                                Immune Globulin (Human) 25 GM/500ML Soln   Inject 25 g into the vein See Admin Instructions. 25 grams every 3 weeks IVIG                                isosorbide mononitrate 60 MG 24 hr tablet   Commonly known as:  IMDUR   Take 1 tablet (60 mg) by mouth daily                                metFORMIN 1000 MG tablet   Commonly known as:  GLUCOPHAGE   Take 1 tablet (1,000 mg) by mouth daily (with dinner) Take 1 tablet twice daily                                nitroGLYcerin 0.4 MG sublingual tablet   Commonly known as:  NITROQUICK   Place 1 tablet (0.4 mg) under the tongue every 5 minutes as needed                                omeprazole 20 MG CR capsule   Commonly known as:  priLOSEC   Take 1 capsule (20 mg) by mouth 2 times daily                                * blood glucose monitoring lancets   Use to test blood sugar 3 times daily or as directed.                                * ONE TOUCH FINE POINT LANCETS   2 times daily.                                * ONETOUCH ULTRA test strip   Test blood sugar twice a day.   Generic drug:  blood glucose monitoring                                * blood glucose  monitoring test strip   Commonly known as:  TERRY CONTOUR NEXT   by In Vitro route 3 times daily Use to test blood sugar 3 times daily or as directed.                                predniSONE 20 MG tablet   Commonly known as:  DELTASONE   Patient to take 60 mg the evening before the procedure and 30 mg the morning of the procedure.                                tiotropium 18 MCG capsule   Commonly known as:  SPIRIVA HANDIHALER   One puff every day                                traMADol 50 MG tablet   Commonly known as:  ULTRAM   Take 1 tablet (50 mg) by mouth every 6 hours as needed for pain                                TYLENOL 325 MG tablet   Take 1-2 tablets by mouth every 6 hours as needed.   Generic drug:  acetaminophen                                * Notice:  This list has 7 medication(s) that are the same as other medications prescribed for you. Read the directions carefully, and ask your doctor or other care provider to review them with you.

## 2018-02-12 NOTE — IP AVS SNAPSHOT
Unit 6D Observation [address] [address]    Phone:  [phone]    Fax:  [phone]                                       After Visit Summary   2/12/2018    Jennifer Alvarez    MRN: [id number]           After Visit Summary Signature Page     I have received my discharge instructions, and my questions have been answered. I have discussed any challenges I see with this plan with the nurse or doctor.    ..........................................................................................................................................  Patient/Patient Representative Signature      ..........................................................................................................................................  Patient Representative Print Name and Relationship to Patient    ..................................................               ................................................  Date                                            Time    ..........................................................................................................................................  Reviewed by Signature/Title    ...................................................              ..............................................  Date                                                            Time

## 2018-02-12 NOTE — OP NOTE
EP PROCEDURE NOTE    Procedures:  1. PPM generator change.  2. Pocket Revision  3. New RV lead Implantation  4. Capped RV lead  5.  Left subclavian venogram    Attending: Dr. Rivas  EP Fellow: Dr. Langley  Procedure Date: 2/12/2018    Pre-operative Diagnosis:  MARIA T, Rapidly Climbing RV capture threhold.  Post-operative diagnosis:   Successful PPM generator change, RV lead insertion.  Complications: None.     Fluoroscopy time/dose: 4.3 minutes, 12 mGy.      Clinical Profile:  See H&P    PROCEDURE  The risks and benefits of the procedure were explained to the patient in full.  The risks of the procedure include, but are not limited to: pain, bleeding, blood transfusion reactions, infection, pneumothorax, damage to existing pacemaker leads and device, possible lead revision, perforation of vessels or heart, pericardial effusion, and death. Informed Consent was obtained. The patient was brought to the EP lab in a fasting and hemodynamically stable condition. The patient was prepped and draped in a sterile fashion. The chest wall was anesthetized with 2% Lidocaine.   Sedation: This procedure was performed under moderate sedation under the supervision of the the Staff Physician. The patient received 1.5 mg Versed and 75 mcg Fentanyl for a total procedural sedation time of 85 minutes. Heart rate, blood pressure, oxygen saturation, and patient responses were monitored throughout the procedure with the assistance of the RN.  The patient had a noted contrast allergy with respiratory decompensation previously, as such we did give her 125 mg of IV Solu-Medrol and 25 mg of IV Benadryl which occurred roughly 30 minutes prior to the necessary venogram.    An approximately 5-cm incision was made over the old incision line. Electrocautery was used to access the pocket, with adequate hemostasis being provided throughout.  We noted a [place]-Wolf pouch surrounding the pacemaker, which required additional work to extract the pocket  surrounding the device.  After freeing the leads from the [place]-Wolf pouch and expanding the pocket superior and more medially, we gained access to the left subclavian vein under fluoroscopy using the first rib puncture technique.  A guidewire was advanced down into the IVC and a 7 [place] SafeSheath was placed.  We advanced the RV lead into the RV below the existing RV lead and positioned it more inferior to the prior superior septal lead. The lead was screwed in to the RV septum at this time and visualized under fluoroscopy.  The peel-away sheath was removed and the lead was sutured into pectoral fascia using the suture sleeve.  The pulse generator was then removed. The wrench tool was used to detach the leads from the pulse generator. The chronic RV lead was capped. And the new RV and old RA lead were then securely attached to the new pulse generator. The pocket was then vigorously washed with antibiotic solution. The new device generator was then interrogated and the leads showed adequate sensing, pacing thresholds and impedance. The pulse generator was then placed in the pocket. A stay suture was then made to secure the device in the pocket using 0-ethibond, the original pocket was expanded medial and the device was moved more medial given a relative lateral location of the original placement.    The pocket was then closed in 3 layers using 2-0 Vicryl for the deep layers and 4-0 Vicryl for the subcuticular layer. Steri-Strips and a dressing were then applied over the incision site, and the patient was transported to a monitored bed.    Dr. Rivas was present throughout the entire procedure.    EQUIPMENT  1. The new Generator is a St. Surjit, Serial [id number].   2. The explanted device a 5826, Serial [id number], implanted 1/14/2008.   3. The chronic RA Lead is a 1699TC, Serial [id number], implanted 1/14/2008.   4. The new RV Lead is a 2088TC, Serial [id number].  5. Capped RV lead is a 1888TC, Serial [id number], implanted  1/14/2008  MEASUREMENTS  The RA lead is sensing P waves of 2.3 mV and a pacing threshold of 0.5 V @ 0.5 msec with an impedance of 280 ohms.  The RV lead is sensing R waves of > 12(18.1) mV and a pacing threshold of 0.625 V @ 0.5 msec with an impedance of 710 ohms.     FINAL PROGRAMMING  Gerald Settings:  -Pacing mode: DDDR.  -Lower Rate Limit: 60 ppm.  -Upper Rate Limit: 130 ppm.    PLAN  1. Wound care.  2. Antibiotics: .    3. Device Interrogatin in AM    Barrera Langley M.D.  Cardiac Electrophysiology Fellow    EP STAFF NOTE  I was present throughout the procedure. I agree with the note above by the EP fellow.  Shola Rivas MD [facility]  Cardiology - Electrophysiology

## 2018-02-12 NOTE — PROGRESS NOTES
Discharge instruction reviewed.  Patient verbalized understanding. PIV removed, patient ambulated to the main lobby accompanied by family.Patient discharged

## 2018-02-12 NOTE — DISCHARGE INSTRUCTIONS
Home Care after a Pacemaker Implant    Wound care:  Keep your incision (surgery wound) dry for 3 days.  After 3 days, you may remove the outer bandage.  Keep the strips of tape on.  They will be removed at your clinic visit.  Check for signs of infection each day.  These include increased redness, swelling, drainage, bleeding or a fever over 101 F (38.3 C).  Call us immediately if you see any of these signs.  If there are no signs of infection, you may shower in 3 days.  Do not submerge the incision (in a bath tub, hot tub, or swimming pool) until fully healed.    Pain:   You may have mild to moderate pain for 3 to 5 days.  Take acetaminophen (Tylenol) or ibuprofen (Advil) for the pain.  Call us if the pain is severe or lasts more than 5 days.    Activity:  After 24 hours, slowly return to your normal activities.   Healing will take 4 to 6 weeks.  No driving for 3 days  Avoid climbing a ladder alone.  It is best to stay within 4 feet of the ground.  Avoid anything that may cause rough contact or a hard hit to your chest.  This includes football, hockey, and other contact sports.  For at least 4 weeks:  Do not raise your affected arm above your shoulder.  Do not use your affected arm to push, pull, or lift anything over 10 pounds.  Avoid repetitive upper body activities for 6 weeks (ie: golf, swimming, and weight lifting)    Follow Up Visits:  Return to the clinic in 7 to 10 days to have your device and wound checked.      Telling others about your device:  Before you have any medical tests or treatments, tell the doctors, dentists, and other care providers about your device.  There are a few tests and treatments that may interfere with your device.  (These include MRI, radiation therapy, electrocautery, and others.)  Your care team may need to take special steps to keep you safe.  Before you leave the hospital, you will receive a temporary ID card.  A permanent card will be mailed to you about 6 to 8 weeks later.   Always carry the ID card with you.  It has important details about your device.  You should also get a MedicAlert ID.  Please ask us for a MedicAlert brochure, or go to [email or web address].    Safety near electrical equipment:  All of these are safe to use when in good repair:    Microwaves    Radios    Cordless phone    Remote controls    Small electrical tools  Cell phones: Keep cell phones at least 6 inches from your device.  Do not carry the phone in a pocket near your device.  Security jaimes: It is okay to walk through security jaimes at the airports and department stores.  Tell airport security that you have a pacemaker.  They should keep the screening wand at least 6 inches from your device.  Full-body scanners are safe.  Avoid the following:    MRI tests in the hospital unless you have a MRI safe pacemaker.           Arc welding, chain saws and high-powered industrial or commercial tools.    Power lines, power plants and large power generators.    Electric body fat scales.    Magnetic mattress pads or pillow.    Questions?  Please call [facility].    Device Nurse:          Business Hours:  [phone]                       After Hours:  [phone]   Choose option 4, then ask for the                                                  on-call device nurse at job code [id number].    Your next device clinic appointment is scheduled on:     ___________________________ at _____________.            [facility]  [facility] Clinic [address]  [address] [place] [address]  [phone]

## 2018-02-12 NOTE — PROGRESS NOTES
RN was notified of contrast allergy which include respiratory symptoms by 2A RN.    Spoke with Tona Bautista NP and Socrates Langley MD regarding contrast allergy. It was decided by MD to premedicate with 125mg of Solu-Medrol and 25mg of IV Benadryl.  See MAR for details.    Continue to monitor and assess pt closely.

## 2018-02-12 NOTE — PROGRESS NOTES
Pt admitted to 2A for a generator change and lead replacement.  Pt reports and verifies a contrast allergy.   Pt has a history of respiratory reaction with contrast.  Pt usually has premeds  benedryl and steroids the evening before and am of procedure.  Socrates Langley here to consent pt.  This RN spoke with him regarding concern for contrast allergy and not premedicated.  Dr. Langley said he would speak to the attending.  Xena Manuel also notified and RN from heart Cath([place]) also notified.  Heart cath decided to go forward with procedure without premedicating pt.  Consent and h and p completed.  Labs done.

## 2018-02-12 NOTE — H&P
H&P from Dr. Rivas's Office Visit on 1/23/18 reviewed. Following today's exam there are no interval changes.     Update: Notified that patient has contrast allergy. Ordered Solumedrol and Benadryl for premedication in case need for venogram during procedure.     CINDY Bray CNP  Electrophysiology Consult Service  Pager: [phone]

## 2018-02-13 ENCOUNTER — CARE COORDINATION (OUTPATIENT)
Dept: CARDIOLOGY | Facility: CLINIC | Age: 78
End: 2018-02-13

## 2018-02-13 DIAGNOSIS — Z95.0 S/P CARDIAC PACEMAKER PROCEDURE: Primary | ICD-10-CM

## 2018-02-13 NOTE — PROGRESS NOTES
Yesterday with left forearm peripheral IV still in place.   Pt walked into [facility] and RN is asking for an order to be faxed to remove the IV>     Please fax order to [facility]: fax [phone]    Routed

## 2018-02-14 LAB — INTERPRETATION ECG - MUSE: NORMAL

## 2018-02-16 ENCOUNTER — CARE COORDINATION (OUTPATIENT)
Dept: CARDIOLOGY | Facility: CLINIC | Age: 78
End: 2018-02-16

## 2018-02-16 NOTE — PROGRESS NOTES
Tona Bautista NP reviewed cxray on Care Everywhere post PPM implant.   Looks good thanks!   LVW

## 2018-03-02 ENCOUNTER — INFUSION THERAPY VISIT (OUTPATIENT)
Dept: INFUSION THERAPY | Facility: CLINIC | Age: 78
End: 2018-03-02
Attending: INTERNAL MEDICINE
Payer: MEDICARE

## 2018-03-02 ENCOUNTER — ALLIED HEALTH/NURSE VISIT (OUTPATIENT)
Dept: CARDIOLOGY | Facility: CLINIC | Age: 78
End: 2018-03-02
Attending: INTERNAL MEDICINE
Payer: MEDICARE

## 2018-03-02 VITALS
RESPIRATION RATE: 18 BRPM | DIASTOLIC BLOOD PRESSURE: 58 MMHG | BODY MASS INDEX: 17.65 KG/M2 | SYSTOLIC BLOOD PRESSURE: 117 MMHG | OXYGEN SATURATION: 98 % | WEIGHT: 93.4 LBS | TEMPERATURE: 97.8 F

## 2018-03-02 DIAGNOSIS — R00.1 SINUS BRADYCARDIA: Primary | ICD-10-CM

## 2018-03-02 DIAGNOSIS — D80.1 HYPOGAMMAGLOBULINEMIA (H): ICD-10-CM

## 2018-03-02 DIAGNOSIS — D80.2 IGA DEFICIENCY (H): Primary | ICD-10-CM

## 2018-03-02 PROBLEM — Z95.0 S/P CARDIAC PACEMAKER PROCEDURE: Status: RESOLVED | Noted: 2018-02-12 | Resolved: 2018-03-02

## 2018-03-02 PROCEDURE — 93280 PM DEVICE PROGR EVAL DUAL: CPT | Mod: ZF

## 2018-03-02 PROCEDURE — 25000132 ZZH RX MED GY IP 250 OP 250 PS 637: Mod: ZF | Performed by: INTERNAL MEDICINE

## 2018-03-02 PROCEDURE — A9270 NON-COVERED ITEM OR SERVICE: HCPCS | Mod: ZF | Performed by: INTERNAL MEDICINE

## 2018-03-02 PROCEDURE — 96375 TX/PRO/DX INJ NEW DRUG ADDON: CPT

## 2018-03-02 PROCEDURE — 96366 THER/PROPH/DIAG IV INF ADDON: CPT

## 2018-03-02 PROCEDURE — 96365 THER/PROPH/DIAG IV INF INIT: CPT

## 2018-03-02 PROCEDURE — 25000128 H RX IP 250 OP 636: Mod: ZF | Performed by: INTERNAL MEDICINE

## 2018-03-02 RX ORDER — DIPHENHYDRAMINE HCL 25 MG
25 CAPSULE ORAL ONCE
Status: COMPLETED | OUTPATIENT
Start: 2018-03-02 | End: 2018-03-02

## 2018-03-02 RX ORDER — ACETAMINOPHEN 325 MG/1
650 TABLET ORAL ONCE
Status: CANCELLED
Start: 2018-03-02 | End: 2018-03-02

## 2018-03-02 RX ORDER — MEPERIDINE HYDROCHLORIDE 25 MG/ML
25 INJECTION INTRAMUSCULAR; INTRAVENOUS; SUBCUTANEOUS
Status: DISCONTINUED | OUTPATIENT
Start: 2018-03-02 | End: 2018-03-02 | Stop reason: HOSPADM

## 2018-03-02 RX ORDER — METHYLPREDNISOLONE SODIUM SUCCINATE 125 MG/2ML
100 INJECTION, POWDER, LYOPHILIZED, FOR SOLUTION INTRAMUSCULAR; INTRAVENOUS ONCE
Status: CANCELLED
Start: 2018-03-02 | End: 2018-03-02

## 2018-03-02 RX ORDER — DIPHENHYDRAMINE HCL 25 MG
25 CAPSULE ORAL ONCE
Status: CANCELLED
Start: 2018-03-02 | End: 2018-03-02

## 2018-03-02 RX ORDER — METHYLPREDNISOLONE SODIUM SUCCINATE 125 MG/2ML
100 INJECTION, POWDER, LYOPHILIZED, FOR SOLUTION INTRAMUSCULAR; INTRAVENOUS ONCE
Status: COMPLETED | OUTPATIENT
Start: 2018-03-02 | End: 2018-03-02

## 2018-03-02 RX ORDER — MEPERIDINE HYDROCHLORIDE 25 MG/ML
25 INJECTION INTRAMUSCULAR; INTRAVENOUS; SUBCUTANEOUS
Status: CANCELLED
Start: 2018-03-02

## 2018-03-02 RX ORDER — ACETAMINOPHEN 325 MG/1
650 TABLET ORAL ONCE
Status: COMPLETED | OUTPATIENT
Start: 2018-03-02 | End: 2018-03-02

## 2018-03-02 RX ADMIN — DIPHENHYDRAMINE HYDROCHLORIDE 25 MG: 25 CAPSULE ORAL at 09:03

## 2018-03-02 RX ADMIN — IMMUNE GLOBULIN INFUSION (HUMAN) 25 G: 100 INJECTION, SOLUTION INTRAVENOUS; SUBCUTANEOUS at 09:45

## 2018-03-02 RX ADMIN — METHYLPREDNISOLONE SODIUM SUCCINATE 100 MG: 125 INJECTION, POWDER, FOR SOLUTION INTRAMUSCULAR; INTRAVENOUS at 09:23

## 2018-03-02 RX ADMIN — ACETAMINOPHEN 650 MG: 325 TABLET ORAL at 09:03

## 2018-03-02 RX ADMIN — MEPERIDINE HYDROCHLORIDE 25 MG: 25 INJECTION, SOLUTION INTRAMUSCULAR; INTRAVENOUS; SUBCUTANEOUS at 09:22

## 2018-03-02 ASSESSMENT — PAIN DESCRIPTION - DESCRIPTORS: DESCRIPTORS: ACHING

## 2018-03-02 NOTE — PROGRESS NOTES
Preliminary Device Interrogation Results.  Final physician signed paceart report to be scanned and attached.    Pt seen in the [facility] for evaluation and iterative programming of an Abbott (SJM), dual lead pacemaker, per MD orders. She is s/p device generator change 2/12/18. Today her intrinsic rhythm is SR 62 bpm. Normal device function. No arrhythmias have been recorded. AP= 25% and = 0%. Lead trends appear stable. Pt reports she is feeling well. Steri-strips removed and incision cleaned. Edges are well approximated with no redness, drainage, or edema noted. Plan for pt to RTC in 3 months.  Dual chamber pacemaker

## 2018-03-02 NOTE — MR AVS SNAPSHOT
After Visit Summary   3/2/2018    Jennifer Alvarez    MRN: [id number]           Patient Information     Date Of Birth          1940        Visit Information        Provider Department      3/2/2018 11:30 AM 1, Uc Cv Device [facility]        Today's Diagnoses     Sinus bradycardia    -  1       Follow-ups after your visit        Your next 10 appointments already scheduled     Mar 23, 2018 11:00 AM CDT   Infusion 300 with UC SPEC INFUSION, UC 44 ATC   [facility] ([facility])    [address]  [address]  [facility] [address]   [phone]            Apr 11, 2018  8:30 AM CDT   PFT VISIT with UC PFL B   [facility] ([facility])    [address] [place] [address]  3rd Floor  [facility] [address]   [phone]            Apr 11, 2018  9:00 AM CDT   (Arrive by 8:45 AM)   Return Interstitial Lung with Maxx Elizabeth MD   [facility] ([facility])    [address] [place] [address]  [address]  [facility] [address]   [phone]            Apr 13, 2018 11:00 AM CDT   Infusion 300 with UC SPEC INFUSION, UC 44 ATC   [facility] ([facility])    [address]  [address]  [facility] [address]   [phone]            May 04, 2018 11:00 AM CDT   Infusion 300 with UC SPEC INFUSION, UC 44 ATC   [facility] ([facility])    [address]  [address]  [facility] [address]   [phone]            May 16, 2018  1:30 PM CDT   (Arrive by 1:15 PM)   Pacemaker Check with Uc Cv Device 1   [facility] ([facility])    [address] [place] [address]  [address]  [facility] [address]   [phone]            May 16, 2018  2:00 PM CDT    (Arrive by 1:45 PM)   RETURN ARRHYTHMIA with CINDY Escudero CNP   [facility] ([facility])    [address]  [address] [place] [address] [phone]              Who to contact     If you have questions or need follow up information about today's clinic visit or your schedule please contact [facility] directly at [phone].  Normal or non-critical lab and imaging results will be communicated to you by Winchannelhart, letter or phone within 4 business days after the clinic has received the results. If you do not hear from us within 7 days, please contact the clinic through Winchannelhart or phone. If you have a critical or abnormal lab result, we will notify you by phone as soon as possible.  Submit refill requests through [employer] or call your pharmacy and they will forward the refill request to us. Please allow 3 business days for your refill to be completed.          Additional Information About Your Visit        WinchannelharAdyoulike Information     [employer] gives you secure access to your electronic health record. If you see a primary care provider, you can also send messages to your care team and make appointments. If you have questions, please call your primary care clinic.  If you do not have a primary care provider, please call [phone] and they will assist you.        Care EveryWhere ID     This is your Care EveryWhere ID. This could be used by other organizations to access your [facility] medical records  [id number]         Blood Pressure from Last 3 Encounters:   03/02/18 117/58   02/12/18 123/80   02/08/18 131/67    Weight from Last 3 Encounters:   03/02/18 42.4 kg (93 lb 6.4 oz)   02/08/18 41.9 kg (92 lb 4.8 oz)   01/23/18 41.6 kg (91 lb 12.8 oz)              We Performed the Following     (67[address]) PM DEVICE PROGRAMMING EVAL, DUAL LEAD PACER        Primary Care Provider Office Phone # Fax #    Brandi Burks [phone]       [place]  CL[address] [address]   [facility] [address]        Equal Access to Services     GUERA HOLT : Hadii adolph sims hadlea Sorosi, wacaroda luqadaha, qaybta kaashkan andrzejcaitlincurly, waxmoustapha stu marinefidencio donnellymark wallaceradha lovelace. So [facility] [phone].    ATENCIÓN: Si habla español, tiene a taylor disposición servicios gratuitos de asistencia lingüística. [facility] [phone].    We comply with applicable federal civil rights laws and [place] laws. We do not discriminate on the basis of race, color, national origin, age, disability, sex, sexual orientation, or gender identity.            Thank you!     Thank you for choosing [facility]  for your care. Our goal is always to provide you with excellent care. Hearing back from our patients is one way we can continue to improve our services. Please take a few minutes to complete the written survey that you may receive in the mail after your visit with us. Thank you!             Your Updated Medication List - Protect others around you: Learn how to safely use, store and throw away your medicines at www.disposemymeds.org.          This list is accurate as of 3/2/18 12:53 PM.  Always use your most recent med list.                   Brand Name Dispense Instructions for use Diagnosis    * albuterol 108 (90 BASE) MCG/ACT Inhaler    PROAIR HFA/PROVENTIL HFA/VENTOLIN HFA    1 Inhaler    Take 2 puffs prn for wheezing or shortness of breath    Wheezing       * albuterol (2.5 MG/3ML) 0.083% neb solution     360 mL    Take 1 vial (2.5 mg) by nebulization every 6 hours as needed for shortness of breath / dyspnea or wheezing    Wheezing       * albuterol (2.5 MG/3ML) 0.083% neb solution      Take 1 ampule by nebulization every 6 hours as needed.        aspirin 81 MG tablet      Take 1 tablet by mouth daily.        atorvastatin 10 MG tablet    LIPITOR    90 tablet    Take 4 tablets (40 mg) by mouth daily    Hyperlipidemia LDL goal <100       Blood Pressure Kit      daily.        carvedilol 12.5 MG  tablet    COREG    60 tablet    Take 1 tablet (12.5 mg) by mouth 2 times daily (with meals)    Secondary hypertension       DEMEROL 25 MG/ML injection   Generic drug:  meperidine      Inject 25 mg into the vein as needed. 25 mg IV prior to IgG infusion        dicyclomine 10 MG capsule    BENTYL     Take 10 mg by mouth 4 times daily (before meals and nightly).        FISH OIL      Unsure of dosage take two caps daily        fluticasone-salmeterol 250-50 MCG/DOSE diskus inhaler    ADVAIR    1 Inhaler    Inhale 1 puff into the lungs 2 times daily.    Unspecified chronic bronchitis       HYDROcodone-acetaminophen  MG per tablet    LORCET     Take 1 tablet by mouth every 6 hours as needed.        Immune Globulin (Human) 25 GM/500ML Soln      Inject 25 g into the vein See Admin Instructions. 25 grams every 3 weeks IVIG        isosorbide mononitrate 60 MG 24 hr tablet    IMDUR    30 tablet    Take 1 tablet (60 mg) by mouth daily    HTN (hypertension)       metFORMIN 1000 MG tablet    GLUCOPHAGE     Take 1 tablet (1,000 mg) by mouth daily (with dinner) Take 1 tablet twice daily    DM (diabetes mellitus) (H)       nitroGLYcerin 0.4 MG sublingual tablet    NITROQUICK    25 tablet    Place 1 tablet (0.4 mg) under the tongue every 5 minutes as needed    Chest pain       omeprazole 20 MG CR capsule    priLOSEC    60 capsule    Take 1 capsule (20 mg) by mouth 2 times daily    Esophageal reflux, Unspecified chronic bronchitis       * blood glucose monitoring lancets     3 Box    Use to test blood sugar 3 times daily or as directed.    Type 2 diabetes mellitus with complication (H)       * ONE TOUCH FINE POINT LANCETS      2 times daily.        * ONETOUCH ULTRA test strip   Generic drug:  blood glucose monitoring     200 strip    Test blood sugar twice a day.    Diabetes mellitus type II       * blood glucose monitoring test strip    TERRY CONTOUR NEXT    270 each    by In Vitro route 3 times daily Use to test blood sugar 3  times daily or as directed.    Type 2 diabetes mellitus with complication (H)       predniSONE 20 MG tablet    DELTASONE    5 tablet    Patient to take 60 mg the evening before the procedure and 30 mg the morning of the procedure.    Allergic reaction to contrast dye       tiotropium 18 MCG capsule    SPIRIVA HANDIHALER    30 capsule    One puff every day    Unspecified chronic bronchitis       traMADol 50 MG tablet    ULTRAM    20 tablet    Take 1 tablet (50 mg) by mouth every 6 hours as needed for pain    Pacemaker battery depletion       TYLENOL 325 MG tablet   Generic drug:  acetaminophen      Take 1-2 tablets by mouth every 6 hours as needed.        * Notice:  This list has 7 medication(s) that are the same as other medications prescribed for you. Read the directions carefully, and ask your doctor or other care provider to review them with you.

## 2018-03-02 NOTE — MR AVS SNAPSHOT
After Visit Summary   3/2/2018    Jennifer Alvarez    MRN: [id number]           Patient Information     Date Of Birth          1940        Visit Information        Provider Department      3/2/2018 9:00 AM UC 46 ATC; UC SPEC INFUSION [facility] Specialty and Procedure        Today's Diagnoses     IgA deficiency (H)    -  1    Hypogammaglobulinemia (H)           Follow-ups after your visit        Your next 10 appointments already scheduled     Mar 23, 2018 11:00 AM CDT   Infusion 300 with UC SPEC INFUSION, UC 44 ATC   [facility] Specialty and Procedure ([facility])    [address]  [address]  [facility] [address]   [phone]            Apr 11, 2018  8:30 AM CDT   PFT VISIT with FARTUN PFL B   [facility] Pulmonary Function Testing ([facility])    [address]  3rd Floor  [facility] [address]   [phone]            Apr 11, 2018  9:00 AM CDT   (Arrive by 8:45 AM)   Return Interstitial Lung with Maxx Elizabeth MD   [facility] ([facility])    [address] [place] [address]  [address]  [facility] [address]   [phone]            Apr 13, 2018 11:00 AM CDT   Infusion 300 with UC SPEC INFUSION, UC 44 ATC   [facility] Specialty and Procedure ([facility])    [address]  [address]  [facility] [address]   [phone]            May 04, 2018 11:00 AM CDT   Infusion 300 with UC SPEC INFUSION, UC 44 ATC   [facility] Specialty and Procedure ([facility])    [address]  [address]  [facility] [address]   [phone]            May 16, 2018  1:30 PM CDT   (Arrive by 1:15 PM)   Pacemaker Check with Uc Cv Device 1   [facility] Heart Care ([facility])    [address] [place] [address]  [address]  [facility] [address] [phone]            May 16, 2018  2:00 PM CDT   (Arrive by 1:45 PM)   RETURN ARRHYTHMIA with CINDY Escudero [facility] ([facility])    [address]  [address]  [facility] [address] [phone]              Who to contact     If you have questions or need follow up information about today's clinic visit or your schedule please contact [facility] [place] SPECIALTY AND PROCEDURE directly at [phone].  Normal or non-critical lab and imaging results will be communicated to you by Fix That Bughart, letter or phone within 4 business days after the clinic has received the results. If you do not hear from us within 7 days, please contact the clinic through NationalFieldt or phone. If you have a critical or abnormal lab result, we will notify you by phone as soon as possible.  Submit refill requests through PawSpot or call your pharmacy and they will forward the refill request to us. Please allow 3 business days for your refill to be completed.          Additional Information About Your Visit        MyChart Information     [employer] gives you secure access to your electronic health record. If you see a primary care provider, you can also send messages to your care team and make appointments. If you have questions, please call your primary care clinic.  If you do not have a primary care provider, please call [phone] and they will assist you.        Care EveryWhere ID     This is your Care EveryWhere ID. This could be used by other organizations to access your [place] medical records  [id number]        Your Vitals Were     Temperature Respirations Pulse Oximetry BMI (Body Mass Index)          97.8  F (36.6  C) (Oral) 18 98% 17.65 kg/m2         Blood Pressure from Last 3 Encounters:   03/02/18 117/58   02/12/18 123/80   02/08/18 131/67    Weight from Last 3 Encounters:   03/02/18 42.4 kg (93 lb 6.4 oz)   02/08/18  41.9 kg (92 lb 4.8 oz)   01/23/18 41.6 kg (91 lb 12.8 oz)              Today, you had the following     No orders found for display       Primary Care Provider Office Phone # Fax #    Brandi Burks [phone]       [facility] [address]   [place] [address]        Equal Access to Services     [facility]: Hadii aad ku hadasho Soomaali, waaxda luqadaha, qaybta kaalmada adeegyada, waxay idiin hayaan adeeg kharash la'aan . So [facility] [phone].    ATENCIÓN: Si habla español, tiene a taylor disposición servicios gratuitos de asistencia lingüística. Carolineame al [phone].    We comply with applicable federal civil rights laws and [place] laws. We do not discriminate on the basis of race, color, national origin, age, disability, sex, sexual orientation, or gender identity.            Thank you!     Thank you for choosing [facility] SPECIALTY AND PROCEDURE  for your care. Our goal is always to provide you with excellent care. Hearing back from our patients is one way we can continue to improve our services. Please take a few minutes to complete the written survey that you may receive in the mail after your visit with us. Thank you!             Your Updated Medication List - Protect others around you: Learn how to safely use, store and throw away your medicines at www.disposemymeds.org.          This list is accurate as of 3/2/18 12:55 PM.  Always use your most recent med list.                   Brand Name Dispense Instructions for use Diagnosis    * albuterol 108 (90 BASE) MCG/ACT Inhaler    PROAIR HFA/PROVENTIL HFA/VENTOLIN HFA    1 Inhaler    Take 2 puffs prn for wheezing or shortness of breath    Wheezing       * albuterol (2.5 MG/3ML) 0.083% neb solution     360 mL    Take 1 vial (2.5 mg) by nebulization every 6 hours as needed for shortness of breath / dyspnea or wheezing    Wheezing       * albuterol (2.5 MG/3ML) 0.083% neb solution      Take 1 ampule by nebulization  every 6 hours as needed.        aspirin 81 MG tablet      Take 1 tablet by mouth daily.        atorvastatin 10 MG tablet    LIPITOR    90 tablet    Take 4 tablets (40 mg) by mouth daily    Hyperlipidemia LDL goal <100       Blood Pressure Kit      daily.        carvedilol 12.5 MG tablet    COREG    60 tablet    Take 1 tablet (12.5 mg) by mouth 2 times daily (with meals)    Secondary hypertension       DEMEROL 25 MG/ML injection   Generic drug:  meperidine      Inject 25 mg into the vein as needed. 25 mg IV prior to IgG infusion        dicyclomine 10 MG capsule    BENTYL     Take 10 mg by mouth 4 times daily (before meals and nightly).        FISH OIL      Unsure of dosage take two caps daily        fluticasone-salmeterol 250-50 MCG/DOSE diskus inhaler    ADVAIR    1 Inhaler    Inhale 1 puff into the lungs 2 times daily.    Unspecified chronic bronchitis       HYDROcodone-acetaminophen  MG per tablet    LORCET     Take 1 tablet by mouth every 6 hours as needed.        Immune Globulin (Human) 25 GM/500ML Soln      Inject 25 g into the vein See Admin Instructions. 25 grams every 3 weeks IVIG        isosorbide mononitrate 60 MG 24 hr tablet    IMDUR    30 tablet    Take 1 tablet (60 mg) by mouth daily    HTN (hypertension)       metFORMIN 1000 MG tablet    GLUCOPHAGE     Take 1 tablet (1,000 mg) by mouth daily (with dinner) Take 1 tablet twice daily    DM (diabetes mellitus) (H)       nitroGLYcerin 0.4 MG sublingual tablet    NITROQUICK    25 tablet    Place 1 tablet (0.4 mg) under the tongue every 5 minutes as needed    Chest pain       omeprazole 20 MG CR capsule    priLOSEC    60 capsule    Take 1 capsule (20 mg) by mouth 2 times daily    Esophageal reflux, Unspecified chronic bronchitis       * blood glucose monitoring lancets     3 Box    Use to test blood sugar 3 times daily or as directed.    Type 2 diabetes mellitus with complication (H)       * ONE TOUCH FINE POINT LANCETS      2 times daily.        *  ONETOUCH ULTRA test strip   Generic drug:  blood glucose monitoring     200 strip    Test blood sugar twice a day.    Diabetes mellitus type II       * blood glucose monitoring test strip    TERRY CONTOUR NEXT    270 each    by In Vitro route 3 times daily Use to test blood sugar 3 times daily or as directed.    Type 2 diabetes mellitus with complication (H)       predniSONE 20 MG tablet    DELTASONE    5 tablet    Patient to take 60 mg the evening before the procedure and 30 mg the morning of the procedure.    Allergic reaction to contrast dye       tiotropium 18 MCG capsule    SPIRIVA HANDIHALER    30 capsule    One puff every day    Unspecified chronic bronchitis       traMADol 50 MG tablet    ULTRAM    20 tablet    Take 1 tablet (50 mg) by mouth every 6 hours as needed for pain    Pacemaker battery depletion       TYLENOL 325 MG tablet   Generic drug:  acetaminophen      Take 1-2 tablets by mouth every 6 hours as needed.        * Notice:  This list has 7 medication(s) that are the same as other medications prescribed for you. Read the directions carefully, and ask your doctor or other care provider to review them with you.

## 2018-03-02 NOTE — PROGRESS NOTES
Nursing Note  Jennifer Al[facility] for:   Chief Complaint   Patient presents with     Infusion     IVIG     During [facility]'s [facility] appointment, orders from Dr Elizabeth were completed.  Frequency: every 2 weeks    Progress note:  Patient identification verified by name and date of birth.  Assessment completed.  Vitals recorded in Doc Flowsheets.  Patient was provided with education regarding infusion and possible side effects.  Patient verbalized understanding.      needed: No  Premedications: administered per order.  Infusion Rates: infusion starts at 20 ml/hr, then increased by 20 ml/hr every 15 minutes to final rate of 160 ml/hr.  Approximate Infusion length:2 hours.   Labs: were not ordered for this appointment.  Vascular access: peripheral IV placed today.  Treatment Conditions: non-applicable.  Patient tolerated infusion: well.    Drug Waste Record    Drug Name: solumedrol  Dose: 100mg  Route administered: IV  NDC #: vial discarded  Amount of waste(mL):25mg  Reason for waste: Single use vial      Discharge Plan:   Follow up plan of care with: ongoing infusions at [facility].  Discharge instructions were reviewed with patient.  Patient/representative verbalized understanding of discharge instructions and all questions answered.  [facility] discharged [facility] in stable condition.    Aracely Henriquez RN    Administrations This Visit     acetaminophen (TYLENOL) tablet 650 mg     Admin Date Action Dose Route Administered By             03/02/2018 Given 650 mg Oral Karely Davis RN                    diphenhydrAMINE (BENADRYL) capsule 25 mg     Admin Date Action Dose Route Administered By             03/02/2018 Given 25 mg Oral Karely Davis RN                    immune globulin - (GAMMAGARD) sucrose free 10 % injection 25 g     Admin Date Action  Dose Route Administered By             03/02/2018 New Bag 25 g Intravenous Aracely Henriquez RN                    meperidine (DEMEROL) injection 25 mg     Admin Date Action Dose Route Administered By             03/02/2018 Given 25 mg Intravenous Aracely Henriquez RN                    methylPREDNISolone sodium succinate (solu-MEDROL) injection 100 mg     Admin Date Action Dose Route Administered By             03/02/2018 Given 100 mg Intravenous Aracely Henriquez RN                          /58  Temp 97.8  F (36.6  C) (Oral)  Resp 18  Wt 42.4 kg (93 lb 6.4 oz)  SpO2 98%  BMI 17.65 kg/m2

## 2018-03-21 DIAGNOSIS — I10 HTN (HYPERTENSION): ICD-10-CM

## 2018-03-22 RX ORDER — ISOSORBIDE MONONITRATE 60 MG/1
60 TABLET, EXTENDED RELEASE ORAL DAILY
Qty: 90 TABLET | Refills: 3 | Status: SHIPPED | OUTPATIENT
Start: 2018-03-22 | End: 2019-01-01

## 2018-03-23 ENCOUNTER — INFUSION THERAPY VISIT (OUTPATIENT)
Dept: INFUSION THERAPY | Facility: CLINIC | Age: 78
End: 2018-03-23
Attending: INTERNAL MEDICINE
Payer: MEDICARE

## 2018-03-23 VITALS
OXYGEN SATURATION: 97 % | HEART RATE: 81 BPM | TEMPERATURE: 98.8 F | DIASTOLIC BLOOD PRESSURE: 69 MMHG | SYSTOLIC BLOOD PRESSURE: 134 MMHG

## 2018-03-23 DIAGNOSIS — D80.1 HYPOGAMMAGLOBULINEMIA (H): ICD-10-CM

## 2018-03-23 DIAGNOSIS — D80.2 IGA DEFICIENCY (H): Primary | ICD-10-CM

## 2018-03-23 PROCEDURE — 96375 TX/PRO/DX INJ NEW DRUG ADDON: CPT

## 2018-03-23 PROCEDURE — 96365 THER/PROPH/DIAG IV INF INIT: CPT

## 2018-03-23 PROCEDURE — 96366 THER/PROPH/DIAG IV INF ADDON: CPT

## 2018-03-23 PROCEDURE — 25000128 H RX IP 250 OP 636: Mod: ZF | Performed by: INTERNAL MEDICINE

## 2018-03-23 PROCEDURE — A9270 NON-COVERED ITEM OR SERVICE: HCPCS | Mod: ZF | Performed by: INTERNAL MEDICINE

## 2018-03-23 PROCEDURE — 25000132 ZZH RX MED GY IP 250 OP 250 PS 637: Mod: ZF | Performed by: INTERNAL MEDICINE

## 2018-03-23 RX ORDER — ACETAMINOPHEN 325 MG/1
650 TABLET ORAL ONCE
Status: CANCELLED
Start: 2018-03-23 | End: 2018-03-23

## 2018-03-23 RX ORDER — MEPERIDINE HYDROCHLORIDE 25 MG/ML
25 INJECTION INTRAMUSCULAR; INTRAVENOUS; SUBCUTANEOUS
Status: DISCONTINUED | OUTPATIENT
Start: 2018-03-23 | End: 2018-03-23 | Stop reason: HOSPADM

## 2018-03-23 RX ORDER — MEPERIDINE HYDROCHLORIDE 25 MG/ML
25 INJECTION INTRAMUSCULAR; INTRAVENOUS; SUBCUTANEOUS
Status: CANCELLED
Start: 2018-03-23

## 2018-03-23 RX ORDER — DIPHENHYDRAMINE HCL 25 MG
25 CAPSULE ORAL ONCE
Status: COMPLETED | OUTPATIENT
Start: 2018-03-23 | End: 2018-03-23

## 2018-03-23 RX ORDER — ACETAMINOPHEN 325 MG/1
650 TABLET ORAL ONCE
Status: COMPLETED | OUTPATIENT
Start: 2018-03-23 | End: 2018-03-23

## 2018-03-23 RX ORDER — METHYLPREDNISOLONE SODIUM SUCCINATE 125 MG/2ML
100 INJECTION, POWDER, LYOPHILIZED, FOR SOLUTION INTRAMUSCULAR; INTRAVENOUS ONCE
Status: CANCELLED
Start: 2018-03-23 | End: 2018-03-23

## 2018-03-23 RX ORDER — METHYLPREDNISOLONE SODIUM SUCCINATE 125 MG/2ML
100 INJECTION, POWDER, LYOPHILIZED, FOR SOLUTION INTRAMUSCULAR; INTRAVENOUS ONCE
Status: COMPLETED | OUTPATIENT
Start: 2018-03-23 | End: 2018-03-23

## 2018-03-23 RX ORDER — DIPHENHYDRAMINE HCL 25 MG
25 CAPSULE ORAL ONCE
Status: CANCELLED
Start: 2018-03-23 | End: 2018-03-23

## 2018-03-23 RX ADMIN — METHYLPREDNISOLONE SODIUM SUCCINATE 100 MG: 125 INJECTION, POWDER, FOR SOLUTION INTRAMUSCULAR; INTRAVENOUS at 11:23

## 2018-03-23 RX ADMIN — ACETAMINOPHEN 650 MG: 325 TABLET, FILM COATED ORAL at 11:21

## 2018-03-23 RX ADMIN — MEPERIDINE HYDROCHLORIDE 25 MG: 25 INJECTION, SOLUTION INTRAMUSCULAR; INTRAVENOUS; SUBCUTANEOUS at 11:22

## 2018-03-23 RX ADMIN — IMMUNE GLOBULIN INFUSION (HUMAN) 25 G: 100 INJECTION, SOLUTION INTRAVENOUS; SUBCUTANEOUS at 11:30

## 2018-03-23 RX ADMIN — DIPHENHYDRAMINE HYDROCHLORIDE 25 MG: 25 CAPSULE ORAL at 11:21

## 2018-03-23 NOTE — MR AVS SNAPSHOT
After Visit Summary   3/23/2018    Jennifer Alvarez    MRN: [id number]           Patient Information     Date Of Birth          1940        Visit Information        Provider Department      3/23/2018 11:00 AM UC 44 ATC; UC SPEC INFUSION [facility] Specialty and Procedure        Today's Diagnoses     IgA deficiency (H)    -  1    Hypogammaglobulinemia (H)          Care Instructions    Dear Jennifer Alvarez    Thank you for choosing [facility] ([facility]) for your infusion.  The following information is a summary of our appointment as well as important reminders.      Please refer to your hospital discharge instructions for details on home care services, future appointments, phone numbers, and diet/activity levels.    Additional information: Today you received IVIG infusion. You will return in 3 weeks for your next infusion.      We look forward in seeing you on your next appointment here at [facility].  Please don t hesitate to call us at [phone] to reschedule any of your appointments or to speak with one of the [facility] registered nurses.  It was a pleasure taking care of you today.    Sincerely,    [facility]  Specialty Infusion [facility] Procedure Center  [address] [place] [address]  [address]  Phone:  [phone]          Follow-ups after your visit        Your next 10 appointments already scheduled     Apr 13, 2018 11:00 AM CDT   Infusion 300 with UC SPEC INFUSION, UC 44 ATC   [facility] Specialty and Procedure ([facility])    [address] [place] [id number] [phone]            May 04, 2018 11:00 AM CDT   Infusion 300 with UC SPEC INFUSION, UC 44 ATC   [facility] Specialty and Procedure ([facility])    [address] [place] [address]  [address] [place] [address]  [address] [phone]            May 16, 2018 11:00 AM CDT   PFT VISIT with UC PFL C   [facility] Pulmonary Function Testing ([facility])    [address]  3rd Floor  [facility] [address] [phone]            May 16, 2018 11:30 AM CDT   (Arrive by 11:15 AM)   Return Interstitial Lung with Maxx Elizabeth MD   [facility] ([facility])    [address] [place] [address]  [address]  [facility] [id number] [phone]            May 16, 2018  1:30 PM CDT   (Arrive by 1:15 PM)   Pacemaker Check with  Cv Device 1   [facility] ([facility])    [address] [place] [address]  [address]  [facility] [id number] [phone]            May 16, 2018  2:00 PM CDT   (Arrive by 1:45 PM)   RETURN ARRHYTHMIA with CINDY Escudero CNP   [facility] ([facility])    [address] [place] [address]  [address] [place] [id number] [phone]            May 25, 2018 11:00 AM CDT   Infusion 300 with UC SPEC INFUSION, UC 44 ATC   [facility] Specialty and Procedure ([facility])    [address] [place] [address]  [address]  [facility] [id number] [phone]              Who to contact     If you have questions or need follow up information about today's clinic visit or your schedule please contact [facility] SPECIALTY AND PROCEDURE directly at [phone].  Normal or non-critical lab and imaging results will be communicated to you by MyChart, letter or phone within 4 business days after the clinic has received the results. If you do not hear from us within 7 days, please contact the clinic through MyChart or phone. If you have a critical or abnormal lab result, we will notify you by phone as soon as possible.  Submit refill requests through Bunchball or call your pharmacy and they will forward the refill  request to us. Please allow 3 business days for your refill to be completed.          Additional Information About Your Visit        MyChart Information     [employer] gives you secure access to your electronic health record. If you see a primary care provider, you can also send messages to your care team and make appointments. If you have questions, please call your primary care clinic.  If you do not have a primary care provider, please call [phone] and they will assist you.        Care EveryWhere ID     This is your Care EveryWhere ID. This could be used by other organizations to access your [place] medical records  [id number]        Your Vitals Were     Pulse Temperature Pulse Oximetry             81 98.8  F (37.1  C) (Oral) 97%          Blood Pressure from Last 3 Encounters:   03/23/18 134/69   03/02/18 117/58   02/12/18 123/80    Weight from Last 3 Encounters:   03/02/18 42.4 kg (93 lb 6.4 oz)   02/08/18 41.9 kg (92 lb 4.8 oz)   01/23/18 41.6 kg (91 lb 12.8 oz)              Today, you had the following     No orders found for display       Primary Care Provider Office Phone # Fax #    Brandi DILLON Vitaliy [phone]       [facility] [address]   [facility] [address]        Equal Access to Services     [facility]: Hadii aad ku hadasho Soomaali, waaxda luqadaha, qaybta kaalmada adeegyada, waxay idiin hayjoycen john lovelace. So [facility] [phone].    ATENCIÓN: Si habla español, tiene a taylor disposición servicios gratuitos de asistencia lingüística. Llame al [phone].    We comply with applicable federal civil rights laws and [place] laws. We do not discriminate on the basis of race, color, national origin, age, disability, sex, sexual orientation, or gender identity.            Thank you!     Thank you for choosing [facility] SPECIALTY AND PROCEDURE  for your care. Our goal is always to provide you with excellent care. Hearing back from our patients is one way  we can continue to improve our services. Please take a few minutes to complete the written survey that you may receive in the mail after your visit with us. Thank you!             Your Updated Medication List - Protect others around you: Learn how to safely use, store and throw away your medicines at www.disposemymeds.org.          This list is accurate as of 3/23/18  3:57 PM.  Always use your most recent med list.                   Brand Name Dispense Instructions for use Diagnosis    * albuterol 108 (90 BASE) MCG/ACT Inhaler    PROAIR HFA/PROVENTIL HFA/VENTOLIN HFA    1 Inhaler    Take 2 puffs prn for wheezing or shortness of breath    Wheezing       * albuterol (2.5 MG/3ML) 0.083% neb solution     360 mL    Take 1 vial (2.5 mg) by nebulization every 6 hours as needed for shortness of breath / dyspnea or wheezing    Wheezing       * albuterol (2.5 MG/3ML) 0.083% neb solution      Take 1 ampule by nebulization every 6 hours as needed.        aspirin 81 MG tablet      Take 1 tablet by mouth daily.        atorvastatin 10 MG tablet    LIPITOR    90 tablet    Take 4 tablets (40 mg) by mouth daily    Hyperlipidemia LDL goal <100       Blood Pressure Kit      daily.        carvedilol 12.5 MG tablet    COREG    60 tablet    Take 1 tablet (12.5 mg) by mouth 2 times daily (with meals)    Secondary hypertension       DEMEROL 25 MG/ML injection   Generic drug:  meperidine      Inject 25 mg into the vein as needed. 25 mg IV prior to IgG infusion        dicyclomine 10 MG capsule    BENTYL     Take 10 mg by mouth 4 times daily (before meals and nightly).        FISH OIL      Unsure of dosage take two caps daily        fluticasone-salmeterol 250-50 MCG/DOSE diskus inhaler    ADVAIR    1 Inhaler    Inhale 1 puff into the lungs 2 times daily.    Unspecified chronic bronchitis       HYDROcodone-acetaminophen  MG per tablet    LORCET     Take 1 tablet by mouth every 6 hours as needed.        Immune Globulin (Human) 25 GM/500ML  Soln      Inject 25 g into the vein See Admin Instructions. 25 grams every 3 weeks IVIG        isosorbide mononitrate 60 MG 24 hr tablet    IMDUR    90 tablet    Take 1 tablet (60 mg) by mouth daily    HTN (hypertension)       metFORMIN 1000 MG tablet    GLUCOPHAGE     Take 1 tablet (1,000 mg) by mouth daily (with dinner) Take 1 tablet twice daily    DM (diabetes mellitus) (H)       nitroGLYcerin 0.4 MG sublingual tablet    NITROQUICK    25 tablet    Place 1 tablet (0.4 mg) under the tongue every 5 minutes as needed    Chest pain       omeprazole 20 MG CR capsule    priLOSEC    60 capsule    Take 1 capsule (20 mg) by mouth 2 times daily    Esophageal reflux, Unspecified chronic bronchitis       * blood glucose monitoring lancets     3 Box    Use to test blood sugar 3 times daily or as directed.    Type 2 diabetes mellitus with complication (H)       * ONE TOUCH FINE POINT LANCETS      2 times daily.        * ONETOUCH ULTRA test strip   Generic drug:  blood glucose monitoring     200 strip    Test blood sugar twice a day.    Diabetes mellitus type II       * blood glucose monitoring test strip    TERRY CONTOUR NEXT    270 each    by In Vitro route 3 times daily Use to test blood sugar 3 times daily or as directed.    Type 2 diabetes mellitus with complication (H)       predniSONE 20 MG tablet    DELTASONE    5 tablet    Patient to take 60 mg the evening before the procedure and 30 mg the morning of the procedure.    Allergic reaction to contrast dye       tiotropium 18 MCG capsule    SPIRIVA HANDIHALER    30 capsule    One puff every day    Unspecified chronic bronchitis       traMADol 50 MG tablet    ULTRAM    20 tablet    Take 1 tablet (50 mg) by mouth every 6 hours as needed for pain    Pacemaker battery depletion       TYLENOL 325 MG tablet   Generic drug:  acetaminophen      Take 1-2 tablets by mouth every 6 hours as needed.        * Notice:  This list has 7 medication(s) that are the same as other medications  prescribed for you. Read the directions carefully, and ask your doctor or other care provider to review them with you.

## 2018-03-23 NOTE — PROGRESS NOTES
Nursing Note  Jennifer Alvarez [facility] for: IVIG  During today's [facility] appointment, orders from Dr. Elizabeth were completed.  Frequency: every 3 weeks for hypogammaglobulinemia    Progress note:  Patient identification verified by name and date of birth.  Assessment completed.  Vitals recorded in Doc Flowsheets.  Patient was provided with education regarding infusion and possible side effects.  Patient verbalized understanding.      needed: No  Premedications: administered per order.  Infusion Rates: infusion starts at 20 ml/hr, then increased by 20 ml/hr every 15 minutes to final rate of 160 ml/hr  Infusion length: 2.5 hours.  Labs: were not ordered for this appointment.  Vascular access: peripheral IV placed today.  Treatment Conditions: non-applicable.  Patient tolerated infusion: well.    Drug Waste Record    Drug Name: solu-medrol  Dose: 100mg  Route administered: IV  NDC #: 1572-2687-20  Amount of waste(mL):0.4ml (25mg)  Reason for waste: Single use vial      Discharge Plan:   Follow up plan of care with: ongoing infusions at [facility].  Discharge instructions were reviewed with patient.  Patient/representative verbalized understanding of discharge instructions and all questions answered.  Patient discharged [facility] in stable condition.    Mary Jo Marquis RN    Administrations This Visit     acetaminophen (TYLENOL) tablet 650 mg     Admin Date Action Dose Route Administered By             03/23/2018 Given 650 mg Oral Deidre Campbell RN                    diphenhydrAMINE (BENADRYL) capsule 25 mg     Admin Date Action Dose Route Administered By             03/23/2018 Given 25 mg Oral Deidre Campbell RN                    immune globulin - (GAMMAGARD) sucrose free 10 % injection 25 g     Admin Date Action Dose Route Administered By             03/23/2018  New Bag 25 g Intravenous Mary Jo Marquis, RN                    meperidine (DEMEROL) injection 25 mg     Admin Date Action Dose Route Administered By             03/23/2018 Given 25 mg Intravenous Deidre Campbell RN                    methylPREDNISolone sodium succinate (solu-MEDROL) injection 100 mg     Admin Date Action Dose Route Administered By             03/23/2018 Given 100 mg Intravenous Deidre Campbell RN                          /71  Pulse 81  Temp 98.8  F (37.1  C) (Oral)  SpO2 97%

## 2018-03-23 NOTE — PATIENT INSTRUCTIONS
Dear Jennifer Alvarez    Thank you for choosing [facility] ([facility]) for your infusion.  The following information is a summary of our appointment as well as important reminders.      Please refer to your hospital discharge instructions for details on home care services, future appointments, phone numbers, and diet/activity levels.    Additional information: Today you received IVIG infusion. You will return in 3 weeks for your next infusion.      We look forward in seeing you on your next appointment here at [facility].  Please don t hesitate to call us at [phone] to reschedule any of your appointments or to speak with one of the [facility] registered nurses.  It was a pleasure taking care of you today.    Sincerely,    [facility]  [facility]  [address] [place]  [address]  Phone:  [phone]

## 2018-03-30 DIAGNOSIS — E78.5 HYPERLIPIDEMIA LDL GOAL <100: ICD-10-CM

## 2018-04-03 RX ORDER — ATORVASTATIN CALCIUM 10 MG/1
40 TABLET, FILM COATED ORAL DAILY
Qty: 90 TABLET | Refills: 3 | Status: SHIPPED | OUTPATIENT
Start: 2018-04-03 | End: 2019-01-01

## 2018-04-12 DIAGNOSIS — I15.9 SECONDARY HYPERTENSION: ICD-10-CM

## 2018-04-13 ENCOUNTER — INFUSION THERAPY VISIT (OUTPATIENT)
Dept: INFUSION THERAPY | Facility: CLINIC | Age: 78
End: 2018-04-13
Attending: INTERNAL MEDICINE
Payer: MEDICARE

## 2018-04-13 VITALS
DIASTOLIC BLOOD PRESSURE: 58 MMHG | RESPIRATION RATE: 16 BRPM | TEMPERATURE: 97.6 F | OXYGEN SATURATION: 98 % | SYSTOLIC BLOOD PRESSURE: 129 MMHG | BODY MASS INDEX: 17.38 KG/M2 | WEIGHT: 92 LBS

## 2018-04-13 DIAGNOSIS — D80.2 IGA DEFICIENCY (H): Primary | ICD-10-CM

## 2018-04-13 DIAGNOSIS — D80.1 HYPOGAMMAGLOBULINEMIA (H): ICD-10-CM

## 2018-04-13 PROCEDURE — A9270 NON-COVERED ITEM OR SERVICE: HCPCS | Mod: ZF | Performed by: INTERNAL MEDICINE

## 2018-04-13 PROCEDURE — 96366 THER/PROPH/DIAG IV INF ADDON: CPT

## 2018-04-13 PROCEDURE — 25000132 ZZH RX MED GY IP 250 OP 250 PS 637: Mod: ZF | Performed by: INTERNAL MEDICINE

## 2018-04-13 PROCEDURE — 96365 THER/PROPH/DIAG IV INF INIT: CPT

## 2018-04-13 PROCEDURE — 96375 TX/PRO/DX INJ NEW DRUG ADDON: CPT

## 2018-04-13 PROCEDURE — 25000128 H RX IP 250 OP 636: Mod: ZF | Performed by: INTERNAL MEDICINE

## 2018-04-13 RX ORDER — METHYLPREDNISOLONE SODIUM SUCCINATE 125 MG/2ML
100 INJECTION, POWDER, LYOPHILIZED, FOR SOLUTION INTRAMUSCULAR; INTRAVENOUS ONCE
Status: COMPLETED | OUTPATIENT
Start: 2018-04-13 | End: 2018-04-13

## 2018-04-13 RX ORDER — METHYLPREDNISOLONE SODIUM SUCCINATE 125 MG/2ML
100 INJECTION, POWDER, LYOPHILIZED, FOR SOLUTION INTRAMUSCULAR; INTRAVENOUS ONCE
Status: CANCELLED
Start: 2018-04-13 | End: 2018-04-13

## 2018-04-13 RX ORDER — MEPERIDINE HYDROCHLORIDE 25 MG/ML
25 INJECTION INTRAMUSCULAR; INTRAVENOUS; SUBCUTANEOUS
Status: CANCELLED
Start: 2018-04-13

## 2018-04-13 RX ORDER — DIPHENHYDRAMINE HCL 25 MG
25 CAPSULE ORAL ONCE
Status: COMPLETED | OUTPATIENT
Start: 2018-04-13 | End: 2018-04-13

## 2018-04-13 RX ORDER — MEPERIDINE HYDROCHLORIDE 25 MG/ML
25 INJECTION INTRAMUSCULAR; INTRAVENOUS; SUBCUTANEOUS
Status: DISCONTINUED | OUTPATIENT
Start: 2018-04-13 | End: 2018-04-13 | Stop reason: HOSPADM

## 2018-04-13 RX ORDER — ACETAMINOPHEN 325 MG/1
650 TABLET ORAL ONCE
Status: COMPLETED | OUTPATIENT
Start: 2018-04-13 | End: 2018-04-13

## 2018-04-13 RX ORDER — DIPHENHYDRAMINE HCL 25 MG
25 CAPSULE ORAL ONCE
Status: CANCELLED
Start: 2018-04-13 | End: 2018-04-13

## 2018-04-13 RX ORDER — ACETAMINOPHEN 325 MG/1
650 TABLET ORAL ONCE
Status: CANCELLED
Start: 2018-04-13 | End: 2018-04-13

## 2018-04-13 RX ADMIN — DIPHENHYDRAMINE HYDROCHLORIDE 25 MG: 25 CAPSULE ORAL at 11:25

## 2018-04-13 RX ADMIN — METHYLPREDNISOLONE SODIUM SUCCINATE 100 MG: 125 INJECTION, POWDER, FOR SOLUTION INTRAMUSCULAR; INTRAVENOUS at 11:27

## 2018-04-13 RX ADMIN — DEXTROSE MONOHYDRATE 10 ML: 5 INJECTION INTRAVENOUS at 11:41

## 2018-04-13 RX ADMIN — MEPERIDINE HYDROCHLORIDE 25 MG: 25 INJECTION, SOLUTION INTRAMUSCULAR; INTRAVENOUS; SUBCUTANEOUS at 11:30

## 2018-04-13 RX ADMIN — IMMUNE GLOBULIN INFUSION (HUMAN) 25 G: 100 INJECTION, SOLUTION INTRAVENOUS; SUBCUTANEOUS at 11:45

## 2018-04-13 RX ADMIN — ACETAMINOPHEN 650 MG: 325 TABLET, FILM COATED ORAL at 11:25

## 2018-04-13 ASSESSMENT — PAIN DESCRIPTION - DESCRIPTORS: DESCRIPTORS: ACHING;BURNING

## 2018-04-13 NOTE — MR AVS SNAPSHOT
After Visit Summary   4/13/2018    Jennifer Alvarez    MRN: [id number]           Patient Information     Date Of Birth          1940        Visit Information        Provider Department      4/13/2018 11:00 AM UC 44 ATC; UC SPEC INFUSION [facility] Specialty [facility] Procedure        Today's Diagnoses     IgA deficiency (H)    -  1    Hypogammaglobulinemia (H)          Care Instructions    Dear Jennifer Alvarez    Thank you for choosing [facility] ([facility]) for your infusion.  The following information is a summary of our appointment as well as important reminders.        Immune Globulin Solution for injection  What is this medicine?  IMMUNE GLOBULIN (im MUNE GLOB sapna cynthia) helps to prevent or reduce the severity of certain infections in patients who are at risk. This medicine is collected from the pooled blood of many donors. It is used to treat immune system problems, thrombocytopenia, and Kawasaki syndrome.  This medicine may be used for other purposes; ask your health care provider or pharmacist if you have questions.  What should I tell my health care provider before I take this medicine?  They need to know if you have any of these conditions:    diabetes    extremely low or no immune antibodies in the blood    heart disease    history of blood clots    hyperprolinemia    infection in the blood, sepsis    kidney disease    taking medicine that may change kidney function - ask your health care provider about your medicine    an unusual or allergic reaction to human immune globulin, albumin, maltose, sucrose, polysorbate 80, other medicines, foods, dyes, or preservatives    pregnant or trying to get pregnant    breast-feeding  How should I use this medicine?  This medicine is for injection into a muscle or infusion into a vein or skin. It is usually given by a health care professional in a hospital or clinic  setting.  In rare cases, some brands of this medicine might be given at home. You will be taught how to give this medicine. Use exactly as directed. Take your medicine at regular intervals. Do not take your medicine more often than directed.  Talk to your pediatrician regarding the use of this medicine in children. Special care may be needed.  Overdosage: If you think you have taken too much of this medicine contact a poison control center or emergency room at once.  NOTE: This medicine is only for you. Do not share this medicine with others.  What if I miss a dose?  It is important not to miss your dose. Call your doctor or health care professional if you are unable to keep an appointment. If you give yourself the medicine and you miss a dose, take it as soon as you can. If it is almost time for your next dose, take only that dose. Do not take double or extra doses.  What may interact with this medicine?    aspirin and aspirin-like medicines    cisplatin    cyclosporine    medicines for infection like acyclovir, adefovir, amphotericin B, bacitracin, cidofovir, foscarnet, ganciclovir, gentamicin, pentamidine, vancomycin    NSAIDS, medicines for pain and inflammation, like ibuprofen or naproxen    pamidronate    vaccines    zoledronic acid  This list may not describe all possible interactions. Give your health care provider a list of all the medicines, herbs, non-prescription drugs, or dietary supplements you use. Also tell them if you smoke, drink alcohol, or use illegal drugs. Some items may interact with your medicine.  What should I watch for while using this medicine?  Your condition will be monitored carefully while you are receiving this medicine.  This medicine is made from pooled blood donations of many different people. It may be possible to pass an infection in this medicine. However, the donors are screened for infections and all products are tested for HIV and hepatitis. The medicine is treated to kill  most or all bacteria and viruses. Talk to your doctor about the risks and benefits of this medicine.  Do not have vaccinations for at least 14 days before, or until at least 3 months after receiving this medicine.  What side effects may I notice from receiving this medicine?  Side effects that you should report to your doctor or health care professional as soon as possible:    allergic reactions like skin rash, itching or hives, swelling of the face, lips, or tongue    breathing problems    chest pain or tightness    fever, chills    headache with nausea, vomiting    neck pain or difficulty moving neck    pain when moving eyes    pain, swelling, warmth in the leg    problems with balance, talking, walking    sudden weight gain    swelling of the ankles, feet, hands    trouble passing urine or change in the amount of urine  Side effects that usually do not require medical attention (report to your doctor or health care professional if they continue or are bothersome):    dizzy, drowsy    flushing    increased sweating    leg cramps    muscle aches and pains    pain at site where injected  This list may not describe all possible side effects. Call your doctor for medical advice about side effects. You may report side effects to FDA at 0-735-FDA-9348.  Where should I keep my medicine?  Keep out of the reach of children.  This drug is usually given in a hospital or clinic and will not be stored at home.  In rare cases, some brands of this medicine may be given at home. If you are using this medicine at home, you will be instructed on how to store this medicine. Throw away any unused medicine after the expiration date on the label.  NOTE: This sheet is a summary. It may not cover all possible information. If you have questions about this medicine, talk to your doctor, pharmacist, or health care provider.  NOTE:This sheet is a summary. It may not cover all possible information. If you have questions about this medicine,  talk to your doctor, pharmacist, or health care provider. Copyright  2016 Gold Standard        We look forward in seeing you on your next appointment here at [facility].  Please don t hesitate to call us at [phone] to reschedule any of your appointments or to speak with one of the [facility] registered nurses.  It was a pleasure taking care of you today.    Sincerely,    [facility]  [facility]  [address] [place] [address]  [address]  Phone:  [phone]            Follow-ups after your visit        Your next 10 appointments already scheduled     May 04, 2018 11:00 AM CDT   Infusion 300 with UC SPEC INFUSION, UC 44 ATC   [facility] Specialty and Procedure ([facility])    [address] [place] [address]  [address]  [facility] [address] [phone]            May 16, 2018 11:00 AM CDT   PFT VISIT with FARTUN PFL C   [facility] Pulmonary Function Testing ([facility])    [address] [place] [address]  3rd Floor  [facility] [address] [phone]            May 16, 2018 11:30 AM CDT   (Arrive by 11:15 AM)   Return Interstitial Lung with Maxx Elizabeth MD   [facility] ([facility])    [address] [place] [address]  [address]  [facility] [address] [phone]            May 16, 2018  1:30 PM CDT   (Arrive by 1:15 PM)   Pacemaker Check with Uc Cv Device 1   [facility] ([facility])    [address] [place] [address]  [address]  [facility] [address] [phone]            May 16, 2018  2:00 PM CDT   (Arrive by 1:45 PM)   RETURN ARRHYTHMIA with CINDY Escudero [facility] ([facility])    [address] [place] [address]  [address] [place] [address] [phone]            May 25, 2018 11:00 AM CDT   Infusion 300 with UC SPEC INFUSION, UC 44 ATC   [facility] Specialty and Procedure ([facility])    [address]  [address]  [facility] [id number] [phone]            Ian 15, 2018 11:00 AM CDT   Infusion 300 with UC SPEC INFUSION, UC 44 ATC   [facility] Specialty and Procedure ([facility])    [address]  [address]  [facility] [id number] [phone]              Who to contact     If you have questions or need follow up information about today's clinic visit or your schedule please contact [facility] SPECIALTY AND PROCEDURE directly at [phone].  Normal or non-critical lab and imaging results will be communicated to you by Socialbombhart, letter or phone within 4 business days after the clinic has received the results. If you do not hear from us within 7 days, please contact the clinic through Socialbombhart or phone. If you have a critical or abnormal lab result, we will notify you by phone as soon as possible.  Submit refill requests through EGT or call your pharmacy and they will forward the refill request to us. Please allow 3 business days for your refill to be completed.          Additional Information About Your Visit        Socialbombhart Information     EGT gives you secure access to your electronic health record. If you see a primary care provider, you can also send messages to your care team and make appointments. If you have questions, please call your primary care clinic.  If you do not have a primary care provider, please call [phone] and they will assist you.        Care EveryWhere ID     This is your Care EveryWhere ID. This could be used by other organizations to access your [place] medical records  [id number]        Your Vitals Were     Temperature Respirations Pulse Oximetry BMI (Body Mass Index)          97.6  F (36.4  C) (Oral) 16 98% 17.38 kg/m2         Blood Pressure from Last 3 Encounters:   04/13/18 129/58    03/23/18 134/69   03/02/18 117/58    Weight from Last 3 Encounters:   04/13/18 41.7 kg (92 lb)   03/02/18 42.4 kg (93 lb 6.4 oz)   02/08/18 41.9 kg (92 lb 4.8 oz)              Today, you had the following     No orders found for display       Primary Care Provider Office Phone # Fax #    Brandi Burks [phone]       [facility] [address]   [facility] [address]        Equal Access to Services     SUJATHA [facility] CenterDENITA : Hadii aad ku hadasho Soomaali, waaxda luqadaha, qaybta kaalmada adeegyada, waxmoustapha peraza hayaan john caicedo . So [facility] [phone].    ATENCIÓN: Si habla español, tiene a taylor disposición servicios gratuitos de asistencia lingüística. LlTri[facility] [phone].    We comply with applicable federal civil rights laws and [place] laws. We do not discriminate on the basis of race, color, national origin, age, disability, sex, sexual orientation, or gender identity.            Thank you!     Thank you for choosing [facility] SPECIALTY AND PROCEDURE  for your care. Our goal is always to provide you with excellent care. Hearing back from our patients is one way we can continue to improve our services. Please take a few minutes to complete the written survey that you may receive in the mail after your visit with us. Thank you!             Your Updated Medication List - Protect others around you: Learn how to safely use, store and throw away your medicines at www.disposemymeds.org.          This list is accurate as of 4/13/18  3:44 PM.  Always use your most recent med list.                   Brand Name Dispense Instructions for use Diagnosis    * albuterol 108 (90 Base) MCG/ACT Inhaler    PROAIR HFA/PROVENTIL HFA/VENTOLIN HFA    1 Inhaler    Take 2 puffs prn for wheezing or shortness of breath    Wheezing       * albuterol (2.5 MG/3ML) 0.083% neb solution     360 mL    Take 1 vial (2.5 mg) by nebulization every 6 hours as needed for shortness of breath / dyspnea or  wheezing    Wheezing       * albuterol (2.5 MG/3ML) 0.083% neb solution      Take 1 ampule by nebulization every 6 hours as needed.        aspirin 81 MG tablet      Take 1 tablet by mouth daily.        atorvastatin 10 MG tablet    LIPITOR    90 tablet    Take 4 tablets (40 mg) by mouth daily    Hyperlipidemia LDL goal <100       Blood Pressure Kit      daily.        carvedilol 12.5 MG tablet    COREG    60 tablet    Take 1 tablet (12.5 mg) by mouth 2 times daily (with meals)    Secondary hypertension       DEMEROL 25 MG/ML injection   Generic drug:  meperidine      Inject 25 mg into the vein as needed. 25 mg IV prior to IgG infusion        dicyclomine 10 MG capsule    BENTYL     Take 10 mg by mouth 4 times daily (before meals and nightly).        FISH OIL      Unsure of dosage take two caps daily        fluticasone-salmeterol 250-50 MCG/DOSE diskus inhaler    ADVAIR    1 Inhaler    Inhale 1 puff into the lungs 2 times daily.    Unspecified chronic bronchitis       HYDROcodone-acetaminophen  MG per tablet    LORCET     Take 1 tablet by mouth every 6 hours as needed.        Immune Globulin (Human) 25 GM/500ML Soln      Inject 25 g into the vein See Admin Instructions. 25 grams every 3 weeks IVIG        isosorbide mononitrate 60 MG 24 hr tablet    IMDUR    90 tablet    Take 1 tablet (60 mg) by mouth daily    HTN (hypertension)       metFORMIN 1000 MG tablet    GLUCOPHAGE     Take 1 tablet (1,000 mg) by mouth daily (with dinner) Take 1 tablet twice daily    DM (diabetes mellitus) (H)       nitroGLYcerin 0.4 MG sublingual tablet    NITROQUICK    25 tablet    Place 1 tablet (0.4 mg) under the tongue every 5 minutes as needed    Chest pain       omeprazole 20 MG CR capsule    priLOSEC    60 capsule    Take 1 capsule (20 mg) by mouth 2 times daily    Esophageal reflux, Unspecified chronic bronchitis       * blood glucose monitoring lancets     3 Box    Use to test blood sugar 3 times daily or as directed.    Type 2  diabetes mellitus with complication (H)       * ONE TOUCH FINE POINT LANCETS      2 times daily.        * ONETOUCH ULTRA test strip   Generic drug:  blood glucose monitoring     200 strip    Test blood sugar twice a day.    Diabetes mellitus type II       * blood glucose monitoring test strip    TERRY CONTOUR NEXT    270 each    by In Vitro route 3 times daily Use to test blood sugar 3 times daily or as directed.    Type 2 diabetes mellitus with complication (H)       predniSONE 20 MG tablet    DELTASONE    5 tablet    Patient to take 60 mg the evening before the procedure and 30 mg the morning of the procedure.    Allergic reaction to contrast dye       tiotropium 18 MCG capsule    SPIRIVA HANDIHALER    30 capsule    One puff every day    Unspecified chronic bronchitis       traMADol 50 MG tablet    ULTRAM    20 tablet    Take 1 tablet (50 mg) by mouth every 6 hours as needed for pain    Pacemaker battery depletion       TYLENOL 325 MG tablet   Generic drug:  acetaminophen      Take 1-2 tablets by mouth every 6 hours as needed.        * Notice:  This list has 7 medication(s) that are the same as other medications prescribed for you. Read the directions carefully, and ask your doctor or other care provider to review them with you.

## 2018-04-13 NOTE — PATIENT INSTRUCTIONS
Dear Jennifer Alvarez    Thank you for choosing [facility] ([facility]) for your infusion.  The following information is a summary of our appointment as well as important reminders.        Immune Globulin Solution for injection  What is this medicine?  IMMUNE GLOBULIN (im MUNE GLOB sapna cynthia) helps to prevent or reduce the severity of certain infections in patients who are at risk. This medicine is collected from the pooled blood of many donors. It is used to treat immune system problems, thrombocytopenia, and Kawasaki syndrome.  This medicine may be used for other purposes; ask your health care provider or pharmacist if you have questions.  What should I tell my health care provider before I take this medicine?  They need to know if you have any of these conditions:    diabetes    extremely low or no immune antibodies in the blood    heart disease    history of blood clots    hyperprolinemia    infection in the blood, sepsis    kidney disease    taking medicine that may change kidney function - ask your health care provider about your medicine    an unusual or allergic reaction to human immune globulin, albumin, maltose, sucrose, polysorbate 80, other medicines, foods, dyes, or preservatives    pregnant or trying to get pregnant    breast-feeding  How should I use this medicine?  This medicine is for injection into a muscle or infusion into a vein or skin. It is usually given by a health care professional in a hospital or clinic setting.  In rare cases, some brands of this medicine might be given at home. You will be taught how to give this medicine. Use exactly as directed. Take your medicine at regular intervals. Do not take your medicine more often than directed.  Talk to your pediatrician regarding the use of this medicine in children. Special care may be needed.  Overdosage: If you think you have taken too much of this medicine contact a poison control  center or emergency room at once.  NOTE: This medicine is only for you. Do not share this medicine with others.  What if I miss a dose?  It is important not to miss your dose. Call your doctor or health care professional if you are unable to keep an appointment. If you give yourself the medicine and you miss a dose, take it as soon as you can. If it is almost time for your next dose, take only that dose. Do not take double or extra doses.  What may interact with this medicine?    aspirin and aspirin-like medicines    cisplatin    cyclosporine    medicines for infection like acyclovir, adefovir, amphotericin B, bacitracin, cidofovir, foscarnet, ganciclovir, gentamicin, pentamidine, vancomycin    NSAIDS, medicines for pain and inflammation, like ibuprofen or naproxen    pamidronate    vaccines    zoledronic acid  This list may not describe all possible interactions. Give your health care provider a list of all the medicines, herbs, non-prescription drugs, or dietary supplements you use. Also tell them if you smoke, drink alcohol, or use illegal drugs. Some items may interact with your medicine.  What should I watch for while using this medicine?  Your condition will be monitored carefully while you are receiving this medicine.  This medicine is made from pooled blood donations of many different people. It may be possible to pass an infection in this medicine. However, the donors are screened for infections and all products are tested for HIV and hepatitis. The medicine is treated to kill most or all bacteria and viruses. Talk to your doctor about the risks and benefits of this medicine.  Do not have vaccinations for at least 14 days before, or until at least 3 months after receiving this medicine.  What side effects may I notice from receiving this medicine?  Side effects that you should report to your doctor or health care professional as soon as possible:    allergic reactions like skin rash, itching or hives,  swelling of the face, lips, or tongue    breathing problems    chest pain or tightness    fever, chills    headache with nausea, vomiting    neck pain or difficulty moving neck    pain when moving eyes    pain, swelling, warmth in the leg    problems with balance, talking, walking    sudden weight gain    swelling of the ankles, feet, hands    trouble passing urine or change in the amount of urine  Side effects that usually do not require medical attention (report to your doctor or health care professional if they continue or are bothersome):    dizzy, drowsy    flushing    increased sweating    leg cramps    muscle aches and pains    pain at site where injected  This list may not describe all possible side effects. Call your doctor for medical advice about side effects. You may report side effects to FDA at 2-355-KET-7075.  Where should I keep my medicine?  Keep out of the reach of children.  This drug is usually given in a hospital or clinic and will not be stored at home.  In rare cases, some brands of this medicine may be given at home. If you are using this medicine at home, you will be instructed on how to store this medicine. Throw away any unused medicine after the expiration date on the label.  NOTE: This sheet is a summary. It may not cover all possible information. If you have questions about this medicine, talk to your doctor, pharmacist, or health care provider.  NOTE:This sheet is a summary. It may not cover all possible information. If you have questions about this medicine, talk to your doctor, pharmacist, or health care provider. Copyright  2016 Gold Standard        We look forward in seeing you on your next appointment here at [facility].  Please don t hesitate to call us at [phone] to reschedule any of your appointments or to speak with one of the [facility] registered nurses.  It was a pleasure taking care of you today.    Sincerely,    [facility]  Specialty Infusion & Procedure  [address]  [address]  Phone:  [phone]

## 2018-04-13 NOTE — PROGRESS NOTES
Nursing Note  Jennifer Alvarez presents [facility] for:   Chief Complaint   Patient presents with     Infusion     IVIG     During today's [facility] appointment, orders from Dr. Maxx Elizabeth were completed.  Frequency: every 3 weeks    Progress note:  Patient identification verified by name and date of birth.  Assessment completed.  Vitals recorded in Doc Flowsheets.  Patient was provided with education regarding infusion and possible side effects.  Patient verbalized understanding.      needed: No  Premedications: administered per order.  Infusion Rates: infusion starts at 20 ml/hr, then increased by 20 ml/hr every 15 minutes to final rate of 160 ml/hr. Line flushed with D5 pre and post Gammagard.  Approximate Infusion length: 2.5 hrs.  Labs: were not ordered for this appointment.  Vascular access: peripheral IV placed today.  Treatment Conditions: patient denies fever, chills, signs of infection, recent illness, on antibiotics, productive cough or elevated temperature.  Patient tolerated infusion: well.    Drug Waste Record  Drug Name: Solu-medrol  Dose: 100 mg  Route administered: IV  NDC #: 6691-8300-00  Amount of waste(mL): 0.4 ml  Reason for waste: Single use vial    Discharge Plan:   Follow up plan of care with: ongoing infusions at [facility].  Discharge instructions were reviewed with patient.  Patient/representative verbalized understanding of discharge instructions and all questions answered.  Patient discharged from [facility] in stable condition.    Roxie Tamez RN     Administrations This Visit     acetaminophen (TYLENOL) tablet 650 mg     Admin Date Action Dose Route Administered By             04/13/2018 Given 650 mg Oral Roxie Tamez RN                    dextrose 5% BOLUS     Admin Date Action Dose Route Administered By             04/13/2018 New Bag 10 mL  Intravenous Roxie Tamez RN                    diphenhydrAMINE (BENADRYL) capsule 25 mg     Admin Date Action Dose Route Administered By             04/13/2018 Given 25 mg Oral Roxie Tamez RN                    immune globulin (GAMMAGARD) sucrose free 10 % injection 25 g     Admin Date Action Dose Route Administered By             04/13/2018 New Bag 25 g Intravenous Roxie Tamez RN                    meperidine (DEMEROL) injection 25 mg     Admin Date Action Dose Route Administered By             04/13/2018 Given 25 mg Intravenous Roxie Tamez RN                    methylPREDNISolone sodium succinate (solu-MEDROL) injection 100 mg     Admin Date Action Dose Route Administered By             04/13/2018 Given 100 mg Intravenous Roxie Tamez RN                        /74  Temp 97.6  F (36.4  C) (Oral)  Resp 16  Wt 41.7 kg (92 lb)  SpO2 98%  BMI 17.38 kg/m2

## 2018-04-15 RX ORDER — CARVEDILOL 12.5 MG/1
12.5 TABLET ORAL 2 TIMES DAILY WITH MEALS
Qty: 180 TABLET | Refills: 1 | Status: SHIPPED | OUTPATIENT
Start: 2018-04-15 | End: 2019-01-01

## 2018-04-24 ENCOUNTER — TELEPHONE (OUTPATIENT)
Dept: CARDIOLOGY | Facility: CLINIC | Age: 78
End: 2018-04-24

## 2018-04-24 DIAGNOSIS — I25.10 CORONARY ARTERY DISEASE: Primary | ICD-10-CM

## 2018-04-24 NOTE — TELEPHONE ENCOUNTER
Pharmacy requesting to refill clopidogrel 75 mg     [facility]   [address] [place] [address] [id number]    P:[phone]  F: [phone]

## 2018-04-25 RX ORDER — CLOPIDOGREL BISULFATE 75 MG/1
75 TABLET ORAL DAILY
Qty: 90 TABLET | Refills: 3 | Status: SHIPPED | OUTPATIENT
Start: 2018-04-25 | End: 2019-01-01

## 2018-05-04 ENCOUNTER — INFUSION THERAPY VISIT (OUTPATIENT)
Dept: INFUSION THERAPY | Facility: CLINIC | Age: 78
End: 2018-05-04
Attending: INTERNAL MEDICINE
Payer: MEDICARE

## 2018-05-04 VITALS — RESPIRATION RATE: 16 BRPM | TEMPERATURE: 99.3 F | OXYGEN SATURATION: 97 % | HEIGHT: 61 IN | HEART RATE: 69 BPM

## 2018-05-04 DIAGNOSIS — D80.2 IGA DEFICIENCY (H): Primary | ICD-10-CM

## 2018-05-04 DIAGNOSIS — D80.1 HYPOGAMMAGLOBULINEMIA (H): ICD-10-CM

## 2018-05-04 PROCEDURE — 25000132 ZZH RX MED GY IP 250 OP 250 PS 637: Mod: ZF | Performed by: INTERNAL MEDICINE

## 2018-05-04 PROCEDURE — 96375 TX/PRO/DX INJ NEW DRUG ADDON: CPT

## 2018-05-04 PROCEDURE — A9270 NON-COVERED ITEM OR SERVICE: HCPCS | Mod: ZF | Performed by: INTERNAL MEDICINE

## 2018-05-04 PROCEDURE — 25000128 H RX IP 250 OP 636: Mod: ZF | Performed by: INTERNAL MEDICINE

## 2018-05-04 PROCEDURE — 96365 THER/PROPH/DIAG IV INF INIT: CPT

## 2018-05-04 PROCEDURE — 96366 THER/PROPH/DIAG IV INF ADDON: CPT

## 2018-05-04 RX ORDER — ACETAMINOPHEN 325 MG/1
650 TABLET ORAL ONCE
Status: CANCELLED
Start: 2018-05-04 | End: 2018-05-04

## 2018-05-04 RX ORDER — ACETAMINOPHEN 325 MG/1
650 TABLET ORAL ONCE
Status: COMPLETED | OUTPATIENT
Start: 2018-05-04 | End: 2018-05-04

## 2018-05-04 RX ORDER — DIPHENHYDRAMINE HCL 25 MG
25 CAPSULE ORAL ONCE
Status: CANCELLED
Start: 2018-05-04 | End: 2018-05-04

## 2018-05-04 RX ORDER — DIPHENHYDRAMINE HCL 25 MG
25 CAPSULE ORAL ONCE
Status: COMPLETED | OUTPATIENT
Start: 2018-05-04 | End: 2018-05-04

## 2018-05-04 RX ORDER — METHYLPREDNISOLONE SODIUM SUCCINATE 125 MG/2ML
100 INJECTION, POWDER, LYOPHILIZED, FOR SOLUTION INTRAMUSCULAR; INTRAVENOUS ONCE
Status: COMPLETED | OUTPATIENT
Start: 2018-05-04 | End: 2018-05-04

## 2018-05-04 RX ORDER — MEPERIDINE HYDROCHLORIDE 25 MG/ML
25 INJECTION INTRAMUSCULAR; INTRAVENOUS; SUBCUTANEOUS
Status: DISCONTINUED | OUTPATIENT
Start: 2018-05-04 | End: 2018-05-04 | Stop reason: HOSPADM

## 2018-05-04 RX ORDER — MEPERIDINE HYDROCHLORIDE 25 MG/ML
25 INJECTION INTRAMUSCULAR; INTRAVENOUS; SUBCUTANEOUS
Status: CANCELLED
Start: 2018-05-04

## 2018-05-04 RX ORDER — METHYLPREDNISOLONE SODIUM SUCCINATE 125 MG/2ML
100 INJECTION, POWDER, LYOPHILIZED, FOR SOLUTION INTRAMUSCULAR; INTRAVENOUS ONCE
Status: CANCELLED
Start: 2018-05-04 | End: 2018-05-04

## 2018-05-04 RX ADMIN — METHYLPREDNISOLONE SODIUM SUCCINATE 100 MG: 125 INJECTION, POWDER, FOR SOLUTION INTRAMUSCULAR; INTRAVENOUS at 11:16

## 2018-05-04 RX ADMIN — IMMUNE GLOBULIN INFUSION (HUMAN) 25 G: 100 INJECTION, SOLUTION INTRAVENOUS; SUBCUTANEOUS at 11:22

## 2018-05-04 RX ADMIN — ACETAMINOPHEN 650 MG: 325 TABLET ORAL at 11:13

## 2018-05-04 RX ADMIN — DEXTROSE MONOHYDRATE 10 ML: 50 INJECTION, SOLUTION INTRAVENOUS at 11:21

## 2018-05-04 RX ADMIN — MEPERIDINE HYDROCHLORIDE 25 MG: 25 INJECTION, SOLUTION INTRAMUSCULAR; INTRAVENOUS; SUBCUTANEOUS at 11:16

## 2018-05-04 RX ADMIN — DIPHENHYDRAMINE HYDROCHLORIDE 25 MG: 25 CAPSULE ORAL at 11:13

## 2018-05-04 ASSESSMENT — PAIN SCALES - GENERAL: PAINLEVEL: SEVERE PAIN (7)

## 2018-05-04 NOTE — PROGRESS NOTES
Nursing Note  Jennifer Alvarez presents [facility] for:   No chief complaint on file.    During today's [facility] appointment, orders from Dr. Maxx Elizabeth were completed.  Frequency: every 3 weeks    Progress note:  Patient identification verified by name and date of birth.  Assessment completed.  Vitals recorded in Doc Flowsheets.  Patient was provided with education regarding infusion and possible side effects.  Patient verbalized understanding.      needed: No  Premedications: administered per order.  Infusion Rates: infusion starts at 20 ml/hr, then increased by 20 ml/hr every 15 minutes to final rate of 160 ml/hr. Line flushed with D5 pre and post Gammagard.  Approximate Infusion length: 2.5 hrs.  Labs: were not ordered for this appointment.  Vascular access: peripheral IV placed today.  Treatment Conditions: patient denies fever, chills, signs of infection, recent illness, on antibiotics, productive cough or elevated temperature.  Patient tolerated infusion: well.    Drug Waste Record  Drug Name: Solu-medrol  Dose: 100 mg  Route administered: IV  NDC #: 4815-6351-61  Amount of waste(mL): 0.4 ml  Reason for waste: Single use vial    Discharge Plan:   Follow up plan of care with: ongoing infusions at [facility].  Discharge instructions were reviewed with patient.  Patient/representative verbalized understanding of discharge instructions and all questions answered.  Patient discharged from [facility] in stable condition.    Kita Barcenas RN     Administrations This Visit     acetaminophen (TYLENOL) tablet 650 mg     Admin Date Action Dose Route Administered By             05/04/2018 Given 650 mg Oral Kita Barcenas RN                    dextrose 5% BOLUS     Admin Date Action Dose Route Administered By             05/04/2018 New Bag 10 mL Intravenous Kita Barcenas  "CARLIN BERNSTEIN                    diphenhydrAMINE (BENADRYL) capsule 25 mg     Admin Date Action Dose Route Administered By             05/04/2018 Given 25 mg Oral Kita Barcenas RN                    immune globulin (GAMMAGARD)- sucrose free 10 % injection 25 g     Admin Date Action Dose Route Administered By             05/04/2018 New Bag 25 g Intravenous Kita Barcenas RN                    meperidine (DEMEROL) injection 25 mg     Admin Date Action Dose Route Administered By             05/04/2018 Given 25 mg Intravenous Kita Barcenas RN                    methylPREDNISolone sodium succinate (solu-MEDROL) injection 100 mg     Admin Date Action Dose Route Administered By             05/04/2018 Given 100 mg Intravenous Kita Barcenas RN                           Pulse 69  Temp 99.3  F (37.4  C) (Oral)  Resp 16  Ht 1.549 m (5' 1\")  SpO2 97%  "

## 2018-05-04 NOTE — MR AVS SNAPSHOT
After Visit Summary   5/4/2018    Jennifer Alvarez    MRN: [id number]           Patient Information     Date Of Birth          1940        Visit Information        Provider Department      5/4/2018 11:00 AM UC 44 ATC; UC SPEC INFUSION [facility] Specialty [facility] Procedure        Today's Diagnoses     IgA deficiency (H)    -  1    Hypogammaglobulinemia (H)           Follow-ups after your visit        Your next 10 appointments already scheduled     May 16, 2018 11:00 AM CDT   PFT VISIT with FARTUN PFL CHAPO   [facility] ([facility])    [address] [place] [address]  3rd Floor  [facility] [address]   [phone]            May 16, 2018 11:30 AM CDT   (Arrive by 11:15 AM)   Return Interstitial Lung with Maxx Elizabeth MD   [facility] ([facility])    [address] [place] [address]  [address]  [facility] [address]   [phone]            May 16, 2018  1:30 PM CDT   (Arrive by 1:15 PM)   Pacemaker Check with Uc Cv Device 1   [facility] ([facility])    [address]  [address]  [facility] [address]   [phone]            May 16, 2018  2:00 PM CDT   (Arrive by 1:45 PM)   RETURN ARRHYTHMIA with CINDY Escudero CNP   [facility] ([facility])    [address]  [address]  [facility] [address]   [phone]            May 25, 2018 11:00 AM CDT   Infusion 300 with UC SPEC INFUSION, UC 44 ATC   [facility] Specialty [facility] Procedure ([facility])    [address] [place] [address]  [address]  [facility] [address]   [phone]            Ian 15, 2018 11:00 AM CDT   Infusion 300 with UC SPEC INFUSION, UC 44 ATC   [facility] Specialty [facility] Procedure ([facility])    [address] [place] [address]  [address]  [facility] [address] [phone]            Jul 06, 2018 11:00 AM CDT   Infusion 300 with UC SPEC INFUSION, UC 44 ATC   [facility] Specialty [facility] Procedure ([facility])    [address]  [address]  [facility] [id number] [phone]              Who to contact     If you have questions or need follow up information about today's clinic visit or your schedule please contact [facility] SPECIALTY AND PROCEDURE directly at [phone].  Normal or non-critical lab and imaging results will be communicated to you by [employer], letter or phone within 4 business days after the clinic has received the results. If you do not hear from us within 7 days, please contact the clinic through [employer] or phone. If you have a critical or abnormal lab result, we will notify you by phone as soon as possible.  Submit refill requests through [employer] or call your pharmacy and they will forward the refill request to us. Please allow 3 business days for your refill to be completed.          Additional Information About Your Visit        V[employer]art Information     [employer] gives you secure access to your electronic health record. If you see a primary care provider, you can also send messages to your care team and make appointments. If you have questions, please call your primary care clinic.  If you do not have a primary care provider, please call [phone] and they will assist you.        Care EveryWhere ID     This is your Care EveryWhere ID. This could be used by other organizations to access your [facility] medical records  [id number]        Your Vitals Were     Pulse Temperature Respirations Height Pulse Oximetry       69 99.3  F (37.4  C) (Oral) 16 1.549 m (5' 1\") 97%        Blood Pressure from Last 3 Encounters:   04/13/18 129/58   03/23/18 134/69   03/02/18 117/58    Weight from Last 3 Encounters:   04/13/18 41.7 kg (92 lb)   03/02/18 42.4 " kg (93 lb 6.4 oz)   02/08/18 41.9 kg (92 lb 4.8 oz)              Today, you had the following     No orders found for display       Primary Care Provider Office Phone # Fax #    Brandi Burks [phone]       [facility] [address]   [place] [address]        Equal Access to Services     [facility] Center[facility] : Hadii aad ku hadasho Soomaali, waaxda luqadaha, qaybta kaalmada adeegyada, waxay idiin hayaan adeeg khaaronsh laEdaan . So [facility] [phone].    ATENCIÓN: Si habla español, tiene a taylor disposición servicios gratuitos de asistencia lingüística. [facility] [phone].    We comply with applicable federal civil rights laws and [place] laws. We do not discriminate on the basis of race, color, national origin, age, disability, sex, sexual orientation, or gender identity.            Thank you!     Thank you for choosing [facility] SPECIALTY [facility] PROCEDURE  for your care. Our goal is always to provide you with excellent care. Hearing back from our patients is one way we can continue to improve our services. Please take a few minutes to complete the written survey that you may receive in the mail after your visit with us. Thank you!             Your Updated Medication List - Protect others around you: Learn how to safely use, store and throw away your medicines at www.disposemymeds.org.          This list is accurate as of 5/4/18  3:32 PM.  Always use your most recent med list.                   Brand Name Dispense Instructions for use Diagnosis    * albuterol 108 (90 Base) MCG/ACT Inhaler    PROAIR HFA/PROVENTIL HFA/VENTOLIN HFA    1 Inhaler    Take 2 puffs prn for wheezing or shortness of breath    Wheezing       * albuterol (2.5 MG/3ML) 0.083% neb solution     360 mL    Take 1 vial (2.5 mg) by nebulization every 6 hours as needed for shortness of breath / dyspnea or wheezing    Wheezing       * albuterol (2.5 MG/3ML) 0.083% neb solution      Take 1 ampule by nebulization every 6  hours as needed.        aspirin 81 MG tablet      Take 1 tablet by mouth daily.        atorvastatin 10 MG tablet    LIPITOR    90 tablet    Take 4 tablets (40 mg) by mouth daily    Hyperlipidemia LDL goal <100       Blood Pressure Kit      daily.        carvedilol 12.5 MG tablet    COREG    180 tablet    Take 1 tablet (12.5 mg) by mouth 2 times daily (with meals)    Secondary hypertension       clopidogrel 75 MG tablet    PLAVIX    90 tablet    Take 1 tablet (75 mg) by mouth daily    Coronary artery disease       DEMEROL 25 MG/ML injection   Generic drug:  meperidine      Inject 25 mg into the vein as needed. 25 mg IV prior to IgG infusion        dicyclomine 10 MG capsule    BENTYL     Take 10 mg by mouth 4 times daily (before meals and nightly).        FISH OIL      Unsure of dosage take two caps daily        fluticasone-salmeterol 250-50 MCG/DOSE diskus inhaler    ADVAIR    1 Inhaler    Inhale 1 puff into the lungs 2 times daily.    Unspecified chronic bronchitis       HYDROcodone-acetaminophen  MG per tablet    LORCET     Take 1 tablet by mouth every 6 hours as needed.        Immune Globulin (Human) 25 GM/500ML Soln      Inject 25 g into the vein See Admin Instructions. 25 grams every 3 weeks IVIG        isosorbide mononitrate 60 MG 24 hr tablet    IMDUR    90 tablet    Take 1 tablet (60 mg) by mouth daily    HTN (hypertension)       metFORMIN 1000 MG tablet    GLUCOPHAGE     Take 1 tablet (1,000 mg) by mouth daily (with dinner) Take 1 tablet twice daily    DM (diabetes mellitus) (H)       nitroGLYcerin 0.4 MG sublingual tablet    NITROQUICK    25 tablet    Place 1 tablet (0.4 mg) under the tongue every 5 minutes as needed    Chest pain       omeprazole 20 MG CR capsule    priLOSEC    60 capsule    Take 1 capsule (20 mg) by mouth 2 times daily    Esophageal reflux, Unspecified chronic bronchitis       * blood glucose monitoring lancets     3 Box    Use to test blood sugar 3 times daily or as directed.     Type 2 diabetes mellitus with complication (H)       * ONE TOUCH FINE POINT LANCETS      2 times daily.        * ONETOUCH ULTRA test strip   Generic drug:  blood glucose monitoring     200 strip    Test blood sugar twice a day.    Diabetes mellitus type II       * blood glucose monitoring test strip    TERRY CONTOUR NEXT    270 each    by In Vitro route 3 times daily Use to test blood sugar 3 times daily or as directed.    Type 2 diabetes mellitus with complication (H)       predniSONE 20 MG tablet    DELTASONE    5 tablet    Patient to take 60 mg the evening before the procedure and 30 mg the morning of the procedure.    Allergic reaction to contrast dye       tiotropium 18 MCG capsule    SPIRIVA HANDIHALER    30 capsule    One puff every day    Unspecified chronic bronchitis       traMADol 50 MG tablet    ULTRAM    20 tablet    Take 1 tablet (50 mg) by mouth every 6 hours as needed for pain    Pacemaker battery depletion       TYLENOL 325 MG tablet   Generic drug:  acetaminophen      Take 1-2 tablets by mouth every 6 hours as needed.        * Notice:  This list has 7 medication(s) that are the same as other medications prescribed for you. Read the directions carefully, and ask your doctor or other care provider to review them with you.

## 2018-05-16 ENCOUNTER — ALLIED HEALTH/NURSE VISIT (OUTPATIENT)
Dept: CARDIOLOGY | Facility: CLINIC | Age: 78
End: 2018-05-16
Attending: NURSE PRACTITIONER
Payer: MEDICARE

## 2018-05-16 ENCOUNTER — OFFICE VISIT (OUTPATIENT)
Dept: PULMONOLOGY | Facility: CLINIC | Age: 78
End: 2018-05-16
Attending: INTERNAL MEDICINE
Payer: MEDICARE

## 2018-05-16 VITALS
TEMPERATURE: 98 F | BODY MASS INDEX: 17.56 KG/M2 | OXYGEN SATURATION: 98 % | HEIGHT: 61 IN | SYSTOLIC BLOOD PRESSURE: 158 MMHG | HEART RATE: 60 BPM | WEIGHT: 93 LBS | DIASTOLIC BLOOD PRESSURE: 73 MMHG

## 2018-05-16 VITALS
OXYGEN SATURATION: 98 % | SYSTOLIC BLOOD PRESSURE: 169 MMHG | HEART RATE: 68 BPM | RESPIRATION RATE: 17 BRPM | BODY MASS INDEX: 17.56 KG/M2 | HEIGHT: 61 IN | DIASTOLIC BLOOD PRESSURE: 80 MMHG | WEIGHT: 93 LBS

## 2018-05-16 DIAGNOSIS — Z95.0 CARDIAC PACEMAKER IN SITU: ICD-10-CM

## 2018-05-16 DIAGNOSIS — D80.2 IGA DEFICIENCY (H): Primary | ICD-10-CM

## 2018-05-16 DIAGNOSIS — J41.8 MIXED SIMPLE AND MUCOPURULENT CHRONIC BRONCHITIS (H): Primary | ICD-10-CM

## 2018-05-16 DIAGNOSIS — K21.9 GASTROESOPHAGEAL REFLUX DISEASE WITHOUT ESOPHAGITIS: ICD-10-CM

## 2018-05-16 DIAGNOSIS — I49.5 SINOATRIAL NODE DYSFUNCTION (H): Primary | ICD-10-CM

## 2018-05-16 DIAGNOSIS — I49.5 SICK SINUS SYNDROME (H): Primary | ICD-10-CM

## 2018-05-16 PROCEDURE — G0463 HOSPITAL OUTPT CLINIC VISIT: HCPCS | Mod: ZF

## 2018-05-16 PROCEDURE — 93280 PM DEVICE PROGR EVAL DUAL: CPT | Mod: 26 | Performed by: INTERNAL MEDICINE

## 2018-05-16 PROCEDURE — 93280 PM DEVICE PROGR EVAL DUAL: CPT | Mod: ZF

## 2018-05-16 PROCEDURE — 99214 OFFICE O/P EST MOD 30 MIN: CPT | Mod: ZP | Performed by: NURSE PRACTITIONER

## 2018-05-16 RX ORDER — TIOTROPIUM BROMIDE 18 UG/1
CAPSULE ORAL; RESPIRATORY (INHALATION)
Qty: 30 CAPSULE | Refills: 6 | Status: SHIPPED | OUTPATIENT
Start: 2018-05-16

## 2018-05-16 RX ORDER — AZITHROMYCIN 250 MG/1
TABLET, FILM COATED ORAL
Qty: 6 TABLET | Refills: 1 | Status: ON HOLD | OUTPATIENT
Start: 2018-05-16 | End: 2019-01-01

## 2018-05-16 RX ORDER — PREDNISONE 10 MG/1
TABLET ORAL
Qty: 30 TABLET | Refills: 1 | Status: ON HOLD | OUTPATIENT
Start: 2018-05-16 | End: 2019-01-01

## 2018-05-16 RX ORDER — ALBUTEROL SULFATE 90 UG/1
AEROSOL, METERED RESPIRATORY (INHALATION)
Qty: 1 INHALER | Refills: 12 | Status: SHIPPED | OUTPATIENT
Start: 2018-05-16 | End: 2019-01-01

## 2018-05-16 ASSESSMENT — PAIN SCALES - GENERAL: PAINLEVEL: NO PAIN (0)

## 2018-05-16 NOTE — LETTER
5/16/2018       RE: Jennifer Alvarez  [address]   [place] [facility] [address]     Dear Colleague,    Thank you for referring your patient, Jennifer Alvarez, to the [facility] at [facility]. Please see a copy of my visit note below.    HPI: 77 year old with chronic bronchitis and frequent respiratory tract infections. 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  Returns today recovering from a cold.  Feels congested with dry cough, but not running a fever.  Breathing is otherwise unchanged.    ROS:   General: appetite OK weight stable; no fever or chills  CV: denies chest pain;   GI:  is having reflux  MS: denies swelling of lower extremities    Exam: O2 saturations 98% RA; BP elevated at 169/80   Nonicteric  M/T - no lesions  Neck - no adenopathy or thyromegaly  Chest: rhonchi present, no wheezing  Cor: RRR, no s1, s2 no murmur appreciated  Abd: soft; no HSM  Ext: no clubbing, cyanosis, or edema  Skin: no rash    PFTs reviewed by me - stable    Assessment/Plan  1. Chronic bronchitis - recovering from recent cold.  Refill albuteral and Advair 250/50 inhalers. Start spiriva. Treat with z-jared and short course of Prednisone for persistent cough. Continue with immunoglobulin replacement.    2. Refill Prilosec for GERD.    RTC in 6 months with labs and pfts.        Maxx Elizabeth  Pulmonary/Critical Care  May 17, 2018 11:31 AM

## 2018-05-16 NOTE — MR AVS SNAPSHOT
After Visit Summary   5/16/2018    Jennifer Alvarez    MRN: [id number]           Patient Information     Date Of Birth          1940        Visit Information        Provider Department      5/16/2018 2:00 PM Tona Patel APRN CNP [facility]        Today's Diagnoses     Sick sinus syndrome (H)    -  1    Cardiac pacemaker in situ           Follow-ups after your visit        Additional Services     Follow-Up with  Cardiac Advanced Practice Provider- 1 Year                 Your next 10 appointments already scheduled     May 25, 2018 11:00 AM CDT   Infusion 300 with UC SPEC INFUSION, UC 44 ATC   [facility] ([facility])    [address]  [address]  [place] [address]   [phone]            Ian 15, 2018 11:00 AM CDT   Infusion 300 with UC SPEC INFUSION, UC 44 ATC   [facility] ([facility])    [address]  [address]  [place] [address]   [phone]            Jul 06, 2018 11:00 AM CDT   Infusion 300 with UC SPEC INFUSION, UC 44 ATC   [facility] ([facility])    [address]  [address]  [place] [address]   [phone]            Jul 27, 2018 11:00 AM CDT   Infusion 300 with UC SPEC INFUSION, UC 44 ATC   [facility] ([facility])    [address]  [address]  [place] [address]   [phone]            Aug 14, 2018 10:45 AM CDT   (Arrive by 10:30 AM)   Return Visit with CINDY Ortiz CNP   [facility] ([facility])    [address]  [address]  [place] [address]   [phone]            Nov 14, 2018  9:30 AM CST   (Arrive by 9:15 AM)   Pacemaker Check with Uc Cv Device 1   [facility]  "[facility] ([facility])    [address]  [address]  [place] [address] [phone]              Who to contact     If you have questions or need follow up information about today's clinic visit or your schedule please contact [facility] directly at [phone].  Normal or non-critical lab and imaging results will be communicated to you by MyChart, letter or phone within 4 business days after the clinic has received the results. If you do not hear from us within 7 days, please contact the clinic through [employer] or phone. If you have a critical or abnormal lab result, we will notify you by phone as soon as possible.  Submit refill requests through [employer] or call your pharmacy and they will forward the refill request to us. Please allow 3 business days for your refill to be completed.          Additional Information About Your Visit        [employer] Information     [employer] gives you secure access to your electronic health record. If you see a primary care provider, you can also send messages to your care team and make appointments. If you have questions, please call your primary care clinic.  If you do not have a primary care provider, please call [phone] and they will assist you.        Care EveryWhere ID     This is your Care EveryWhere ID. This could be used by other organizations to access your [facility] medical records  [id number]        Your Vitals Were     Pulse Temperature Height Pulse Oximetry BMI (Body Mass Index)       60 98  F (36.7  C) (Oral) 1.549 m (5' 1\") 98% 17.57 kg/m2        Blood Pressure from Last 3 Encounters:   05/16/18 158/73   05/16/18 169/80   04/13/18 129/58    Weight from Last 3 Encounters:   05/16/18 42.2 kg (93 lb)   05/16/18 42.2 kg (93 lb)   04/13/18 41.7 kg (92 lb)                 Today's Medication Changes          These changes are accurate as of 5/16/18 11:59 PM.  If you have any questions, ask your nurse or doctor.          "      Start taking these medicines.        Dose/Directions    azithromycin 250 MG tablet   Commonly known as:  ZITHROMAX   Used for:  Mixed simple and mucopurulent chronic bronchitis (H), Gastroesophageal reflux disease without esophagitis   Started by:  Maxx Elizabeth MD        Take as directed   Quantity:  6 tablet   Refills:  1         These medicines have changed or have updated prescriptions.        Dose/Directions    * albuterol 108 (90 Base) MCG/ACT Inhaler   Commonly known as:  PROAIR HFA/PROVENTIL HFA/VENTOLIN HFA   This may have changed:    - Another medication with the same name was added. Make sure you understand how and when to take each.  - Another medication with the same name was removed. Continue taking this medication, and follow the directions you see here.   Used for:  Wheezing   Changed by:  Maxx Elizabeth MD        Take 2 puffs prn for wheezing or shortness of breath   Quantity:  1 Inhaler   Refills:  12       * albuterol 108 (90 Base) MCG/ACT Inhaler   Commonly known as:  PROAIR HFA/PROVENTIL HFA/VENTOLIN HFA   This may have changed:  You were already taking a medication with the same name, and this prescription was added. Make sure you understand how and when to take each.   Used for:  Mixed simple and mucopurulent chronic bronchitis (H), Gastroesophageal reflux disease without esophagitis   Replaces:  albuterol (2.5 MG/3ML) 0.083% neb solution   Changed by:  Maxx Elizabeth MD        Take 2 puffs as needed for shortness of breath   Quantity:  1 Inhaler   Refills:  12       * albuterol (2.5 MG/3ML) 0.083% neb solution   This may have changed:    - Another medication with the same name was added. Make sure you understand how and when to take each.  - Another medication with the same name was removed. Continue taking this medication, and follow the directions you see here.   Changed by:  Maxx Elizabeth MD        Dose:  1 ampule   Take 1 ampule by nebulization every 6 hours as  needed.   Refills:  0       * fluticasone-salmeterol 250-50 MCG/DOSE diskus inhaler   Commonly known as:  ADVAIR   This may have changed:  Another medication with the same name was added. Make sure you understand how and when to take each.   Used for:  Unspecified chronic bronchitis   Changed by:  Maxx Elizabeth MD        Dose:  1 puff   Inhale 1 puff into the lungs 2 times daily.   Quantity:  1 Inhaler   Refills:  12       * fluticasone-salmeterol 250-50 MCG/DOSE diskus inhaler   Commonly known as:  ADVAIR   This may have changed:  You were already taking a medication with the same name, and this prescription was added. Make sure you understand how and when to take each.   Used for:  Mixed simple and mucopurulent chronic bronchitis (H), Gastroesophageal reflux disease without esophagitis   Changed by:  Maxx Elizabeth MD        Dose:  1 puff   Inhale 1 puff into the lungs 2 times daily   Quantity:  1 Inhaler   Refills:  12       * omeprazole 20 MG CR capsule   Commonly known as:  priLOSEC   This may have changed:  Another medication with the same name was added. Make sure you understand how and when to take each.   Used for:  Esophageal reflux, Unspecified chronic bronchitis   Changed by:  Maxx Elizabeth MD        Dose:  20 mg   Take 1 capsule (20 mg) by mouth 2 times daily   Quantity:  60 capsule   Refills:  6       * omeprazole 20 MG CR capsule   Commonly known as:  priLOSEC   This may have changed:  You were already taking a medication with the same name, and this prescription was added. Make sure you understand how and when to take each.   Used for:  Mixed simple and mucopurulent chronic bronchitis (H), Gastroesophageal reflux disease without esophagitis   Changed by:  Maxx Elizabeth MD        Dose:  20 mg   Take 1 capsule (20 mg) by mouth daily   Quantity:  60 capsule   Refills:  6       * predniSONE 20 MG tablet   Commonly known as:  DELTASONE   This may have changed:  Another medication with  the same name was added. Make sure you understand how and when to take each.   Used for:  Allergic reaction to contrast dye   Changed by:  Maxx Elizabeth MD        Patient to take 60 mg the evening before the procedure and 30 mg the morning of the procedure.   Quantity:  5 tablet   Refills:  0       * predniSONE 10 MG tablet   Commonly known as:  DELTASONE   This may have changed:  You were already taking a medication with the same name, and this prescription was added. Make sure you understand how and when to take each.   Used for:  Mixed simple and mucopurulent chronic bronchitis (H), Gastroesophageal reflux disease without esophagitis   Changed by:  Maxx Elizabeth MD        4 tablets by mouth each day for 3 days, then 3 tablets each day for 3 days, then 2 tablets each day for 3 days, then 1 tablet each day for 3 days, then off   Quantity:  30 tablet   Refills:  1       * tiotropium 18 MCG capsule   Commonly known as:  SPIRIVA HANDIHALER   This may have changed:  Another medication with the same name was added. Make sure you understand how and when to take each.   Used for:  Unspecified chronic bronchitis   Changed by:  Maxx Elizabeth MD        One puff every day   Quantity:  30 capsule   Refills:  6       * tiotropium 18 MCG capsule   Commonly known as:  SPIRIVA HANDIHALER   This may have changed:  You were already taking a medication with the same name, and this prescription was added. Make sure you understand how and when to take each.   Used for:  Mixed simple and mucopurulent chronic bronchitis (H), Gastroesophageal reflux disease without esophagitis   Changed by:  Maxx Elizabeth MD        One puff every day   Quantity:  30 capsule   Refills:  6       * Notice:  This list has 11 medication(s) that are the same as other medications prescribed for you. Read the directions carefully, and ask your doctor or other care provider to review them with you.      Stop taking these medicines if you  haven't already. Please contact your care team if you have questions.     albuterol (2.5 MG/3ML) 0.083% neb solution   Replaced by:  albuterol 108 (90 Base) MCG/ACT Inhaler   You also have another medication with the same name that you need to continue taking as instructed.   Stopped by:  Maxx Elizabeth MD                Where to get your medicines      These medications were sent to [facility] - [address]  [address] [facility] [address]     Phone:  [phone]     albuterol 108 (90 Base) MCG/ACT Inhaler    azithromycin 250 MG tablet    fluticasone-salmeterol 250-50 MCG/DOSE diskus inhaler    omeprazole 20 MG CR capsule    tiotropium 18 MCG capsule         Some of these will need a paper prescription and others can be bought over the counter.  Ask your nurse if you have questions.     Bring a paper prescription for each of these medications     predniSONE 10 MG tablet                Primary Care Provider Office Phone # Fax #    Brandi Burks [phone]       [facility] [address]   [facility] [address]        Equal Access to Services     [facility] PeninsulaDENITA : Hadii adolph Altamirano, waaxda lurodney, qaybta kaashkan nassar, chester lovelace. So [facility] [phone].    ATENCIÓN: Si habla español, tiene a taylor disposición servicios gratPresbyterian Españ[facility] Hospitalos de asistencia lingüística. [facility] [phone].    We comply with applicable federal civil rights laws and [place] laws. We do not discriminate on the basis of race, color, national origin, age, disability, sex, sexual orientation, or gender identity.            Thank you!     Thank you for choosing [facility]  for your care. Our goal is always to provide you with excellent care. Hearing back from our patients is one way we can continue to improve our services. Please take a few minutes to complete the written survey that you may receive in the mail after your visit with us. Thank you!              Your Updated Medication List - Protect others around you: Learn how to safely use, store and throw away your medicines at www.disposemymeds.org.          This list is accurate as of 5/16/18 11:59 PM.  Always use your most recent med list.                   Brand Name Dispense Instructions for use Diagnosis    * albuterol 108 (90 Base) MCG/ACT Inhaler    PROAIR HFA/PROVENTIL HFA/VENTOLIN HFA    1 Inhaler    Take 2 puffs prn for wheezing or shortness of breath    Wheezing       * albuterol 108 (90 Base) MCG/ACT Inhaler    PROAIR HFA/PROVENTIL HFA/VENTOLIN HFA    1 Inhaler    Take 2 puffs as needed for shortness of breath    Mixed simple and mucopurulent chronic bronchitis (H), Gastroesophageal reflux disease without esophagitis       * albuterol (2.5 MG/3ML) 0.083% neb solution      Take 1 ampule by nebulization every 6 hours as needed.        aspirin 81 MG tablet      Take 1 tablet by mouth daily.        atorvastatin 10 MG tablet    LIPITOR    90 tablet    Take 4 tablets (40 mg) by mouth daily    Hyperlipidemia LDL goal <100       azithromycin 250 MG tablet    ZITHROMAX    6 tablet    Take as directed    Mixed simple and mucopurulent chronic bronchitis (H), Gastroesophageal reflux disease without esophagitis       Blood Pressure Kit      daily.        carvedilol 12.5 MG tablet    COREG    180 tablet    Take 1 tablet (12.5 mg) by mouth 2 times daily (with meals)    Secondary hypertension       clopidogrel 75 MG tablet    PLAVIX    90 tablet    Take 1 tablet (75 mg) by mouth daily    Coronary artery disease       DEMEROL 25 MG/ML injection   Generic drug:  meperidine      Inject 25 mg into the vein as needed. 25 mg IV prior to IgG infusion        dicyclomine 10 MG capsule    BENTYL     Take 10 mg by mouth 4 times daily (before meals and nightly).        FISH OIL      Unsure of dosage take two caps daily        * fluticasone-salmeterol 250-50 MCG/DOSE diskus inhaler    ADVAIR    1 Inhaler    Inhale 1 puff into  the lungs 2 times daily.    Unspecified chronic bronchitis       * fluticasone-salmeterol 250-50 MCG/DOSE diskus inhaler    ADVAIR    1 Inhaler    Inhale 1 puff into the lungs 2 times daily    Mixed simple and mucopurulent chronic bronchitis (H), Gastroesophageal reflux disease without esophagitis       HYDROcodone-acetaminophen  MG per tablet    LORCET     Take 1 tablet by mouth every 6 hours as needed.        Immune Globulin (Human) 25 GM/500ML Soln      Inject 25 g into the vein See Admin Instructions. 25 grams every 3 weeks IVIG        isosorbide mononitrate 60 MG 24 hr tablet    IMDUR    90 tablet    Take 1 tablet (60 mg) by mouth daily    HTN (hypertension)       metFORMIN 1000 MG tablet    GLUCOPHAGE     Take 1 tablet (1,000 mg) by mouth daily (with dinner) Take 1 tablet twice daily    DM (diabetes mellitus) (H)       nitroGLYcerin 0.4 MG sublingual tablet    NITROQUICK    25 tablet    Place 1 tablet (0.4 mg) under the tongue every 5 minutes as needed    Chest pain       * omeprazole 20 MG CR capsule    priLOSEC    60 capsule    Take 1 capsule (20 mg) by mouth 2 times daily    Esophageal reflux, Unspecified chronic bronchitis       * omeprazole 20 MG CR capsule    priLOSEC    60 capsule    Take 1 capsule (20 mg) by mouth daily    Mixed simple and mucopurulent chronic bronchitis (H), Gastroesophageal reflux disease without esophagitis       * blood glucose monitoring lancets     3 Box    Use to test blood sugar 3 times daily or as directed.    Type 2 diabetes mellitus with complication (H)       * ONE TOUCH FINE POINT LANCETS      2 times daily.        * ONETOUCH ULTRA test strip   Generic drug:  blood glucose monitoring     200 strip    Test blood sugar twice a day.    Diabetes mellitus type II       * blood glucose monitoring test strip    TERRY CONTOUR NEXT    270 each    by In Vitro route 3 times daily Use to test blood sugar 3 times daily or as directed.    Type 2 diabetes mellitus with complication  (H)       * predniSONE 20 MG tablet    DELTASONE    5 tablet    Patient to take 60 mg the evening before the procedure and 30 mg the morning of the procedure.    Allergic reaction to contrast dye       * predniSONE 10 MG tablet    DELTASONE    30 tablet    4 tablets by mouth each day for 3 days, then 3 tablets each day for 3 days, then 2 tablets each day for 3 days, then 1 tablet each day for 3 days, then off    Mixed simple and mucopurulent chronic bronchitis (H), Gastroesophageal reflux disease without esophagitis       * tiotropium 18 MCG capsule    SPIRIVA HANDIHALER    30 capsule    One puff every day    Unspecified chronic bronchitis       * tiotropium 18 MCG capsule    SPIRIVA HANDIHALER    30 capsule    One puff every day    Mixed simple and mucopurulent chronic bronchitis (H), Gastroesophageal reflux disease without esophagitis       traMADol 50 MG tablet    ULTRAM    20 tablet    Take 1 tablet (50 mg) by mouth every 6 hours as needed for pain    Pacemaker battery depletion       TYLENOL 325 MG tablet   Generic drug:  acetaminophen      Take 1-2 tablets by mouth every 6 hours as needed.        * Notice:  This list has 15 medication(s) that are the same as other medications prescribed for you. Read the directions carefully, and ask your doctor or other care provider to review them with you.

## 2018-05-16 NOTE — MR AVS SNAPSHOT
After Visit Summary   5/16/2018    Jennifer Alvarez    MRN: [id number]           Patient Information     Date Of Birth          1940        Visit Information        Provider Department      5/16/2018 11:30 AM Maxx Elizabeth MD [facility]        Today's Diagnoses     Mixed simple and mucopurulent chronic bronchitis (H)    -  1    Gastroesophageal reflux disease without esophagitis           Follow-ups after your visit        Follow-up notes from your care team     Return in about 6 months (around 11/16/2018).      Your next 10 appointments already scheduled     May 16, 2018 11:30 AM CDT   (Arrive by 11:15 AM)   Return Interstitial Lung with Maxx Elizabeth MD   [facility] ([facility])    [address] [place] [address]  [address]  [facility] [address] [phone]            May 16, 2018  1:30 PM CDT   (Arrive by 1:15 PM)   Pacemaker Check with Uc Cv Device 1   [facility] ([facility])    [address] [place] [address]  [address]  [facility] [address]   [phone]            May 16, 2018  2:00 PM CDT   (Arrive by 1:45 PM)   RETURN ARRHYTHMIA with CINDY Escudero [facility] ([facility])    [address] [place] [address]  [address]  [facility] [address]   [phone]            May 25, 2018 11:00 AM CDT   Infusion 300 with UC SPEC INFUSION, UC 44 ATC   [facility] ([facility])    [address]  [address]  [facility] [address]   [phone]            Ian 15, 2018 11:00 AM CDT   Infusion 300 with UC SPEC INFUSION, UC 44 ATC   [facility] ([facility])    [address]  [address]  [facility] [address]   [phone]            Jul 06, 2018 11:00 AM CDT   Infusion 300  with UC SPEC INFUSION, UC 44 ATC   [facility] ([facility])    [address]  [address]  [facility] [id number] [phone]            Jul 27, 2018 11:00 AM CDT   Infusion 300 with UC SPEC INFUSION   [facility] ([facility])    [address]  [address]  [facility] [id number] [phone]              Future tests that were ordered for you today     Open Future Orders        Priority Expected Expires Ordered    CBC with platelets Routine 11/16/2018 5/16/2019 5/16/2018    Basic metabolic panel Routine 11/16/2018 5/16/2019 5/16/2018    Hepatic panel Routine 11/16/2018 5/16/2019 5/16/2018    Spirometry, Breathing Capacity Routine 11/16/2018 5/16/2019 5/16/2018    HC DIFFUSING CAPACITY Routine 11/16/2018 5/16/2019 5/16/2018    Immunoglobulins A G and M Routine 11/16/2018 5/16/2019 5/16/2018    Immunoglobulin G subclasses Routine 11/16/2018 5/16/2019 5/16/2018    IgG Routine 11/16/2018 5/16/2019 5/16/2018            Who to contact     If you have questions or need follow up information about today's clinic visit or your schedule please contact [facility] directly at [phone].  Normal or non-critical lab and imaging results will be communicated to you by [employer], letter or phone within 4 business days after the clinic has received the results. If you do not hear from us within 7 days, please contact the clinic through [employer] or phone. If you have a critical or abnormal lab result, we will notify you by phone as soon as possible.  Submit refill requests through [employer] or call your pharmacy and they will forward the refill request to us. Please allow 3 business days for your refill to be completed.          Additional Information About Your Visit        [employer] Information     [employer] gives you secure access to your  "electronic health record. If you see a primary care provider, you can also send messages to your care team and make appointments. If you have questions, please call your primary care clinic.  If you do not have a primary care provider, please call [phone] and they will assist you.        Care EveryWhere ID     This is your Care EveryWhere ID. This could be used by other organizations to access your [facility] medical records  [id number]        Your Vitals Were     Pulse Respirations Height Pulse Oximetry BMI (Body Mass Index)       68 17 1.549 m (5' 1\") 98% 17.57 kg/m2        Blood Pressure from Last 3 Encounters:   05/16/18 169/80   04/13/18 129/58   03/23/18 134/69    Weight from Last 3 Encounters:   05/16/18 42.2 kg (93 lb)   04/13/18 41.7 kg (92 lb)   03/02/18 42.4 kg (93 lb 6.4 oz)                 Today's Medication Changes          These changes are accurate as of 5/16/18 11:27 AM.  If you have any questions, ask your nurse or doctor.               Start taking these medicines.        Dose/Directions    azithromycin 250 MG tablet   Commonly known as:  ZITHROMAX   Used for:  Mixed simple and mucopurulent chronic bronchitis (H), Gastroesophageal reflux disease without esophagitis   Started by:  Maxx Elizabeth MD        Take as directed   Quantity:  6 tablet   Refills:  1         These medicines have changed or have updated prescriptions.        Dose/Directions    * albuterol 108 (90 Base) MCG/ACT Inhaler   Commonly known as:  PROAIR HFA/PROVENTIL HFA/VENTOLIN HFA   This may have changed:    - Another medication with the same name was added. Make sure you understand how and when to take each.  - Another medication with the same name was removed. Continue taking this medication, and follow the directions you see here.   Used for:  Wheezing   Changed by:  Maxx Elizabeth MD        Take 2 puffs prn for wheezing or shortness of breath   Quantity:  1 Inhaler   Refills:  12       * albuterol 108 (90 Base) " MCG/ACT Inhaler   Commonly known as:  PROAIR HFA/PROVENTIL HFA/VENTOLIN HFA   This may have changed:  You were already taking a medication with the same name, and this prescription was added. Make sure you understand how and when to take each.   Used for:  Mixed simple and mucopurulent chronic bronchitis (H), Gastroesophageal reflux disease without esophagitis   Replaces:  albuterol (2.5 MG/3ML) 0.083% neb solution   Changed by:  Maxx Elizabeth MD        Take 2 puffs as needed for shortness of breath   Quantity:  1 Inhaler   Refills:  12       * albuterol (2.5 MG/3ML) 0.083% neb solution   This may have changed:    - Another medication with the same name was added. Make sure you understand how and when to take each.  - Another medication with the same name was removed. Continue taking this medication, and follow the directions you see here.   Changed by:  Maxx Elizabeth MD        Dose:  1 ampule   Take 1 ampule by nebulization every 6 hours as needed.   Refills:  0       * fluticasone-salmeterol 250-50 MCG/DOSE diskus inhaler   Commonly known as:  ADVAIR   This may have changed:  Another medication with the same name was added. Make sure you understand how and when to take each.   Used for:  Unspecified chronic bronchitis   Changed by:  Maxx Elizabeth MD        Dose:  1 puff   Inhale 1 puff into the lungs 2 times daily.   Quantity:  1 Inhaler   Refills:  12       * fluticasone-salmeterol 250-50 MCG/DOSE diskus inhaler   Commonly known as:  ADVAIR   This may have changed:  You were already taking a medication with the same name, and this prescription was added. Make sure you understand how and when to take each.   Used for:  Mixed simple and mucopurulent chronic bronchitis (H), Gastroesophageal reflux disease without esophagitis   Changed by:  Maxx Elizabeth MD        Dose:  1 puff   Inhale 1 puff into the lungs 2 times daily   Quantity:  1 Inhaler   Refills:  12       * omeprazole 20 MG CR capsule    Commonly known as:  priLOSEC   This may have changed:  Another medication with the same name was added. Make sure you understand how and when to take each.   Used for:  Esophageal reflux, Unspecified chronic bronchitis   Changed by:  Maxx Elizabeth MD        Dose:  20 mg   Take 1 capsule (20 mg) by mouth 2 times daily   Quantity:  60 capsule   Refills:  6       * omeprazole 20 MG CR capsule   Commonly known as:  priLOSEC   This may have changed:  You were already taking a medication with the same name, and this prescription was added. Make sure you understand how and when to take each.   Used for:  Mixed simple and mucopurulent chronic bronchitis (H), Gastroesophageal reflux disease without esophagitis   Changed by:  Maxx Elizabeth MD        Dose:  20 mg   Take 1 capsule (20 mg) by mouth daily   Quantity:  60 capsule   Refills:  6       * predniSONE 20 MG tablet   Commonly known as:  DELTASONE   This may have changed:  Another medication with the same name was added. Make sure you understand how and when to take each.   Used for:  Allergic reaction to contrast dye   Changed by:  Maxx Elizabeth MD        Patient to take 60 mg the evening before the procedure and 30 mg the morning of the procedure.   Quantity:  5 tablet   Refills:  0       * predniSONE 10 MG tablet   Commonly known as:  DELTASONE   This may have changed:  You were already taking a medication with the same name, and this prescription was added. Make sure you understand how and when to take each.   Used for:  Mixed simple and mucopurulent chronic bronchitis (H), Gastroesophageal reflux disease without esophagitis   Changed by:  Maxx Elizabeth MD        4 tablets by mouth each day for 3 days, then 3 tablets each day for 3 days, then 2 tablets each day for 3 days, then 1 tablet each day for 3 days, then off   Quantity:  30 tablet   Refills:  1       * tiotropium 18 MCG capsule   Commonly known as:  SPIRIVA HANDIHALER   This may have  changed:  Another medication with the same name was added. Make sure you understand how and when to take each.   Used for:  Unspecified chronic bronchitis   Changed by:  Maxx Elizabeth MD        One puff every day   Quantity:  30 capsule   Refills:  6       * tiotropium 18 MCG capsule   Commonly known as:  SPIRIVA HANDIHALER   This may have changed:  You were already taking a medication with the same name, and this prescription was added. Make sure you understand how and when to take each.   Used for:  Mixed simple and mucopurulent chronic bronchitis (H), Gastroesophageal reflux disease without esophagitis   Changed by:  Maxx Elizabeth MD        One puff every day   Quantity:  30 capsule   Refills:  6       * Notice:  This list has 11 medication(s) that are the same as other medications prescribed for you. Read the directions carefully, and ask your doctor or other care provider to review them with you.      Stop taking these medicines if you haven't already. Please contact your care team if you have questions.     albuterol (2.5 MG/3ML) 0.083% neb solution   Replaced by:  albuterol 108 (90 Base) MCG/ACT Inhaler   You also have another medication with the same name that you need to continue taking as instructed.   Stopped by:  Maxx Elizabeth MD                Where to get your medicines      These medications were sent to [facility] [address] [address]     Phone:  [phone]     albuterol 108 (90 Base) MCG/ACT Inhaler    azithromycin 250 MG tablet    fluticasone-salmeterol 250-50 MCG/DOSE diskus inhaler    omeprazole 20 MG CR capsule    tiotropium 18 MCG capsule         Some of these will need a paper prescription and others can be bought over the counter.  Ask your nurse if you have questions.     Bring a paper prescription for each of these medications     predniSONE 10 MG tablet                Primary Care Provider Office Phone # Fax #     Brandi DILLON [facility] [phone]       [facility] [address]   [facility] [id number]        Equal Access to Services     GUERA HOLT : Hadii aad ku hadlea Sorosi, wacaroda luqvandana, qadonovanta karocioda maday, chester ellis andrzejmark garduno sascha lovelace. So [facility] [phone].    ATENCIÓN: Si habla español, tiene a taylor disposición servicios gratuitos de asistencia lingüística. Llame al [phone].    We comply with applicable federal civil rights laws and [place] laws. We do not discriminate on the basis of race, color, national origin, age, disability, sex, sexual orientation, or gender identity.            Thank you!     Thank you for choosing [facility]  for your care. Our goal is always to provide you with excellent care. Hearing back from our patients is one way we can continue to improve our services. Please take a few minutes to complete the written survey that you may receive in the mail after your visit with us. Thank you!             Your Updated Medication List - Protect others around you: Learn how to safely use, store and throw away your medicines at www.disposemymeds.org.          This list is accurate as of 5/16/18 11:27 AM.  Always use your most recent med list.                   Brand Name Dispense Instructions for use Diagnosis    * albuterol 108 (90 Base) MCG/ACT Inhaler    PROAIR HFA/PROVENTIL HFA/VENTOLIN HFA    1 Inhaler    Take 2 puffs prn for wheezing or shortness of breath    Wheezing       * albuterol 108 (90 Base) MCG/ACT Inhaler    PROAIR HFA/PROVENTIL HFA/VENTOLIN HFA    1 Inhaler    Take 2 puffs as needed for shortness of breath    Mixed simple and mucopurulent chronic bronchitis (H), Gastroesophageal reflux disease without esophagitis       * albuterol (2.5 MG/3ML) 0.083% neb solution      Take 1 ampule by nebulization every 6 hours as needed.        aspirin 81 MG tablet      Take 1 tablet by mouth daily.        atorvastatin 10 MG tablet     LIPITOR    90 tablet    Take 4 tablets (40 mg) by mouth daily    Hyperlipidemia LDL goal <100       azithromycin 250 MG tablet    ZITHROMAX    6 tablet    Take as directed    Mixed simple and mucopurulent chronic bronchitis (H), Gastroesophageal reflux disease without esophagitis       Blood Pressure Kit      daily.        carvedilol 12.5 MG tablet    COREG    180 tablet    Take 1 tablet (12.5 mg) by mouth 2 times daily (with meals)    Secondary hypertension       clopidogrel 75 MG tablet    PLAVIX    90 tablet    Take 1 tablet (75 mg) by mouth daily    Coronary artery disease       DEMEROL 25 MG/ML injection   Generic drug:  meperidine      Inject 25 mg into the vein as needed. 25 mg IV prior to IgG infusion        dicyclomine 10 MG capsule    BENTYL     Take 10 mg by mouth 4 times daily (before meals and nightly).        FISH OIL      Unsure of dosage take two caps daily        * fluticasone-salmeterol 250-50 MCG/DOSE diskus inhaler    ADVAIR    1 Inhaler    Inhale 1 puff into the lungs 2 times daily.    Unspecified chronic bronchitis       * fluticasone-salmeterol 250-50 MCG/DOSE diskus inhaler    ADVAIR    1 Inhaler    Inhale 1 puff into the lungs 2 times daily    Mixed simple and mucopurulent chronic bronchitis (H), Gastroesophageal reflux disease without esophagitis       HYDROcodone-acetaminophen  MG per tablet    LORCET     Take 1 tablet by mouth every 6 hours as needed.        Immune Globulin (Human) 25 GM/500ML Soln      Inject 25 g into the vein See Admin Instructions. 25 grams every 3 weeks IVIG        isosorbide mononitrate 60 MG 24 hr tablet    IMDUR    90 tablet    Take 1 tablet (60 mg) by mouth daily    HTN (hypertension)       metFORMIN 1000 MG tablet    GLUCOPHAGE     Take 1 tablet (1,000 mg) by mouth daily (with dinner) Take 1 tablet twice daily    DM (diabetes mellitus) (H)       nitroGLYcerin 0.4 MG sublingual tablet    NITROQUICK    25 tablet    Place 1 tablet (0.4 mg) under the tongue  every 5 minutes as needed    Chest pain       * omeprazole 20 MG CR capsule    priLOSEC    60 capsule    Take 1 capsule (20 mg) by mouth 2 times daily    Esophageal reflux, Unspecified chronic bronchitis       * omeprazole 20 MG CR capsule    priLOSEC    60 capsule    Take 1 capsule (20 mg) by mouth daily    Mixed simple and mucopurulent chronic bronchitis (H), Gastroesophageal reflux disease without esophagitis       * blood glucose monitoring lancets     3 Box    Use to test blood sugar 3 times daily or as directed.    Type 2 diabetes mellitus with complication (H)       * ONE TOUCH FINE POINT LANCETS      2 times daily.        * ONETOUCH ULTRA test strip   Generic drug:  blood glucose monitoring     200 strip    Test blood sugar twice a day.    Diabetes mellitus type II       * blood glucose monitoring test strip    TERRY CONTOUR NEXT    270 each    by In Vitro route 3 times daily Use to test blood sugar 3 times daily or as directed.    Type 2 diabetes mellitus with complication (H)       * predniSONE 20 MG tablet    DELTASONE    5 tablet    Patient to take 60 mg the evening before the procedure and 30 mg the morning of the procedure.    Allergic reaction to contrast dye       * predniSONE 10 MG tablet    DELTASONE    30 tablet    4 tablets by mouth each day for 3 days, then 3 tablets each day for 3 days, then 2 tablets each day for 3 days, then 1 tablet each day for 3 days, then off    Mixed simple and mucopurulent chronic bronchitis (H), Gastroesophageal reflux disease without esophagitis       * tiotropium 18 MCG capsule    SPIRIVA HANDIHALER    30 capsule    One puff every day    Unspecified chronic bronchitis       * tiotropium 18 MCG capsule    SPIRIVA HANDIHALER    30 capsule    One puff every day    Mixed simple and mucopurulent chronic bronchitis (H), Gastroesophageal reflux disease without esophagitis       traMADol 50 MG tablet    ULTRAM    20 tablet    Take 1 tablet (50 mg) by mouth every 6 hours as  needed for pain    Pacemaker battery depletion       TYLENOL 325 MG tablet   Generic drug:  acetaminophen      Take 1-2 tablets by mouth every 6 hours as needed.        * Notice:  This list has 15 medication(s) that are the same as other medications prescribed for you. Read the directions carefully, and ask your doctor or other care provider to review them with you.

## 2018-05-16 NOTE — NURSING NOTE
Chief Complaint   Patient presents with     RECHECK     Jennifer is here today for a follow up for arrhythmia. She states that she has been getting a little more tired then before.      Cassandra Cain, CMA

## 2018-05-16 NOTE — PROGRESS NOTES
Electrophysiology Clinic Note  HPI:   Ms. Alvarez is a 77 year old female who has a past medical history significant for CAD s/p PCI LAD X2 2007, SSS s/p PPM implant 2008 gen change and new RV lead 2/12/18, CKD, IgA deficiency, renal artery stenosis s/p Left renal artery stent 7/2017, HLD, asthma, DM2, and GERD. She presents today for follow up.      Her cardiac history dates back to 2007 when she was found to have single vessel CAD with PCIX 2 to mid and distal LAD. A repeat coronary angiogram in 2008 revealed patent stents and non-obstructive CAD. Another coronary angiogram in May 2010 showed minimal CAD with patent stents. RHC showed mild elevation of PA pressures and left sided filling pressures. She had a dobutamine stress Echo in Sept 2011 that was negative for any inducible ischemia. LV function was normal at baseline and augmented appropriately with stress. A repeat echocardiogram in June 2013 revealed a normal global and regional left ventricular function with an EF of 60-65% and normal right ventricular function. There was mild concentric LVH and mild aortic sclerosis with no significant gradient across AV.  She had a negative dobutamine Echo in July 2015. She has been experiencing chest pain occurring 3-4X/week associated with activity. She is following with Dr. Adair for this who feels her symptoms are atypical and does not want to pursue further ischemic evaluation. Other work up revealed renal artery stenosis. Her device interrogation has showed she is nearing MARIA T for which she was sent to reestablish EP care. Her device checks have shown normal device function and stable lead parameters. She reports that she occassionally has pain at pacemaker site when laying on that side.     EP Visit 1/23/18: She presents today for follow up. Since our last visit and she had renal artery stent placed and her BP meds were adjusted. Her device has now reached MARIA T. Device interrogation shows increasing RV thresholds.  Today RV threshold 2.75@0.4 and 6 months ago RV threshold 1.75@0.4. 11 beats NSVT recorded. One episode of noise recorded on RV lead. RV lead sensing and impedence stable. AP 20%,  1%. She reports feeling fatigued. She is noted to be hypertensive 159/72. Given climbing RV lead thresholds and device at MARIA T decision was made to pursue gen change with RV lead replacement.     She presents today for follow up. She reports feeling well. She had PPM gen change with new RV lead placement on 2/12/18. Device site is well healed. Device check today shows 96 AMS episodes recorded due to noise on the atrial lead which is not a new finding.  P waves are measuring 2.6 mV today. 1 VHR episode recorded that appears to be SVT - 4 sec, 183 bpm.  Intrinsic rhythm = NSR @ 60 bpm.  AP = 26%.   = 1%. She reports ongoing fatigue. She denies any chest pain/pressures, dizziness, lightheadedness, worsening shortness of breath, leg/ankle swelling, PND, orthopnea, palpitations, or syncopal symptoms. Current cardiac medications include: Coreg, ASA, Lipitor,and Isosorbide.    PAST MEDICAL HISTORY:  Past Medical History:   Diagnosis Date     Abdominal pain     cramping     Arthritis      CAD (coronary artery disease)     stent x2, ppm     Cancer (H)     skin     Cardiac pacemaker      CC (Crohn's colitis) (H)      Chronic kidney disease      Diabetes (H)     DM type 2, oral agent     Diarrhea      GERD (gastroesophageal reflux disease)      History of blood transfusion      HTN (hypertension)      Hyperlipidemia LDL goal <70 11/19/2013     IgA deficiency (H)      Neck pain 12/31/2014     Uncomplicated asthma      Weight loss        CURRENT MEDICATIONS:  Current Outpatient Prescriptions   Medication Sig Dispense Refill     acetaminophen (TYLENOL) 325 MG tablet Take 1-2 tablets by mouth every 6 hours as needed.       albuterol (2.5 MG/3ML) 0.083% nebulizer solution Take 1 ampule by nebulization every 6 hours as needed.       albuterol (PROAIR  HFA/PROVENTIL HFA/VENTOLIN HFA) 108 (90 BASE) MCG/ACT Inhaler Take 2 puffs prn for wheezing or shortness of breath 1 Inhaler 12     albuterol (PROAIR HFA/PROVENTIL HFA/VENTOLIN HFA) 108 (90 Base) MCG/ACT Inhaler Take 2 puffs as needed for shortness of breath 1 Inhaler 12     aspirin 81 MG tablet Take 1 tablet by mouth daily.       atorvastatin (LIPITOR) 10 MG tablet Take 4 tablets (40 mg) by mouth daily 90 tablet 3     azithromycin (ZITHROMAX) 250 MG tablet Take as directed 6 tablet 1     blood glucose monitoring (TERRY CONTOUR NEXT) test strip by In Vitro route 3 times daily Use to test blood sugar 3 times daily or as directed. 270 each 3     blood glucose monitoring (TERRY MICROLET) lancets Use to test blood sugar 3 times daily or as directed. 3 Box 3     Blood Pressure KIT daily.       carvedilol (COREG) 12.5 MG tablet Take 1 tablet (12.5 mg) by mouth 2 times daily (with meals) 180 tablet 1     clopidogrel (PLAVIX) 75 MG tablet Take 1 tablet (75 mg) by mouth daily 90 tablet 3     dicyclomine (BENTYL) 10 MG capsule Take 10 mg by mouth 4 times daily (before meals and nightly).       FISH OIL Unsure of dosage take two caps daily       fluticasone-salmeterol (ADVAIR) 250-50 MCG/DOSE diskus inhaler Inhale 1 puff into the lungs 2 times daily. 1 Inhaler 12     fluticasone-salmeterol (ADVAIR) 250-50 MCG/DOSE diskus inhaler Inhale 1 puff into the lungs 2 times daily 1 Inhaler 12     hydrocodone-acetaminophen (LORCET)  MG per tablet Take 1 tablet by mouth every 6 hours as needed.       Immune Globulin, Human, 25 GM/500ML SOLN Inject 25 g into the vein See Admin Instructions. 25 grams every 3 weeks IVIG       isosorbide mononitrate (IMDUR) 60 MG 24 hr tablet Take 1 tablet (60 mg) by mouth daily 90 tablet 3     meperidine (DEMEROL) 25 MG/ML injection Inject 25 mg into the vein as needed. 25 mg IV prior to IgG infusion       metFORMIN (GLUCOPHAGE) 1000 MG tablet Take 1 tablet (1,000 mg) by mouth daily (with dinner) Take  1 tablet twice daily  0     nitroglycerin (NITROQUICK) 0.4 MG sublingual tablet Place 1 tablet (0.4 mg) under the tongue every 5 minutes as needed 25 tablet 3     omeprazole (PRILOSEC) 20 MG capsule Take 1 capsule (20 mg) by mouth 2 times daily 60 capsule 6     omeprazole (PRILOSEC) 20 MG CR capsule Take 1 capsule (20 mg) by mouth daily 60 capsule 6     ONE TOUCH FINE POINT LANCETS 2 times daily.       ONE TOUCH ULTRA TEST strip Test blood sugar twice a day. 200 strip 3     predniSONE (DELTASONE) 10 MG tablet 4 tablets by mouth each day for 3 days, then 3 tablets each day for 3 days, then 2 tablets each day for 3 days, then 1 tablet each day for 3 days, then off 30 tablet 1     predniSONE (DELTASONE) 20 MG tablet Patient to take 60 mg the evening before the procedure and 30 mg the morning of the procedure. 5 tablet 0     tiotropium (SPIRIVA HANDIHALER) 18 MCG capsule One puff every day 30 capsule 6     tiotropium (SPIRIVA HANDIHALER) 18 MCG inhalation capsule One puff every day 30 capsule 6     traMADol (ULTRAM) 50 MG tablet Take 1 tablet (50 mg) by mouth every 6 hours as needed for pain 20 tablet 0     [DISCONTINUED] albuterol (2.5 MG/3ML) 0.083% neb solution Take 1 vial (2.5 mg) by nebulization every 6 hours as needed for shortness of breath / dyspnea or wheezing 360 mL 0       PAST SURGICAL HISTORY:  Past Surgical History:   Procedure Laterality Date     APPENDECTOMY       C LAP,SURG,COLECTOMY, PARTIAL, W/ANAST      partial colectomy;diverticulitis     cardiac stents      x 2     CARPAL TUNNEL RELEASE RT/LT      bilateral     CHOLECYSTECTOMY       COLONOSCOPY       COLONOSCOPY N/A 11/9/2015    Procedure: COMBINED COLONOSCOPY, SINGLE OR MULTIPLE BIOPSY/POLYPECTOMY BY BIOPSY;  Surgeon: Victor Hugo Turner MD;  Location:  GI     ENT SURGERY      back throat     ESOPHAGEAL MOTILITY STUDY  5-5-15     ESOPHAGOSCOPY, GASTROSCOPY, DUODENOSCOPY (EGD), COMBINED Left 5/13/2015    Procedure: COMBINED ESOPHAGOSCOPY,  GASTROSCOPY, DUODENOSCOPY (EGD), BIOPSY SINGLE OR MULTIPLE;  Surgeon: Dipesh Bobby MD;  [facility]     ESOPHAGOSCOPY, GASTROSCOPY, DUODENOSCOPY (EGD), COMBINED N/A 11/6/2015    Procedure: COMBINED ESOPHAGOSCOPY, GASTROSCOPY, DUODENOSCOPY (EGD), BIOPSY SINGLE OR MULTIPLE;  Surgeon: Victor Hugo Turner MD;  Location: [facility]     FOOT SURGERY      left     HC ESOPH/GAS REFLUX TEST W NASAL IMPED >1 HR N/A 5/5/2015    Procedure: ESOPHAGEAL IMPEDENCE FUNCTION TEST WITH 24 HOUR PH GREATER THAN 1 HOUR;  Surgeon: Dipesh Bobby MD;  Location: [facility]     IMPLANT PACEMAKER      PPM     IR RENAL/VISCERAL STENT/ATHERECT/PTA Left 07/2017     LAPAROTOMY EXPLORATORY       NISSEN FUNDOPLICATION      x 2     SHOULDER SURGERY      right     UPPER GI ENDOSCOPY       WRIST SURGERY      right cyst       ALLERGIES:     Allergies   Allergen Reactions     Bees Anaphylaxis     Codeine Sulfate Hives     Contrast Dye Anaphylaxis and Hives     Milk Products Shortness Of Breath     Morphine Sulfate Other (See Comments), Hives and Itching     Pt had abdominal pain that had her doubled over     Pcn [Penicillin G Ammonium] Difficulty breathing     Zinacef [Cefuroxime Sodium] Difficulty breathing     Influenza Vaccine Live Rash     Pneumovax [Pneumococcal Polysaccharides]      Procardia [Nifedipine] Difficulty breathing     Barium Rash       FAMILY HISTORY:  Family History   Problem Relation Age of Onset     Ovarian Cancer Mother      Stomach Cancer Father      HEART DISEASE Father      DIABETES Father      Ovarian Cancer Sister      Leukemia Brother      Ovarian Cancer Sister      DIABETES Brother      DIABETES Brother      Neurologic Disorder No family hx of        SOCIAL HISTORY:  Social History   Substance Use Topics     Smoking status: Former Smoker     Packs/day: 0.20     Years: 20.00     Types: Cigarettes     Quit date: 2/24/1974     Smokeless tobacco: Never Used     Alcohol use No       ROS:   A comprehensive 10 point  "review of systems negative other than as mentioned in HPI.  Exam:  /73 (BP Location: Left arm, Patient Position: Chair, Cuff Size: Adult Regular)  Pulse 60  Temp 98  F (36.7  C) (Oral)  Ht 1.549 m (5' 1\")  Wt 42.2 kg (93 lb)  SpO2 98%  BMI 17.57 kg/m2  GENERAL APPEARANCE: healthy, alert and no distress  HEENT: no icterus, no xanthelasmas, normal pupil size and reaction, normal palate, mucosa moist, no central cyanosis  NECK: no adenopathy, no asymmetry, masses, or scars, thyroid normal to palpation and no bruits, JVP not elevated  RESPIRATORY: lungs clear to auscultation - no rales, rhonchi or wheezes, no use of accessory muscles, no retractions, respirations are unlabored, normal respiratory rate  CARDIOVASCULAR: regular rhythm, normal S1 with physiologic split S2, no S3 or S4 and no murmur, click or rub, precordium quiet with normal PMI.  ABDOMEN: soft, non tender, without hepatosplenomegaly, no masses palpable, bowel sounds normal, aorta not enlarged by palpation, no abdominal bruits  EXTREMITIES: peripheral pulses normal, no edema, no bruits  NEURO: alert and oriented to person/place/time, normal speech, gait and affect  VASC: Radial, femoral, dorsalis pedis and posterior tibialis pulses are normal in volumes and symmetric bilaterally. No bruits are heard.  SKIN: no ecchymoses, no rashes    Labs:  Reviewed.     Testing/Procedures:  PULMONARY FUNCTION TESTS:   PFT Latest Ref Rng & Units 5/16/2018   FVC L 1.12   FEV1 L 0.77   FVC% % 45   FEV1% % 41         7/2015 STRESS ECHOCARDIOGRAM:     Interpretation Summary  Normal dobutamine stress echo at target heartrate.No significant valvular  abnormality.      Assessment and Plan:   Ms. Alvarez is a 77 year old female who has a past medical history significant for CAD s/p PCI LAD X2 2007, SSS s/p PPM implant 2008, CKD, IgA deficiency, renal artery stenosis s/p left renal artery stent 7/2017, HLD, asthma, DM2, and GERD. She presents today for follow up. She " reports feeling well. She had PPM gen change with new RV lead placement on 2/12/18. Device site is well healed. Device check today shows good RV lead function with stable lead parameters. RA lead has noise which is not a new finding. Atrial sensitivity programmed from 0.5 to 0.75 mV. She continues to have some HTN and we have asked her to keep a BP log. We will need to consider resuming prior lisinopril if ongoing HTN. We have asked her to monitor her BPs at home and send us log in 1 month. Follow up in 1 year.      The patient states understanding and is agreeable with plan.   CINDY Bray CNP  Pager: [phone]        MINDI RODRIGUEZ

## 2018-05-16 NOTE — PROGRESS NOTES
Preliminary Device Interrogation Results.  Final physician signed paceart report to be scanned and attached.    Patient seen in clinic for evaluation and iterative programming of her [employer] dual lead pacemaker per MD orders.  Patient is scheduled to see Tona Bautista NP today.  Normal pacemaker function.  96 AMS episodes recorded due to noise on the atrial lead which is not a new finding.  P waves are measuring 2.6 mV today.  Atrial sensitivity programmed from 0.5 to 0.75 mV.  1 VHR episode recorded that appears to be SVT - 4 sec, 183 bpm.  Intrinsic rhythm = NSR @ 60 bpm.  AP = 26%.   = 1%.  Estimated battery longevity to MARIA T = 9.7-11.3 years.  Patient reports that she is feeling more tired recently.  Plan for patient to send a remote transmission in 3 months and return to clinic in 6 months.    Dual lead pacemaker

## 2018-05-16 NOTE — PATIENT INSTRUCTIONS
It was a pleasure to see you in clinic today.  Please do not hesitate to call with any questions or concerns.  You are scheduled for a remote transmission on 8/20/18.  We look forward to seeing you in clinic at your next device check in 6 months.    Linn Bradshaw, RN, MS, CCRN  Electrophysiology Nurse Clinician  [facility]    During Business Hours Please Call:  [phone]  After Hours Please Call:  [phone] - select option #4 and ask for job code [id number]

## 2018-05-16 NOTE — PROGRESS NOTES
HPI: 77 year old with chronic bronchitis and frequent respiratory tract infections. 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  Returns today recovering from a cold.  Feels congested with dry cough, but not running a fever.  Breathing is otherwise unchanged.    ROS:   General: appetite OK weight stable; no fever or chills  CV: denies chest pain;   GI:  is having reflux  MS: denies swelling of lower extremities    Exam: O2 saturations 98% RA; BP elevated at 169/80   Nonicteric  M/T - no lesions  Neck - no adenopathy or thyromegaly  Chest: rhonchi present, no wheezing  Cor: RRR, no s1, s2 no murmur appreciated  Abd: soft; no HSM  Ext: no clubbing, cyanosis, or edema  Skin: no rash    PFTs reviewed by me - stable    Assessment/Plan  1. Chronic bronchitis - recovering from recent cold.  Refill albuteral and Advair 250/50 inhalers. Start spiriva. Treat with z-jared and short course of Prednisone for persistent cough. Continue with immunoglobulin replacement.    2. Refill Prilosec for GERD.    RTC in 6 months with labs and pfts.          Maxx Elizabeth  Pulmonary/Critical Care  May 17, 2018 11:31 AM

## 2018-05-16 NOTE — LETTER
5/16/2018      RE: Jennifer Alvarez  [address]   [facility] [id number]       Dear Colleague,    Thank you for the opportunity to participate in the care of your patient, Jennifer Alvarez, at the [facility] at [facility]. Please see a copy of my visit note below.    Electrophysiology Clinic Note  HPI:   Ms. Alvarez is a 77 year old female who has a past medical history significant for CAD s/p PCI LAD X2 2007, SSS s/p PPM implant 2008 gen change and new RV lead 2/12/18, CKD, IgA deficiency, renal artery stenosis s/p Left renal artery stent 7/2017, HLD, asthma, DM2, and GERD. She presents today for follow up.      Her cardiac history dates back to 2007 when she was found to have single vessel CAD with PCIX 2 to mid and distal LAD. A repeat coronary angiogram in 2008 revealed patent stents and non-obstructive CAD. Another coronary angiogram in May 2010 showed minimal CAD with patent stents. RHC showed mild elevation of PA pressures and left sided filling pressures. She had a dobutamine stress Echo in Sept 2011 that was negative for any inducible ischemia. LV function was normal at baseline and augmented appropriately with stress. A repeat echocardiogram in June 2013 revealed a normal global and regional left ventricular function with an EF of 60-65% and normal right ventricular function. There was mild concentric LVH and mild aortic sclerosis with no significant gradient across AV.  She had a negative dobutamine Echo in July 2015. She has been experiencing chest pain occurring 3-4X/week associated with activity. She is following with Dr. Adair for this who feels her symptoms are atypical and does not want to pursue further ischemic evaluation. Other work up revealed renal artery stenosis. Her device interrogation has showed she is nearing MARIA T for which she was sent to reestablish EP care. Her device checks have shown normal device function and stable lead parameters.  She reports that she occassionally has pain at pacemaker site when laying on that side.     EP Visit 1/23/18: She presents today for follow up. Since our last visit and she had renal artery stent placed and her BP meds were adjusted. Her device has now reached MARIA T. Device interrogation shows increasing RV thresholds. Today RV threshold 2.75@0.4 and 6 months ago RV threshold 1.75@0.4. 11 beats NSVT recorded. One episode of noise recorded on RV lead. RV lead sensing and impedence stable. AP 20%,  1%. She reports feeling fatigued. She is noted to be hypertensive 159/72. Given climbing RV lead thresholds and device at MARIA T decision was made to pursue gen change with RV lead replacement.     She presents today for follow up. She reports feeling well. She had PPM gen change with new RV lead placement on 2/12/18. Device site is well healed. Device check today shows 96 AMS episodes recorded due to noise on the atrial lead which is not a new finding.  P waves are measuring 2.6 mV today. 1 VHR episode recorded that appears to be SVT - 4 sec, 183 bpm.  Intrinsic rhythm = NSR @ 60 bpm.  AP = 26%.   = 1%. She reports ongoing fatigue. She denies any chest pain/pressures, dizziness, lightheadedness, worsening shortness of breath, leg/ankle swelling, PND, orthopnea, palpitations, or syncopal symptoms. Current cardiac medications include: Coreg, ASA, Lipitor,and Isosorbide.    PAST MEDICAL HISTORY:  Past Medical History:   Diagnosis Date     Abdominal pain     cramping     Arthritis      CAD (coronary artery disease)     stent x2, ppm     Cancer (H)     skin     Cardiac pacemaker      CC (Crohn's colitis) (H)      Chronic kidney disease      Diabetes (H)     DM type 2, oral agent     Diarrhea      GERD (gastroesophageal reflux disease)      History of blood transfusion      HTN (hypertension)      Hyperlipidemia LDL goal <70 11/19/2013     IgA deficiency (H)      Neck pain 12/31/2014     Uncomplicated asthma      Weight loss         CURRENT MEDICATIONS:  Current Outpatient Prescriptions   Medication Sig Dispense Refill     acetaminophen (TYLENOL) 325 MG tablet Take 1-2 tablets by mouth every 6 hours as needed.       albuterol (2.5 MG/3ML) 0.083% nebulizer solution Take 1 ampule by nebulization every 6 hours as needed.       albuterol (PROAIR HFA/PROVENTIL HFA/VENTOLIN HFA) 108 (90 BASE) MCG/ACT Inhaler Take 2 puffs prn for wheezing or shortness of breath 1 Inhaler 12     albuterol (PROAIR HFA/PROVENTIL HFA/VENTOLIN HFA) 108 (90 Base) MCG/ACT Inhaler Take 2 puffs as needed for shortness of breath 1 Inhaler 12     aspirin 81 MG tablet Take 1 tablet by mouth daily.       atorvastatin (LIPITOR) 10 MG tablet Take 4 tablets (40 mg) by mouth daily 90 tablet 3     azithromycin (ZITHROMAX) 250 MG tablet Take as directed 6 tablet 1     blood glucose monitoring (TERRY CONTOUR NEXT) test strip by In Vitro route 3 times daily Use to test blood sugar 3 times daily or as directed. 270 each 3     blood glucose monitoring (TERRY MICROLET) lancets Use to test blood sugar 3 times daily or as directed. 3 Box 3     Blood Pressure KIT daily.       carvedilol (COREG) 12.5 MG tablet Take 1 tablet (12.5 mg) by mouth 2 times daily (with meals) 180 tablet 1     clopidogrel (PLAVIX) 75 MG tablet Take 1 tablet (75 mg) by mouth daily 90 tablet 3     dicyclomine (BENTYL) 10 MG capsule Take 10 mg by mouth 4 times daily (before meals and nightly).       FISH OIL Unsure of dosage take two caps daily       fluticasone-salmeterol (ADVAIR) 250-50 MCG/DOSE diskus inhaler Inhale 1 puff into the lungs 2 times daily. 1 Inhaler 12     fluticasone-salmeterol (ADVAIR) 250-50 MCG/DOSE diskus inhaler Inhale 1 puff into the lungs 2 times daily 1 Inhaler 12     hydrocodone-acetaminophen (LORCET)  MG per tablet Take 1 tablet by mouth every 6 hours as needed.       Immune Globulin, Human, 25 GM/500ML SOLN Inject 25 g into the vein See Admin Instructions. 25 grams every 3 weeks IVIG        isosorbide mononitrate (IMDUR) 60 MG 24 hr tablet Take 1 tablet (60 mg) by mouth daily 90 tablet 3     meperidine (DEMEROL) 25 MG/ML injection Inject 25 mg into the vein as needed. 25 mg IV prior to IgG infusion       metFORMIN (GLUCOPHAGE) 1000 MG tablet Take 1 tablet (1,000 mg) by mouth daily (with dinner) Take 1 tablet twice daily  0     nitroglycerin (NITROQUICK) 0.4 MG sublingual tablet Place 1 tablet (0.4 mg) under the tongue every 5 minutes as needed 25 tablet 3     omeprazole (PRILOSEC) 20 MG capsule Take 1 capsule (20 mg) by mouth 2 times daily 60 capsule 6     omeprazole (PRILOSEC) 20 MG CR capsule Take 1 capsule (20 mg) by mouth daily 60 capsule 6     ONE TOUCH FINE POINT LANCETS 2 times daily.       ONE TOUCH ULTRA TEST strip Test blood sugar twice a day. 200 strip 3     predniSONE (DELTASONE) 10 MG tablet 4 tablets by mouth each day for 3 days, then 3 tablets each day for 3 days, then 2 tablets each day for 3 days, then 1 tablet each day for 3 days, then off 30 tablet 1     predniSONE (DELTASONE) 20 MG tablet Patient to take 60 mg the evening before the procedure and 30 mg the morning of the procedure. 5 tablet 0     tiotropium (SPIRIVA HANDIHALER) 18 MCG capsule One puff every day 30 capsule 6     tiotropium (SPIRIVA HANDIHALER) 18 MCG inhalation capsule One puff every day 30 capsule 6     traMADol (ULTRAM) 50 MG tablet Take 1 tablet (50 mg) by mouth every 6 hours as needed for pain 20 tablet 0     [DISCONTINUED] albuterol (2.5 MG/3ML) 0.083% neb solution Take 1 vial (2.5 mg) by nebulization every 6 hours as needed for shortness of breath / dyspnea or wheezing 360 mL 0       PAST SURGICAL HISTORY:  Past Surgical History:   Procedure Laterality Date     APPENDECTOMY       C LAP,SURG,COLECTOMY, PARTIAL, W/ANAST      partial colectomy;diverticulitis     cardiac stents      x 2     CARPAL TUNNEL RELEASE RT/LT      bilateral     CHOLECYSTECTOMY       COLONOSCOPY       COLONOSCOPY N/A 11/9/2015     Procedure: COMBINED COLONOSCOPY, SINGLE OR MULTIPLE BIOPSY/POLYPECTOMY BY BIOPSY;  Surgeon: Victor Hugo Turner MD;  Location:  GI     ENT SURGERY      back throat     ESOPHAGEAL MOTILITY STUDY  5-5-15     ESOPHAGOSCOPY, GASTROSCOPY, DUODENOSCOPY (EGD), COMBINED Left 5/13/2015    Procedure: COMBINED ESOPHAGOSCOPY, GASTROSCOPY, DUODENOSCOPY (EGD), BIOPSY SINGLE OR MULTIPLE;  Surgeon: Dipesh Bobby MD;  Location:  GI     ESOPHAGOSCOPY, GASTROSCOPY, DUODENOSCOPY (EGD), COMBINED N/A 11/6/2015    Procedure: COMBINED ESOPHAGOSCOPY, GASTROSCOPY, DUODENOSCOPY (EGD), BIOPSY SINGLE OR MULTIPLE;  Surgeon: Victor Hugo Turner MD;  Location:  GI     FOOT SURGERY      left     HC ESOPH/GAS REFLUX TEST W NASAL IMPED >1 HR N/A 5/5/2015    Procedure: ESOPHAGEAL IMPEDENCE FUNCTION TEST WITH 24 HOUR PH GREATER THAN 1 HOUR;  Surgeon: Dipesh Bobby MD;  Location:  GI     IMPLANT PACEMAKER      PPM     IR RENAL/VISCERAL STENT/ATHERECT/PTA Left 07/2017     LAPAROTOMY EXPLORATORY       NISSEN FUNDOPLICATION      x 2     SHOULDER SURGERY      right     UPPER GI ENDOSCOPY       WRIST SURGERY      right cyst       ALLERGIES:     Allergies   Allergen Reactions     Bees Anaphylaxis     Codeine Sulfate Hives     Contrast Dye Anaphylaxis and Hives     Milk Products Shortness Of Breath     Morphine Sulfate Other (See Comments), Hives and Itching     Pt had abdominal pain that had her doubled over     Pcn [Penicillin G Ammonium] Difficulty breathing     Zinacef [Cefuroxime Sodium] Difficulty breathing     Influenza Vaccine Live Rash     Pneumovax [Pneumococcal Polysaccharides]      Procardia [Nifedipine] Difficulty breathing     Barium Rash       FAMILY HISTORY:  Family History   Problem Relation Age of Onset     Ovarian Cancer Mother      Stomach Cancer Father      HEART DISEASE Father      DIABETES Father      Ovarian Cancer Sister      Leukemia Brother      Ovarian Cancer Sister      DIABETES Brother      DIABETES Brother  "     Neurologic Disorder No family hx of        SOCIAL HISTORY:  Social History   Substance Use Topics     Smoking status: Former Smoker     Packs/day: 0.20     Years: 20.00     Types: Cigarettes     Quit date: 2/24/1974     Smokeless tobacco: Never Used     Alcohol use No       ROS:   A comprehensive 10 point review of systems negative other than as mentioned in HPI.  Exam:  /73 (BP Location: Left arm, Patient Position: Chair, Cuff Size: Adult Regular)  Pulse 60  Temp 98  F (36.7  C) (Oral)  Ht 1.549 m (5' 1\")  Wt 42.2 kg (93 lb)  SpO2 98%  BMI 17.57 kg/m2  GENERAL APPEARANCE: healthy, alert and no distress  HEENT: no icterus, no xanthelasmas, normal pupil size and reaction, normal palate, mucosa moist, no central cyanosis  NECK: no adenopathy, no asymmetry, masses, or scars, thyroid normal to palpation and no bruits, JVP not elevated  RESPIRATORY: lungs clear to auscultation - no rales, rhonchi or wheezes, no use of accessory muscles, no retractions, respirations are unlabored, normal respiratory rate  CARDIOVASCULAR: regular rhythm, normal S1 with physiologic split S2, no S3 or S4 and no murmur, click or rub, precordium quiet with normal PMI.  ABDOMEN: soft, non tender, without hepatosplenomegaly, no masses palpable, bowel sounds normal, aorta not enlarged by palpation, no abdominal bruits  EXTREMITIES: peripheral pulses normal, no edema, no bruits  NEURO: alert and oriented to person/place/time, normal speech, gait and affect  VASC: Radial, femoral, dorsalis pedis and posterior tibialis pulses are normal in volumes and symmetric bilaterally. No bruits are heard.  SKIN: no ecchymoses, no rashes    Labs:  Reviewed.     Testing/Procedures:  PULMONARY FUNCTION TESTS:   PFT Latest Ref Rng & Units 5/16/2018   FVC L 1.12   FEV1 L 0.77   FVC% % 45   FEV1% % 41         7/2015 STRESS ECHOCARDIOGRAM:     Interpretation Summary  Normal dobutamine stress echo at target heartrate.No significant " valvular  abnormality.      Assessment and Plan:   Ms. Alvarez is a 77 year old female who has a past medical history significant for CAD s/p PCI LAD X2 2007, SSS s/p PPM implant 2008, CKD, IgA deficiency, renal artery stenosis s/p left renal artery stent 7/2017, HLD, asthma, DM2, and GERD. She presents today for follow up. She reports feeling well. She had PPM gen change with new RV lead placement on 2/12/18. Device site is well healed. Device check today shows good RV lead function with stable lead parameters. RA lead has noise which is not a new finding. Atrial sensitivity programmed from 0.5 to 0.75 mV. She continues to have some HTN and we have asked her to keep a BP log. We will need to consider resuming prior lisinopril if ongoing HTN. We have asked her to monitor her BPs at home and send us log in 1 month. Follow up in 1 year.      The patient states understanding and is agreeable with plan.   CINDY Bray CNP  Pager: [phone]    MINDI RODRIGUEZ

## 2018-05-25 ENCOUNTER — INFUSION THERAPY VISIT (OUTPATIENT)
Dept: INFUSION THERAPY | Facility: CLINIC | Age: 78
End: 2018-05-25
Attending: INTERNAL MEDICINE
Payer: MEDICARE

## 2018-05-25 VITALS
DIASTOLIC BLOOD PRESSURE: 71 MMHG | HEART RATE: 70 BPM | RESPIRATION RATE: 16 BRPM | TEMPERATURE: 98.7 F | SYSTOLIC BLOOD PRESSURE: 155 MMHG | HEIGHT: 61 IN

## 2018-05-25 DIAGNOSIS — D80.1 HYPOGAMMAGLOBULINEMIA (H): ICD-10-CM

## 2018-05-25 DIAGNOSIS — D80.2 IGA DEFICIENCY (H): Primary | ICD-10-CM

## 2018-05-25 PROCEDURE — 25000132 ZZH RX MED GY IP 250 OP 250 PS 637: Mod: ZF | Performed by: INTERNAL MEDICINE

## 2018-05-25 PROCEDURE — 25000128 H RX IP 250 OP 636: Mod: ZF | Performed by: INTERNAL MEDICINE

## 2018-05-25 PROCEDURE — A9270 NON-COVERED ITEM OR SERVICE: HCPCS | Mod: ZF | Performed by: INTERNAL MEDICINE

## 2018-05-25 PROCEDURE — 96365 THER/PROPH/DIAG IV INF INIT: CPT

## 2018-05-25 PROCEDURE — 96366 THER/PROPH/DIAG IV INF ADDON: CPT

## 2018-05-25 PROCEDURE — 96375 TX/PRO/DX INJ NEW DRUG ADDON: CPT

## 2018-05-25 RX ORDER — DIPHENHYDRAMINE HCL 25 MG
25 CAPSULE ORAL ONCE
Status: COMPLETED | OUTPATIENT
Start: 2018-05-25 | End: 2018-05-25

## 2018-05-25 RX ORDER — METHYLPREDNISOLONE SODIUM SUCCINATE 125 MG/2ML
100 INJECTION, POWDER, LYOPHILIZED, FOR SOLUTION INTRAMUSCULAR; INTRAVENOUS ONCE
Status: COMPLETED | OUTPATIENT
Start: 2018-05-25 | End: 2018-05-25

## 2018-05-25 RX ORDER — ACETAMINOPHEN 325 MG/1
650 TABLET ORAL ONCE
Status: CANCELLED
Start: 2018-05-25 | End: 2018-05-25

## 2018-05-25 RX ORDER — ACETAMINOPHEN 325 MG/1
650 TABLET ORAL ONCE
Status: COMPLETED | OUTPATIENT
Start: 2018-05-25 | End: 2018-05-25

## 2018-05-25 RX ORDER — MEPERIDINE HYDROCHLORIDE 25 MG/ML
25 INJECTION INTRAMUSCULAR; INTRAVENOUS; SUBCUTANEOUS
Status: CANCELLED
Start: 2018-05-25

## 2018-05-25 RX ORDER — MEPERIDINE HYDROCHLORIDE 25 MG/ML
25 INJECTION INTRAMUSCULAR; INTRAVENOUS; SUBCUTANEOUS
Status: DISCONTINUED | OUTPATIENT
Start: 2018-05-25 | End: 2018-05-25 | Stop reason: HOSPADM

## 2018-05-25 RX ORDER — METHYLPREDNISOLONE SODIUM SUCCINATE 125 MG/2ML
100 INJECTION, POWDER, LYOPHILIZED, FOR SOLUTION INTRAMUSCULAR; INTRAVENOUS ONCE
Status: CANCELLED
Start: 2018-05-25 | End: 2018-05-25

## 2018-05-25 RX ORDER — DIPHENHYDRAMINE HCL 25 MG
25 CAPSULE ORAL ONCE
Status: CANCELLED
Start: 2018-05-25 | End: 2018-05-25

## 2018-05-25 RX ADMIN — METHYLPREDNISOLONE SODIUM SUCCINATE 100 MG: 125 INJECTION, POWDER, FOR SOLUTION INTRAMUSCULAR; INTRAVENOUS at 11:10

## 2018-05-25 RX ADMIN — DEXTROSE MONOHYDRATE 50 ML: 50 INJECTION, SOLUTION INTRAVENOUS at 11:25

## 2018-05-25 RX ADMIN — DIPHENHYDRAMINE HYDROCHLORIDE 25 MG: 25 CAPSULE ORAL at 11:09

## 2018-05-25 RX ADMIN — IMMUNE GLOBULIN INFUSION (HUMAN) 25 G: 100 INJECTION, SOLUTION INTRAVENOUS; SUBCUTANEOUS at 11:25

## 2018-05-25 RX ADMIN — MEPERIDINE HYDROCHLORIDE 25 MG: 25 INJECTION, SOLUTION INTRAMUSCULAR; INTRAVENOUS; SUBCUTANEOUS at 11:16

## 2018-05-25 RX ADMIN — ACETAMINOPHEN 650 MG: 325 TABLET ORAL at 11:08

## 2018-05-25 ASSESSMENT — PAIN SCALES - GENERAL: PAINLEVEL: SEVERE PAIN (7)

## 2018-05-25 NOTE — PROGRESS NOTES
Nursing Note  Jennifer Alvarez presents today to [facility] for:   Chief Complaint   Patient presents with     Infusion     IVIG     During today's [facility] appointment, orders from Dr CHAPO Elizabeth were completed.  Frequency: every 3 weeks    Progress note:  Patient identification verified by name and date of birth.  Assessment completed.  Vitals recorded in Doc Flowsheets.  Patient was provided with education regarding infusion and possible side effects.  Patient verbalized understanding.      needed: No  Premedications: administered per order.  Infusion Rates: infusion starts at 20 ml/hr, then increased by 20 ml/hr every 15 minutes to final rate of 160 ml/hr.  Approximate Infusion length:  IV Solumedrol administered over approximately 3 minutes  IV Demerol administered over approximately 4 minutes  IVIG administered over approximately 2 hours.   Labs: were not ordered for this appointment.  Vascular access: peripheral IV placed today.  Treatment Conditions: non-applicable.  Patient tolerated infusion: well.    Drug Waste Record    Drug Name: Solumedrol  Dose: 100 mg  Route administered: IV  NDC #: 1289-4705-50  Amount of waste(mL): 0.4 ml  Reason for waste: Single use vial      Discharge Plan:   Follow up plan of care with: ongoing infusions at [facility].  Discharge instructions were reviewed with patient.  Patient/representative verbalized understanding of discharge instructions and all questions answered.  Patient discharged from [facility] in stable condition.    Yao Berg RN    Administrations This Visit     acetaminophen (TYLENOL) tablet 650 mg     Admin Date Action Dose Route Administered By             05/25/2018 Given 650 mg Oral Yao Berg RN                    dextrose 5% BOLUS     Admin Date Action Dose Route Administered By             05/25/2018 New Bag 50  "mL Intravenous Yao Berg RN                    diphenhydrAMINE (BENADRYL) capsule 25 mg     Admin Date Action Dose Route Administered By             05/25/2018 Given 25 mg Oral Yao Berg RN                    immune globulin -(GAMMAGARD 10%) sucrose free 10 % injection 25 g     Admin Date Action Dose Route Administered By             05/25/2018 New Bag 25 g Intravenous Yao Berg RN                    meperidine (DEMEROL) injection 25 mg     Admin Date Action Dose Route Administered By             05/25/2018 Given 25 mg Intravenous Yao Berg RN                    methylPREDNISolone sodium succinate (solu-MEDROL) injection 100 mg     Admin Date Action Dose Route Administered By             05/25/2018 Given 100 mg Intravenous Yao Berg RN                          /71  Pulse 70  Temp 98.7  F (37.1  C) (Oral)  Resp 16  Ht 1.549 m (5' 1\")      "

## 2018-05-25 NOTE — MR AVS SNAPSHOT
After Visit Summary   5/25/2018    Jennifer Alvarez    MRN: [id number]           Patient Information     Date Of Birth          1940        Visit Information        Provider Department      5/25/2018 11:00 AM UC 44 ATC; UC SPEC INFUSION [facility]        Today's Diagnoses     IgA deficiency (H)    -  1    Hypogammaglobulinemia (H)           Follow-ups after your visit        Your next 10 appointments already scheduled     Ian 15, 2018 11:00 AM CDT   Infusion 300 with UC SPEC INFUSION, UC 44 ATC   [facility] ([facility])    [address]  [address]  [facility] [address]   [phone]            Jul 06, 2018 11:00 AM CDT   Infusion 300 with UC SPEC INFUSION, UC 44 ATC   [facility] ([facility])    [address]  [address]  [facility] [address]   [phone]            Jul 27, 2018 11:00 AM CDT   Infusion 300 with UC SPEC INFUSION, UC 44 ATC   [facility] ([facility])    [address]  [address]  [facility] [address]   [phone]            Aug 14, 2018 10:45 AM CDT   (Arrive by 10:30 AM)   Return Visit with CINDY Ortiz CNP   [facility] ([facility])    [address] [place] [address]  [address]  [facility] [address]   [phone]            Nov 14, 2018  9:30 AM CST   (Arrive by 9:15 AM)   Pacemaker Check with Uc Cv Device 1   [facility] ([facility])    [address] [place] [address]  [address]  [facility] [address]   [phone]            Nov 14, 2018 10:00 AM CST   Lab with UC LAB   [facility] ([facility])    [address] [place] [address]  1st Floor  [facility] [address] [phone]            Nov 14,  "2018 10:30 AM CST   PFT VISIT with  PFL D   [facility] ([facility])    [address]  3rd Floor  [facility] [address] [phone]              Who to contact     If you have questions or need follow up information about today's clinic visit or your schedule please contact [facility] directly at [phone].  Normal or non-critical lab and imaging results will be communicated to you by iCabbihart, letter or phone within 4 business days after the clinic has received the results. If you do not hear from us within 7 days, please contact the clinic through [employer] or phone. If you have a critical or abnormal lab result, we will notify you by phone as soon as possible.  Submit refill requests through [employer] or call your pharmacy and they will forward the refill request to us. Please allow 3 business days for your refill to be completed.          Additional Information About Your Visit        [employer] Information     [employer] gives you secure access to your electronic health record. If you see a primary care provider, you can also send messages to your care team and make appointments. If you have questions, please call your primary care clinic.  If you do not have a primary care provider, please call [phone] and they will assist you.        Care EveryWhere ID     This is your Care EveryWhere ID. This could be used by other organizations to access your [place] medical records  [id number]        Your Vitals Were     Pulse Temperature Respirations Height          70 98.7  F (37.1  C) (Oral) 16 1.549 m (5' 1\")         Blood Pressure from Last 3 Encounters:   05/25/18 155/71   05/16/18 158/73   05/16/18 169/80    Weight from Last 3 Encounters:   05/16/18 42.2 kg (93 lb)   05/16/18 42.2 kg (93 lb)   04/13/18 41.7 kg (92 lb)              Today, you had the following     No orders found for display       Primary " Care Provider Office Phone # Fax #    Brandi Burks [phone]       [facility] [address]   [facility] [id number]        Equal Access to Services     GUERA HOLT : Hadii adolph ku hadlamonto Sosusyali, waaxda luqadaha, qaybta kaalmada adecaitlinda, chester garduno laEdquentin lovelace. So [facility] [phone].    ATENCIÓN: Si habla español, tiene a taylor disposición servicios gratuitos de asistencia lingüística. Llame al [phone].    We comply with applicable federal civil rights laws and [place] laws. We do not discriminate on the basis of race, color, national origin, age, disability, sex, sexual orientation, or gender identity.            Thank you!     Thank you for choosing [facility]  for your care. Our goal is always to provide you with excellent care. Hearing back from our patients is one way we can continue to improve our services. Please take a few minutes to complete the written survey that you may receive in the mail after your visit with us. Thank you!             Your Updated Medication List - Protect others around you: Learn how to safely use, store and throw away your medicines at www.disposemymeds.org.          This list is accurate as of 5/25/18  2:49 PM.  Always use your most recent med list.                   Brand Name Dispense Instructions for use Diagnosis    * albuterol 108 (90 Base) MCG/ACT Inhaler    PROAIR HFA/PROVENTIL HFA/VENTOLIN HFA    1 Inhaler    Take 2 puffs prn for wheezing or shortness of breath    Wheezing       * albuterol 108 (90 Base) MCG/ACT Inhaler    PROAIR HFA/PROVENTIL HFA/VENTOLIN HFA    1 Inhaler    Take 2 puffs as needed for shortness of breath    Mixed simple and mucopurulent chronic bronchitis (H), Gastroesophageal reflux disease without esophagitis       * albuterol (2.5 MG/3ML) 0.083% neb solution      Take 1 ampule by nebulization every 6 hours as needed.        aspirin 81 MG tablet      Take 1 tablet  by mouth daily.        atorvastatin 10 MG tablet    LIPITOR    90 tablet    Take 4 tablets (40 mg) by mouth daily    Hyperlipidemia LDL goal <100       azithromycin 250 MG tablet    ZITHROMAX    6 tablet    Take as directed    Mixed simple and mucopurulent chronic bronchitis (H), Gastroesophageal reflux disease without esophagitis       Blood Pressure Kit      daily.        carvedilol 12.5 MG tablet    COREG    180 tablet    Take 1 tablet (12.5 mg) by mouth 2 times daily (with meals)    Secondary hypertension       clopidogrel 75 MG tablet    PLAVIX    90 tablet    Take 1 tablet (75 mg) by mouth daily    Coronary artery disease       DEMEROL 25 MG/ML injection   Generic drug:  meperidine      Inject 25 mg into the vein as needed. 25 mg IV prior to IgG infusion        dicyclomine 10 MG capsule    BENTYL     Take 10 mg by mouth 4 times daily (before meals and nightly).        FISH OIL      Unsure of dosage take two caps daily        * fluticasone-salmeterol 250-50 MCG/DOSE diskus inhaler    ADVAIR    1 Inhaler    Inhale 1 puff into the lungs 2 times daily.    Unspecified chronic bronchitis       * fluticasone-salmeterol 250-50 MCG/DOSE diskus inhaler    ADVAIR    1 Inhaler    Inhale 1 puff into the lungs 2 times daily    Mixed simple and mucopurulent chronic bronchitis (H), Gastroesophageal reflux disease without esophagitis       HYDROcodone-acetaminophen  MG per tablet    LORCET     Take 1 tablet by mouth every 6 hours as needed.        Immune Globulin (Human) 25 GM/500ML Soln      Inject 25 g into the vein See Admin Instructions. 25 grams every 3 weeks IVIG        isosorbide mononitrate 60 MG 24 hr tablet    IMDUR    90 tablet    Take 1 tablet (60 mg) by mouth daily    HTN (hypertension)       metFORMIN 1000 MG tablet    GLUCOPHAGE     Take 1 tablet (1,000 mg) by mouth daily (with dinner) Take 1 tablet twice daily    DM (diabetes mellitus) (H)       nitroGLYcerin 0.4 MG sublingual tablet    NITROQUICK    25  tablet    Place 1 tablet (0.4 mg) under the tongue every 5 minutes as needed    Chest pain       * omeprazole 20 MG CR capsule    priLOSEC    60 capsule    Take 1 capsule (20 mg) by mouth 2 times daily    Esophageal reflux, Unspecified chronic bronchitis       * omeprazole 20 MG CR capsule    priLOSEC    60 capsule    Take 1 capsule (20 mg) by mouth daily    Mixed simple and mucopurulent chronic bronchitis (H), Gastroesophageal reflux disease without esophagitis       * blood glucose monitoring lancets     3 Box    Use to test blood sugar 3 times daily or as directed.    Type 2 diabetes mellitus with complication (H)       * ONE TOUCH FINE POINT LANCETS      2 times daily.        * ONETOUCH ULTRA test strip   Generic drug:  blood glucose monitoring     200 strip    Test blood sugar twice a day.    Diabetes mellitus type II       * blood glucose monitoring test strip    TERRY CONTOUR NEXT    270 each    by In Vitro route 3 times daily Use to test blood sugar 3 times daily or as directed.    Type 2 diabetes mellitus with complication (H)       * predniSONE 20 MG tablet    DELTASONE    5 tablet    Patient to take 60 mg the evening before the procedure and 30 mg the morning of the procedure.    Allergic reaction to contrast dye       * predniSONE 10 MG tablet    DELTASONE    30 tablet    4 tablets by mouth each day for 3 days, then 3 tablets each day for 3 days, then 2 tablets each day for 3 days, then 1 tablet each day for 3 days, then off    Mixed simple and mucopurulent chronic bronchitis (H), Gastroesophageal reflux disease without esophagitis       * tiotropium 18 MCG capsule    SPIRIVA HANDIHALER    30 capsule    One puff every day    Unspecified chronic bronchitis       * tiotropium 18 MCG capsule    SPIRIVA HANDIHALER    30 capsule    One puff every day    Mixed simple and mucopurulent chronic bronchitis (H), Gastroesophageal reflux disease without esophagitis       traMADol 50 MG tablet    ULTRAM    20 tablet     Take 1 tablet (50 mg) by mouth every 6 hours as needed for pain    Pacemaker battery depletion       TYLENOL 325 MG tablet   Generic drug:  acetaminophen      Take 1-2 tablets by mouth every 6 hours as needed.        * Notice:  This list has 15 medication(s) that are the same as other medications prescribed for you. Read the directions carefully, and ask your doctor or other care provider to review them with you.

## 2018-05-30 LAB
EXPTIME-PRE: 7.83 SEC
FEF2575-%PRED-PRE: 34 %
FEF2575-PRE: 0.54 L/SEC
FEF2575-PRED: 1.56 L/SEC
FEFMAX-%PRED-PRE: 28 %
FEFMAX-PRE: 1.34 L/SEC
FEFMAX-PRED: 4.73 L/SEC
FEV1-%PRED-PRE: 41 %
FEV1-PRE: 0.77 L
FEV1FEV6-PRE: 69 %
FEV1FEV6-PRED: 78 %
FEV1FVC-PRE: 69 %
FEV1FVC-PRED: 74 %
FIFMAX-PRE: 0.96 L/SEC
FVC-%PRED-PRE: 45 %
FVC-PRE: 1.12 L
FVC-PRED: 2.44 L

## 2018-06-15 ENCOUNTER — INFUSION THERAPY VISIT (OUTPATIENT)
Dept: INFUSION THERAPY | Facility: CLINIC | Age: 78
End: 2018-06-15
Attending: INTERNAL MEDICINE
Payer: MEDICARE

## 2018-06-15 VITALS
RESPIRATION RATE: 16 BRPM | HEART RATE: 79 BPM | DIASTOLIC BLOOD PRESSURE: 57 MMHG | WEIGHT: 92 LBS | TEMPERATURE: 97 F | SYSTOLIC BLOOD PRESSURE: 130 MMHG | BODY MASS INDEX: 17.38 KG/M2

## 2018-06-15 DIAGNOSIS — J41.0 SIMPLE CHRONIC BRONCHITIS (H): ICD-10-CM

## 2018-06-15 DIAGNOSIS — D80.2 IGA DEFICIENCY (H): Primary | ICD-10-CM

## 2018-06-15 DIAGNOSIS — D80.1 HYPOGAMMAGLOBULINEMIA (H): ICD-10-CM

## 2018-06-15 LAB
ANION GAP SERPL CALCULATED.3IONS-SCNC: 10 MMOL/L (ref 3–14)
BUN SERPL-MCNC: 23 MG/DL (ref 7–30)
CALCIUM SERPL-MCNC: 8.9 MG/DL (ref 8.5–10.1)
CHLORIDE SERPL-SCNC: 104 MMOL/L (ref 94–109)
CO2 SERPL-SCNC: 27 MMOL/L (ref 20–32)
CREAT SERPL-MCNC: 0.94 MG/DL (ref 0.52–1.04)
ERYTHROCYTE [DISTWIDTH] IN BLOOD BY AUTOMATED COUNT: 14 % (ref 10–15)
GFR SERPL CREATININE-BSD FRML MDRD: 58 ML/MIN/1.7M2
GLUCOSE SERPL-MCNC: 136 MG/DL (ref 70–99)
HCT VFR BLD AUTO: 37.7 % (ref 35–47)
HGB BLD-MCNC: 12.5 G/DL (ref 11.7–15.7)
MCH RBC QN AUTO: 27.4 PG (ref 26.5–33)
MCHC RBC AUTO-ENTMCNC: 33.2 G/DL (ref 31.5–36.5)
MCV RBC AUTO: 83 FL (ref 78–100)
PLATELET # BLD AUTO: 188 10E9/L (ref 150–450)
POTASSIUM SERPL-SCNC: 3.5 MMOL/L (ref 3.4–5.3)
RBC # BLD AUTO: 4.57 10E12/L (ref 3.8–5.2)
SODIUM SERPL-SCNC: 141 MMOL/L (ref 133–144)
WBC # BLD AUTO: 5 10E9/L (ref 4–11)

## 2018-06-15 PROCEDURE — 96365 THER/PROPH/DIAG IV INF INIT: CPT

## 2018-06-15 PROCEDURE — 82787 IGG 1 2 3 OR 4 EACH: CPT | Performed by: INTERNAL MEDICINE

## 2018-06-15 PROCEDURE — 25000128 H RX IP 250 OP 636: Mod: ZF | Performed by: INTERNAL MEDICINE

## 2018-06-15 PROCEDURE — 82784 ASSAY IGA/IGD/IGG/IGM EACH: CPT | Performed by: INTERNAL MEDICINE

## 2018-06-15 PROCEDURE — A9270 NON-COVERED ITEM OR SERVICE: HCPCS | Mod: ZF | Performed by: INTERNAL MEDICINE

## 2018-06-15 PROCEDURE — 80048 BASIC METABOLIC PNL TOTAL CA: CPT | Performed by: INTERNAL MEDICINE

## 2018-06-15 PROCEDURE — 96375 TX/PRO/DX INJ NEW DRUG ADDON: CPT

## 2018-06-15 PROCEDURE — 85027 COMPLETE CBC AUTOMATED: CPT | Performed by: INTERNAL MEDICINE

## 2018-06-15 PROCEDURE — 25000132 ZZH RX MED GY IP 250 OP 250 PS 637: Mod: ZF | Performed by: INTERNAL MEDICINE

## 2018-06-15 PROCEDURE — 96366 THER/PROPH/DIAG IV INF ADDON: CPT

## 2018-06-15 RX ORDER — METHYLPREDNISOLONE SODIUM SUCCINATE 125 MG/2ML
100 INJECTION, POWDER, LYOPHILIZED, FOR SOLUTION INTRAMUSCULAR; INTRAVENOUS ONCE
Status: COMPLETED | OUTPATIENT
Start: 2018-06-15 | End: 2018-06-15

## 2018-06-15 RX ORDER — MEPERIDINE HYDROCHLORIDE 25 MG/ML
25 INJECTION INTRAMUSCULAR; INTRAVENOUS; SUBCUTANEOUS
Status: DISCONTINUED | OUTPATIENT
Start: 2018-06-15 | End: 2018-06-15 | Stop reason: HOSPADM

## 2018-06-15 RX ORDER — METHYLPREDNISOLONE SODIUM SUCCINATE 125 MG/2ML
100 INJECTION, POWDER, LYOPHILIZED, FOR SOLUTION INTRAMUSCULAR; INTRAVENOUS ONCE
Status: CANCELLED
Start: 2018-06-15 | End: 2018-06-15

## 2018-06-15 RX ORDER — ACETAMINOPHEN 325 MG/1
650 TABLET ORAL ONCE
Status: CANCELLED
Start: 2018-06-15 | End: 2018-06-15

## 2018-06-15 RX ORDER — ACETAMINOPHEN 325 MG/1
650 TABLET ORAL ONCE
Status: COMPLETED | OUTPATIENT
Start: 2018-06-15 | End: 2018-06-15

## 2018-06-15 RX ORDER — DIPHENHYDRAMINE HCL 25 MG
25 CAPSULE ORAL ONCE
Status: COMPLETED | OUTPATIENT
Start: 2018-06-15 | End: 2018-06-15

## 2018-06-15 RX ORDER — DIPHENHYDRAMINE HCL 25 MG
25 CAPSULE ORAL ONCE
Status: CANCELLED
Start: 2018-06-15 | End: 2018-06-15

## 2018-06-15 RX ORDER — MEPERIDINE HYDROCHLORIDE 25 MG/ML
25 INJECTION INTRAMUSCULAR; INTRAVENOUS; SUBCUTANEOUS
Status: CANCELLED
Start: 2018-06-15

## 2018-06-15 RX ADMIN — DIPHENHYDRAMINE HYDROCHLORIDE 25 MG: 25 CAPSULE ORAL at 10:49

## 2018-06-15 RX ADMIN — DEXTROSE MONOHYDRATE 100 ML: 5 INJECTION, SOLUTION INTRAVENOUS at 11:26

## 2018-06-15 RX ADMIN — IMMUNE GLOBULIN INFUSION (HUMAN) 25 G: 100 INJECTION, SOLUTION INTRAVENOUS; SUBCUTANEOUS at 11:27

## 2018-06-15 RX ADMIN — MEPERIDINE HYDROCHLORIDE 25 MG: 25 INJECTION, SOLUTION INTRAMUSCULAR; INTRAVENOUS; SUBCUTANEOUS at 11:11

## 2018-06-15 RX ADMIN — ACETAMINOPHEN 650 MG: 325 TABLET ORAL at 10:49

## 2018-06-15 RX ADMIN — METHYLPREDNISOLONE SODIUM SUCCINATE 100 MG: 125 INJECTION, POWDER, FOR SOLUTION INTRAMUSCULAR; INTRAVENOUS at 11:05

## 2018-06-15 ASSESSMENT — PAIN DESCRIPTION - DESCRIPTORS: DESCRIPTORS: ACHING

## 2018-06-15 NOTE — PROGRESS NOTES
Nursing Note  Jennifer Al[facility] for: IVIG  During [facility]'s [facility] appointment, orders from Dr. Maxx Elizabeth were completed.  Frequency: every 3 weeks    Progress note:  Patient identification verified by name and date of birth.  Assessment completed.  Vitals recorded in Doc Flowsheets.  Patient was provided with education regarding infusion and possible side effects.  Patient verbalized understanding.      needed: No  Premedications: administered per order.  Infusion Rates: infusion starts at 20 ml/hr and increased by 20ml every 15 minutes to a max rate of 160ml/hr.  Approximate Infusion length:3 hours.   Labs: were drawn per orders.   Vascular access: peripheral IV placed today.  Treatment Conditions: non-applicable.  Patient tolerated infusion: well.    Drug Waste Record    Drug Name: solu-medrol  Dose: 100mg  Route administered: IV  NDC #: 3985-6982-48  Amount of waste(mL):0.4ml (25mg)  Reason for waste: Single use vial      Discharge Plan:   Follow up plan of care with: ongoing infusions [facility].  Discharge instructions were reviewed with patient.  Patient/representative verbalized understanding of discharge instructions and all questions answered.  [facility] discharged [facility] in stable condition.    Karely Davis RN   Administrations This Visit     acetaminophen (TYLENOL) tablet 650 mg     Admin Date Action Dose Route Administered By             06/15/2018 Given 650 mg Oral Karely Davis RN                    dextrose 5% BOLUS     Admin Date Action Dose Route Administered By             06/15/2018 New Bag 100 mL Intravenous Karely Davis RN                    diphenhydrAMINE (BENADRYL) capsule 25 mg     Admin Date Action Dose Route Administered By             06/15/2018 Given 25 mg Oral Karely Davis RN                     immune globulin (GAMMAGARD) sucrose free 10 % injection 25 g     Admin Date Action Dose Route Administered By             06/15/2018 New Bag 25 g Intravenous Karely Davis RN                    meperidine (DEMEROL) injection 25 mg     Admin Date Action Dose Route Administered By             06/15/2018 Given 25 mg Intravenous Karely Davis RN                    methylPREDNISolone sodium succinate (solu-MEDROL) injection 100 mg     Admin Date Action Dose Route Administered By             06/15/2018 Given 100 mg Intravenous Karely Davis RN                              Wt 41.7 kg (92 lb)  BMI 17.38 kg/m2

## 2018-06-15 NOTE — MR AVS SNAPSHOT
After Visit Summary   6/15/2018    Jennifer Alvarez    MRN: [id number]           Patient Information     Date Of Birth          1940        Visit Information        Provider Department      6/15/2018 11:00 AM UC 44 ATC; UC SPEC INFUSION [facility]        Today's Diagnoses     IgA deficiency (H)    -  1    Hypogammaglobulinemia (H)        Simple chronic bronchitis (H)           Follow-ups after your visit        Your next 10 appointments already scheduled     Jul 06, 2018 11:00 AM CDT   Infusion 300 with UC SPEC INFUSION, UC 44 ATC   [facility] ([facility])    [address]  [address]  [facility] [address]   [phone]            Jul 27, 2018 11:00 AM CDT   Infusion 300 with UC SPEC INFUSION, UC 44 ATC   [facility] ([facility])    [address]  [address]  [facility] [address]   [phone]            Aug 14, 2018 10:00 AM CDT   (Arrive by 9:45 AM)   Pacemaker Check with Uc Cv Device 1   [facility] ([facility])    [address] [place] [address]  [address]  [facility] [address]   [phone]            Aug 14, 2018 10:45 AM CDT   (Arrive by 10:30 AM)   Return Visit with CINDY Ortiz CNP   [facility] ([facility])    [address] [place] [address]  [address]  [facility] [address]   [phone]            Nov 14, 2018  9:30 AM CST   (Arrive by 9:15 AM)   Pacemaker Check with Uc Cv Device 1   [facility] ([facility])    [address] [place] [address]  [address]  [facility] [address]   [phone]            Nov 14, 2018 10:00 AM CST   Lab with UC LAB   [facility] ([facility])    [address] [place] [address]  1st Floor  [facility] [address]   [phone]             Nov 14, 2018 10:30 AM CST   PFT VISIT with FARTUN PFL REAL   [facility] ([facility])    [address]  3rd Floor  [facility] [address] [phone]            Nov 14, 2018 11:00 AM CST   (Arrive by 10:45 AM)   Return Interstitial Lung with Maxx Elizabeth MD   [facility] ([facility])    [address]  [address]  [facility] [address] [phone]              Who to contact     If you have questions or need follow up information about today's clinic visit or your schedule please contact [facility] directly at [phone].  Normal or non-critical lab and imaging results will be communicated to you by Meet[employer]hart, letter or phone within 4 business days after the clinic has received the results. If you do not hear from us within 7 days, please contact the clinic through [employer] or phone. If you have a critical or abnormal lab result, we will notify you by phone as soon as possible.  Submit refill requests through [employer] or call your pharmacy and they will forward the refill request to us. Please allow 3 business days for your refill to be completed.          Additional Information About Your Visit        [employer] Information     [employer] gives you secure access to your electronic health record. If you see a primary care provider, you can also send messages to your care team and make appointments. If you have questions, please call your primary care clinic.  If you do not have a primary care provider, please call [phone] and they will assist you.        Care EveryWhere ID     This is your Care EveryWhere ID. This could be used by other organizations to access your [place] medical records  [id number]        Your Vitals Were     Pulse Temperature Respirations BMI (Body Mass Index)          79 97  F (36.1  C) (Oral) 16 17.38 kg/m2         Blood  Pressure from Last 3 Encounters:   06/15/18 130/57   05/25/18 155/71   05/16/18 158/73    Weight from Last 3 Encounters:   06/15/18 41.7 kg (92 lb)   05/16/18 42.2 kg (93 lb)   05/16/18 42.2 kg (93 lb)              We Performed the Following     Basic metabolic panel     CBC with platelets     Immunoglobulin G subclasses        Primary Care Provider Office Phone # Fax #    Brandi Burks [phone]       [facility] [address]   [facility] [address]        Equal Access to Services     [facility] Hospital[facility] : Hadii aad ku hadasho Soomaali, waaxda luqadaha, qaybta kaalmada adeegyada, chester caicedo . So [facility] [phone].    ATENCIÓN: Si habla español, tiene a taylor disposición servicios gratuitos de asistencia lingüística. [facility] [phone].    We comply with applicable federal civil rights laws and [place] laws. We do not discriminate on the basis of race, color, national origin, age, disability, sex, sexual orientation, or gender identity.            Thank you!     Thank you for choosing [facility]  for your care. Our goal is always to provide you with excellent care. Hearing back from our patients is one way we can continue to improve our services. Please take a few minutes to complete the written survey that you may receive in the mail after your visit with us. Thank you!             Your Updated Medication List - Protect others around you: Learn how to safely use, store and throw away your medicines at www.disposemymeds.org.          This list is accurate as of 6/15/18  3:54 PM.  Always use your most recent med list.                   Brand Name Dispense Instructions for use Diagnosis    * albuterol 108 (90 Base) MCG/ACT Inhaler    PROAIR HFA/PROVENTIL HFA/VENTOLIN HFA    1 Inhaler    Take 2 puffs prn for wheezing or shortness of breath    Wheezing       * albuterol 108 (90 Base) MCG/ACT Inhaler    PROAIR HFA/PROVENTIL  HFA/VENTOLIN HFA    1 Inhaler    Take 2 puffs as needed for shortness of breath    Mixed simple and mucopurulent chronic bronchitis (H), Gastroesophageal reflux disease without esophagitis       * albuterol (2.5 MG/3ML) 0.083% neb solution      Take 1 ampule by nebulization every 6 hours as needed.        aspirin 81 MG tablet      Take 1 tablet by mouth daily.        atorvastatin 10 MG tablet    LIPITOR    90 tablet    Take 4 tablets (40 mg) by mouth daily    Hyperlipidemia LDL goal <100       azithromycin 250 MG tablet    ZITHROMAX    6 tablet    Take as directed    Mixed simple and mucopurulent chronic bronchitis (H), Gastroesophageal reflux disease without esophagitis       Blood Pressure Kit      daily.        carvedilol 12.5 MG tablet    COREG    180 tablet    Take 1 tablet (12.5 mg) by mouth 2 times daily (with meals)    Secondary hypertension       clopidogrel 75 MG tablet    PLAVIX    90 tablet    Take 1 tablet (75 mg) by mouth daily    Coronary artery disease       DEMEROL 25 MG/ML injection   Generic drug:  meperidine      Inject 25 mg into the vein as needed. 25 mg IV prior to IgG infusion        dicyclomine 10 MG capsule    BENTYL     Take 10 mg by mouth 4 times daily (before meals and nightly).        FISH OIL      Unsure of dosage take two caps daily        * fluticasone-salmeterol 250-50 MCG/DOSE diskus inhaler    ADVAIR    1 Inhaler    Inhale 1 puff into the lungs 2 times daily.    Unspecified chronic bronchitis       * fluticasone-salmeterol 250-50 MCG/DOSE diskus inhaler    ADVAIR    1 Inhaler    Inhale 1 puff into the lungs 2 times daily    Mixed simple and mucopurulent chronic bronchitis (H), Gastroesophageal reflux disease without esophagitis       HYDROcodone-acetaminophen  MG per tablet    LORCET     Take 1 tablet by mouth every 6 hours as needed.        Immune Globulin (Human) 25 GM/500ML Soln      Inject 25 g into the vein See Admin Instructions. 25 grams every 3 weeks IVIG         isosorbide mononitrate 60 MG 24 hr tablet    IMDUR    90 tablet    Take 1 tablet (60 mg) by mouth daily    HTN (hypertension)       metFORMIN 1000 MG tablet    GLUCOPHAGE     Take 1 tablet (1,000 mg) by mouth daily (with dinner) Take 1 tablet twice daily    DM (diabetes mellitus) (H)       nitroGLYcerin 0.4 MG sublingual tablet    NITROQUICK    25 tablet    Place 1 tablet (0.4 mg) under the tongue every 5 minutes as needed    Chest pain       * omeprazole 20 MG CR capsule    priLOSEC    60 capsule    Take 1 capsule (20 mg) by mouth 2 times daily    Esophageal reflux, Unspecified chronic bronchitis       * omeprazole 20 MG CR capsule    priLOSEC    60 capsule    Take 1 capsule (20 mg) by mouth daily    Mixed simple and mucopurulent chronic bronchitis (H), Gastroesophageal reflux disease without esophagitis       * blood glucose monitoring lancets     3 Box    Use to test blood sugar 3 times daily or as directed.    Type 2 diabetes mellitus with complication (H)       * ONE TOUCH FINE POINT LANCETS      2 times daily.        * ONETOUCH ULTRA test strip   Generic drug:  blood glucose monitoring     200 strip    Test blood sugar twice a day.    Diabetes mellitus type II       * blood glucose monitoring test strip    TERRY CONTOUR NEXT    270 each    by In Vitro route 3 times daily Use to test blood sugar 3 times daily or as directed.    Type 2 diabetes mellitus with complication (H)       * predniSONE 20 MG tablet    DELTASONE    5 tablet    Patient to take 60 mg the evening before the procedure and 30 mg the morning of the procedure.    Allergic reaction to contrast dye       * predniSONE 10 MG tablet    DELTASONE    30 tablet    4 tablets by mouth each day for 3 days, then 3 tablets each day for 3 days, then 2 tablets each day for 3 days, then 1 tablet each day for 3 days, then off    Mixed simple and mucopurulent chronic bronchitis (H), Gastroesophageal reflux disease without esophagitis       * tiotropium 18 MCG  capsule    SPIRIVA HANDIHALER    30 capsule    One puff every day    Unspecified chronic bronchitis       * tiotropium 18 MCG capsule    SPIRIVA HANDIHALER    30 capsule    One puff every day    Mixed simple and mucopurulent chronic bronchitis (H), Gastroesophageal reflux disease without esophagitis       traMADol 50 MG tablet    ULTRAM    20 tablet    Take 1 tablet (50 mg) by mouth every 6 hours as needed for pain    Pacemaker battery depletion       TYLENOL 325 MG tablet   Generic drug:  acetaminophen      Take 1-2 tablets by mouth every 6 hours as needed.        * Notice:  This list has 15 medication(s) that are the same as other medications prescribed for you. Read the directions carefully, and ask your doctor or other care provider to review them with you.

## 2018-06-18 LAB
IGG SERPL-MCNC: 1110 MG/DL (ref 695–1620)
IGG1 SER-MCNC: NORMAL MG/DL (ref 300–856)
IGG2 SER-MCNC: NORMAL MG/DL (ref 158–761)
IGG3 SER-MCNC: NORMAL MG/DL (ref 24–192)
IGG4 SER-MCNC: NORMAL MG/DL (ref 11–86)

## 2018-07-06 ENCOUNTER — INFUSION THERAPY VISIT (OUTPATIENT)
Dept: INFUSION THERAPY | Facility: CLINIC | Age: 78
End: 2018-07-06
Attending: INTERNAL MEDICINE
Payer: MEDICARE

## 2018-07-06 VITALS
WEIGHT: 93.1 LBS | BODY MASS INDEX: 17.59 KG/M2 | HEART RATE: 61 BPM | TEMPERATURE: 98.2 F | DIASTOLIC BLOOD PRESSURE: 62 MMHG | SYSTOLIC BLOOD PRESSURE: 142 MMHG

## 2018-07-06 DIAGNOSIS — D80.1 HYPOGAMMAGLOBULINEMIA (H): ICD-10-CM

## 2018-07-06 DIAGNOSIS — D80.2 IGA DEFICIENCY (H): Primary | ICD-10-CM

## 2018-07-06 PROCEDURE — 25000132 ZZH RX MED GY IP 250 OP 250 PS 637: Mod: ZF | Performed by: INTERNAL MEDICINE

## 2018-07-06 PROCEDURE — 96365 THER/PROPH/DIAG IV INF INIT: CPT

## 2018-07-06 PROCEDURE — A9270 NON-COVERED ITEM OR SERVICE: HCPCS | Mod: ZF | Performed by: INTERNAL MEDICINE

## 2018-07-06 PROCEDURE — 96375 TX/PRO/DX INJ NEW DRUG ADDON: CPT

## 2018-07-06 PROCEDURE — 96366 THER/PROPH/DIAG IV INF ADDON: CPT

## 2018-07-06 PROCEDURE — 25000128 H RX IP 250 OP 636: Mod: ZF | Performed by: INTERNAL MEDICINE

## 2018-07-06 RX ORDER — MEPERIDINE HYDROCHLORIDE 25 MG/ML
25 INJECTION INTRAMUSCULAR; INTRAVENOUS; SUBCUTANEOUS
Status: DISCONTINUED | OUTPATIENT
Start: 2018-07-06 | End: 2018-07-06 | Stop reason: HOSPADM

## 2018-07-06 RX ORDER — MEPERIDINE HYDROCHLORIDE 25 MG/ML
25 INJECTION INTRAMUSCULAR; INTRAVENOUS; SUBCUTANEOUS
Status: CANCELLED
Start: 2018-07-06

## 2018-07-06 RX ORDER — METHYLPREDNISOLONE SODIUM SUCCINATE 125 MG/2ML
100 INJECTION, POWDER, LYOPHILIZED, FOR SOLUTION INTRAMUSCULAR; INTRAVENOUS ONCE
Status: CANCELLED
Start: 2018-07-06 | End: 2018-07-06

## 2018-07-06 RX ORDER — DIPHENHYDRAMINE HCL 25 MG
25 CAPSULE ORAL ONCE
Status: CANCELLED
Start: 2018-07-06 | End: 2018-07-06

## 2018-07-06 RX ORDER — METHYLPREDNISOLONE SODIUM SUCCINATE 125 MG/2ML
100 INJECTION, POWDER, LYOPHILIZED, FOR SOLUTION INTRAMUSCULAR; INTRAVENOUS ONCE
Status: COMPLETED | OUTPATIENT
Start: 2018-07-06 | End: 2018-07-06

## 2018-07-06 RX ORDER — ACETAMINOPHEN 325 MG/1
650 TABLET ORAL ONCE
Status: CANCELLED
Start: 2018-07-06 | End: 2018-07-06

## 2018-07-06 RX ORDER — ACETAMINOPHEN 325 MG/1
650 TABLET ORAL ONCE
Status: COMPLETED | OUTPATIENT
Start: 2018-07-06 | End: 2018-07-06

## 2018-07-06 RX ORDER — DIPHENHYDRAMINE HCL 25 MG
25 CAPSULE ORAL ONCE
Status: COMPLETED | OUTPATIENT
Start: 2018-07-06 | End: 2018-07-06

## 2018-07-06 RX ADMIN — IMMUNE GLOBULIN INFUSION (HUMAN) 25 G: 100 INJECTION, SOLUTION INTRAVENOUS; SUBCUTANEOUS at 11:25

## 2018-07-06 RX ADMIN — MEPERIDINE HYDROCHLORIDE 25 MG: 25 INJECTION, SOLUTION INTRAMUSCULAR; INTRAVENOUS; SUBCUTANEOUS at 11:05

## 2018-07-06 RX ADMIN — ACETAMINOPHEN 650 MG: 325 TABLET ORAL at 11:05

## 2018-07-06 RX ADMIN — DIPHENHYDRAMINE HYDROCHLORIDE 25 MG: 25 CAPSULE ORAL at 11:04

## 2018-07-06 RX ADMIN — METHYLPREDNISOLONE SODIUM SUCCINATE 100 MG: 125 INJECTION, POWDER, FOR SOLUTION INTRAMUSCULAR; INTRAVENOUS at 11:05

## 2018-07-06 NOTE — PROGRESS NOTES
Infusion nursing note:    Jennifer Alvarez presents today to [facility] for a IVIG infusion.  During today's [facility] appointment orders from Dr Elizabeth were completed.  Frequency: every 3week infusion    Progress note:  ID verified by name and .  Assessment completed.  Vitals were stable throughout time in [facility].  verbal education given to patient/representative regarding infusion and possible side effects.  Patient verbalized understanding.    Note: Pt says she is feeling well.    Infusion given over approximately  2.5hours    rates: 20cc/hour x15min; increase by 20c/hour every 15min to final rate of 160cc/hour.      Administrations This Visit     diphenhydrAMINE (BENADRYL) capsule 25 mg     Admin Date Action Dose Route Administered By             2018 Given 25 mg Oral Aracely Henriquez RN                    immune globulin (GAMMAGARD) - sucrose free 10 % injection 25 g     Admin Date Action Dose Route Administered By             2018 New Bag 25 g Intravenous Aracely Henriquez RN                    meperidine (DEMEROL) injection 25 mg     Admin Date Action Dose Route Administered By             2018 Given 25 mg Intravenous Aracely Henriquez RN                    methylPREDNISolone sodium succinate (solu-MEDROL) injection 100 mg     Admin Date Action Dose Route Administered By             2018 Given 100 mg Intravenous Aracely Henriquez RN                          BP (P) 130/60  Pulse (P) 67  Temp (P) 98.2  F (36.8  C)  Wt 42.2 kg (93 lb 1.6 oz)  BMI 17.59 kg/m2    Drug Waste Record    Drug Name: solumedrol  Dose: 100mg  Route administered: IV  NDC #: na  vial discarded   Amount of waste(mL)25mg  Reason for waste: Single use vial        Discharge plan:    Discharge instructions were reviewed with patient: Yes  Patient/representative verbalized understanding of discharge instructions and all questions answered: Yes.    Discharged from [facility] at 1350 with  to home.    Aracely MARRUFO  Martinez

## 2018-07-06 NOTE — MR AVS SNAPSHOT
After Visit Summary   7/6/2018    Jennifer Alvarez    MRN: [id number]           Patient Information     Date Of Birth          1940        Visit Information        Provider Department      7/6/2018 11:00 AM UC 44 ATC; UC SPEC INFUSION [facility]        Today's Diagnoses     IgA deficiency (H)    -  1    Hypogammaglobulinemia (H)           Follow-ups after your visit        Your next 10 appointments already scheduled     Jul 27, 2018 11:00 AM CDT   Infusion 300 with UC SPEC INFUSION, UC 44 ATC   [facility] ([facility])    [address]  [address]  [place] [facility] [address]   [phone]            Aug 14, 2018 10:00 AM CDT   (Arrive by 9:45 AM)   Pacemaker Check with  Cv Device 1   [facility] ([facility])    [address]  [address]  [place] [address]   [phone]            Aug 14, 2018 10:45 AM CDT   (Arrive by 10:30 AM)   Return Visit with CINDY Ortiz CNP   [facility] ([facility])    [address]  [address]  [place] [address]   [phone]            Nov 14, 2018  9:30 AM CST   (Arrive by 9:15 AM)   Pacemaker Check with Uc Cv Device 1   [facility] ([facility])    [address]  [address]  [place] [facility] [address]   [phone]            Nov 14, 2018 10:00 AM CST   Lab with  LAB   [facility] ([facility])    [address]  1st Floor  [place] [facility] [address]   [phone]            Nov 14, 2018 10:30 AM CST   PFT VISIT with  PFL D   [facility] ([facility])    [address] [place] [address]  3rd Floor  [place] [address]   [phone]            Nov 14, 2018 11:00 AM CST   (Arrive by 10:45 AM)   Return Interstitial Lung with  Maxx Elizabeth MD   [facility] ([facility])    [address]  [address] [place] [address] [phone]              Who to contact     If you have questions or need follow up information about today's clinic visit or your schedule please contact [facility] directly at [phone].  Normal or non-critical lab and imaging results will be communicated to you by Deolanhart, letter or phone within 4 business days after the clinic has received the results. If you do not hear from us within 7 days, please contact the clinic through [employer] or phone. If you have a critical or abnormal lab result, we will notify you by phone as soon as possible.  Submit refill requests through [employer] or call your pharmacy and they will forward the refill request to us. Please allow 3 business days for your refill to be completed.          Additional Information About Your Visit        [employer]ucket Information     [employer] gives you secure access to your electronic health record. If you see a primary care provider, you can also send messages to your care team and make appointments. If you have questions, please call your primary care clinic.  If you do not have a primary care provider, please call [phone] and they will assist you.        Care EveryWhere ID     This is your Care EveryWhere ID. This could be used by other organizations to access your [facility] medical records  [id number]        Your Vitals Were     Pulse Temperature BMI (Body Mass Index)             61 98.2  F (36.8  C) 17.59 kg/m2          Blood Pressure from Last 3 Encounters:   07/06/18 142/62   06/15/18 130/57   05/25/18 155/71    Weight from Last 3 Encounters:   07/06/18 42.2 kg (93 lb 1.6 oz)   06/15/18 41.7 kg (92 lb)   05/16/18 42.2 kg (93 lb)              Today, you had the following     No orders found for display       Primary Care Provider  Office Phone # Fax #    Brandi Burks [phone]       [facility] [address]   [facility] [address]        Equal Access to Services     GUERA HOLT : Hadii adolph sims coryo Sosusyali, wacaroda luqadaha, qaybta kaalmada adeujan, chester ellis andrzejmark garduno sascha lovelace. So [facility] [phone].    ATENCIÓN: Si habla español, tiene a taylor disposición servicios gratuitos de asistencia lingüística. Llame al [phone].    We comply with applicable federal civil rights laws and [place] laws. We do not discriminate on the basis of race, color, national origin, age, disability, sex, sexual orientation, or gender identity.            Thank you!     Thank you for choosing [facility]  for your care. Our goal is always to provide you with excellent care. Hearing back from our patients is one way we can continue to improve our services. Please take a few minutes to complete the written survey that you may receive in the mail after your visit with us. Thank you!             Your Updated Medication List - Protect others around you: Learn how to safely use, store and throw away your medicines at www.disposemymeds.org.          This list is accurate as of 7/6/18  3:05 PM.  Always use your most recent med list.                   Brand Name Dispense Instructions for use Diagnosis    * albuterol 108 (90 Base) MCG/ACT Inhaler    PROAIR HFA/PROVENTIL HFA/VENTOLIN HFA    1 Inhaler    Take 2 puffs prn for wheezing or shortness of breath    Wheezing       * albuterol 108 (90 Base) MCG/ACT Inhaler    PROAIR HFA/PROVENTIL HFA/VENTOLIN HFA    1 Inhaler    Take 2 puffs as needed for shortness of breath    Mixed simple and mucopurulent chronic bronchitis (H), Gastroesophageal reflux disease without esophagitis       * albuterol (2.5 MG/3ML) 0.083% neb solution      Take 1 ampule by nebulization every 6 hours as needed.        aspirin 81 MG tablet      Take 1 tablet by mouth daily.         atorvastatin 10 MG tablet    LIPITOR    90 tablet    Take 4 tablets (40 mg) by mouth daily    Hyperlipidemia LDL goal <100       azithromycin 250 MG tablet    ZITHROMAX    6 tablet    Take as directed    Mixed simple and mucopurulent chronic bronchitis (H), Gastroesophageal reflux disease without esophagitis       Blood Pressure Kit      daily.        carvedilol 12.5 MG tablet    COREG    180 tablet    Take 1 tablet (12.5 mg) by mouth 2 times daily (with meals)    Secondary hypertension       clopidogrel 75 MG tablet    PLAVIX    90 tablet    Take 1 tablet (75 mg) by mouth daily    Coronary artery disease       DEMEROL 25 MG/ML injection   Generic drug:  meperidine      Inject 25 mg into the vein as needed. 25 mg IV prior to IgG infusion        dicyclomine 10 MG capsule    BENTYL     Take 10 mg by mouth 4 times daily (before meals and nightly).        FISH OIL      Unsure of dosage take two caps daily        * fluticasone-salmeterol 250-50 MCG/DOSE diskus inhaler    ADVAIR    1 Inhaler    Inhale 1 puff into the lungs 2 times daily.    Unspecified chronic bronchitis       * fluticasone-salmeterol 250-50 MCG/DOSE diskus inhaler    ADVAIR    1 Inhaler    Inhale 1 puff into the lungs 2 times daily    Mixed simple and mucopurulent chronic bronchitis (H), Gastroesophageal reflux disease without esophagitis       HYDROcodone-acetaminophen  MG per tablet    LORCET     Take 1 tablet by mouth every 6 hours as needed.        Immune Globulin (Human) 25 GM/500ML Soln      Inject 25 g into the vein See Admin Instructions. 25 grams every 3 weeks IVIG        isosorbide mononitrate 60 MG 24 hr tablet    IMDUR    90 tablet    Take 1 tablet (60 mg) by mouth daily    HTN (hypertension)       metFORMIN 1000 MG tablet    GLUCOPHAGE     Take 1 tablet (1,000 mg) by mouth daily (with dinner) Take 1 tablet twice daily    DM (diabetes mellitus) (H)       nitroGLYcerin 0.4 MG sublingual tablet    NITROQUICK    25 tablet    Place 1  tablet (0.4 mg) under the tongue every 5 minutes as needed    Chest pain       * omeprazole 20 MG CR capsule    priLOSEC    60 capsule    Take 1 capsule (20 mg) by mouth 2 times daily    Esophageal reflux, Unspecified chronic bronchitis       * omeprazole 20 MG CR capsule    priLOSEC    60 capsule    Take 1 capsule (20 mg) by mouth daily    Mixed simple and mucopurulent chronic bronchitis (H), Gastroesophageal reflux disease without esophagitis       * blood glucose monitoring lancets     3 Box    Use to test blood sugar 3 times daily or as directed.    Type 2 diabetes mellitus with complication (H)       * ONE TOUCH FINE POINT LANCETS      2 times daily.        * ONETOUCH ULTRA test strip   Generic drug:  blood glucose monitoring     200 strip    Test blood sugar twice a day.    Diabetes mellitus type II       * blood glucose monitoring test strip    TERRY CONTOUR NEXT    270 each    by In Vitro route 3 times daily Use to test blood sugar 3 times daily or as directed.    Type 2 diabetes mellitus with complication (H)       * predniSONE 20 MG tablet    DELTASONE    5 tablet    Patient to take 60 mg the evening before the procedure and 30 mg the morning of the procedure.    Allergic reaction to contrast dye       * predniSONE 10 MG tablet    DELTASONE    30 tablet    4 tablets by mouth each day for 3 days, then 3 tablets each day for 3 days, then 2 tablets each day for 3 days, then 1 tablet each day for 3 days, then off    Mixed simple and mucopurulent chronic bronchitis (H), Gastroesophageal reflux disease without esophagitis       * tiotropium 18 MCG capsule    SPIRIVA HANDIHALER    30 capsule    One puff every day    Unspecified chronic bronchitis       * tiotropium 18 MCG capsule    SPIRIVA HANDIHALER    30 capsule    One puff every day    Mixed simple and mucopurulent chronic bronchitis (H), Gastroesophageal reflux disease without esophagitis       traMADol 50 MG tablet    ULTRAM    20 tablet    Take 1 tablet  (50 mg) by mouth every 6 hours as needed for pain    Pacemaker battery depletion       TYLENOL 325 MG tablet   Generic drug:  acetaminophen      Take 1-2 tablets by mouth every 6 hours as needed.        * Notice:  This list has 15 medication(s) that are the same as other medications prescribed for you. Read the directions carefully, and ask your doctor or other care provider to review them with you.

## 2018-07-27 ENCOUNTER — INFUSION THERAPY VISIT (OUTPATIENT)
Dept: INFUSION THERAPY | Facility: CLINIC | Age: 78
End: 2018-07-27
Attending: INTERNAL MEDICINE
Payer: MEDICARE

## 2018-07-27 VITALS
TEMPERATURE: 98.5 F | BODY MASS INDEX: 17.29 KG/M2 | SYSTOLIC BLOOD PRESSURE: 122 MMHG | DIASTOLIC BLOOD PRESSURE: 50 MMHG | OXYGEN SATURATION: 98 % | RESPIRATION RATE: 16 BRPM | HEART RATE: 61 BPM | WEIGHT: 91.5 LBS

## 2018-07-27 DIAGNOSIS — D80.1 HYPOGAMMAGLOBULINEMIA (H): ICD-10-CM

## 2018-07-27 DIAGNOSIS — D80.2 IGA DEFICIENCY (H): Primary | ICD-10-CM

## 2018-07-27 PROCEDURE — A9270 NON-COVERED ITEM OR SERVICE: HCPCS | Mod: ZF | Performed by: INTERNAL MEDICINE

## 2018-07-27 PROCEDURE — 25000128 H RX IP 250 OP 636: Mod: ZF | Performed by: INTERNAL MEDICINE

## 2018-07-27 PROCEDURE — 25000132 ZZH RX MED GY IP 250 OP 250 PS 637: Mod: ZF | Performed by: INTERNAL MEDICINE

## 2018-07-27 PROCEDURE — 96365 THER/PROPH/DIAG IV INF INIT: CPT

## 2018-07-27 PROCEDURE — 96366 THER/PROPH/DIAG IV INF ADDON: CPT

## 2018-07-27 PROCEDURE — 96375 TX/PRO/DX INJ NEW DRUG ADDON: CPT

## 2018-07-27 RX ORDER — DIPHENHYDRAMINE HCL 25 MG
25 CAPSULE ORAL ONCE
Status: COMPLETED | OUTPATIENT
Start: 2018-07-27 | End: 2018-07-27

## 2018-07-27 RX ORDER — ACETAMINOPHEN 325 MG/1
650 TABLET ORAL ONCE
Status: CANCELLED
Start: 2018-07-27 | End: 2018-07-27

## 2018-07-27 RX ORDER — DIPHENHYDRAMINE HCL 25 MG
25 CAPSULE ORAL ONCE
Status: CANCELLED
Start: 2018-07-27 | End: 2018-07-27

## 2018-07-27 RX ORDER — MEPERIDINE HYDROCHLORIDE 25 MG/ML
25 INJECTION INTRAMUSCULAR; INTRAVENOUS; SUBCUTANEOUS
Status: DISCONTINUED | OUTPATIENT
Start: 2018-07-27 | End: 2018-07-27 | Stop reason: HOSPADM

## 2018-07-27 RX ORDER — METHYLPREDNISOLONE SODIUM SUCCINATE 125 MG/2ML
100 INJECTION, POWDER, LYOPHILIZED, FOR SOLUTION INTRAMUSCULAR; INTRAVENOUS ONCE
Status: CANCELLED
Start: 2018-07-27 | End: 2018-07-27

## 2018-07-27 RX ORDER — METHYLPREDNISOLONE SODIUM SUCCINATE 125 MG/2ML
100 INJECTION, POWDER, LYOPHILIZED, FOR SOLUTION INTRAMUSCULAR; INTRAVENOUS ONCE
Status: COMPLETED | OUTPATIENT
Start: 2018-07-27 | End: 2018-07-27

## 2018-07-27 RX ORDER — ACETAMINOPHEN 325 MG/1
650 TABLET ORAL ONCE
Status: COMPLETED | OUTPATIENT
Start: 2018-07-27 | End: 2018-07-27

## 2018-07-27 RX ORDER — MEPERIDINE HYDROCHLORIDE 25 MG/ML
25 INJECTION INTRAMUSCULAR; INTRAVENOUS; SUBCUTANEOUS
Status: CANCELLED
Start: 2018-07-27

## 2018-07-27 RX ADMIN — IMMUNE GLOBULIN INFUSION (HUMAN) 25 G: 100 INJECTION, SOLUTION INTRAVENOUS; SUBCUTANEOUS at 11:53

## 2018-07-27 RX ADMIN — METHYLPREDNISOLONE SODIUM SUCCINATE 100 MG: 125 INJECTION, POWDER, FOR SOLUTION INTRAMUSCULAR; INTRAVENOUS at 11:40

## 2018-07-27 RX ADMIN — MEPERIDINE HYDROCHLORIDE 25 MG: 25 INJECTION, SOLUTION INTRAMUSCULAR; INTRAVENOUS; SUBCUTANEOUS at 11:34

## 2018-07-27 RX ADMIN — DIPHENHYDRAMINE HYDROCHLORIDE 25 MG: 25 CAPSULE ORAL at 11:30

## 2018-07-27 RX ADMIN — ACETAMINOPHEN 650 MG: 325 TABLET ORAL at 11:30

## 2018-07-27 ASSESSMENT — PAIN DESCRIPTION - DESCRIPTORS: DESCRIPTORS: ACHING

## 2018-07-27 NOTE — PROGRESS NOTES
Nursing Note  Jennifer [facility]:   Chief Complaint   Patient presents with     Infusion     IVIG     During [facility]'s [facility] appointment, orders from Dr. Elizabeth were completed.  Frequency: q 3 weeks    Progress note:  Patient identification verified by name and date of birth.  Assessment completed.  Vitals recorded in Doc Flowsheets.  Patient was provided with education regarding infusion and possible side effects.  Patient verbalized understanding.      needed: No  Premedications: administered per order.  Infusion Rates: infusion starts at 20 ml/hr, then increased by 20 ml/hr every 15 minutes to final rate of 160 ml/hr. Flushed with 20 ml D5W both pre and post infusion.   Approximate Infusion length:3 hours.   Labs: were not ordered for this appointment.  Vascular access: peripheral IV placed today.  Treatment Conditions: patient denies fever, chills, signs of infection, recent illness, on antibiotics, productive cough or elevated temperature.  Patient tolerated infusion: well.    Drug Waste Record    Drug Name: Solumedrol  Dose: 100 mg  Route administered: IV  NDC #: 0009-6372442  Amount of waste(mL):0.4 ml  Reason for waste: Single use vial      Discharge Plan:   Follow up plan of care with: ongoing infusions at [facility].  Discharge instructions were reviewed with patient.  Patient/representative verbalized understanding of discharge instructions and all questions answered.  [facility] in stable condition.    Kecia Condon RN    Administrations This Visit     acetaminophen (TYLENOL) tablet 650 mg     Admin Date Action Dose Route Administered By             07/27/2018 Given 650 mg Oral Kecia Condon RN                    diphenhydrAMINE (BENADRYL) capsule 25 mg     Admin Date Action Dose Route Administered By              07/27/2018 Given 25 mg Oral Kecia Condon RN                    immune globulin (GAMMAGARD) sucrose free 10 % injection 25 g     Admin Date Action Dose Route Administered By             07/27/2018 New Bag 25 g Intravenous Kecia Condon RN                    meperidine (DEMEROL) injection 25 mg     Admin Date Action Dose Route Administered By             07/27/2018 Given 25 mg Intravenous Kecia Condon RN                    methylPREDNISolone sodium succinate (solu-MEDROL) injection 100 mg     Admin Date Action Dose Route Administered By             07/27/2018 Given 100 mg Intravenous Kecia Condon RN                          /50  Pulse 61  Temp 98.5  F (36.9  C) (Oral)  Resp 16  Wt 41.5 kg (91 lb 8 oz)  SpO2 98%  BMI 17.29 kg/m2

## 2018-07-27 NOTE — MR AVS SNAPSHOT
After Visit Summary   7/27/2018    Jennifer Alvarez    MRN: [id number]           Patient Information     Date Of Birth          1940        Visit Information        Provider Department      7/27/2018 11:00 AM UC 44 ATC; UC SPEC INFUSION [facility] Special[facility] Procedure        Today's Diagnoses     IgA deficiency (H)    -  1    Hypogammaglobulinemia (H)          Care Instructions    Dear Jennifer Alvarez    Thank you for choosing [facility] ([facility]) for your infusion.  The following information is a summary of our appointment as well as important reminders.        Immune Globulin Solution for injection  What is this medicine?  IMMUNE GLOBULIN (im MUNE GLOB sapna cynthia) helps to prevent or reduce the severity of certain infections in patients who are at risk. This medicine is collected from the pooled blood of many donors. It is used to treat immune system problems, thrombocytopenia, and Kawasaki syndrome.  This medicine may be used for other purposes; ask your health care provider or pharmacist if you have questions.  What should I tell my health care provider before I take this medicine?  They need to know if you have any of these conditions:    diabetes    extremely low or no immune antibodies in the blood    heart disease    history of blood clots    hyperprolinemia    infection in the blood, sepsis    kidney disease    taking medicine that may change kidney function - ask your health care provider about your medicine    an unusual or allergic reaction to human immune globulin, albumin, maltose, sucrose, polysorbate 80, other medicines, foods, dyes, or preservatives    pregnant or trying to get pregnant    breast-feeding  How should I use this medicine?  This medicine is for injection into a muscle or infusion into a vein or skin. It is usually given by a health care professional in a hospital or clinic  setting.  In rare cases, some brands of this medicine might be given at home. You will be taught how to give this medicine. Use exactly as directed. Take your medicine at regular intervals. Do not take your medicine more often than directed.  Talk to your pediatrician regarding the use of this medicine in children. Special care may be needed.  Overdosage: If you think you have taken too much of this medicine contact a poison control center or emergency room at once.  NOTE: This medicine is only for you. Do not share this medicine with others.  What if I miss a dose?  It is important not to miss your dose. Call your doctor or health care professional if you are unable to keep an appointment. If you give yourself the medicine and you miss a dose, take it as soon as you can. If it is almost time for your next dose, take only that dose. Do not take double or extra doses.  What may interact with this medicine?    aspirin and aspirin-like medicines    cisplatin    cyclosporine    medicines for infection like acyclovir, adefovir, amphotericin B, bacitracin, cidofovir, foscarnet, ganciclovir, gentamicin, pentamidine, vancomycin    NSAIDS, medicines for pain and inflammation, like ibuprofen or naproxen    pamidronate    vaccines    zoledronic acid  This list may not describe all possible interactions. Give your health care provider a list of all the medicines, herbs, non-prescription drugs, or dietary supplements you use. Also tell them if you smoke, drink alcohol, or use illegal drugs. Some items may interact with your medicine.  What should I watch for while using this medicine?  Your condition will be monitored carefully while you are receiving this medicine.  This medicine is made from pooled blood donations of many different people. It may be possible to pass an infection in this medicine. However, the donors are screened for infections and all products are tested for HIV and hepatitis. The medicine is treated to kill  most or all bacteria and viruses. Talk to your doctor about the risks and benefits of this medicine.  Do not have vaccinations for at least 14 days before, or until at least 3 months after receiving this medicine.  What side effects may I notice from receiving this medicine?  Side effects that you should report to your doctor or health care professional as soon as possible:    allergic reactions like skin rash, itching or hives, swelling of the face, lips, or tongue    breathing problems    chest pain or tightness    fever, chills    headache with nausea, vomiting    neck pain or difficulty moving neck    pain when moving eyes    pain, swelling, warmth in the leg    problems with balance, talking, walking    sudden weight gain    swelling of the ankles, feet, hands    trouble passing urine or change in the amount of urine  Side effects that usually do not require medical attention (report to your doctor or health care professional if they continue or are bothersome):    dizzy, drowsy    flushing    increased sweating    leg cramps    muscle aches and pains    pain at site where injected  This list may not describe all possible side effects. Call your doctor for medical advice about side effects. You may report side effects to FDA at 6-766-FDA-4317.  Where should I keep my medicine?  Keep out of the reach of children.  This drug is usually given in a hospital or clinic and will not be stored at home.  In rare cases, some brands of this medicine may be given at home. If you are using this medicine at home, you will be instructed on how to store this medicine. Throw away any unused medicine after the expiration date on the label.  NOTE: This sheet is a summary. It may not cover all possible information. If you have questions about this medicine, talk to your doctor, pharmacist, or health care provider.  NOTE:This sheet is a summary. It may not cover all possible information. If you have questions about this medicine,  talk to your doctor, pharmacist, or health care provider. Copyright  2016 Gold Standard          Additional information: you had your infusion of Gammagard 25 grams, Demerol 25 mg, Solumedrol 100 mg via IV today, along with oral Tylenol 650 mg and Benadryl 25 mg.       We look forward in seeing you on your next appointment here at [facility].  Please don t hesitate to call us at [phone] to reschedule any of your appointments or to speak with one of the [facility] registered nurses.  It was a pleasure taking care of you today.    Sincerely,  Kecia Condon, CARLIN  [facility]  Specialty Infusion [facility]  [address] [place] [address]  [address]  Phone:  [phone]          Follow-ups after your visit        Your next 10 appointments already scheduled     Aug 14, 2018 12:30 PM CDT   (Arrive by 12:15 PM)   Return Visit with Eloina Tavares PA-C   [facility] ([facility])    [address]  [address]  [facility] [id number] [phone]            Aug 14, 2018  1:00 PM CDT   (Arrive by 12:45 PM)   Pacemaker Check with Uc Cv Device 1   [facility] ([facility])    [address]  [address]  [facility] [id number] [phone]            Aug 17, 2018 11:00 AM CDT   Infusion 300 with UC SPEC INFUSION, UC 43 ATC   [facility] ([facility])    [address]  [address]  [facility] [id number] [phone]            Sep 07, 2018 11:00 AM CDT   Infusion 300 with UC SPEC INFUSION, UC 44 ATC   [facility] Specialty and Procedure ([facility])    [address]  [address]  [facility] [id number] [phone]            Sep 28, 2018 11:00 AM CDT   Infusion 300 with UC SPEC INFUSION, UC 44 ATC   [facility] Specialty and Procedure ([facility])    [address]  [address]  [facility] [address] [phone]            Oct 19, 2018 11:00 AM CDT   Infusion 300 with UC SPEC INFUSION, UC 44 ATC   [facility] Specialty and Procedure ([facility])    [address]  [address]  [facility] [address] [phone]              Who to contact     If you have questions or need follow up information about today's clinic visit or your schedule please contact [facility] SPECIALTY AND PROCEDURE directly at [phone].  Normal or non-critical lab and imaging results will be communicated to you by VCNChart, letter or phone within 4 business days after the clinic has received the results. If you do not hear from us within 7 days, please contact the clinic through [employer] or phone. If you have a critical or abnormal lab result, we will notify you by phone as soon as possible.  Submit refill requests through [employer] or call your pharmacy and they will forward the refill request to us. Please allow 3 business days for your refill to be completed.          Additional Information About Your Visit        VCNChart Information     [employer] gives you secure access to your electronic health record. If you see a primary care provider, you can also send messages to your care team and make appointments. If you have questions, please call your primary care clinic.  If you do not have a primary care provider, please call [phone] and they will assist you.        Care EveryWhere ID     This is your Care EveryWhere ID. This could be used by other organizations to access your [place] medical records  [id number]        Your Vitals Were     Pulse Temperature Respirations Pulse Oximetry BMI (Body Mass Index)       80 98.5  F (36.9  C) (Oral) 16 98% 17.29 kg/m2        Blood Pressure from Last 3 Encounters:   07/27/18 133/74   07/06/18 142/62   06/15/18 130/57     Weight from Last 3 Encounters:   07/27/18 41.5 kg (91 lb 8 oz)   07/06/18 42.2 kg (93 lb 1.6 oz)   06/15/18 41.7 kg (92 lb)              Today, you had the following     No orders found for display       Primary Care Provider Office Phone # Fax #    Brandi Burks [phone]       [facility] [address]   [facility] [address]        Equal Access to Services     GUERA HOLT : Hadii aad ku hadasho Soomaali, waaxda luqadaha, qaybta kaalmada adeegyada, waxay idiin hayaan adeeg kharash la'aan . So [facility] [phone].    ATENCIÓN: Si habla español, tiene a taylor disposición servicios gratuitos de asistencia lingüística. [facility] [phone].    We comply with applicable federal civil rights laws and [place] laws. We do not discriminate on the basis of race, color, national origin, age, disability, sex, sexual orientation, or gender identity.            Thank you!     Thank you for choosing [facility] SPECIALTY AND PROCEDURE  for your care. Our goal is always to provide you with excellent care. Hearing back from our patients is one way we can continue to improve our services. Please take a few minutes to complete the written survey that you may receive in the mail after your visit with us. Thank you!             Your Updated Medication List - Protect others around you: Learn how to safely use, store and throw away your medicines at www.disposemymeds.org.          This list is accurate as of 7/27/18  2:06 PM.  Always use your most recent med list.                   Brand Name Dispense Instructions for use Diagnosis    * albuterol 108 (90 Base) MCG/ACT Inhaler    PROAIR HFA/PROVENTIL HFA/VENTOLIN HFA    1 Inhaler    Take 2 puffs prn for wheezing or shortness of breath    Wheezing       * albuterol 108 (90 Base) MCG/ACT Inhaler    PROAIR HFA/PROVENTIL HFA/VENTOLIN HFA    1 Inhaler    Take 2 puffs as needed for shortness of breath    Mixed simple and mucopurulent chronic bronchitis  (H), Gastroesophageal reflux disease without esophagitis       * albuterol (2.5 MG/3ML) 0.083% neb solution      Take 1 ampule by nebulization every 6 hours as needed.        aspirin 81 MG tablet      Take 1 tablet by mouth daily.        atorvastatin 10 MG tablet    LIPITOR    90 tablet    Take 4 tablets (40 mg) by mouth daily    Hyperlipidemia LDL goal <100       azithromycin 250 MG tablet    ZITHROMAX    6 tablet    Take as directed    Mixed simple and mucopurulent chronic bronchitis (H), Gastroesophageal reflux disease without esophagitis       Blood Pressure Kit      daily.        carvedilol 12.5 MG tablet    COREG    180 tablet    Take 1 tablet (12.5 mg) by mouth 2 times daily (with meals)    Secondary hypertension       clopidogrel 75 MG tablet    PLAVIX    90 tablet    Take 1 tablet (75 mg) by mouth daily    Coronary artery disease       DEMEROL 25 MG/ML injection   Generic drug:  meperidine      Inject 25 mg into the vein as needed. 25 mg IV prior to IgG infusion        dicyclomine 10 MG capsule    BENTYL     Take 10 mg by mouth 4 times daily (before meals and nightly).        FISH OIL      Unsure of dosage take two caps daily        * fluticasone-salmeterol 250-50 MCG/DOSE diskus inhaler    ADVAIR    1 Inhaler    Inhale 1 puff into the lungs 2 times daily.    Unspecified chronic bronchitis       * fluticasone-salmeterol 250-50 MCG/DOSE diskus inhaler    ADVAIR    1 Inhaler    Inhale 1 puff into the lungs 2 times daily    Mixed simple and mucopurulent chronic bronchitis (H), Gastroesophageal reflux disease without esophagitis       HYDROcodone-acetaminophen  MG per tablet    LORCET     Take 1 tablet by mouth every 6 hours as needed.        Immune Globulin (Human) 25 GM/500ML Soln      Inject 25 g into the vein See Admin Instructions. 25 grams every 3 weeks IVIG        isosorbide mononitrate 60 MG 24 hr tablet    IMDUR    90 tablet    Take 1 tablet (60 mg) by mouth daily    HTN (hypertension)        metFORMIN 1000 MG tablet    GLUCOPHAGE     Take 1 tablet (1,000 mg) by mouth daily (with dinner) Take 1 tablet twice daily    DM (diabetes mellitus) (H)       nitroGLYcerin 0.4 MG sublingual tablet    NITROQUICK    25 tablet    Place 1 tablet (0.4 mg) under the tongue every 5 minutes as needed    Chest pain       * omeprazole 20 MG CR capsule    priLOSEC    60 capsule    Take 1 capsule (20 mg) by mouth 2 times daily    Esophageal reflux, Unspecified chronic bronchitis       * omeprazole 20 MG CR capsule    priLOSEC    60 capsule    Take 1 capsule (20 mg) by mouth daily    Mixed simple and mucopurulent chronic bronchitis (H), Gastroesophageal reflux disease without esophagitis       * blood glucose monitoring lancets     3 Box    Use to test blood sugar 3 times daily or as directed.    Type 2 diabetes mellitus with complication (H)       * ONE TOUCH FINE POINT LANCETS      2 times daily.        * ONETOUCH ULTRA test strip   Generic drug:  blood glucose monitoring     200 strip    Test blood sugar twice a day.    Diabetes mellitus type II       * blood glucose monitoring test strip    TERRY CONTOUR NEXT    270 each    by In Vitro route 3 times daily Use to test blood sugar 3 times daily or as directed.    Type 2 diabetes mellitus with complication (H)       * predniSONE 20 MG tablet    DELTASONE    5 tablet    Patient to take 60 mg the evening before the procedure and 30 mg the morning of the procedure.    Allergic reaction to contrast dye       * predniSONE 10 MG tablet    DELTASONE    30 tablet    4 tablets by mouth each day for 3 days, then 3 tablets each day for 3 days, then 2 tablets each day for 3 days, then 1 tablet each day for 3 days, then off    Mixed simple and mucopurulent chronic bronchitis (H), Gastroesophageal reflux disease without esophagitis       * tiotropium 18 MCG capsule    SPIRIVA HANDIHALER    30 capsule    One puff every day    Unspecified chronic bronchitis       * tiotropium 18 MCG capsule     SPIRIVA HANDIHALER    30 capsule    One puff every day    Mixed simple and mucopurulent chronic bronchitis (H), Gastroesophageal reflux disease without esophagitis       traMADol 50 MG tablet    ULTRAM    20 tablet    Take 1 tablet (50 mg) by mouth every 6 hours as needed for pain    Pacemaker battery depletion       TYLENOL 325 MG tablet   Generic drug:  acetaminophen      Take 1-2 tablets by mouth every 6 hours as needed.        * Notice:  This list has 15 medication(s) that are the same as other medications prescribed for you. Read the directions carefully, and ask your doctor or other care provider to review them with you.

## 2018-07-27 NOTE — PATIENT INSTRUCTIONS
Dear Jennifer Alvarez    Thank you for choosing [facility] ([facility]) for your infusion.  The following information is a summary of our appointment as well as important reminders.        Immune Globulin Solution for injection  What is this medicine?  IMMUNE GLOBULIN (im MUNE GLOB sapna cynthia) helps to prevent or reduce the severity of certain infections in patients who are at risk. This medicine is collected from the pooled blood of many donors. It is used to treat immune system problems, thrombocytopenia, and Kawasaki syndrome.  This medicine may be used for other purposes; ask your health care provider or pharmacist if you have questions.  What should I tell my health care provider before I take this medicine?  They need to know if you have any of these conditions:    diabetes    extremely low or no immune antibodies in the blood    heart disease    history of blood clots    hyperprolinemia    infection in the blood, sepsis    kidney disease    taking medicine that may change kidney function - ask your health care provider about your medicine    an unusual or allergic reaction to human immune globulin, albumin, maltose, sucrose, polysorbate 80, other medicines, foods, dyes, or preservatives    pregnant or trying to get pregnant    breast-feeding  How should I use this medicine?  This medicine is for injection into a muscle or infusion into a vein or skin. It is usually given by a health care professional in a hospital or clinic setting.  In rare cases, some brands of this medicine might be given at home. You will be taught how to give this medicine. Use exactly as directed. Take your medicine at regular intervals. Do not take your medicine more often than directed.  Talk to your pediatrician regarding the use of this medicine in children. Special care may be needed.  Overdosage: If you think you have taken too much of this medicine contact a poison control  center or emergency room at once.  NOTE: This medicine is only for you. Do not share this medicine with others.  What if I miss a dose?  It is important not to miss your dose. Call your doctor or health care professional if you are unable to keep an appointment. If you give yourself the medicine and you miss a dose, take it as soon as you can. If it is almost time for your next dose, take only that dose. Do not take double or extra doses.  What may interact with this medicine?    aspirin and aspirin-like medicines    cisplatin    cyclosporine    medicines for infection like acyclovir, adefovir, amphotericin B, bacitracin, cidofovir, foscarnet, ganciclovir, gentamicin, pentamidine, vancomycin    NSAIDS, medicines for pain and inflammation, like ibuprofen or naproxen    pamidronate    vaccines    zoledronic acid  This list may not describe all possible interactions. Give your health care provider a list of all the medicines, herbs, non-prescription drugs, or dietary supplements you use. Also tell them if you smoke, drink alcohol, or use illegal drugs. Some items may interact with your medicine.  What should I watch for while using this medicine?  Your condition will be monitored carefully while you are receiving this medicine.  This medicine is made from pooled blood donations of many different people. It may be possible to pass an infection in this medicine. However, the donors are screened for infections and all products are tested for HIV and hepatitis. The medicine is treated to kill most or all bacteria and viruses. Talk to your doctor about the risks and benefits of this medicine.  Do not have vaccinations for at least 14 days before, or until at least 3 months after receiving this medicine.  What side effects may I notice from receiving this medicine?  Side effects that you should report to your doctor or health care professional as soon as possible:    allergic reactions like skin rash, itching or hives,  swelling of the face, lips, or tongue    breathing problems    chest pain or tightness    fever, chills    headache with nausea, vomiting    neck pain or difficulty moving neck    pain when moving eyes    pain, swelling, warmth in the leg    problems with balance, talking, walking    sudden weight gain    swelling of the ankles, feet, hands    trouble passing urine or change in the amount of urine  Side effects that usually do not require medical attention (report to your doctor or health care professional if they continue or are bothersome):    dizzy, drowsy    flushing    increased sweating    leg cramps    muscle aches and pains    pain at site where injected  This list may not describe all possible side effects. Call your doctor for medical advice about side effects. You may report side effects to FDA at 7-162-REW-5459.  Where should I keep my medicine?  Keep out of the reach of children.  This drug is usually given in a hospital or clinic and will not be stored at home.  In rare cases, some brands of this medicine may be given at home. If you are using this medicine at home, you will be instructed on how to store this medicine. Throw away any unused medicine after the expiration date on the label.  NOTE: This sheet is a summary. It may not cover all possible information. If you have questions about this medicine, talk to your doctor, pharmacist, or health care provider.  NOTE:This sheet is a summary. It may not cover all possible information. If you have questions about this medicine, talk to your doctor, pharmacist, or health care provider. Copyright  2016 Gold Standard          Additional information: you had your infusion of Gammagard 25 grams, Demerol 25 mg, Solumedrol 100 mg via IV today, along with oral Tylenol 650 mg and Benadryl 25 mg.       We look forward in seeing you on your next appointment here at [facility].  Please don t hesitate to call us at [phone] to reschedule any of your appointments or to  speak with one of the [facility] registered nurses.  It was a pleasure taking care of you today.    Sincerely,  Kecia Condon RN  [facility]  Specialty Infusion & Procedure Center  [address] [place] [address]  [address]  Phone:  [phone]

## 2018-08-14 ENCOUNTER — ALLIED HEALTH/NURSE VISIT (OUTPATIENT)
Dept: CARDIOLOGY | Facility: CLINIC | Age: 78
End: 2018-08-14
Attending: NURSE PRACTITIONER
Payer: MEDICARE

## 2018-08-14 ENCOUNTER — OFFICE VISIT (OUTPATIENT)
Dept: CARDIOLOGY | Facility: CLINIC | Age: 78
End: 2018-08-14
Attending: PHYSICIAN ASSISTANT
Payer: MEDICARE

## 2018-08-14 VITALS
WEIGHT: 92 LBS | OXYGEN SATURATION: 99 % | DIASTOLIC BLOOD PRESSURE: 76 MMHG | BODY MASS INDEX: 17.37 KG/M2 | HEIGHT: 61 IN | HEART RATE: 68 BPM | SYSTOLIC BLOOD PRESSURE: 149 MMHG

## 2018-08-14 DIAGNOSIS — R00.1 SINUS BRADYCARDIA: Primary | ICD-10-CM

## 2018-08-14 DIAGNOSIS — I70.1 RENAL ARTERY STENOSIS (H): Primary | ICD-10-CM

## 2018-08-14 DIAGNOSIS — I10 BENIGN ESSENTIAL HYPERTENSION: ICD-10-CM

## 2018-08-14 PROCEDURE — 99214 OFFICE O/P EST MOD 30 MIN: CPT | Mod: ZP | Performed by: PHYSICIAN ASSISTANT

## 2018-08-14 PROCEDURE — G0463 HOSPITAL OUTPT CLINIC VISIT: HCPCS | Mod: 25,ZF

## 2018-08-14 PROCEDURE — 93280 PM DEVICE PROGR EVAL DUAL: CPT | Mod: 26 | Performed by: INTERNAL MEDICINE

## 2018-08-14 PROCEDURE — 93280 PM DEVICE PROGR EVAL DUAL: CPT | Mod: ZF

## 2018-08-14 RX ORDER — HYDROCHLOROTHIAZIDE 25 MG/1
25 TABLET ORAL DAILY
Qty: 90 TABLET | Refills: 3 | Status: SHIPPED | OUTPATIENT
Start: 2018-08-14

## 2018-08-14 ASSESSMENT — PAIN SCALES - GENERAL: PAINLEVEL: MILD PAIN (3)

## 2018-08-14 NOTE — LETTER
8/14/2018      RE: Jennifer Alvarez  [address]  [place] [address]       Dear Colleague,    Thank you for the opportunity to participate in the care of your patient, Jennifer Alvarez, at the [facility] at [facility]. Please see a copy of my visit note below.    CARDIOLOGY CLINIC   Jennifer Alvarez is a 77 year old female with Ig deficiency, coronary artery disease and peripheral artery disease s/p renal stenting in July 2017 who presents for follow up.     She had a positive nuclear stress study in December 2007 which led to coronary angiography and stenting of her mid and distal LAD. She last had a repeat angiogram in 2010 which showed her stents were patent and she had a negative stress echo in 2011. She underwent pacemaker placement in 2008 for sick sinus syndrome and had generator change this past February. In July 2017 she had stenting of her left renal artery for renal artery stenosis.     Today she reports to be doing ok. She said she [place] in her [place] and they found some carotid stenosis. She said it's a sudden onset of shocking discomfort with some tingling/numbness in the lower right face for <1 minutes. No associated vision or speech changes. She says it's been intermittent for about 1 year but seems to be increasing. No chest pain, palpitations, dyspnea, swelling, PND, orthopnea, dizziness, syncope.     She has a device check after her visit with me. She says it's been very itchy deep inside, no obvious redness or rash and not painful.     PAST MEDICAL HISTORY:  Past Medical History:   Diagnosis Date     Abdominal pain     cramping     Arthritis      CAD (coronary artery disease)     stent x2, ppm     Cancer (H)     skin     Cardiac pacemaker      CC (Crohn's colitis) (H)      Chronic kidney disease      Diabetes (H)     DM type 2, oral agent     Diarrhea      GERD  (gastroesophageal reflux disease)      History of blood transfusion      HTN (hypertension)      Hyperlipidemia LDL goal <70 11/19/2013     IgA deficiency (H)      Neck pain 12/31/2014     Uncomplicated asthma      Weight loss      SOCIAL HISTORY:  Social History     Social History     Marital status:      Spouse name: N/A     Number of children: N/A     Years of education: N/A     Social History Main Topics     Smoking status: Former Smoker     Packs/day: 0.20     Years: 20.00     Types: Cigarettes     Quit date: 2/24/1974     Smokeless tobacco: Never Used     Alcohol use No     Drug use: No     Sexual activity: Not Asked     Other Topics Concern     None     Social History Narrative     CURRENT MEDICATIONS:  Current Outpatient Prescriptions   Medication Sig Dispense Refill     acetaminophen (TYLENOL) 325 MG tablet Take 1-2 tablets by mouth every 6 hours as needed.       albuterol (2.5 MG/3ML) 0.083% nebulizer solution Take 1 ampule by nebulization every 6 hours as needed.       albuterol (PROAIR HFA/PROVENTIL HFA/VENTOLIN HFA) 108 (90 Base) MCG/ACT Inhaler Take 2 puffs as needed for shortness of breath 1 Inhaler 12     albuterol (PROAIR HFA/PROVENTIL HFA/VENTOLIN HFA) 108 (90 BASE) MCG/ACT Inhaler Take 2 puffs prn for wheezing or shortness of breath 1 Inhaler 12     aspirin 81 MG tablet Take 1 tablet by mouth daily.       atorvastatin (LIPITOR) 10 MG tablet Take 4 tablets (40 mg) by mouth daily 90 tablet 3     azithromycin (ZITHROMAX) 250 MG tablet Take as directed 6 tablet 1     blood glucose monitoring (TERRY CONTOUR NEXT) test strip by In Vitro route 3 times daily Use to test blood sugar 3 times daily or as directed. 270 each 3     blood glucose monitoring (TERRY MICROLET) lancets Use to test blood sugar 3 times daily or as directed. 3 Box 3     Blood Pressure KIT daily.       carvedilol (COREG) 12.5 MG tablet Take 1 tablet (12.5 mg) by mouth 2 times daily (with meals) 180 tablet 1     clopidogrel  (PLAVIX) 75 MG tablet Take 1 tablet (75 mg) by mouth daily 90 tablet 3     dicyclomine (BENTYL) 10 MG capsule Take 10 mg by mouth 4 times daily (before meals and nightly).       FISH OIL Unsure of dosage take two caps daily       fluticasone-salmeterol (ADVAIR) 250-50 MCG/DOSE diskus inhaler Inhale 1 puff into the lungs 2 times daily 1 Inhaler 12     fluticasone-salmeterol (ADVAIR) 250-50 MCG/DOSE diskus inhaler Inhale 1 puff into the lungs 2 times daily. 1 Inhaler 12     hydrocodone-acetaminophen (LORCET)  MG per tablet Take 1 tablet by mouth every 6 hours as needed.       Immune Globulin, Human, 25 GM/500ML SOLN Inject 25 g into the vein See Admin Instructions. 25 grams every 3 weeks IVIG       isosorbide mononitrate (IMDUR) 60 MG 24 hr tablet Take 1 tablet (60 mg) by mouth daily 90 tablet 3     meperidine (DEMEROL) 25 MG/ML injection Inject 25 mg into the vein as needed. 25 mg IV prior to IgG infusion       metFORMIN (GLUCOPHAGE) 1000 MG tablet Take 1 tablet (1,000 mg) by mouth daily (with dinner) Take 1 tablet twice daily  0     nitroglycerin (NITROQUICK) 0.4 MG sublingual tablet Place 1 tablet (0.4 mg) under the tongue every 5 minutes as needed 25 tablet 3     omeprazole (PRILOSEC) 20 MG capsule Take 1 capsule (20 mg) by mouth 2 times daily 60 capsule 6     omeprazole (PRILOSEC) 20 MG CR capsule Take 1 capsule (20 mg) by mouth daily 60 capsule 6     ONE TOUCH FINE POINT LANCETS 2 times daily.       ONE TOUCH ULTRA TEST strip Test blood sugar twice a day. 200 strip 3     predniSONE (DELTASONE) 10 MG tablet 4 tablets by mouth each day for 3 days, then 3 tablets each day for 3 days, then 2 tablets each day for 3 days, then 1 tablet each day for 3 days, then off 30 tablet 1     tiotropium (SPIRIVA HANDIHALER) 18 MCG capsule One puff every day 30 capsule 6     tiotropium (SPIRIVA HANDIHALER) 18 MCG inhalation capsule One puff every day 30 capsule 6     predniSONE (DELTASONE) 20 MG tablet Patient to take 60 mg  "the evening before the procedure and 30 mg the morning of the procedure. (Patient not taking: Reported on 8/14/2018) 5 tablet 0     traMADol (ULTRAM) 50 MG tablet Take 1 tablet (50 mg) by mouth every 6 hours as needed for pain (Patient not taking: Reported on 8/14/2018) 20 tablet 0     ROS:    ROS: 10 point ROS neg other than the symptoms noted above in the HPI.    EXAM:  /76 (BP Location: Left arm, Patient Position: Chair, Cuff Size: Child)  Pulse 68  Ht 1.549 m (5' 1\")  Wt 41.7 kg (92 lb)  SpO2 99%  BMI 17.38 kg/m2  General: seated in chair, in NAD  HEENT: NC/AT, anicteric sclera, MMM  Card: RRR, normal S1/S2, no murmur, gallop or rub noted  Pulm: CTA B, no rales, wheezing, or rhonchi  GI: ND, +BS, soft, NT  Skin: no rashes noted  Neurological: alert, normal speech, no gross motor deficits  Psych: pleasant mood and appropriate affect    Labs:  CBC RESULTS:  Lab Results   Component Value Date    WBC 5.0 06/15/2018    RBC 4.57 06/15/2018    HGB 12.5 06/15/2018    HCT 37.7 06/15/2018    MCV 83 06/15/2018    MCH 27.4 06/15/2018    MCHC 33.2 06/15/2018    RDW 14.0 06/15/2018     06/15/2018     CMP RESULTS:  Lab Results   Component Value Date     06/15/2018    POTASSIUM 3.5 06/15/2018    CHLORIDE 104 06/15/2018    CO2 27 06/15/2018    ANIONGAP 10 06/15/2018     (H) 06/15/2018    BUN 23 06/15/2018    CR 0.94 06/15/2018    GFRESTIMATED 58 (L) 06/15/2018    GFRESTBLACK 70 06/15/2018    JAVON 8.9 06/15/2018      Lipids: 4/5/2017: /HDL 60//Tri 226    Data:  Carotid Duplex 12/2017: NADIA 50-69%, LICA <50%      Assessment and Plan: Jennifer Alvarez is a 77 year old female with peripheral arterial disease including renal artery stenosis s/p left renal stent in 2017, coronary artery disease s/p LAD stent in 2007 and Ig deficiency who presents to cardiology for follow up. Her blood pressure is on the higher side today, which seems to have trended up since last fall after the stent. She " has not had follow up imaging yet, it was ordered but appears not done. I commented to her to add back hydrochlorothiazide which was not on her list and she noted she had been taking it. Will obtain repeat US and if no recurrent stenosis then consider re-addition of ACEI.    1. PAD/CAD  -continue aspirin daily  -Continue statin- needs updated panel- if LDL still >100 would increase atorvastatin to 80mg daily  -continue Plavix 75mg daily- will obtain Renal US results first before deciding if ok to stop  -continue BB    2. HTN  -assure taking hydrochlorothiazide 25mg daily, consider addition of ACEI if renal US unremarkable      Eloina Tavares PA-C  [facility]  Pager [phone]

## 2018-08-14 NOTE — MR AVS SNAPSHOT
After Visit Summary   8/14/2018    Jennifer Alvarez    MRN: [id number]           Patient Information     Date Of Birth          1940        Visit Information        Provider Department      8/14/2018 1:00 PM 1, Huyen Cv Device [facility]         Follow-ups after your visit        Your next 10 appointments already scheduled     Aug 17, 2018 11:00 AM CDT   Infusion 300 with UC SPEC INFUSION, UC 43 ATC   [facility] ([facility])    [address]  [address]  [place] [address] [phone]            Aug 17, 2018  4:00 PM CDT   US RENAL COMPLETE WITH DUPLEX COMPLETE with [id number]   [facility] US ([facility])    [address]  1st Floor  [place] [address] [phone]           Please bring a list of your medicines (including vitamins, minerals and over-the-counter drugs). Also, tell your doctor about any allergies you may have. Wear comfortable clothes and leave your valuables at home.  Adults: No eating or drinking for 8 hours before the exam. You may take medicine with a small sip of water.  Children: - Infants, breast-fed: may have breast milk up to 2 hours before exam. - Infants, formula: may have bottle until 4 hours before exam. - Children 1-5 years: No food or drink for 4 hours before exam. - Children 6 -12 years: No food or drink for 6 hours before exam. - Children over 12 years: No food or drink for 8 hours before exam. - J Tube Fed: No need to stop feedings.  Please call the Imaging Department at your exam site with any questions.            Sep 07, 2018 11:00 AM CDT   Infusion 300 with UC SPEC INFUSION, UC 44 ATC   [facility] ([facility])    [address]  [address]  [place] [id number] [phone]            Sep 28, 2018 11:00 AM CDT   Infusion 300 with  UC SPEC INFUSION, UC 44 ATC   [facility] ([facility])    [address]  [address]  [facility] [address]   [phone]            Oct 19, 2018 11:00 AM CDT   Infusion 300 with UC SPEC INFUSION, UC 44 ATC   [facility] ([facility])    [address]  [address]  [facility] [address]   [phone]            Nov 09, 2018 11:00 AM CST   Infusion 300 with UC SPEC INFUSION   [facility] ([facility])    [address]  [address]  [facility] [address]   [phone]              Future tests that were ordered for you today     Open Future Orders        Priority Expected Expires Ordered    US Renal Complete w Doppler Complete Routine 8/17/2018 11/19/2018 8/14/2018            Who to contact     If you have questions or need follow up information about today's clinic visit or your schedule please contact [facility] directly at [phone].  Normal or non-critical lab and imaging results will be communicated to you by [employer], letter or phone within 4 business days after the clinic has received the results. If you do not hear from us within 7 days, please contact the clinic through [employer] or phone. If you have a critical or abnormal lab result, we will notify you by phone as soon as possible.  Submit refill requests through [employer] or call your pharmacy and they will forward the refill request to us. Please allow 3 business days for your refill to be completed.          Additional Information About Your Visit        [employer] Information     [employer] gives you secure access to your electronic health record. If you see a primary care provider, you can also send messages to your care team and make appointments. If you have questions, please call your primary care clinic.  If  you do not have a primary care provider, please call [phone] and they will assist you.        Care EveryWhere ID     This is your Care EveryWhere ID. This could be used by other organizations to access your [place] medical records  [id number]         Blood Pressure from Last 3 Encounters:   08/14/18 149/76   07/27/18 122/50   07/06/18 142/62    Weight from Last 3 Encounters:   08/14/18 41.7 kg (92 lb)   07/27/18 41.5 kg (91 lb 8 oz)   07/06/18 42.2 kg (93 lb 1.6 oz)              Today, you had the following     No orders found for display         Today's Medication Changes          These changes are accurate as of 8/14/18  1:34 PM.  If you have any questions, ask your nurse or doctor.               Start taking these medicines.        Dose/Directions    hydrochlorothiazide 25 MG tablet   Commonly known as:  HYDRODIURIL   Used for:  Benign essential hypertension   Started by:  Eloina Tavares PA-C        Dose:  25 mg   Take 1 tablet (25 mg) by mouth daily   Quantity:  90 tablet   Refills:  3            Where to get your medicines      These medications were sent to [facility] [address] [address]     Phone:  [phone]     hydrochlorothiazide 25 MG tablet                Primary Care Provider Office Phone # Fax #    Brandi Burks [phone]       [facility] [address]   [facility] [address]        Equal Access to Services     SUJATHA HOLT AH: Hadii adolph rayo Sosusyali, waaxda luqadaha, qaybta kaalmada maday, chester lovelace. So [facility] [phone].    ATENCIÓN: Si habla español, tiene a taylor disposición servicios gratuitos de asistencia lingüística. Llame al [phone].    We comply with applicable federal civil rights laws and [place] laws. We do not discriminate on the basis of race, color, national origin, age, disability, sex, sexual orientation, or gender identity.            Thank you!      Thank you for choosing [facility]  for your care. Our goal is always to provide you with excellent care. Hearing back from our patients is one way we can continue to improve our services. Please take a few minutes to complete the written survey that you may receive in the mail after your visit with us. Thank you!             Your Updated Medication List - Protect others around you: Learn how to safely use, store and throw away your medicines at www.disposemymeds.org.          This list is accurate as of 8/14/18  1:34 PM.  Always use your most recent med list.                   Brand Name Dispense Instructions for use Diagnosis    * albuterol 108 (90 Base) MCG/ACT inhaler    PROAIR HFA/PROVENTIL HFA/VENTOLIN HFA    1 Inhaler    Take 2 puffs prn for wheezing or shortness of breath    Wheezing       * albuterol 108 (90 Base) MCG/ACT inhaler    PROAIR HFA/PROVENTIL HFA/VENTOLIN HFA    1 Inhaler    Take 2 puffs as needed for shortness of breath    Mixed simple and mucopurulent chronic bronchitis (H), Gastroesophageal reflux disease without esophagitis       * albuterol (2.5 MG/3ML) 0.083% neb solution      Take 1 ampule by nebulization every 6 hours as needed.        aspirin 81 MG tablet      Take 1 tablet by mouth daily.        atorvastatin 10 MG tablet    LIPITOR    90 tablet    Take 4 tablets (40 mg) by mouth daily    Hyperlipidemia LDL goal <100       azithromycin 250 MG tablet    ZITHROMAX    6 tablet    Take as directed    Mixed simple and mucopurulent chronic bronchitis (H), Gastroesophageal reflux disease without esophagitis       Blood Pressure Kit      daily.        carvedilol 12.5 MG tablet    COREG    180 tablet    Take 1 tablet (12.5 mg) by mouth 2 times daily (with meals)    Secondary hypertension       clopidogrel 75 MG tablet    PLAVIX    90 tablet    Take 1 tablet (75 mg) by mouth daily    Coronary artery disease       DEMEROL 25 MG/ML injection   Generic drug:  meperidine      Inject 25 mg into  the vein as needed. 25 mg IV prior to IgG infusion        dicyclomine 10 MG capsule    BENTYL     Take 10 mg by mouth 4 times daily (before meals and nightly).        FISH OIL      Unsure of dosage take two caps daily        * fluticasone-salmeterol 250-50 MCG/DOSE diskus inhaler    ADVAIR    1 Inhaler    Inhale 1 puff into the lungs 2 times daily.    Unspecified chronic bronchitis       * fluticasone-salmeterol 250-50 MCG/DOSE diskus inhaler    ADVAIR    1 Inhaler    Inhale 1 puff into the lungs 2 times daily    Mixed simple and mucopurulent chronic bronchitis (H), Gastroesophageal reflux disease without esophagitis       hydrochlorothiazide 25 MG tablet    HYDRODIURIL    90 tablet    Take 1 tablet (25 mg) by mouth daily    Benign essential hypertension       HYDROcodone-acetaminophen  MG per tablet    LORCET     Take 1 tablet by mouth every 6 hours as needed.        Immune Globulin (Human) 25 GM/500ML Soln      Inject 25 g into the vein See Admin Instructions. 25 grams every 3 weeks IVIG        isosorbide mononitrate 60 MG 24 hr tablet    IMDUR    90 tablet    Take 1 tablet (60 mg) by mouth daily    HTN (hypertension)       metFORMIN 1000 MG tablet    GLUCOPHAGE     Take 1 tablet (1,000 mg) by mouth daily (with dinner) Take 1 tablet twice daily    DM (diabetes mellitus) (H)       nitroGLYcerin 0.4 MG sublingual tablet    NITROQUICK    25 tablet    Place 1 tablet (0.4 mg) under the tongue every 5 minutes as needed    Chest pain       * omeprazole 20 MG CR capsule    priLOSEC    60 capsule    Take 1 capsule (20 mg) by mouth 2 times daily    Esophageal reflux, Unspecified chronic bronchitis       * omeprazole 20 MG CR capsule    priLOSEC    60 capsule    Take 1 capsule (20 mg) by mouth daily    Mixed simple and mucopurulent chronic bronchitis (H), Gastroesophageal reflux disease without esophagitis       * blood glucose monitoring lancets     3 Box    Use to test blood sugar 3 times daily or as directed.     Type 2 diabetes mellitus with complication (H)       * ONE TOUCH FINE POINT LANCETS      2 times daily.        * ONETOUCH ULTRA test strip   Generic drug:  blood glucose monitoring     200 strip    Test blood sugar twice a day.    Diabetes mellitus type II       * blood glucose monitoring test strip    TERRY CONTOUR NEXT    270 each    by In Vitro route 3 times daily Use to test blood sugar 3 times daily or as directed.    Type 2 diabetes mellitus with complication (H)       * predniSONE 20 MG tablet    DELTASONE    5 tablet    Patient to take 60 mg the evening before the procedure and 30 mg the morning of the procedure.    Allergic reaction to contrast dye       * predniSONE 10 MG tablet    DELTASONE    30 tablet    4 tablets by mouth each day for 3 days, then 3 tablets each day for 3 days, then 2 tablets each day for 3 days, then 1 tablet each day for 3 days, then off    Mixed simple and mucopurulent chronic bronchitis (H), Gastroesophageal reflux disease without esophagitis       * tiotropium 18 MCG capsule    SPIRIVA HANDIHALER    30 capsule    One puff every day    Unspecified chronic bronchitis       * tiotropium 18 MCG capsule    SPIRIVA HANDIHALER    30 capsule    One puff every day    Mixed simple and mucopurulent chronic bronchitis (H), Gastroesophageal reflux disease without esophagitis       traMADol 50 MG tablet    ULTRAM    20 tablet    Take 1 tablet (50 mg) by mouth every 6 hours as needed for pain    Pacemaker battery depletion       TYLENOL 325 MG tablet   Generic drug:  acetaminophen      Take 1-2 tablets by mouth every 6 hours as needed.        * Notice:  This list has 15 medication(s) that are the same as other medications prescribed for you. Read the directions carefully, and ask your doctor or other care provider to review them with you.

## 2018-08-14 NOTE — PROGRESS NOTES
Preliminary Device Interrogation Results.  Final physician signed paceart report to be scanned and attached.    Patient seen in clinic for evaluation and iterative programming of her [employer] dual lead pacemaker per MD orders.  Patient saw  VASQUEZ Negron prior to her device check today.  Normal pacemaker function.  1 AMS  episode recorded-4 seconds, no stored egm available for review.   1 VHR episode recorded which appears to be SVT-2 seconds, 218 bpm.  Patient asymptomatic.   Intrinsic rhythm = NSR  @ 60 bpm.  AP = 31%.   = <1%.  Estimated battery longevity to MARIA T = 9-11 years.  Patient reports that she is feeling well. However, she does note that over the past month she has experienced an itchy feeling deep inside her device pocket.  Her device site is well-healed without any swelling or erythema present.  She was instructed to notify the device nurses should this continue or worsen.  Plan for patient to return to clinic in 3 months.    Dual lead pacemaker

## 2018-08-14 NOTE — MR AVS SNAPSHOT
After Visit Summary   8/14/2018    Jennifer Alvarez    MRN: [id number]           Patient Information     Date Of Birth          1940        Visit Information        Provider Department      8/14/2018 12:30 PM Eloina Tavares PA-C [facility]        Today's Diagnoses     Renal artery stenosis (H)    -  1    Benign essential hypertension          Care Instructions    It was a pleasure to see you in the cardiology clinic today.    If you have any questions, you can reach our nurses at [phone].  Press Option #1 for the [facility], and then press Option #3 for nursing.    1. Take your hydrochlorothiazide 25mg daily  2. Have the ultrasound of your kidneys on Friday August 17th.    Control your risk of coronary artery disease with these lifestyle changes:  - Eating a heart healthy diet by following the American Heart Association Recommendations: Reduce saturated fat and trans fat to 5-6 percent of daily calories and minimizing the amount of trans fat you eat by limiting your intake of red meat and dairy products made with whole milk. It also means choosing skim milk, low-fat or fat-free dairy products, limiting fried food, and cooking with healthy oils such as vegetable oil. A healthy diet should include emphasis on fruits, vegetables, whole grains, poultry, fish and nuts, and limiting sugary foods and beverages. We recommend following the DASH (Dietary Approaches to Stop Hypertension) or Mediterranean Diet.  - Regular Exercise: Just 40 minutes of aerobic exercise of moderate to vigorous intensity done 3-4 times per week is enough to lower both cholesterol and high blood pressure. Brisk walking, swimming, bicycling or a dance class are examples.  - Avoiding Tobacco Smoking: Smoking compounds the risk from other risk factors for heart disease including high cholesterol, high blood pressure, and diabetes. Smokers can lower cholesterol, blood pressure,  and protect their arteries by quitting. Ask to learn more about quitting smoking.  - Losing Weight: Being overweight or obese raises your risk of high cholesterol, high blood pressure, and diabetes which are all risk factors for heart disease. Losing excess weight can improve cholesterol levels, blood pressure, and reduce incidence of diabetes and potentially reverse these disease processes.       Eloina Tavares PA-C  [facility]                Follow-ups after your visit        Your next 10 appointments already scheduled     Aug 17, 2018 11:00 AM CDT   Infusion 300 with UC SPEC INFUSION, UC 43 ATC   [facility] [place] [facility] ([facility] [place])    [address]  [address]  [facility] [address] [phone]            Aug 17, 2018  4:00 PM CDT   US RENAL COMPLETE WITH DUPLEX COMPLETE with [id number]   [facility] US ([facility])    [address]  1st Floor  [facility] [id number] [phone]           Please bring a list of your medicines (including vitamins, minerals and over-the-counter drugs). Also, tell your doctor about any allergies you may have. Wear comfortable clothes and leave your valuables at home.  Adults: No eating or drinking for 8 hours before the exam. You may take medicine with a small sip of water.  Children: - Infants, breast-fed: may have breast milk up to 2 hours before exam. - Infants, formula: may have bottle until 4 hours before exam. - Children 1-5 years: No food or drink for 4 hours before exam. - Children 6 -12 years: No food or drink for 6 hours before exam. - Children over 12 years: No food or drink for 8 hours before exam. - J Tube Fed: No need to stop feedings.  Please call the Imaging Department at your exam site with any questions.            Sep 07, 2018 11:00 AM CDT   Infusion 300 with UC SPEC INFUSION, UC 44 ATC   [facility] Specialty and Procedure ([facility])    [address]  [address]  [place] [address]   [phone]            Sep 28, 2018 11:00 AM CDT   Infusion 300 with UC SPEC INFUSION, UC 44 ATC   [facility] Specialty [facility] Procedure ([facility])    [address]  [address]  [place] [address]   [phone]            Oct 19, 2018 11:00 AM CDT   Infusion 300 with UC SPEC INFUSION, UC 44 ATC   [facility] Procedure ([facility])    [address]  [address]  [place] [address]   [phone]            Nov 09, 2018 11:00 AM CST   Infusion 300 with UC SPEC INFUSION   [facility] Procedure ([facility])    [address]  [address]  [place] [address]   [phone]              Future tests that were ordered for you today     Open Future Orders        Priority Expected Expires Ordered    US Renal Complete w Doppler Complete Routine 8/17/2018 11/19/2018 8/14/2018            Who to contact     If you have questions or need follow up information about today's clinic visit or your schedule please contact [facility] directly at [phone].  Normal or non-critical lab and imaging results will be communicated to you by [employer], letter or phone within 4 business days after the clinic has received the results. If you do not hear from us within 7 days, please contact the clinic through [employer] or phone. If you have a critical or abnormal lab result, we will notify you by phone as soon as possible.  Submit refill requests through [email or web address] or call your pharmacy and they will forward the refill request to us. Please allow 3 business days for your refill to be completed.          Additional Information About Your Visit        [email or web address] Information     [email or web address] gives you  "secure access to your electronic health record. If you see a primary care provider, you can also send messages to your care team and make appointments. If you have questions, please call your primary care clinic.  If you do not have a primary care provider, please call [phone] and they will assist you.        Care EveryWhere ID     This is your Care EveryWhere ID. This could be used by other organizations to access your [place] medical records  [id number]        Your Vitals Were     Pulse Height Pulse Oximetry BMI (Body Mass Index)          68 1.549 m (5' 1\") 99% 17.38 kg/m2         Blood Pressure from Last 3 Encounters:   08/14/18 149/76   07/27/18 122/50   07/06/18 142/62    Weight from Last 3 Encounters:   08/14/18 41.7 kg (92 lb)   07/27/18 41.5 kg (91 lb 8 oz)   07/06/18 42.2 kg (93 lb 1.6 oz)                 Today's Medication Changes          These changes are accurate as of 8/14/18  1:34 PM.  If you have any questions, ask your nurse or doctor.               Start taking these medicines.        Dose/Directions    hydrochlorothiazide 25 MG tablet   Commonly known as:  HYDRODIURIL   Used for:  Benign essential hypertension   Started by:  Eloina Tavares PA-C        Dose:  25 mg   Take 1 tablet (25 mg) by mouth daily   Quantity:  90 tablet   Refills:  3            Where to get your medicines      These medications were sent to [facility] - [place] [id number]     Phone:  [phone]     hydrochlorothiazide 25 MG tablet                Primary Care Provider Office Phone # Fax #    Brandi Burks [phone]       [facility] [address]   [facility] [address]        Equal Access to Services     GUERA HOLT AH: Anshu Altamirano, walisandra coffman, qamyles kaalmada sonamda, chester lovelace. [facility] [phone].    ATENCIÓN: Si habla español, tiene a taylor disposición servicios gratuitos de " asistencia lingüística. Ben al [phone].    We comply with applicable federal civil rights laws and [place] laws. We do not discriminate on the basis of race, color, national origin, age, disability, sex, sexual orientation, or gender identity.            Thank you!     Thank you for choosing [facility]  for your care. Our goal is always to provide you with excellent care. Hearing back from our patients is one way we can continue to improve our services. Please take a few minutes to complete the written survey that you may receive in the mail after your visit with us. Thank you!             Your Updated Medication List - Protect others around you: Learn how to safely use, store and throw away your medicines at www.disposemymeds.org.          This list is accurate as of 8/14/18  1:34 PM.  Always use your most recent med list.                   Brand Name Dispense Instructions for use Diagnosis    * albuterol 108 (90 Base) MCG/ACT inhaler    PROAIR HFA/PROVENTIL HFA/VENTOLIN HFA    1 Inhaler    Take 2 puffs prn for wheezing or shortness of breath    Wheezing       * albuterol 108 (90 Base) MCG/ACT inhaler    PROAIR HFA/PROVENTIL HFA/VENTOLIN HFA    1 Inhaler    Take 2 puffs as needed for shortness of breath    Mixed simple and mucopurulent chronic bronchitis (H), Gastroesophageal reflux disease without esophagitis       * albuterol (2.5 MG/3ML) 0.083% neb solution      Take 1 ampule by nebulization every 6 hours as needed.        aspirin 81 MG tablet      Take 1 tablet by mouth daily.        atorvastatin 10 MG tablet    LIPITOR    90 tablet    Take 4 tablets (40 mg) by mouth daily    Hyperlipidemia LDL goal <100       azithromycin 250 MG tablet    ZITHROMAX    6 tablet    Take as directed    Mixed simple and mucopurulent chronic bronchitis (H), Gastroesophageal reflux disease without esophagitis       Blood Pressure Kit      daily.        carvedilol 12.5 MG tablet    COREG    180 tablet    Take 1  tablet (12.5 mg) by mouth 2 times daily (with meals)    Secondary hypertension       clopidogrel 75 MG tablet    PLAVIX    90 tablet    Take 1 tablet (75 mg) by mouth daily    Coronary artery disease       DEMEROL 25 MG/ML injection   Generic drug:  meperidine      Inject 25 mg into the vein as needed. 25 mg IV prior to IgG infusion        dicyclomine 10 MG capsule    BENTYL     Take 10 mg by mouth 4 times daily (before meals and nightly).        FISH OIL      Unsure of dosage take two caps daily        * fluticasone-salmeterol 250-50 MCG/DOSE diskus inhaler    ADVAIR    1 Inhaler    Inhale 1 puff into the lungs 2 times daily.    Unspecified chronic bronchitis       * fluticasone-salmeterol 250-50 MCG/DOSE diskus inhaler    ADVAIR    1 Inhaler    Inhale 1 puff into the lungs 2 times daily    Mixed simple and mucopurulent chronic bronchitis (H), Gastroesophageal reflux disease without esophagitis       hydrochlorothiazide 25 MG tablet    HYDRODIURIL    90 tablet    Take 1 tablet (25 mg) by mouth daily    Benign essential hypertension       HYDROcodone-acetaminophen  MG per tablet    LORCET     Take 1 tablet by mouth every 6 hours as needed.        Immune Globulin (Human) 25 GM/500ML Soln      Inject 25 g into the vein See Admin Instructions. 25 grams every 3 weeks IVIG        isosorbide mononitrate 60 MG 24 hr tablet    IMDUR    90 tablet    Take 1 tablet (60 mg) by mouth daily    HTN (hypertension)       metFORMIN 1000 MG tablet    GLUCOPHAGE     Take 1 tablet (1,000 mg) by mouth daily (with dinner) Take 1 tablet twice daily    DM (diabetes mellitus) (H)       nitroGLYcerin 0.4 MG sublingual tablet    NITROQUICK    25 tablet    Place 1 tablet (0.4 mg) under the tongue every 5 minutes as needed    Chest pain       * omeprazole 20 MG CR capsule    priLOSEC    60 capsule    Take 1 capsule (20 mg) by mouth 2 times daily    Esophageal reflux, Unspecified chronic bronchitis       * omeprazole 20 MG CR capsule     priLOSEC    60 capsule    Take 1 capsule (20 mg) by mouth daily    Mixed simple and mucopurulent chronic bronchitis (H), Gastroesophageal reflux disease without esophagitis       * blood glucose monitoring lancets     3 Box    Use to test blood sugar 3 times daily or as directed.    Type 2 diabetes mellitus with complication (H)       * ONE TOUCH FINE POINT LANCETS      2 times daily.        * ONETOUCH ULTRA test strip   Generic drug:  blood glucose monitoring     200 strip    Test blood sugar twice a day.    Diabetes mellitus type II       * blood glucose monitoring test strip    TERRY CONTOUR NEXT    270 each    by In Vitro route 3 times daily Use to test blood sugar 3 times daily or as directed.    Type 2 diabetes mellitus with complication (H)       * predniSONE 20 MG tablet    DELTASONE    5 tablet    Patient to take 60 mg the evening before the procedure and 30 mg the morning of the procedure.    Allergic reaction to contrast dye       * predniSONE 10 MG tablet    DELTASONE    30 tablet    4 tablets by mouth each day for 3 days, then 3 tablets each day for 3 days, then 2 tablets each day for 3 days, then 1 tablet each day for 3 days, then off    Mixed simple and mucopurulent chronic bronchitis (H), Gastroesophageal reflux disease without esophagitis       * tiotropium 18 MCG capsule    SPIRIVA HANDIHALER    30 capsule    One puff every day    Unspecified chronic bronchitis       * tiotropium 18 MCG capsule    SPIRIVA HANDIHALER    30 capsule    One puff every day    Mixed simple and mucopurulent chronic bronchitis (H), Gastroesophageal reflux disease without esophagitis       traMADol 50 MG tablet    ULTRAM    20 tablet    Take 1 tablet (50 mg) by mouth every 6 hours as needed for pain    Pacemaker battery depletion       TYLENOL 325 MG tablet   Generic drug:  acetaminophen      Take 1-2 tablets by mouth every 6 hours as needed.        * Notice:  This list has 15 medication(s) that are the same as other  medications prescribed for you. Read the directions carefully, and ask your doctor or other care provider to review them with you.

## 2018-08-14 NOTE — PATIENT INSTRUCTIONS
It was a pleasure to see you in clinic today.  Please do not hesitate to call with any questions or concerns.  We look forward to seeing you in clinic at your next device check in 3 months.    Tona Arce, RN, BSN  Electrophysiology Nurse Clinician  [facility]    During Business Hours Please Call:  [phone]  After Hours Please Call:  [phone] - select option #4 and ask for job code [id number]

## 2018-08-14 NOTE — PROGRESS NOTES
CARDIOLOGY CLINIC   Jennifer Alvarez is a 77 year old female with Ig deficiency, coronary artery disease and peripheral artery disease s/p renal stenting in July 2017 who presents for follow up.     She had a positive nuclear stress study in December 2007 which led to coronary angiography and stenting of her mid and distal LAD. She last had a repeat angiogram in 2010 which showed her stents were patent and she had a negative stress echo in 2011. She underwent pacemaker placement in 2008 for sick sinus syndrome and had generator change this past February. In July 2017 she had stenting of her left renal artery for renal artery stenosis.     Today she reports to be doing ok. She said she [place] us [place] in her [place] and they found some carotid stenosis. She said it's a sudden onset of shocking discomfort with some tingling/numbness in the lower right face for <1 minutes. No associated vision or speech changes. She says it's been intermittent for about 1 year but seems to be increasing. No chest pain, palpitations, dyspnea, swelling, PND, orthopnea, dizziness, syncope.     She has a device check after her visit with me. She says it's been very itchy deep inside, no obvious redness or rash and not painful.     PAST MEDICAL HISTORY:  Past Medical History:   Diagnosis Date     Abdominal pain     cramping     Arthritis      CAD (coronary artery disease)     stent x2, ppm     Cancer (H)     skin     Cardiac pacemaker      CC (Crohn's colitis) (H)      Chronic kidney disease      Diabetes (H)     DM type 2, oral agent     Diarrhea      GERD (gastroesophageal reflux disease)      History of blood transfusion      HTN (hypertension)      Hyperlipidemia LDL goal <70 11/19/2013     IgA deficiency (H)      Neck pain 12/31/2014     Uncomplicated asthma      Weight loss      SOCIAL HISTORY:  Social History     Social History     Marital status:      Spouse  name: N/A     Number of children: N/A     Years of education: N/A     Social History Main Topics     Smoking status: Former Smoker     Packs/day: 0.20     Years: 20.00     Types: Cigarettes     Quit date: 2/24/1974     Smokeless tobacco: Never Used     Alcohol use No     Drug use: No     Sexual activity: Not Asked     Other Topics Concern     None     Social History Narrative     CURRENT MEDICATIONS:  Current Outpatient Prescriptions   Medication Sig Dispense Refill     acetaminophen (TYLENOL) 325 MG tablet Take 1-2 tablets by mouth every 6 hours as needed.       albuterol (2.5 MG/3ML) 0.083% nebulizer solution Take 1 ampule by nebulization every 6 hours as needed.       albuterol (PROAIR HFA/PROVENTIL HFA/VENTOLIN HFA) 108 (90 Base) MCG/ACT Inhaler Take 2 puffs as needed for shortness of breath 1 Inhaler 12     albuterol (PROAIR HFA/PROVENTIL HFA/VENTOLIN HFA) 108 (90 BASE) MCG/ACT Inhaler Take 2 puffs prn for wheezing or shortness of breath 1 Inhaler 12     aspirin 81 MG tablet Take 1 tablet by mouth daily.       atorvastatin (LIPITOR) 10 MG tablet Take 4 tablets (40 mg) by mouth daily 90 tablet 3     azithromycin (ZITHROMAX) 250 MG tablet Take as directed 6 tablet 1     blood glucose monitoring (TERRY CONTOUR NEXT) test strip by In Vitro route 3 times daily Use to test blood sugar 3 times daily or as directed. 270 each 3     blood glucose monitoring (TERRY MICROLET) lancets Use to test blood sugar 3 times daily or as directed. 3 Box 3     Blood Pressure KIT daily.       carvedilol (COREG) 12.5 MG tablet Take 1 tablet (12.5 mg) by mouth 2 times daily (with meals) 180 tablet 1     clopidogrel (PLAVIX) 75 MG tablet Take 1 tablet (75 mg) by mouth daily 90 tablet 3     dicyclomine (BENTYL) 10 MG capsule Take 10 mg by mouth 4 times daily (before meals and nightly).       FISH OIL Unsure of dosage take two caps daily       fluticasone-salmeterol (ADVAIR) 250-50 MCG/DOSE diskus inhaler Inhale 1 puff into the lungs 2  times daily 1 Inhaler 12     fluticasone-salmeterol (ADVAIR) 250-50 MCG/DOSE diskus inhaler Inhale 1 puff into the lungs 2 times daily. 1 Inhaler 12     hydrocodone-acetaminophen (LORCET)  MG per tablet Take 1 tablet by mouth every 6 hours as needed.       Immune Globulin, Human, 25 GM/500ML SOLN Inject 25 g into the vein See Admin Instructions. 25 grams every 3 weeks IVIG       isosorbide mononitrate (IMDUR) 60 MG 24 hr tablet Take 1 tablet (60 mg) by mouth daily 90 tablet 3     meperidine (DEMEROL) 25 MG/ML injection Inject 25 mg into the vein as needed. 25 mg IV prior to IgG infusion       metFORMIN (GLUCOPHAGE) 1000 MG tablet Take 1 tablet (1,000 mg) by mouth daily (with dinner) Take 1 tablet twice daily  0     nitroglycerin (NITROQUICK) 0.4 MG sublingual tablet Place 1 tablet (0.4 mg) under the tongue every 5 minutes as needed 25 tablet 3     omeprazole (PRILOSEC) 20 MG capsule Take 1 capsule (20 mg) by mouth 2 times daily 60 capsule 6     omeprazole (PRILOSEC) 20 MG CR capsule Take 1 capsule (20 mg) by mouth daily 60 capsule 6     ONE TOUCH FINE POINT LANCETS 2 times daily.       ONE TOUCH ULTRA TEST strip Test blood sugar twice a day. 200 strip 3     predniSONE (DELTASONE) 10 MG tablet 4 tablets by mouth each day for 3 days, then 3 tablets each day for 3 days, then 2 tablets each day for 3 days, then 1 tablet each day for 3 days, then off 30 tablet 1     tiotropium (SPIRIVA HANDIHALER) 18 MCG capsule One puff every day 30 capsule 6     tiotropium (SPIRIVA HANDIHALER) 18 MCG inhalation capsule One puff every day 30 capsule 6     predniSONE (DELTASONE) 20 MG tablet Patient to take 60 mg the evening before the procedure and 30 mg the morning of the procedure. (Patient not taking: Reported on 8/14/2018) 5 tablet 0     traMADol (ULTRAM) 50 MG tablet Take 1 tablet (50 mg) by mouth every 6 hours as needed for pain (Patient not taking: Reported on 8/14/2018) 20 tablet 0     ROS:    ROS: 10 point ROS neg other  "than the symptoms noted above in the HPI.    EXAM:  /76 (BP Location: Left arm, Patient Position: Chair, Cuff Size: Child)  Pulse 68  Ht 1.549 m (5' 1\")  Wt 41.7 kg (92 lb)  SpO2 99%  BMI 17.38 kg/m2  General: seated in chair, in NAD  HEENT: NC/AT, anicteric sclera, MMM  Card: RRR, normal S1/S2, no murmur, gallop or rub noted  Pulm: CTA B, no rales, wheezing, or rhonchi  GI: ND, +BS, soft, NT  Skin: no rashes noted  Neurological: alert, normal speech, no gross motor deficits  Psych: pleasant mood and appropriate affect    Labs:  CBC RESULTS:  Lab Results   Component Value Date    WBC 5.0 06/15/2018    RBC 4.57 06/15/2018    HGB 12.5 06/15/2018    HCT 37.7 06/15/2018    MCV 83 06/15/2018    MCH 27.4 06/15/2018    MCHC 33.2 06/15/2018    RDW 14.0 06/15/2018     06/15/2018     CMP RESULTS:  Lab Results   Component Value Date     06/15/2018    POTASSIUM 3.5 06/15/2018    CHLORIDE 104 06/15/2018    CO2 27 06/15/2018    ANIONGAP 10 06/15/2018     (H) 06/15/2018    BUN 23 06/15/2018    CR 0.94 06/15/2018    GFRESTIMATED 58 (L) 06/15/2018    GFRESTBLACK 70 06/15/2018    JAVON 8.9 06/15/2018      Lipids: 4/5/2017: /HDL 60//Tri 226    Data:  Carotid Duplex 12/2017: NADIA 50-69%, LICA <50%      Assessment and Plan: Jennifer Alvarez is a 77 year old female with peripheral arterial disease including renal artery stenosis s/p left renal stent in 2017, coronary artery disease s/p LAD stent in 2007 and Ig deficiency who presents to cardiology for follow up. Her blood pressure is on the higher side today, which seems to have trended up since last fall after the stent. She has not had follow up imaging yet, it was ordered but appears not done. I commented to her to add back hydrochlorothiazide which was not on her list and she noted she had been taking it. Will obtain repeat US and if no recurrent stenosis then consider re-addition of ACEI.    1. PAD/CAD  -continue aspirin daily  -Continue " statin- needs updated panel- if LDL still >100 would increase atorvastatin to 80mg daily  -continue Plavix 75mg daily- will obtain Renal US results first before deciding if ok to stop  -continue BB    2. HTN  -assure taking hydrochlorothiazide 25mg daily, consider addition of ACEI if renal US unremarkable      Eloina Tavares PA-C  [facility]  Pager [phone]

## 2018-08-14 NOTE — NURSING NOTE
Chief Complaint   Patient presents with     Follow Up For     annual return device after     Vitals were performed, medications were reconciled.   Chrissie Mathew MA

## 2018-08-14 NOTE — PATIENT INSTRUCTIONS
It was a pleasure to see you in the cardiology clinic today.    If you have any questions, you can reach our nurses at [phone].  Press Option #1 for the [facility], and then press Option #3 for nursing.    1. Take your hydrochlorothiazide 25mg daily  2. Have the ultrasound of your kidneys on Friday August 17th.    Control your risk of coronary artery disease with these lifestyle changes:  - Eating a heart healthy diet by following the American Heart Association Recommendations: Reduce saturated fat and trans fat to 5-6 percent of daily calories and minimizing the amount of trans fat you eat by limiting your intake of red meat and dairy products made with whole milk. It also means choosing skim milk, low-fat or fat-free dairy products, limiting fried food, and cooking with healthy oils such as vegetable oil. A healthy diet should include emphasis on fruits, vegetables, whole grains, poultry, fish and nuts, and limiting sugary foods and beverages. We recommend following the DASH (Dietary Approaches to Stop Hypertension) or Mediterranean Diet.  - Regular Exercise: Just 40 minutes of aerobic exercise of moderate to vigorous intensity done 3-4 times per week is enough to lower both cholesterol and high blood pressure. Brisk walking, swimming, bicycling or a dance class are examples.  - Avoiding Tobacco Smoking: Smoking compounds the risk from other risk factors for heart disease including high cholesterol, high blood pressure, and diabetes. Smokers can lower cholesterol, blood pressure, and protect their arteries by quitting. Ask to learn more about quitting smoking.  - Losing Weight: Being overweight or obese raises your risk of high cholesterol, high blood pressure, and diabetes which are all risk factors for heart disease. Losing excess weight can improve cholesterol levels, blood pressure, and reduce incidence of diabetes and potentially reverse these disease processes.        Eloina Tavares PA-C  [facility]

## 2018-08-17 ENCOUNTER — RADIANT APPOINTMENT (OUTPATIENT)
Dept: ULTRASOUND IMAGING | Facility: CLINIC | Age: 78
End: 2018-08-17
Attending: PHYSICIAN ASSISTANT
Payer: MEDICARE

## 2018-08-17 ENCOUNTER — INFUSION THERAPY VISIT (OUTPATIENT)
Dept: INFUSION THERAPY | Facility: CLINIC | Age: 78
End: 2018-08-17
Attending: INTERNAL MEDICINE
Payer: MEDICARE

## 2018-08-17 VITALS
BODY MASS INDEX: 17.16 KG/M2 | WEIGHT: 90.8 LBS | SYSTOLIC BLOOD PRESSURE: 145 MMHG | DIASTOLIC BLOOD PRESSURE: 52 MMHG | HEART RATE: 67 BPM | TEMPERATURE: 97.8 F | OXYGEN SATURATION: 99 %

## 2018-08-17 DIAGNOSIS — I70.1 RENAL ARTERY STENOSIS (H): ICD-10-CM

## 2018-08-17 DIAGNOSIS — D80.1 HYPOGAMMAGLOBULINEMIA (H): ICD-10-CM

## 2018-08-17 DIAGNOSIS — D80.2 IGA DEFICIENCY (H): Primary | ICD-10-CM

## 2018-08-17 PROCEDURE — 96365 THER/PROPH/DIAG IV INF INIT: CPT

## 2018-08-17 PROCEDURE — 25000128 H RX IP 250 OP 636: Mod: ZF | Performed by: INTERNAL MEDICINE

## 2018-08-17 PROCEDURE — 96366 THER/PROPH/DIAG IV INF ADDON: CPT

## 2018-08-17 PROCEDURE — 96375 TX/PRO/DX INJ NEW DRUG ADDON: CPT

## 2018-08-17 PROCEDURE — 25000132 ZZH RX MED GY IP 250 OP 250 PS 637: Mod: ZF | Performed by: INTERNAL MEDICINE

## 2018-08-17 PROCEDURE — A9270 NON-COVERED ITEM OR SERVICE: HCPCS | Mod: ZF | Performed by: INTERNAL MEDICINE

## 2018-08-17 RX ORDER — MEPERIDINE HYDROCHLORIDE 25 MG/ML
25 INJECTION INTRAMUSCULAR; INTRAVENOUS; SUBCUTANEOUS
Status: DISCONTINUED | OUTPATIENT
Start: 2018-08-17 | End: 2018-08-17 | Stop reason: HOSPADM

## 2018-08-17 RX ORDER — ACETAMINOPHEN 325 MG/1
650 TABLET ORAL ONCE
Status: CANCELLED
Start: 2018-08-17 | End: 2018-08-17

## 2018-08-17 RX ORDER — DIPHENHYDRAMINE HCL 25 MG
25 CAPSULE ORAL ONCE
Status: CANCELLED
Start: 2018-08-17 | End: 2018-08-17

## 2018-08-17 RX ORDER — ACETAMINOPHEN 325 MG/1
650 TABLET ORAL ONCE
Status: COMPLETED | OUTPATIENT
Start: 2018-08-17 | End: 2018-08-17

## 2018-08-17 RX ORDER — DIPHENHYDRAMINE HCL 25 MG
25 CAPSULE ORAL ONCE
Status: COMPLETED | OUTPATIENT
Start: 2018-08-17 | End: 2018-08-17

## 2018-08-17 RX ORDER — MEPERIDINE HYDROCHLORIDE 25 MG/ML
25 INJECTION INTRAMUSCULAR; INTRAVENOUS; SUBCUTANEOUS
Status: CANCELLED
Start: 2018-08-17

## 2018-08-17 RX ORDER — METHYLPREDNISOLONE SODIUM SUCCINATE 125 MG/2ML
100 INJECTION, POWDER, LYOPHILIZED, FOR SOLUTION INTRAMUSCULAR; INTRAVENOUS ONCE
Status: COMPLETED | OUTPATIENT
Start: 2018-08-17 | End: 2018-08-17

## 2018-08-17 RX ORDER — METHYLPREDNISOLONE SODIUM SUCCINATE 125 MG/2ML
100 INJECTION, POWDER, LYOPHILIZED, FOR SOLUTION INTRAMUSCULAR; INTRAVENOUS ONCE
Status: CANCELLED
Start: 2018-08-17 | End: 2018-08-17

## 2018-08-17 RX ADMIN — ACETAMINOPHEN 650 MG: 325 TABLET ORAL at 11:26

## 2018-08-17 RX ADMIN — METHYLPREDNISOLONE SODIUM SUCCINATE 100 MG: 125 INJECTION, POWDER, FOR SOLUTION INTRAMUSCULAR; INTRAVENOUS at 11:28

## 2018-08-17 RX ADMIN — IMMUNE GLOBULIN INFUSION (HUMAN) 25 G: 100 INJECTION, SOLUTION INTRAVENOUS; SUBCUTANEOUS at 12:04

## 2018-08-17 RX ADMIN — DIPHENHYDRAMINE HYDROCHLORIDE 25 MG: 25 CAPSULE ORAL at 11:27

## 2018-08-17 RX ADMIN — MEPERIDINE HYDROCHLORIDE 25 MG: 25 INJECTION, SOLUTION INTRAMUSCULAR; INTRAVENOUS; SUBCUTANEOUS at 11:34

## 2018-08-17 ASSESSMENT — PAIN DESCRIPTION - DESCRIPTORS: DESCRIPTORS: CONSTANT;DISCOMFORT

## 2018-08-17 NOTE — PROGRESS NOTES
Nursing Note  Jennifer Alvarez [facility] for:   Chief Complaint   Patient presents with     Infusion     IVIG     During [facility]'s [facility] appointment, orders from Dr.Chris Jaleel Elizabeth were completed.  Frequency: every 3 weeks    Progress note:  Patient identification verified by name and date of birth.  Assessment completed.  Vitals recorded in Doc Flowsheets.  Patient was provided with education regarding infusion and possible side effects.  Patient verbalized understanding.      needed: No  Premedications: administered per order.  Infusion Rates: infusion starts at 20 ml/hr, then increased by 20 ml/hr every 15 minutes to final rate of 160 ml/hr.  Approximate Infusion length:3 hours.   Labs: were not ordered for this appointment.  Vascular access: peripheral IV placed today.  Treatment Conditions: patient denies fever, chills, signs of infection, recent illness, on antibiotics, productive cough or elevated temperature.  Patient tolerated infusion: well.    Drug Waste Record    Drug Name: Solumedrol  Dose: 100 mg  Route administered: IV  NDC #: -0047-25  Amount of waste(mL):0.4 ml  Reason for waste: Single use vial      Discharge Plan:   Follow up plan of care with: ongoing infusions at [facility].  Discharge instructions were reviewed with patient.  Patient/representative verbalized understanding of discharge instructions and all questions answered.  Patient discharged [facility] in stable condition.    Anisha French RN        /70 (BP Location: Left arm)  Pulse 67  Temp 97.8  F (36.6  C) (Oral)  SpO2 96%      Administrations This Visit     acetaminophen (TYLENOL) tablet 650 mg     Admin Date Action Dose Route Administered By             08/17/2018 Given 650 mg Oral Anisha French RN                    diphenhydrAMINE (BENADRYL) capsule 25 mg      Admin Date Action Dose Route Administered By             08/17/2018 Given 25 mg Oral Anisha French RN                    immune globulin - (GAMMAGARD) sucrose free 10 % injection 25 g     Admin Date Action Dose Route Administered By             08/17/2018 New Bag 25 g Intravenous Anisha French RN                    meperidine (DEMEROL) injection 25 mg     Admin Date Action Dose Route Administered By             08/17/2018 Given 25 mg Intravenous Anisha French RN                    methylPREDNISolone sodium succinate (solu-MEDROL) injection 100 mg     Admin Date Action Dose Route Administered By             08/17/2018 Given 100 mg Intravenous Anisha French RN

## 2018-08-17 NOTE — PATIENT INSTRUCTIONS
Dear Jennifer Alvarez    Thank you for choosing [facility] ([facility]) for your infusion.  The following information is a summary of our appointment as well as important reminder.    We look forward in seeing you on your next appointment here at [facility].  Please don t hesitate to call us at [phone] to reschedule any of your appointments or to speak with one of the [facility] registered nurses.  It was a pleasure taking care of you today.    Sincerely,    [facility]  [facility]  [address] [place] [address]  [address]  Phone:  [phone]

## 2018-08-17 NOTE — MR AVS SNAPSHOT
After Visit Summary   8/17/2018    Jennifer Alvarez    MRN: [id number]           Patient Information     Date Of Birth          1940        Visit Information        Provider Department      8/17/2018 11:00 AM UC 43 ATC; UC SPEC INFUSION [facility] Special[facility] Procedure        Today's Diagnoses     IgA deficiency (H)    -  1    Hypogammaglobulinemia (H)          Care Instructions    Dear Jennifer Alvarez    Thank you for choosing [facility] ([facility]) for your infusion.  The following information is a summary of our appointment as well as important reminder.    We look forward in seeing you on your next appointment here at [facility].  Please don t hesitate to call us at [phone] to reschedule any of your appointments or to speak with one of the [facility] registered nurses.  It was a pleasure taking care of you today.    Sincerely,    [facility]  Specialty [facility] Center  [address] [place] [address]  [address]  Phone:  [phone]            Follow-ups after your visit        Your next 10 appointments already scheduled     Aug 17, 2018  4:00 PM CDT   US RENAL COMPLETE WITH DUPLEX COMPLETE with [id number]   [facility] ([facility])    [address] [place] [id number] [phone]           Please bring a list of your medicines (including vitamins, minerals and over-the-counter drugs). Also, tell your doctor about any allergies you may have. Wear comfortable clothes and leave your valuables at home.  Adults: No eating or drinking for 8 hours before the exam. You may take medicine with a small sip of water.  Children: - Infants, breast-fed: may have breast milk up to 2 hours before exam. - Infants, formula: may have bottle until 4 hours before exam. - Children 1-5 years: No food or drink for 4 hours before  exam. - Children 6 -12 years: No food or drink for 6 hours before exam. - Children over 12 years: No food or drink for 8 hours before exam. - J Tube Fed: No need to stop feedings.  Please call the Imaging Department at your exam site with any questions.            Sep 07, 2018 11:00 AM CDT   Infusion 300 with UC SPEC INFUSION, UC 44 ATC   [facility] Specialty and Procedure ([facility])    [address]  [address]  [facility] [id number] [phone]            Sep 28, 2018  8:00 AM CDT   Infusion 300 with UC SPEC INFUSION, UC 45 ATC   [facility] Specialty and Procedure ([facility])    [address]  [address]  [facility] [id number] [phone]            Oct 19, 2018 11:00 AM CDT   Infusion 300 with UC SPEC INFUSION, UC 44 ATC   [facility] Special[facility] Procedure ([facility])    [address]  [address]  [facility] [id number] [phone]            Nov 09, 2018 11:00 AM CST   Infusion 300 with UC SPEC INFUSION, UC 44 ATC   [facility] Procedure ([facility])    [address]  [address]  [facility] [id number] [phone]            Nov 14, 2018  9:30 AM CST   (Arrive by 9:15 AM)   Pacemaker Check with Uc Cv Device 1   [facility] ([facility])    [address] [place] [address]  [address]  [facility] [id number] [phone]              Who to contact     If you have questions or need follow up information about today's clinic visit or your schedule please contact [facility] SPECIALTY AND PROCEDURE directly at [phone].  Normal or non-critical lab and imaging results will be communicated to you by MyChart, letter or phone within 4 business days after the clinic has received the results. If you do not hear  from us within 7 days, please contact the clinic through ibabybox or phone. If you have a critical or abnormal lab result, we will notify you by phone as soon as possible.  Submit refill requests through ibabybox or call your pharmacy and they will forward the refill request to us. Please allow 3 business days for your refill to be completed.          Additional Information About Your Visit        Electronic Sound Magazine[employer]Hello Chair Information     ibabybox gives you secure access to your electronic health record. If you see a primary care provider, you can also send messages to your care team and make appointments. If you have questions, please call your primary care clinic.  If you do not have a primary care provider, please call [phone] and they will assist you.        Care EveryWhere ID     This is your Care EveryWhere ID. This could be used by other organizations to access your [place] medical records  [id number]        Your Vitals Were     Pulse Temperature Pulse Oximetry BMI (Body Mass Index)          67 97.8  F (36.6  C) (Oral) 96% 17.16 kg/m2         Blood Pressure from Last 3 Encounters:   08/17/18 152/70   08/14/18 149/76   07/27/18 122/50    Weight from Last 3 Encounters:   08/17/18 41.2 kg (90 lb 12.8 oz)   08/14/18 41.7 kg (92 lb)   07/27/18 41.5 kg (91 lb 8 oz)              Today, you had the following     No orders found for display       Primary Care Provider Office Phone # Fax #    Brandi DILLON Vitaliy [phone]       [facility] [address]   [facility] [address]        Equal Access to Services     GUERA HOLT : Hadii aad ku hadasho Soomaali, waaxda luqadaha, qaybta kaalmada adeegyada, chester caicedo . So [facility] [phone].    ATENCIÓN: Si habla español, tiene a taylor disposición servicios gratuitos de asistencia lingüística. Llame al [phone].    We comply with applicable federal civil rights laws and [place] laws. We do not discriminate on the basis of race, color, national  origin, age, disability, sex, sexual orientation, or gender identity.            Thank you!     Thank you for choosing [facility]  for your care. Our goal is always to provide you with excellent care. Hearing back from our patients is one way we can continue to improve our services. Please take a few minutes to complete the written survey that you may receive in the mail after your visit with us. Thank you!             Your Updated Medication List - Protect others around you: Learn how to safely use, store and throw away your medicines at www.disposemymeds.org.          This list is accurate as of 8/17/18  2:46 PM.  Always use your most recent med list.                   Brand Name Dispense Instructions for use Diagnosis    * albuterol 108 (90 Base) MCG/ACT inhaler    PROAIR HFA/PROVENTIL HFA/VENTOLIN HFA    1 Inhaler    Take 2 puffs prn for wheezing or shortness of breath    Wheezing       * albuterol 108 (90 Base) MCG/ACT inhaler    PROAIR HFA/PROVENTIL HFA/VENTOLIN HFA    1 Inhaler    Take 2 puffs as needed for shortness of breath    Mixed simple and mucopurulent chronic bronchitis (H), Gastroesophageal reflux disease without esophagitis       * albuterol (2.5 MG/3ML) 0.083% neb solution      Take 1 ampule by nebulization every 6 hours as needed.        aspirin 81 MG tablet      Take 1 tablet by mouth daily.        atorvastatin 10 MG tablet    LIPITOR    90 tablet    Take 4 tablets (40 mg) by mouth daily    Hyperlipidemia LDL goal <100       azithromycin 250 MG tablet    ZITHROMAX    6 tablet    Take as directed    Mixed simple and mucopurulent chronic bronchitis (H), Gastroesophageal reflux disease without esophagitis       Blood Pressure Kit      daily.        carvedilol 12.5 MG tablet    COREG    180 tablet    Take 1 tablet (12.5 mg) by mouth 2 times daily (with meals)    Secondary hypertension       clopidogrel 75 MG tablet    PLAVIX    90 tablet    Take 1  tablet (75 mg) by mouth daily    Coronary artery disease       DEMEROL 25 MG/ML injection   Generic drug:  meperidine      Inject 25 mg into the vein as needed. 25 mg IV prior to IgG infusion        dicyclomine 10 MG capsule    BENTYL     Take 10 mg by mouth 4 times daily (before meals and nightly).        FISH OIL      Unsure of dosage take two caps daily        * fluticasone-salmeterol 250-50 MCG/DOSE diskus inhaler    ADVAIR    1 Inhaler    Inhale 1 puff into the lungs 2 times daily.    Unspecified chronic bronchitis       * fluticasone-salmeterol 250-50 MCG/DOSE diskus inhaler    ADVAIR    1 Inhaler    Inhale 1 puff into the lungs 2 times daily    Mixed simple and mucopurulent chronic bronchitis (H), Gastroesophageal reflux disease without esophagitis       hydrochlorothiazide 25 MG tablet    HYDRODIURIL    90 tablet    Take 1 tablet (25 mg) by mouth daily    Benign essential hypertension       HYDROcodone-acetaminophen  MG per tablet    LORCET     Take 1 tablet by mouth every 6 hours as needed.        Immune Globulin (Human) 25 GM/500ML Soln      Inject 25 g into the vein See Admin Instructions. 25 grams every 3 weeks IVIG        isosorbide mononitrate 60 MG 24 hr tablet    IMDUR    90 tablet    Take 1 tablet (60 mg) by mouth daily    HTN (hypertension)       metFORMIN 1000 MG tablet    GLUCOPHAGE     Take 1 tablet (1,000 mg) by mouth daily (with dinner) Take 1 tablet twice daily    DM (diabetes mellitus) (H)       nitroGLYcerin 0.4 MG sublingual tablet    NITROQUICK    25 tablet    Place 1 tablet (0.4 mg) under the tongue every 5 minutes as needed    Chest pain       * omeprazole 20 MG CR capsule    priLOSEC    60 capsule    Take 1 capsule (20 mg) by mouth 2 times daily    Esophageal reflux, Unspecified chronic bronchitis       * omeprazole 20 MG CR capsule    priLOSEC    60 capsule    Take 1 capsule (20 mg) by mouth daily    Mixed simple and mucopurulent chronic bronchitis (H), Gastroesophageal reflux  disease without esophagitis       * blood glucose monitoring lancets     3 Box    Use to test blood sugar 3 times daily or as directed.    Type 2 diabetes mellitus with complication (H)       * ONE TOUCH FINE POINT LANCETS      2 times daily.        * ONETOUCH ULTRA test strip   Generic drug:  blood glucose monitoring     200 strip    Test blood sugar twice a day.    Diabetes mellitus type II       * blood glucose monitoring test strip    TERRY CONTOUR NEXT    270 each    by In Vitro route 3 times daily Use to test blood sugar 3 times daily or as directed.    Type 2 diabetes mellitus with complication (H)       * predniSONE 20 MG tablet    DELTASONE    5 tablet    Patient to take 60 mg the evening before the procedure and 30 mg the morning of the procedure.    Allergic reaction to contrast dye       * predniSONE 10 MG tablet    DELTASONE    30 tablet    4 tablets by mouth each day for 3 days, then 3 tablets each day for 3 days, then 2 tablets each day for 3 days, then 1 tablet each day for 3 days, then off    Mixed simple and mucopurulent chronic bronchitis (H), Gastroesophageal reflux disease without esophagitis       * tiotropium 18 MCG capsule    SPIRIVA HANDIHALER    30 capsule    One puff every day    Unspecified chronic bronchitis       * tiotropium 18 MCG capsule    SPIRIVA HANDIHALER    30 capsule    One puff every day    Mixed simple and mucopurulent chronic bronchitis (H), Gastroesophageal reflux disease without esophagitis       traMADol 50 MG tablet    ULTRAM    20 tablet    Take 1 tablet (50 mg) by mouth every 6 hours as needed for pain    Pacemaker battery depletion       TYLENOL 325 MG tablet   Generic drug:  acetaminophen      Take 1-2 tablets by mouth every 6 hours as needed.        * Notice:  This list has 15 medication(s) that are the same as other medications prescribed for you. Read the directions carefully, and ask your doctor or other care provider to review them with you.

## 2018-08-31 NOTE — PROGRESS NOTES
S-(situation): patient's BP's outside of clinic are 160-170 systolic over 90's    B-(background): Jennifer Alvarez is a 77 year old female with Ig deficiency, coronary artery disease and peripheral artery disease s/p renal stenting in July 2017 who presents for follow up.     A-(assessment): hypertension    R-(recommendations): Date: 8/31/2018    Time of Call: 12:56 PM     Diagnosis:  hypertension     [ TORB ] Ordering provider: Eloina OVALLE  Order: start lisinopril 10 mg by mouth everday     Order received by: Samantha Ordaz RN      Follow-up/additional notes: patient understands and agrees to the plan. Will follow up in two weeks for BP.

## 2018-09-07 NOTE — PROGRESS NOTES
Nursing Note  Jennifer Alvarez [facility] for:   Chief Complaint   Patient presents with     Infusion     Gammagard infusion     During [facility]'s [facility] appointment, orders from Dr. Elizabeth were completed.  Frequency: every 3 weeks    Progress note:  Patient identification verified by name and date of birth.  Assessment completed.  Vitals recorded in Doc Flowsheets.  Patient was provided with education regarding infusion and possible side effects.  Patient verbalized understanding.      needed: No  Premedications: administered per order.  Infusion Rates: infusion starts at 20 ml/hr, then increased by 20 ml/hr every 15 minutes to final rate of 160 ml/hr.  Approximate Infusion length:2.5 hours.   Labs: were not ordered for this appointment.  Vascular access: peripheral IV placed today.  Treatment Conditions: patient denies fever, chills, signs of infection, recent illness, on antibiotics, productive cough or elevated temperature.  Patient tolerated infusion: well.    Drug Waste Record    Drug Name: Solu Medrol  Dose: 100 mg  Route administered: IV  NDC #: 2502-9993-83  Amount of waste(mL):04 ml  Reason for waste: Single use vial      Discharge Plan:   Follow up plan of care with: ongoing infusions at [facility].  Discharge instructions were reviewed with patient.  Patient/representative verbalized understanding of discharge instructions and all questions answered.  Patient discharged [facility] in stable condition.    Bonny Mckoy RN    Administrations This Visit     acetaminophen (TYLENOL) tablet 650 mg     Admin Date Action Dose Route Administered By             09/07/2018 Given 650 mg Oral Bonny Mckoy RN                    dextrose 5% BOLUS     Admin Date Action Dose Route Administered By             09/07/2018 New Bag 150 mL Intravenous Bonny Mckoy RN                     diphenhydrAMINE (BENADRYL) capsule 25 mg     Admin Date Action Dose Route Administered By             09/07/2018 Given 25 mg Oral MedBonny cunha RN                    immune globulin (GAMMAGARD) - sucrose free 10 % injection 25 g     Admin Date Action Dose Route Administered By             09/07/2018 New Bag 25 g Intravenous MedBonny cunha RN                    meperidine (DEMEROL) injection 25 mg     Admin Date Action Dose Route Administered By             09/07/2018 Given 25 mg Intravenous MedBonny cunha RN                    methylPREDNISolone sodium succinate (solu-MEDROL) injection 100 mg     Admin Date Action Dose Route Administered By             09/07/2018 Given 100 mg Intravenous Bonny Mckoy RN                          /68  Pulse 85  Temp 98.2  F (36.8  C) (Oral)  Resp 16  Wt 41.3 kg (91 lb)  SpO2 98%  BMI 17.19 kg/m2

## 2018-09-07 NOTE — PATIENT INSTRUCTIONS
Dear Jennifer Alvarez    Thank you for choosing [facility] ([facility]) for your infusion.  The following information is a summary of our appointment as well as important reminders.        We look forward in seeing you on your next appointment here at [facility].  Please don t hesitate to call us at [phone] to reschedule any of your appointments or to speak with one of the [facility] registered nurses.  It was a pleasure taking care of you today.    Sincerely,    [facility]  [facility]  [address] [place] [address]  [address]  Phone:  [phone]

## 2018-09-07 NOTE — MR AVS SNAPSHOT
After Visit Summary   9/7/2018    Jennifer Alvarez    MRN: [id number]           Patient Information     Date Of Birth          1940        Visit Information        Provider Department      9/7/2018 11:00 AM UC 44 ATC; UC SPEC INFUSION [facility] Specialty and Procedure        Today's Diagnoses     IgA deficiency (H)    -  1    Hypogammaglobulinemia (H)          Care Instructions    Dear Jennifer Alvarez    Thank you for choosing [facility] Special[facility] Infusion [facility] ([facility]) for your infusion.  The following information is a summary of our appointment as well as important reminders.        We look forward in seeing you on your next appointment here at [facility].  Please don t hesitate to call us at [phone] to reschedule any of your appointments or to speak with one of the [facility] registered nurses.  It was a pleasure taking care of you today.    Sincerely,    [facility]  Specialty Infusion & Procedure Center  [address] [place] [address]  Phone:  [phone]            Follow-ups after your visit        Your next 10 appointments already scheduled     Sep 28, 2018  8:00 AM CDT   Infusion 300 with UC SPEC INFUSION, UC 45 ATC   [facility] Specialty and Procedure ([facility])    [address]  [address]  [facility] [address] [phone]            Oct 19, 2018 11:00 AM CDT   Infusion 300 with UC SPEC INFUSION, UC 44 ATC   [facility] Specialty and Procedure ([facility])    [address]  [address]  [facility] [address] [phone]            Nov 09, 2018 11:00 AM CST   Infusion 300 with UC SPEC INFUSION, UC 44 ATC   [facility] Specialty and Procedure ([facility])    [address]  [address]  [facility] [address]    [phone]            Nov 14, 2018  9:30 AM CST   (Arrive by 9:15 AM)   Pacemaker Check with  Cv Device 1   [facility] Heart Care ([facility])    [address]  [address]  [facility] [address]   [phone]            Nov 14, 2018 10:00 AM CST   Lab with  LAB   [facility] Lab ([facility])    [address]  1st Floor  [facility] [address]   [phone]            Nov 14, 2018 10:30 AM CST   PFT VISIT with  PFL D   [facility] Pulmonary Function Testing ([facility])    [address]  3rd Floor  [facility] [id number] [phone]            Nov 14, 2018 11:00 AM CST   (Arrive by 10:45 AM)   Return Interstitial Lung with Maxx Elizabeth MD   [facility] ([facility])    [address] [place] [address]  [address]  [facility] [address] [phone]              Who to contact     If you have questions or need follow up information about today's clinic visit or your schedule please contact [facility] SPECIALTY AND PROCEDURE directly at [phone].  Normal or non-critical lab and imaging results will be communicated to you by Razoomhart, letter or phone within 4 business days after the clinic has received the results. If you do not hear from us within 7 days, please contact the clinic through Razoomhart or phone. If you have a critical or abnormal lab result, we will notify you by phone as soon as possible.  Submit refill requests through Crayon Data or call your pharmacy and they will forward the refill request to us. Please allow 3 business days for your refill to be completed.          Additional Information About Your Visit        [employer] Information     [employer] gives you secure access to your electronic health record. If you see a primary care provider, you can also send messages to your care team and make appointments. If you have  questions, please call your primary care clinic.  If you do not have a primary care provider, please call [phone] and they will assist you.        Care EveryWhere ID     This is your Care EveryWhere ID. This could be used by other organizations to access your [facility] medical records  [id number]        Your Vitals Were     Pulse Temperature Respirations Pulse Oximetry BMI (Body Mass Index)       85 98.2  F (36.8  C) (Oral) 16 98% 17.19 kg/m2        Blood Pressure from Last 3 Encounters:   09/07/18 166/68   08/17/18 145/52   08/14/18 149/76    Weight from Last 3 Encounters:   09/07/18 41.3 kg (91 lb)   08/17/18 41.2 kg (90 lb 12.8 oz)   08/14/18 41.7 kg (92 lb)              Today, you had the following     No orders found for display       Primary Care Provider Office Phone # Fax #    Brandi DILLON Vitaliy [phone]       [facility] [address]   [place] [id number]        Equal Access to Services     SUJATHA HOLT : Hadii aad ku hadasho Soomaali, waaxda luqadaha, qaybta kaalmada adeegyada, waxay idiin hayjoycen john caicedo . So [facility] [phone].    ATENCIÓN: Si habla español, tiene a taylor disposición servicios gratuitos de asistencia lingüística. Llame al [phone].    We comply with applicable federal civil rights laws and [place] laws. We do not discriminate on the basis of race, color, national origin, age, disability, sex, sexual orientation, or gender identity.            Thank you!     Thank you for choosing [facility] SPECIALTY AND PROCEDURE  for your care. Our goal is always to provide you with excellent care. Hearing back from our patients is one way we can continue to improve our services. Please take a few minutes to complete the written survey that you may receive in the mail after your visit with us. Thank you!             Your Updated Medication List - Protect others around you: Learn how to safely use, store and throw away your medicines at  www.disposemymeds.org.          This list is accurate as of 9/7/18  1:59 PM.  Always use your most recent med list.                   Brand Name Dispense Instructions for use Diagnosis    * albuterol 108 (90 Base) MCG/ACT inhaler    PROAIR HFA/PROVENTIL HFA/VENTOLIN HFA    1 Inhaler    Take 2 puffs prn for wheezing or shortness of breath    Wheezing       * albuterol 108 (90 Base) MCG/ACT inhaler    PROAIR HFA/PROVENTIL HFA/VENTOLIN HFA    1 Inhaler    Take 2 puffs as needed for shortness of breath    Mixed simple and mucopurulent chronic bronchitis (H), Gastroesophageal reflux disease without esophagitis       * albuterol (2.5 MG/3ML) 0.083% neb solution      Take 1 ampule by nebulization every 6 hours as needed.        aspirin 81 MG tablet      Take 1 tablet by mouth daily.        atorvastatin 10 MG tablet    LIPITOR    90 tablet    Take 4 tablets (40 mg) by mouth daily    Hyperlipidemia LDL goal <100       azithromycin 250 MG tablet    ZITHROMAX    6 tablet    Take as directed    Mixed simple and mucopurulent chronic bronchitis (H), Gastroesophageal reflux disease without esophagitis       Blood Pressure Kit      daily.        carvedilol 12.5 MG tablet    COREG    180 tablet    Take 1 tablet (12.5 mg) by mouth 2 times daily (with meals)    Secondary hypertension       clopidogrel 75 MG tablet    PLAVIX    90 tablet    Take 1 tablet (75 mg) by mouth daily    Coronary artery disease       DEMEROL 25 MG/ML injection   Generic drug:  meperidine      Inject 25 mg into the vein as needed. 25 mg IV prior to IgG infusion        dicyclomine 10 MG capsule    BENTYL     Take 10 mg by mouth 4 times daily (before meals and nightly).        FISH OIL      Unsure of dosage take two caps daily        * fluticasone-salmeterol 250-50 MCG/DOSE diskus inhaler    ADVAIR    1 Inhaler    Inhale 1 puff into the lungs 2 times daily.    Unspecified chronic bronchitis       * fluticasone-salmeterol 250-50 MCG/DOSE diskus inhaler    ADVAIR     1 Inhaler    Inhale 1 puff into the lungs 2 times daily    Mixed simple and mucopurulent chronic bronchitis (H), Gastroesophageal reflux disease without esophagitis       hydrochlorothiazide 25 MG tablet    HYDRODIURIL    90 tablet    Take 1 tablet (25 mg) by mouth daily    Benign essential hypertension       HYDROcodone-acetaminophen  MG per tablet    LORCET     Take 1 tablet by mouth every 6 hours as needed.        Immune Globulin (Human) 25 GM/500ML Soln      Inject 25 g into the vein See Admin Instructions. 25 grams every 3 weeks IVIG        isosorbide mononitrate 60 MG 24 hr tablet    IMDUR    90 tablet    Take 1 tablet (60 mg) by mouth daily    HTN (hypertension)       lisinopril 10 MG tablet    PRINIVIL/ZESTRIL    90 tablet    Take 1 tablet (10 mg) by mouth daily    Essential hypertension       metFORMIN 1000 MG tablet    GLUCOPHAGE     Take 1 tablet (1,000 mg) by mouth daily (with dinner) Take 1 tablet twice daily    DM (diabetes mellitus) (H)       nitroGLYcerin 0.4 MG sublingual tablet    NITROQUICK    25 tablet    Place 1 tablet (0.4 mg) under the tongue every 5 minutes as needed    Chest pain       * omeprazole 20 MG CR capsule    priLOSEC    60 capsule    Take 1 capsule (20 mg) by mouth 2 times daily    Esophageal reflux, Unspecified chronic bronchitis       * omeprazole 20 MG CR capsule    priLOSEC    60 capsule    Take 1 capsule (20 mg) by mouth daily    Mixed simple and mucopurulent chronic bronchitis (H), Gastroesophageal reflux disease without esophagitis       * blood glucose monitoring lancets     3 Box    Use to test blood sugar 3 times daily or as directed.    Type 2 diabetes mellitus with complication (H)       * ONE TOUCH FINE POINT LANCETS      2 times daily.        * ONETOUCH ULTRA test strip   Generic drug:  blood glucose monitoring     200 strip    Test blood sugar twice a day.    Diabetes mellitus type II       * blood glucose monitoring test strip    TERRY CONTOUR NEXT    270  each    by In Vitro route 3 times daily Use to test blood sugar 3 times daily or as directed.    Type 2 diabetes mellitus with complication (H)       predniSONE 10 MG tablet    DELTASONE    30 tablet    4 tablets by mouth each day for 3 days, then 3 tablets each day for 3 days, then 2 tablets each day for 3 days, then 1 tablet each day for 3 days, then off    Mixed simple and mucopurulent chronic bronchitis (H), Gastroesophageal reflux disease without esophagitis       * tiotropium 18 MCG capsule    SPIRIVA HANDIHALER    30 capsule    One puff every day    Unspecified chronic bronchitis       * tiotropium 18 MCG capsule    SPIRIVA HANDIHALER    30 capsule    One puff every day    Mixed simple and mucopurulent chronic bronchitis (H), Gastroesophageal reflux disease without esophagitis       TYLENOL 325 MG tablet   Generic drug:  acetaminophen      Take 1-2 tablets by mouth every 6 hours as needed.        * Notice:  This list has 13 medication(s) that are the same as other medications prescribed for you. Read the directions carefully, and ask your doctor or other care provider to review them with you.

## 2018-09-15 NOTE — TELEPHONE ENCOUNTER
BP is still at 160 systolic on Lisinopril 10 mg.  Date: 9/15/2018    Time of Call: 11:41 AM     Diagnosis:  hypertension     [ TORB ] Ordering provider: Dr. Jaleel Wall  Order: increase lisinopril to 10 mg bid     Order received by: Samantha CNOTE     Follow-up/additional notes: patient understands and agrees to the plan

## 2018-09-28 NOTE — MR AVS SNAPSHOT
After Visit Summary   9/28/2018    Jennifer Alvarez    MRN: [id number]           Patient Information     Date Of Birth          1940        Visit Information        Provider Department      9/28/2018 8:00 AM UC 45 ATC; UC SPEC INFUSION [facility] Specialty and Procedure        Today's Diagnoses     IgA deficiency (H)    -  1    Hypogammaglobulinemia (H)          Care Instructions    Dear Jennifer Alvarez    Thank you for choosing [facility] Specialty Infusion [facility] Procedure Center ([facility]) for your infusion.  The following information is a summary of our appointment as well as important reminders.      We look forward in seeing you on your next appointment here at [facility].  Please don t hesitate to call us at [phone] to reschedule any of your appointments or to speak with one of the [facility] registered nurses.  It was a pleasure taking care of you today.    Sincerely,    [facility]  Specialty Infusion & Procedure Center  [address] [place] [address]  [address]  Phone:  [phone]            Follow-ups after your visit        Your next 10 appointments already scheduled     Oct 19, 2018 11:00 AM CDT   Infusion 300 with UC SPEC INFUSION, UC 44 ATC   [facility] Specialty and Procedure ([facility])    [address]  [address]  [facility] [address] [phone]            Nov 09, 2018 11:00 AM CST   Infusion 300 with UC SPEC INFUSION, UC 44 ATC   [facility] Specialty and Procedure ([facility])    [address]  [address]  [facility] [address] [phone]            Nov 14, 2018  9:30 AM CST   (Arrive by 9:15 AM)   Pacemaker Check with Uc Cv Device 1   [facility] Heart Care ([facility])    [address] [place] [address]  3rd Floor  [address]               Nov 14, 2018 10:00 AM CST    Lab with UC LAB   [facility] Lab ([facility])    [address]  1st Floor  [facility] [address]   [phone]            Nov 14, 2018 10:30 AM CST   PFT VISIT with  PFL D   [facility] Pulmonary Function Testing ([facility])    [address]  3rd Floor  [facility] [address]   [phone]            Nov 14, 2018 11:00 AM CST   (Arrive by 10:45 AM)   Return Interstitial Lung with Maxx Elizabeth MD   [facility] ([facility])    [address]  [address]  [facility] [address]   [phone]            Nov 30, 2018 11:00 AM CST   Infusion 300 with  SPEC INFUSION, UC 44 ATC   [facility] Specialty and Procedure ([facility])    [address]  [address]  [facility] [address] [phone]              Who to contact     If you have questions or need follow up information about today's clinic visit or your schedule please contact [facility] SPECIALTY AND PROCEDURE directly at [phone].  Normal or non-critical lab and imaging results will be communicated to you by MyChart, letter or phone within 4 business days after the clinic has received the results. If you do not hear from us within 7 days, please contact the clinic through MyChart or phone. If you have a critical or abnormal lab result, we will notify you by phone as soon as possible.  Submit refill requests through Loginza or call your pharmacy and they will forward the refill request to us. Please allow 3 business days for your refill to be completed.          Additional Information About Your Visit        [employer] Information     [employer] gives you secure access to your electronic health record. If you see a primary care provider, you can also send messages to your care team and make appointments. If you have questions, please call your  primary care clinic.  If you do not have a primary care provider, please call [phone] and they will assist you.        Care EveryWhere ID     This is your Care EveryWhere ID. This could be used by other organizations to access your [facility] medical records  [id number]        Your Vitals Were     Pulse Temperature Respirations Pulse Oximetry BMI (Body Mass Index)       66 98.2  F (36.8  C) 16 99% 17.38 kg/m2        Blood Pressure from Last 3 Encounters:   09/28/18 145/80   09/07/18 140/70   08/17/18 145/52    Weight from Last 3 Encounters:   09/28/18 41.7 kg (92 lb)   09/07/18 41.3 kg (91 lb)   08/17/18 41.2 kg (90 lb 12.8 oz)              Today, you had the following     No orders found for display       Primary Care Provider Office Phone # Fax #    Brandi DILLON Vitaliy [phone]       [facility] [address]   [place] [address]        Equal Access to Services     GUERA HOLT AH: Hadii aad ku hadasho Soomaali, waaxda luqadaha, qaybta kaalmada adeegyada, waxay idiin hayaan john guzmanararadha caicedo . So [facility] [phone].    ATENCIÓN: Si habla español, tiene a taylor disposición servicios gratuitos de asistencia lingüística. [facility] [phone].    We comply with applicable federal civil rights laws and [place] laws. We do not discriminate on the basis of race, color, national origin, age, disability, sex, sexual orientation, or gender identity.            Thank you!     Thank you for choosing [facility] SPECIALTY AND PROCEDURE  for your care. Our goal is always to provide you with excellent care. Hearing back from our patients is one way we can continue to improve our services. Please take a few minutes to complete the written survey that you may receive in the mail after your visit with us. Thank you!             Your Updated Medication List - Protect others around you: Learn how to safely use, store and throw away your medicines at www.disposemymeds.org.          This list  is accurate as of 9/28/18 12:07 PM.  Always use your most recent med list.                   Brand Name Dispense Instructions for use Diagnosis    * albuterol 108 (90 Base) MCG/ACT inhaler    PROAIR HFA/PROVENTIL HFA/VENTOLIN HFA    1 Inhaler    Take 2 puffs prn for wheezing or shortness of breath    Wheezing       * albuterol 108 (90 Base) MCG/ACT inhaler    PROAIR HFA/PROVENTIL HFA/VENTOLIN HFA    1 Inhaler    Take 2 puffs as needed for shortness of breath    Mixed simple and mucopurulent chronic bronchitis (H), Gastroesophageal reflux disease without esophagitis       * albuterol (2.5 MG/3ML) 0.083% neb solution      Take 1 ampule by nebulization every 6 hours as needed.        aspirin 81 MG tablet      Take 1 tablet by mouth daily.        atorvastatin 10 MG tablet    LIPITOR    90 tablet    Take 4 tablets (40 mg) by mouth daily    Hyperlipidemia LDL goal <100       azithromycin 250 MG tablet    ZITHROMAX    6 tablet    Take as directed    Mixed simple and mucopurulent chronic bronchitis (H), Gastroesophageal reflux disease without esophagitis       Blood Pressure Kit      daily.        carvedilol 12.5 MG tablet    COREG    180 tablet    Take 1 tablet (12.5 mg) by mouth 2 times daily (with meals)    Secondary hypertension       clopidogrel 75 MG tablet    PLAVIX    90 tablet    Take 1 tablet (75 mg) by mouth daily    Coronary artery disease       DEMEROL 25 MG/ML injection   Generic drug:  meperidine      Inject 25 mg into the vein as needed. 25 mg IV prior to IgG infusion        dicyclomine 10 MG capsule    BENTYL     Take 10 mg by mouth 4 times daily (before meals and nightly).        FISH OIL      Unsure of dosage take two caps daily        * fluticasone-salmeterol 250-50 MCG/DOSE diskus inhaler    ADVAIR    1 Inhaler    Inhale 1 puff into the lungs 2 times daily.    Unspecified chronic bronchitis       * fluticasone-salmeterol 250-50 MCG/DOSE diskus inhaler    ADVAIR    1 Inhaler    Inhale 1 puff into the  lungs 2 times daily    Mixed simple and mucopurulent chronic bronchitis (H), Gastroesophageal reflux disease without esophagitis       hydrochlorothiazide 25 MG tablet    HYDRODIURIL    90 tablet    Take 1 tablet (25 mg) by mouth daily    Benign essential hypertension       HYDROcodone-acetaminophen  MG per tablet    LORCET     Take 1 tablet by mouth every 6 hours as needed.        Immune Globulin (Human) 25 GM/500ML Soln      Inject 25 g into the vein See Admin Instructions. 25 grams every 3 weeks IVIG        isosorbide mononitrate 60 MG 24 hr tablet    IMDUR    90 tablet    Take 1 tablet (60 mg) by mouth daily    HTN (hypertension)       lisinopril 10 MG tablet    PRINIVIL/ZESTRIL    180 tablet    Take 1 tablet (10 mg) by mouth 2 times daily    Essential hypertension       metFORMIN 1000 MG tablet    GLUCOPHAGE     Take 1 tablet (1,000 mg) by mouth daily (with dinner) Take 1 tablet twice daily    DM (diabetes mellitus) (H)       nitroGLYcerin 0.4 MG sublingual tablet    NITROQUICK    25 tablet    Place 1 tablet (0.4 mg) under the tongue every 5 minutes as needed    Chest pain       * omeprazole 20 MG CR capsule    priLOSEC    60 capsule    Take 1 capsule (20 mg) by mouth 2 times daily    Esophageal reflux, Unspecified chronic bronchitis       * omeprazole 20 MG CR capsule    priLOSEC    60 capsule    Take 1 capsule (20 mg) by mouth daily    Mixed simple and mucopurulent chronic bronchitis (H), Gastroesophageal reflux disease without esophagitis       * blood glucose monitoring lancets     3 Box    Use to test blood sugar 3 times daily or as directed.    Type 2 diabetes mellitus with complication (H)       * ONE TOUCH FINE POINT LANCETS      2 times daily.        * ONETOUCH ULTRA test strip   Generic drug:  blood glucose monitoring     200 strip    Test blood sugar twice a day.    Diabetes mellitus type II       * blood glucose monitoring test strip    TERRY CONTOUR NEXT    270 each    by In Vitro route 3  times daily Use to test blood sugar 3 times daily or as directed.    Type 2 diabetes mellitus with complication (H)       predniSONE 10 MG tablet    DELTASONE    30 tablet    4 tablets by mouth each day for 3 days, then 3 tablets each day for 3 days, then 2 tablets each day for 3 days, then 1 tablet each day for 3 days, then off    Mixed simple and mucopurulent chronic bronchitis (H), Gastroesophageal reflux disease without esophagitis       * tiotropium 18 MCG capsule    SPIRIVA HANDIHALER    30 capsule    One puff every day    Unspecified chronic bronchitis       * tiotropium 18 MCG capsule    SPIRIVA HANDIHALER    30 capsule    One puff every day    Mixed simple and mucopurulent chronic bronchitis (H), Gastroesophageal reflux disease without esophagitis       TYLENOL 325 MG tablet   Generic drug:  acetaminophen      Take 1-2 tablets by mouth every 6 hours as needed.        * Notice:  This list has 13 medication(s) that are the same as other medications prescribed for you. Read the directions carefully, and ask your doctor or other care provider to review them with you.

## 2018-09-28 NOTE — PROGRESS NOTES
Nursing Note  Jennifer Alvarez [facility] for:   Chief Complaint   Patient presents with     Infusion     IVIG     During [facility]'s [facility] appointment, orders from Dr.Craig Jaleel Elizabeth were completed.  Frequency: every 3 weeks    Progress note:  Patient identification verified by name and date of birth.  Assessment completed.  Vitals recorded in Doc Flowsheets.  Patient was provided with education regarding infusion and possible side effects.  Patient verbalized understanding.      needed: No  Premedications: administered per order.  Infusion Rates: infusion starts at 20 ml/hr, then increased by 20 ml/hr every 15 minutes to final rate of 160 ml/hr.  Approximate Infusion length:3 hours.   Labs: were not ordered for this appointment.  Vascular access: peripheral IV placed today.  Treatment Conditions: patient denies fever, chills, signs of infection, recent illness, on antibiotics, productive cough or elevated temperature.  Patient tolerated infusion: well.    Drug Waste Record    Drug Name: Solu Medrol  Dose: 100 mg  Route administered: IV  NDC #: ND 4768-0651-09  Amount of waste(mL):0.4 ml  Reason for waste: Single use vial      Discharge Plan:   Follow up plan of care with: ongoing infusions at [facility].  Discharge instructions were reviewed with patient.  Patient/representative verbalized understanding of discharge instructions and all questions answered.  Patient discharged from [facility] in stable condition.    Anisha French RN    Administrations This Visit     acetaminophen (TYLENOL) tablet 650 mg     Admin Date Action Dose Route Administered By             09/28/2018 Given 650 mg Oral Anisha French RN                    dextrose 5% BOLUS     Admin Date Action Dose Route Administered By             09/28/2018 New Bag 100 mL Intravenous Anisha French RN                     diphenhydrAMINE (BENADRYL) capsule 25 mg     Admin Date Action Dose Route Administered By             09/28/2018 Given 25 mg Oral Anisha French RN                    immune globulin - (GAMMAGARD) sucrose free 10 % injection 25 g     Admin Date Action Dose Route Administered By             09/28/2018 New Bag 25 g Intravenous Anisha French RN                    meperidine (DEMEROL) injection 25 mg     Admin Date Action Dose Route Administered By             09/28/2018 Given 25 mg Intravenous Anisha Frenhc RN                    methylPREDNISolone sodium succinate (solu-MEDROL) injection 100 mg     Admin Date Action Dose Route Administered By             09/28/2018 Given 125 mg Intravenous Anisha French RN                          /70 (BP Location: Left arm)  Pulse 72  Temp 98.6  F (37  C)  Resp 16  Wt 41.7 kg (92 lb)  SpO2 98%  BMI 17.38 kg/m2

## 2018-09-28 NOTE — PATIENT INSTRUCTIONS
Dear Jennifer Alvarez    Thank you for choosing [facility] ([facility]) for your infusion.  The following information is a summary of our appointment as well as important reminders.      We look forward in seeing you on your next appointment here at [facility].  Please don t hesitate to call us at [phone] to reschedule any of your appointments or to speak with one of the [facility] registered nurses.  It was a pleasure taking care of you today.    Sincerely,    [facility]  [facility]  [address] [place] [address]  [address]  Phone:  [phone]

## 2018-10-17 NOTE — PROGRESS NOTES
Calling about blood pressures. She hasn't been taking them recently as she is in [place]. She has a visit here at the [place] on Friday and will have her BP checked then.    October 19, 2018 [facility]  /69

## 2018-10-19 NOTE — MR AVS SNAPSHOT
After Visit Summary   10/19/2018    Jennifer Alvarez    MRN: [id number]           Patient Information     Date Of Birth          1940        Visit Information        Provider Department      10/19/2018 1:00 PM UC 44 ATC; UC SPEC INFUSION [facility]        Today's Diagnoses     Hypogammaglobulinemia (H)    -  1    IgA deficiency (H)          Care Instructions    Dear Jennifer Alvarez    Thank you for choosing [facility] ([facility]) for your infusion.  The following information is a summary of our appointment as well as important reminders.      You received your IVIG infusion today.   We look forward in seeing you on your next appointment here at [facility].  Please don t hesitate to call us at [phone] to reschedule any of your appointments or to speak with one of the [facility] registered nurses.  It was a pleasure taking care of you today.    Sincerely,    [facility]  [facility]  [address] [place] [address]  [address]  Phone:  [phone]            Follow-ups after your visit        Your next 10 appointments already scheduled     Nov 09, 2018 11:00 AM CST   Infusion 300 with UC SPEC INFUSION, UC 44 ATC   [facility] Specialty [facility] Procedure ([facility])    [address] [place] [address]  [address] [place] [address] [phone]            Nov 14, 2018  9:30 AM CST   (Arrive by 9:15 AM)   Pacemaker Check with  Cv Device 1   [facility] ([facility])    [address] [place] [address]  3rd Floor  [id number]               Nov 14, 2018 10:00 AM CST   Lab with  LAB   [facility] Lab ([facility])    [address] [place] [address] [phone]            Nov 14, 2018 10:30 AM CST   PFT VISIT with  PFL REAL WILLS  [facility] ([facility])    [address]  3rd Floor  [facility] [address] [phone]            Nov 14, 2018 11:00 AM CST   (Arrive by 10:45 AM)   Return Interstitial Lung with Maxx Elizabeth MD   [facility] ([facility])    [address]  [address]  [facility] [address] [phone]            Nov 30, 2018 11:00 AM CST   Infusion 300 with UC SPEC INFUSION, UC 44 ATC   [facility] Specialty and Procedure ([facility])    [address]  [address]  [facility] [address] [phone]            Dec 21, 2018 10:30 AM CST   Infusion 300 with UC SPEC INFUSION, UC 48 ATC   [facility] Special[facility] and Procedure ([facility])    [address]  [address]  [facility] [id number] [phone]              Who to contact     If you have questions or need follow up information about today's clinic visit or your schedule please contact [facility] SPECIALTY AND PROCEDURE directly at [phone].  Normal or non-critical lab and imaging results will be communicated to you by MyChart, letter or phone within 4 business days after the clinic has received the results. If you do not hear from us within 7 days, please contact the clinic through [employer] or phone. If you have a critical or abnormal lab result, we will notify you by phone as soon as possible.  Submit refill requests through [employer] or call your pharmacy and they will forward the refill request to us. Please allow 3 business days for your refill to be completed.          Additional Information About Your Visit        [employer] Information     [employer] gives you secure access to your electronic health record. If you see a primary care provider, you can also send messages to your care team and make  appointments. If you have questions, please call your primary care clinic.  If you do not have a primary care provider, please call [phone] and they will assist you.        Care EveryWhere ID     This is your Care EveryWhere ID. This could be used by other organizations to access your [facility] medical records  [id number]        Your Vitals Were     Pulse Temperature Respirations Pulse Oximetry BMI (Body Mass Index)       64 98.2  F (36.8  C) (Oral) 16 100% 17.08 kg/m2        Blood Pressure from Last 3 Encounters:   10/19/18 151/53   09/28/18 145/80   09/07/18 140/70    Weight from Last 3 Encounters:   10/19/18 41 kg (90 lb 6.4 oz)   09/28/18 41.7 kg (92 lb)   09/07/18 41.3 kg (91 lb)              Today, you had the following     No orders found for display       Primary Care Provider Office Phone # Fax #    Brandi DILLON Vitaliy [phone]       [facility] [address]   [facility] [address]        Equal Access to Services     [facility] : Hadii aad ku hadasho Soomaali, waaxda luqadaha, qaybta kaalmada adeegyada, waxmoustapha peraza hayquentin caicedo . So [facility] [phone].    ATENCIÓN: Si habla español, tiene a taylor disposición servicios gratuitos de asistencia lingüística. Llame al [phone].    We comply with applicable federal civil rights laws and [place] laws. We do not discriminate on the basis of race, color, national origin, age, disability, sex, sexual orientation, or gender identity.            Thank you!     Thank you for choosing [facility] SPECIAL[facility] PROCEDURE  for your care. Our goal is always to provide you with excellent care. Hearing back from our patients is one way we can continue to improve our services. Please take a few minutes to complete the written survey that you may receive in the mail after your visit with us. Thank you!             Your Updated Medication List - Protect others around you: Learn how to safely use, store and throw away  your medicines at www.disposemymeds.org.          This list is accurate as of 10/19/18  4:20 PM.  Always use your most recent med list.                   Brand Name Dispense Instructions for use Diagnosis    * albuterol 108 (90 Base) MCG/ACT inhaler    PROAIR HFA/PROVENTIL HFA/VENTOLIN HFA    1 Inhaler    Take 2 puffs prn for wheezing or shortness of breath    Wheezing       * albuterol 108 (90 Base) MCG/ACT inhaler    PROAIR HFA/PROVENTIL HFA/VENTOLIN HFA    1 Inhaler    Take 2 puffs as needed for shortness of breath    Mixed simple and mucopurulent chronic bronchitis (H), Gastroesophageal reflux disease without esophagitis       * albuterol (2.5 MG/3ML) 0.083% neb solution      Take 1 ampule by nebulization every 6 hours as needed.        aspirin 81 MG tablet      Take 1 tablet by mouth daily.        atorvastatin 10 MG tablet    LIPITOR    90 tablet    Take 4 tablets (40 mg) by mouth daily    Hyperlipidemia LDL goal <100       azithromycin 250 MG tablet    ZITHROMAX    6 tablet    Take as directed    Mixed simple and mucopurulent chronic bronchitis (H), Gastroesophageal reflux disease without esophagitis       Blood Pressure Kit      daily.        carvedilol 12.5 MG tablet    COREG    180 tablet    Take 1 tablet (12.5 mg) by mouth 2 times daily (with meals)    Secondary hypertension       clopidogrel 75 MG tablet    PLAVIX    90 tablet    Take 1 tablet (75 mg) by mouth daily    Coronary artery disease       DEMEROL 25 MG/ML injection   Generic drug:  meperidine      Inject 25 mg into the vein as needed. 25 mg IV prior to IgG infusion        dicyclomine 10 MG capsule    BENTYL     Take 10 mg by mouth 4 times daily (before meals and nightly).        FISH OIL      Unsure of dosage take two caps daily        * fluticasone-salmeterol 250-50 MCG/DOSE diskus inhaler    ADVAIR    1 Inhaler    Inhale 1 puff into the lungs 2 times daily.    Unspecified chronic bronchitis (H)       * fluticasone-salmeterol 250-50 MCG/DOSE  diskus inhaler    ADVAIR    1 Inhaler    Inhale 1 puff into the lungs 2 times daily    Mixed simple and mucopurulent chronic bronchitis (H), Gastroesophageal reflux disease without esophagitis       hydrochlorothiazide 25 MG tablet    HYDRODIURIL    90 tablet    Take 1 tablet (25 mg) by mouth daily    Benign essential hypertension       HYDROcodone-acetaminophen  MG per tablet    LORCET     Take 1 tablet by mouth every 6 hours as needed.        Immune Globulin (Human) 25 GM/500ML Soln      Inject 25 g into the vein See Admin Instructions. 25 grams every 3 weeks IVIG        isosorbide mononitrate 60 MG 24 hr tablet    IMDUR    90 tablet    Take 1 tablet (60 mg) by mouth daily    HTN (hypertension)       lisinopril 10 MG tablet    PRINIVIL/ZESTRIL    180 tablet    Take 1 tablet (10 mg) by mouth 2 times daily    Essential hypertension       metFORMIN 1000 MG tablet    GLUCOPHAGE     Take 1 tablet (1,000 mg) by mouth daily (with dinner) Take 1 tablet twice daily    DM (diabetes mellitus) (H)       nitroGLYcerin 0.4 MG sublingual tablet    NITROQUICK    25 tablet    Place 1 tablet (0.4 mg) under the tongue every 5 minutes as needed    Chest pain       * omeprazole 20 MG CR capsule    priLOSEC    60 capsule    Take 1 capsule (20 mg) by mouth 2 times daily    Esophageal reflux, Unspecified chronic bronchitis (H)       * omeprazole 20 MG CR capsule    priLOSEC    60 capsule    Take 1 capsule (20 mg) by mouth daily    Mixed simple and mucopurulent chronic bronchitis (H), Gastroesophageal reflux disease without esophagitis       * blood glucose monitoring lancets     3 Box    Use to test blood sugar 3 times daily or as directed.    Type 2 diabetes mellitus with complication (H)       * ONE TOUCH FINE POINT LANCETS      2 times daily.        * ONETOUCH ULTRA test strip   Generic drug:  blood glucose monitoring     200 strip    Test blood sugar twice a day.    Diabetes mellitus type II       * blood glucose monitoring  test strip    TERRY CONTOUR NEXT    270 each    by In Vitro route 3 times daily Use to test blood sugar 3 times daily or as directed.    Type 2 diabetes mellitus with complication (H)       predniSONE 10 MG tablet    DELTASONE    30 tablet    4 tablets by mouth each day for 3 days, then 3 tablets each day for 3 days, then 2 tablets each day for 3 days, then 1 tablet each day for 3 days, then off    Mixed simple and mucopurulent chronic bronchitis (H), Gastroesophageal reflux disease without esophagitis       * tiotropium 18 MCG capsule    SPIRIVA HANDIHALER    30 capsule    One puff every day    Unspecified chronic bronchitis (H)       * tiotropium 18 MCG capsule    SPIRIVA HANDIHALER    30 capsule    One puff every day    Mixed simple and mucopurulent chronic bronchitis (H), Gastroesophageal reflux disease without esophagitis       TYLENOL 325 MG tablet   Generic drug:  acetaminophen      Take 1-2 tablets by mouth every 6 hours as needed.        * Notice:  This list has 13 medication(s) that are the same as other medications prescribed for you. Read the directions carefully, and ask your doctor or other care provider to review them with you.

## 2018-10-19 NOTE — PROGRESS NOTES
Nursing Note  Jennifer Alvarez [facility] for:   Chief Complaint   Patient presents with     Infusion     ivig     During [facility]'s [facility] appointment, orders from Dr. Elizabeth were completed.  Frequency: every 3 weeks    Progress note:  Patient identification verified by name and date of birth.  Assessment completed.  Vitals recorded in Doc Flowsheets.  Patient was provided with education regarding infusion and possible side effects.  Patient verbalized understanding.      needed: No  Premedications: administered per order.  Infusion Rates: infusion starts at 20 ml/hr, then increased by 20 ml/hr every 15 minutes to final rate of 160 ml/hr.  Approximate Infusion length:3 hours.   Labs: were not ordered for this appointment.  Vascular access: peripheral IV placed today.  Treatment Conditions: patient denies fever, chills, signs of infection, recent illness, on antibiotics, productive cough or elevated temperature.  Patient tolerated infusion: well.    Discharge Plan:   Follow up plan of care with: ongoing infusions at [facility]. and primary medical doctor.  Discharge instructions were reviewed with patient.  Patient/representative verbalized understanding of discharge instructions and all questions answered.  Patient discharged [facility] in stable condition.    Pat Hardy RN    Administrations This Visit     acetaminophen (TYLENOL) tablet 650 mg     Admin Date Action Dose Route Administered By             10/19/2018 Given 650 mg Oral Pat Hardy RN                    diphenhydrAMINE (BENADRYL) capsule 50 mg     Admin Date Action Dose Route Administered By             10/19/2018 Given 50 mg Oral Pat Hardy RN                    hydrocortisone sodium succinate PF (solu-CORTEF) injection 100 mg     Admin Date Action Dose Route Administered By              10/19/2018 Given 100 mg Intravenous Pat Hardy RN                    immune globulin - (GAMMAGARD) sucrose free 10 % injection 25 g     Admin Date Action Dose Route Administered By             10/19/2018 New Bag 25 g Intravenous Pat Hardy RN                    meperidine (DEMEROL) injection 25 mg     Admin Date Action Dose Route Administered By             10/19/2018 Given 25 mg Intravenous Roxie Tamez RN                          /69  Pulse 64  Temp 98.2  F (36.8  C) (Oral)  Resp 16  Wt 41 kg (90 lb 6.4 oz)  SpO2 100%  BMI 17.08 kg/m2

## 2018-10-19 NOTE — PATIENT INSTRUCTIONS
Dear Jennifer Alvarez    Thank you for choosing [facility] ([facility]) for your infusion.  The following information is a summary of our appointment as well as important reminders.      You received your IVIG infusion today.   We look forward in seeing you on your next appointment here at [facility].  Please don t hesitate to call us at [phone] to reschedule any of your appointments or to speak with one of the [facility] registered nurses.  It was a pleasure taking care of you today.    Sincerely,    [facility]  [facility]  [address] [place] [address]  [address]  Phone:  [phone]

## 2018-10-24 NOTE — PROGRESS NOTES
Date: 10/24/2018    Time of Call: 10:13 AM     Diagnosis:  Renal artery stenosis     [ TORB ] Ordering provider: Dr. Jaleel Adair   Order: renal artery ultrasound duplex     Order received by: Samantha Ordaz RN     Follow-up/additional notes: no eating 6 hours prior to procedure. No smoking or chewing gum 6 hours prior to procedure.  Patient understands and agrees to the plan.

## 2018-11-09 NOTE — MR AVS SNAPSHOT
After Visit Summary   11/9/2018    Jennifer Alvarez    MRN: [id number]           Patient Information     Date Of Birth          1940        Visit Information        Provider Department      11/9/2018 11:00 AM UC 44 ATC; UC SPEC INFUSION [facility]        Today's Diagnoses     Hypogammaglobulinemia (H)    -  1    IgA deficiency (H)           Follow-ups after your visit        Your next 10 appointments already scheduled     Nov 14, 2018  9:30 AM CST   (Arrive by 9:15 AM)   Pacemaker Check with  Cv Device 1   [facility] ([facility])    [address]  3rd [address]  [id number]               Nov 14, 2018 10:00 AM CST   Lab with  LAB   [facility] ([facility])    [address]  1st [address]  [facility] [id number] [phone]            Nov 14, 2018 10:30 AM CST   PFT VISIT with  PFL D   [facility] Pulmonary Function Testing ([facility])    [address]  3rd Floor  [facility] [id number] [phone]            Nov 14, 2018 11:00 AM CST   (Arrive by 10:45 AM)   Return Interstitial Lung with Maxx Elizabeth MD   [facility] ([facility])    [address]  [address]  [facility] [id number] [phone]            Nov 30, 2018 11:00 AM CST   Infusion 300 with UC SPEC INFUSION, UC 44 ATC   [facility] ([facility])    [address]  [address]  [facility] [id number] [phone]            Dec 21, 2018 10:30 AM CST   Infusion 300 with UC SPEC INFUSION, UC 48 ATC   [facility] ([facility])    [address]  [address]  [facility] [id number] [phone]              Who to contact     If you have questions or  need follow up information about today's clinic visit or your schedule please contact [facility] directly at [phone].  Normal or non-critical lab and imaging results will be communicated to you by MyChart, letter or phone within 4 business days after the clinic has received the results. If you do not hear from us within 7 days, please contact the clinic through [facility] [employer] or phone. If you have a critical or abnormal lab result, we will notify you by phone as soon as possible.  Submit refill requests through [employer] or call your pharmacy and they will forward the refill request to us. Please allow 3 business days for your refill to be completed.          Additional Information About Your Visit        [facility] [employer] Information     [employer] gives you secure access to your electronic health record. If you see a primary care provider, you can also send messages to your care team and make appointments. If you have questions, please call your primary care clinic.  If you do not have a primary care provider, please call [phone] and they will assist you.        Care EveryWhere ID     This is your Care EveryWhere ID. This could be used by other organizations to access your [place] medical records  [id number]        Your Vitals Were     Pulse Temperature Respirations Pulse Oximetry BMI (Body Mass Index)       79 97.9  F (36.6  C) (Oral) 18 100% 17.08 kg/m2        Blood Pressure from Last 3 Encounters:   11/09/18 149/81   10/19/18 151/53   09/28/18 145/80    Weight from Last 3 Encounters:   11/09/18 41 kg (90 lb 6.4 oz)   10/19/18 41 kg (90 lb 6.4 oz)   09/28/18 41.7 kg (92 lb)              Today, you had the following     No orders found for display       Primary Care Provider Office Phone # Fax #    Brandi WILMA Burks [phone]       [facility] [address]   [facility] [address]        Equal Access to Services     GUERA HOLT AH: Anshu Altamirano,  wacaroda meghna, qaybta kaashkan nassar, chester laughlinaafidencio ah. So [facility] [phone].    ATENCIÓN: Si tessie le, tiene a taylor disposición servicios gratuitos de asistencia lingüística. Ben al [phone].    We comply with applicable federal civil rights laws and [place] laws. We do not discriminate on the basis of race, color, national origin, age, disability, sex, sexual orientation, or gender identity.            Thank you!     Thank you for choosing [facility]  for your care. Our goal is always to provide you with excellent care. Hearing back from our patients is one way we can continue to improve our services. Please take a few minutes to complete the written survey that you may receive in the mail after your visit with us. Thank you!             Your Updated Medication List - Protect others around you: Learn how to safely use, store and throw away your medicines at www.disposemymeds.org.          This list is accurate as of 11/9/18  3:23 PM.  Always use your most recent med list.                   Brand Name Dispense Instructions for use Diagnosis    * albuterol 108 (90 Base) MCG/ACT inhaler    PROAIR HFA/PROVENTIL HFA/VENTOLIN HFA    1 Inhaler    Take 2 puffs prn for wheezing or shortness of breath    Wheezing       * albuterol 108 (90 Base) MCG/ACT inhaler    PROAIR HFA/PROVENTIL HFA/VENTOLIN HFA    1 Inhaler    Take 2 puffs as needed for shortness of breath    Mixed simple and mucopurulent chronic bronchitis (H), Gastroesophageal reflux disease without esophagitis       * albuterol (2.5 MG/3ML) 0.083% neb solution      Take 1 ampule by nebulization every 6 hours as needed.        aspirin 81 MG tablet      Take 1 tablet by mouth daily.        atorvastatin 10 MG tablet    LIPITOR    90 tablet    Take 4 tablets (40 mg) by mouth daily    Hyperlipidemia LDL goal <100       azithromycin 250 MG tablet    ZITHROMAX    6 tablet    Take as  directed    Mixed simple and mucopurulent chronic bronchitis (H), Gastroesophageal reflux disease without esophagitis       Blood Pressure Kit      daily.        carvedilol 12.5 MG tablet    COREG    180 tablet    Take 1 tablet (12.5 mg) by mouth 2 times daily (with meals)    Secondary hypertension       clopidogrel 75 MG tablet    PLAVIX    90 tablet    Take 1 tablet (75 mg) by mouth daily    Coronary artery disease       DEMEROL 25 MG/ML injection   Generic drug:  meperidine      Inject 25 mg into the vein as needed. 25 mg IV prior to IgG infusion        dicyclomine 10 MG capsule    BENTYL     Take 10 mg by mouth 4 times daily (before meals and nightly).        FISH OIL      Unsure of dosage take two caps daily        * fluticasone-salmeterol 250-50 MCG/DOSE diskus inhaler    ADVAIR    1 Inhaler    Inhale 1 puff into the lungs 2 times daily.    Unspecified chronic bronchitis (H)       * fluticasone-salmeterol 250-50 MCG/DOSE diskus inhaler    ADVAIR    1 Inhaler    Inhale 1 puff into the lungs 2 times daily    Mixed simple and mucopurulent chronic bronchitis (H), Gastroesophageal reflux disease without esophagitis       hydrochlorothiazide 25 MG tablet    HYDRODIURIL    90 tablet    Take 1 tablet (25 mg) by mouth daily    Benign essential hypertension       HYDROcodone-acetaminophen  MG per tablet    LORCET     Take 1 tablet by mouth every 6 hours as needed.        Immune Globulin (Human) 25 GM/500ML Soln      Inject 25 g into the vein See Admin Instructions. 25 grams every 3 weeks IVIG        isosorbide mononitrate 60 MG 24 hr tablet    IMDUR    90 tablet    Take 1 tablet (60 mg) by mouth daily    HTN (hypertension)       lisinopril 10 MG tablet    PRINIVIL/ZESTRIL    180 tablet    Take 1 tablet (10 mg) by mouth 2 times daily    Essential hypertension       metFORMIN 1000 MG tablet    GLUCOPHAGE     Take 1 tablet (1,000 mg) by mouth daily (with dinner) Take 1 tablet twice daily    DM (diabetes mellitus) (H)        nitroGLYcerin 0.4 MG sublingual tablet    NITROQUICK    25 tablet    Place 1 tablet (0.4 mg) under the tongue every 5 minutes as needed    Chest pain       * omeprazole 20 MG CR capsule    priLOSEC    60 capsule    Take 1 capsule (20 mg) by mouth 2 times daily    Esophageal reflux, Unspecified chronic bronchitis (H)       * omeprazole 20 MG CR capsule    priLOSEC    60 capsule    Take 1 capsule (20 mg) by mouth daily    Mixed simple and mucopurulent chronic bronchitis (H), Gastroesophageal reflux disease without esophagitis       * blood glucose monitoring lancets     3 Box    Use to test blood sugar 3 times daily or as directed.    Type 2 diabetes mellitus with complication (H)       * ONE TOUCH FINE POINT LANCETS      2 times daily.        * ONETOUCH ULTRA test strip   Generic drug:  blood glucose monitoring     200 strip    Test blood sugar twice a day.    Diabetes mellitus type II       * blood glucose monitoring test strip    TERRY CONTOUR NEXT    270 each    by In Vitro route 3 times daily Use to test blood sugar 3 times daily or as directed.    Type 2 diabetes mellitus with complication (H)       predniSONE 10 MG tablet    DELTASONE    30 tablet    4 tablets by mouth each day for 3 days, then 3 tablets each day for 3 days, then 2 tablets each day for 3 days, then 1 tablet each day for 3 days, then off    Mixed simple and mucopurulent chronic bronchitis (H), Gastroesophageal reflux disease without esophagitis       * tiotropium 18 MCG capsule    SPIRIVA HANDIHALER    30 capsule    One puff every day    Unspecified chronic bronchitis (H)       * tiotropium 18 MCG capsule    SPIRIVA HANDIHALER    30 capsule    One puff every day    Mixed simple and mucopurulent chronic bronchitis (H), Gastroesophageal reflux disease without esophagitis       TYLENOL 325 MG tablet   Generic drug:  acetaminophen      Take 1-2 tablets by mouth every 6 hours as needed.        * Notice:  This list has 13 medication(s) that are  the same as other medications prescribed for you. Read the directions carefully, and ask your doctor or other care provider to review them with you.

## 2018-11-09 NOTE — PROGRESS NOTES
Nursing Note  Jenn[facility] for:IV[facility]  During [facility]'s [facility] appointment, orders from Dr. Elizabeth were completed.  Frequency: every 3 weeks    Progress note:  Patient identification verified by name and date of birth.  Assessment completed.  Vitals recorded in Doc Flowsheets.  Patient was provided with education regarding infusion and possible side effects.  Patient verbalized understanding.      needed: No  Premedications: administered per order.  Infusion Rates: infusion starts at 20 ml/hr, then increased by 20 ml/hr every 15 minutes to final rate of 160 ml/hr.  Labs: were not ordered for this appointment.  Vascular access: peripheral IV placed today.  Treatment Conditions: non-applicable.  Patient tolerated infusion: well.    Drug Waste Record    Drug Name: solu-medrol  Dose: 100mg  Route administered: IV  NDC #: 8664-1602-87  Amount of waste(mL):0.4ml (25mg)  Reason for waste: Single use vial      Discharge Plan:   Follow up plan of care with: ongoing infusions [facility].  Discharge instructions were reviewed with patient.  Patient/representative verbalized understanding of discharge instructions and all questions answered.  [facility] discharged [facility] in stable condition.    Karely Davis RN       Administrations This Visit     acetaminophen (TYLENOL) tablet 650 mg     Admin Date Action Dose Route Administered By             11/09/2018 Given 650 mg Oral Karely Davis RN                    diphenhydrAMINE (BENADRYL) capsule 25 mg     Admin Date Action Dose Route Administered By             11/09/2018 Given 25 mg Oral Karely Davis RN                    immune globulin (GAMMAGARD) sucrose free 10 % injection 25 g     Admin Date Action Dose Route Administered By             11/09/2018 New Bag 25 g Intravenous Karely Davis RN                     meperidine (DEMEROL) injection 25 mg     Admin Date Action Dose Route Administered By             11/09/2018 Given 25 mg Intravenous Karely Davis RN                    methylPREDNISolone sodium succinate (solu-MEDROL) injection 100 mg     Admin Date Action Dose Route Administered By             11/09/2018 Given 100 mg Intravenous Karely aDvis RN                          Wt 41 kg (90 lb 6.4 oz)  BMI 17.08 kg/m2

## 2018-11-14 NOTE — MR AVS SNAPSHOT
After Visit Summary   11/14/2018    Jennifer Alvarez    MRN: [id number]           Patient Information     Date Of Birth          1940        Visit Information        Provider Department      11/14/2018 11:00 AM Maxx Elizabeth MD [facility]        Today's Diagnoses     Mixed simple and mucopurulent chronic bronchitis (H)    -  1       Follow-ups after your visit        Follow-up notes from your care team     Return in about 6 months (around 5/14/2019).      Your next 10 appointments already scheduled     Nov 30, 2018 11:00 AM CST   Infusion 300 with UC SPEC INFUSION, UC 44 ATC   [facility]    [address]  [address]  [facility] [address]   [phone]            Dec 21, 2018 10:30 AM CST   Infusion 300 with UC SPEC INFUSION, UC 48 ATC   [facility]    [address]  [address]  [place] [address]   [phone]            May 15, 2019 10:15 AM CDT   Lab with  LAB   [facility]    [address]  1st Floor  [facility] [address]   [phone]            May 15, 2019 10:30 AM CDT   PFT VISIT with  PFL C   [facility] ([facility]    [address]  3rd Floor  [facility] [address] [phone]            May 15, 2019 11:00 AM CDT   (Arrive by 10:45 AM)   Return Interstitial Lung with Maxx Elizabeth MD   [facility]    [address]  [address]  [facility] [address] [phone]              Future tests that were ordered for you today     Open Future Orders        Priority Expected Expires Ordered    CBC with platelets Routine 5/14/2019 11/14/2019 11/14/2018    Basic  "metabolic panel Routine 5/14/2019 11/14/2019 11/14/2018    Hepatic panel Routine 5/14/2019 11/14/2019 11/14/2018    Follow-Up with Device Clinic - 3 months Routine 2/12/2019 5/13/2019 11/14/2018            Who to contact     If you have questions or need follow up information about today's clinic visit or your schedule please contact [facility] directly at [phone].  Normal or non-critical lab and imaging results will be communicated to you by [employer], letter or phone within 4 business days after the clinic has received the results. If you do not hear from us within 7 days, please contact the clinic through [employer] or phone. If you have a critical or abnormal lab result, we will notify you by phone as soon as possible.  Submit refill requests through [facility] or call your pharmacy and they will forward the refill request to us. Please allow 3 business days for your refill to be completed.          Additional Information About Your Visit        [employer] Information     [facility] gives you secure access to your electronic health record. If you see a primary care provider, you can also send messages to your care team and make appointments. If you have questions, please call your primary care clinic.  If you do not have a primary care provider, please call [phone] and they will assist you.        Care EveryWhere ID     This is your Care EveryWhere ID. This could be used by other organizations to access your [place] medical records  [id number]        Your Vitals Were     Pulse Respirations Height Pulse Oximetry BMI (Body Mass Index)       76 16 1.549 m (5' 0.98\") 94% 17.01 kg/m2        Blood Pressure from Last 3 Encounters:   11/14/18 189/79   11/09/18 149/81   10/19/18 151/53    Weight from Last 3 Encounters:   11/14/18 40.8 kg (90 lb)   11/09/18 41 kg (90 lb 6.4 oz)   10/19/18 41 kg (90 lb 6.4 oz)                 Today's Medication Changes          These changes are accurate as " of 11/14/18 11:21 AM.  If you have any questions, ask your nurse or doctor.               Start taking these medicines.        Dose/Directions    levofloxacin 500 MG tablet   Commonly known as:  LEVAQUIN   Used for:  Mixed simple and mucopurulent chronic bronchitis (H)   Started by:  Maxx Elizabeth MD        Dose:  500 mg   Take 1 tablet (500 mg) by mouth daily   Quantity:  14 tablet   Refills:  3       ranitidine 75 MG tablet   Commonly known as:  ZANTAC   Used for:  Mixed simple and mucopurulent chronic bronchitis (H)   Started by:  Maxx Elizabeth MD        Dose:  150 mg   Take 2 tablets (150 mg) by mouth 2 times daily   Quantity:  30 tablet   Refills:  3         These medicines have changed or have updated prescriptions.        Dose/Directions    * predniSONE 10 MG tablet   Commonly known as:  DELTASONE   This may have changed:  Another medication with the same name was added. Make sure you understand how and when to take each.   Used for:  Mixed simple and mucopurulent chronic bronchitis (H), Gastroesophageal reflux disease without esophagitis   Changed by:  Maxx Elizabeth MD        4 tablets by mouth each day for 3 days, then 3 tablets each day for 3 days, then 2 tablets each day for 3 days, then 1 tablet each day for 3 days, then off   Quantity:  30 tablet   Refills:  1       * predniSONE 20 MG tablet   Commonly known as:  DELTASONE   This may have changed:  Another medication with the same name was added. Make sure you understand how and when to take each.   Used for:  Allergic reaction to contrast dye   Changed by:  Maxx Elizabeth MD        Dose:  20 mg   Take 1 tablet (20 mg) by mouth See Admin Instructions 60 mg the night before the procedure, 30 mg the morning of the procedure   Quantity:  5 tablet   Refills:  3       * predniSONE 10 MG tablet   Commonly known as:  DELTASONE   This may have changed:  You were already taking a medication with the same name, and this prescription was added.  Make sure you understand how and when to take each.   Used for:  Mixed simple and mucopurulent chronic bronchitis (H)   Changed by:  Maxx Elizabeth MD        Take 4 tablets by mouth for 3 days, the 3 tablets for 3 days, then 2 tablets for 3 days, then 1 tablet for 3 days then off   Quantity:  40 tablet   Refills:  1       * Notice:  This list has 3 medication(s) that are the same as other medications prescribed for you. Read the directions carefully, and ask your doctor or other care provider to review them with you.         Where to get your medicines      These medications were sent to [facility] - [facility] [address]  [address], [facility] [address]     Phone:  [phone]     levofloxacin 500 MG tablet    predniSONE 10 MG tablet    ranitidine 75 MG tablet                Primary Care Provider Office Phone # Fax #    Brandi Burks [phone]       [facility] [address]   [facility] [address]        Equal Access to Services     GUERA HOLT AH: Hadii adolph ku hadasho Soomaali, waaxda luqadaha, qaybta kaalmada adeegyada, waxay idiin hayaan john caicedo . So [facility] [phone].    ATENCIÓN: Si habla español, tiene a taylor disposición servicios gratuitos de asistencia lingüística. Llame al [phone].    We comply with applicable federal civil rights laws and [place] laws. We do not discriminate on the basis of race, color, national origin, age, disability, sex, sexual orientation, or gender identity.            Thank you!     Thank you for choosing [facility]  for your care. Our goal is always to provide you with excellent care. Hearing back from our patients is one way we can continue to improve our services. Please take a few minutes to complete the written survey that you may receive in the mail after your visit with us. Thank you!             Your Updated Medication List - Protect others around you: Learn how to safely use, store  and throw away your medicines at www.disposemymeds.org.          This list is accurate as of 11/14/18 11:21 AM.  Always use your most recent med list.                   Brand Name Dispense Instructions for use Diagnosis    * albuterol 108 (90 Base) MCG/ACT inhaler    PROAIR HFA/PROVENTIL HFA/VENTOLIN HFA    1 Inhaler    Take 2 puffs prn for wheezing or shortness of breath    Wheezing       * albuterol 108 (90 Base) MCG/ACT inhaler    PROAIR HFA/PROVENTIL HFA/VENTOLIN HFA    1 Inhaler    Take 2 puffs as needed for shortness of breath    Mixed simple and mucopurulent chronic bronchitis (H), Gastroesophageal reflux disease without esophagitis       * albuterol (2.5 MG/3ML) 0.083% neb solution      Take 1 ampule by nebulization every 6 hours as needed.        aspirin 81 MG tablet      Take 1 tablet by mouth daily.        atorvastatin 10 MG tablet    LIPITOR    90 tablet    Take 4 tablets (40 mg) by mouth daily    Hyperlipidemia LDL goal <100       azithromycin 250 MG tablet    ZITHROMAX    6 tablet    Take as directed    Mixed simple and mucopurulent chronic bronchitis (H), Gastroesophageal reflux disease without esophagitis       Blood Pressure Kit      daily.        carvedilol 12.5 MG tablet    COREG    180 tablet    Take 1 tablet (12.5 mg) by mouth 2 times daily (with meals)    Secondary hypertension       clopidogrel 75 MG tablet    PLAVIX    90 tablet    Take 1 tablet (75 mg) by mouth daily    Coronary artery disease       DEMEROL 25 MG/ML injection   Generic drug:  meperidine      Inject 25 mg into the vein as needed. 25 mg IV prior to IgG infusion        dicyclomine 10 MG capsule    BENTYL     Take 10 mg by mouth 4 times daily (before meals and nightly).        diphenhydrAMINE 50 MG capsule    BENADRYL    1 capsule    Take 1 capsule (50 mg) by mouth See Admin Instructions Take 50 mg the morning of the procedure    Allergic reaction to contrast dye       FISH OIL      Unsure of dosage take two caps daily        *  fluticasone-salmeterol 250-50 MCG/DOSE diskus inhaler    ADVAIR    1 Inhaler    Inhale 1 puff into the lungs 2 times daily.    Unspecified chronic bronchitis (H)       * fluticasone-salmeterol 250-50 MCG/DOSE diskus inhaler    ADVAIR    1 Inhaler    Inhale 1 puff into the lungs 2 times daily    Mixed simple and mucopurulent chronic bronchitis (H), Gastroesophageal reflux disease without esophagitis       hydrochlorothiazide 25 MG tablet    HYDRODIURIL    90 tablet    Take 1 tablet (25 mg) by mouth daily    Benign essential hypertension       HYDROcodone-acetaminophen  MG per tablet    LORCET     Take 1 tablet by mouth every 6 hours as needed.        Immune Globulin (Human) 25 GM/500ML Soln      Inject 25 g into the vein See Admin Instructions. 25 grams every 3 weeks IVIG        isosorbide mononitrate 60 MG 24 hr tablet    IMDUR    90 tablet    Take 1 tablet (60 mg) by mouth daily    HTN (hypertension)       levofloxacin 500 MG tablet    LEVAQUIN    14 tablet    Take 1 tablet (500 mg) by mouth daily    Mixed simple and mucopurulent chronic bronchitis (H)       lisinopril 10 MG tablet    PRINIVIL/ZESTRIL    180 tablet    Take 1 tablet (10 mg) by mouth 2 times daily    Essential hypertension       metFORMIN 1000 MG tablet    GLUCOPHAGE     Take 1 tablet (1,000 mg) by mouth daily (with dinner) Take 1 tablet twice daily    DM (diabetes mellitus) (H)       nitroGLYcerin 0.4 MG sublingual tablet    NITROQUICK    25 tablet    Place 1 tablet (0.4 mg) under the tongue every 5 minutes as needed    Chest pain       * omeprazole 20 MG CR capsule    priLOSEC    60 capsule    Take 1 capsule (20 mg) by mouth 2 times daily    Esophageal reflux, Unspecified chronic bronchitis (H)       * omeprazole 20 MG CR capsule    priLOSEC    60 capsule    Take 1 capsule (20 mg) by mouth daily    Mixed simple and mucopurulent chronic bronchitis (H), Gastroesophageal reflux disease without esophagitis       * blood glucose monitoring lancets      3 Box    Use to test blood sugar 3 times daily or as directed.    Type 2 diabetes mellitus with complication (H)       * ONE TOUCH FINE POINT LANCETS      2 times daily.        * ONETOUCH ULTRA test strip   Generic drug:  blood glucose monitoring     200 strip    Test blood sugar twice a day.    Diabetes mellitus type II       * blood glucose monitoring test strip    TERRY CONTOUR NEXT    270 each    by In Vitro route 3 times daily Use to test blood sugar 3 times daily or as directed.    Type 2 diabetes mellitus with complication (H)       * predniSONE 10 MG tablet    DELTASONE    30 tablet    4 tablets by mouth each day for 3 days, then 3 tablets each day for 3 days, then 2 tablets each day for 3 days, then 1 tablet each day for 3 days, then off    Mixed simple and mucopurulent chronic bronchitis (H), Gastroesophageal reflux disease without esophagitis       * predniSONE 20 MG tablet    DELTASONE    5 tablet    Take 1 tablet (20 mg) by mouth See Admin Instructions 60 mg the night before the procedure, 30 mg the morning of the procedure    Allergic reaction to contrast dye       * predniSONE 10 MG tablet    DELTASONE    40 tablet    Take 4 tablets by mouth for 3 days, the 3 tablets for 3 days, then 2 tablets for 3 days, then 1 tablet for 3 days then off    Mixed simple and mucopurulent chronic bronchitis (H)       ranitidine 75 MG tablet    ZANTAC    30 tablet    Take 2 tablets (150 mg) by mouth 2 times daily    Mixed simple and mucopurulent chronic bronchitis (H)       * tiotropium 18 MCG capsule    SPIRIVA HANDIHALER    30 capsule    One puff every day    Unspecified chronic bronchitis (H)       * tiotropium 18 MCG capsule    SPIRIVA HANDIHALER    30 capsule    One puff every day    Mixed simple and mucopurulent chronic bronchitis (H), Gastroesophageal reflux disease without esophagitis       TYLENOL 325 MG tablet   Generic drug:  acetaminophen      Take 1-2 tablets by mouth every 6 hours as needed.        *  Notice:  This list has 16 medication(s) that are the same as other medications prescribed for you. Read the directions carefully, and ask your doctor or other care provider to review them with you.

## 2018-11-14 NOTE — PROGRESS NOTES
Jennifer is a 77-year-old female with a chronic bronchitis and recurrent respiratory tract infections.  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  Jennifer's last visit with me was in May of this past year.  Since her last visit she was again visiting her relatives down in [place] and taking care of her grandchildren who were sick and she developed another respiratory tract infection.  She was treated with an antibiotic, unclear which one it was about 3 months ago.  However, she still has not fully recovered and feels congested with raving within her chest and increased wheezing.  She is not really able to bring up much sputum and she denies running a fever, but clearly is more congested than what she previously has felt.  She uses albuterol and Advair inhalers as well as albuterol nebulizer for her bronchitis.      REVIEW OF SYSTEMS:  Jennifer is noticing reflux despite being on Prilosec b.i.d. and this has been quite bothersome for her and may be playing a bit of a role in her cough.  She denies chest pain at this point.      PHYSICAL EXAMINATION:   VITAL SIGNS:  Jennifer's blood pressure was elevated with a systolic pressure of 189 and diastolic pressure in the 90s.  Of note, she is scheduled to see Dr. Adair.  She has a stent in place for renal artery stenosis and may need the stent either replaced or adjusted.     HEENT: She is nonicteric.  Mouth and throat she has upper and lower plates in place.  No lesions are seen.   NECK:  Supple, no adenopathy or thyromegaly.   CHEST:  She has rhonchi present.  A few wheezes.  Good breath sounds bilaterally.   HEART:  Regular rate and rhythm, normal S1 and S2.   ABDOMEN:  Soft, no hepatosplenomegaly.   EXTREMITIES:  Without clubbing, cyanosis or appreciable edema today.   SKIN:  No rash.      PULMONARY FUNCTION TESTS:  Her PFTs show FVC of 1.42 or 58% of predicted.  FEV1 is 0.55 or 29% of predicted, her ratio was 39% of  predicted.  Her PFTs, the FVC has improved compared to the last several times; however, FEV1 is decreased.  Her PFTs are not much changed compared to PFTs dating back up to 10 years ago.      ASSESSMENT AND PLAN:     1.  In summary then, Jennifer has a chronic bronchitis with recurrent respiratory tract infections.  She has IgM, IgA immune deficiency on immunoglobulin replacement on an every 3 week basis, which we will continue.  For her current respiratory symptoms, I will treat her with a 14-day course of Levaquin and a 2-week course of prednisone.   2.  For GERD with significant reflux despite Prilosec -  I will place her on Zantac 150 mg b.i.d.        I will have Jennifer follow up in 6 months with PFTs and labs.           Maxx Elizabeth  Pulmonary/Critical Care  November 14, 2018 12:24 PM

## 2018-11-14 NOTE — PATIENT INSTRUCTIONS
It was a pleasure to see you in clinic today. Please do not hesitate to call with any questions or concerns. We look forward to seeing you in clinic at your next device check in 3 months. Please call to schedule the appointment after you schedule your infusion appointment for February.    Elba Encarnacion, RN  Electrophysiology Nurse Clinician  [facility]  During business hours call:  [phone]  After business hours please call: [phone]- select option #4 and ask for job code [phone].

## 2018-11-14 NOTE — PROGRESS NOTES
Preliminary Device Interrogation Results.  Final physician signed paceart report to be scanned and attached.    Pt seen in the CVC for evaluation and iterative programming of an Abbott, dual chamber pacemaker, per MD orders. Today her intrinsic rhythm is SB 50 bpm. Normal pacemaker function. 2 VHR episodes recorded. The EGMs show 1:1 AV conduction. 1 AMS episode recorded. The EGM shows noise on the RA lead which is not a new finding. Lead trends appear stable. AP= 28% and = 0%. Pt reports she is feeling well. Battery estimates 8.8-10.8 years to MARIA T. Plan for pt to RTC in 3 months.  Dual lead pacemaker

## 2018-11-14 NOTE — LETTER
11/14/2018       RE: Jennifer Alvarez  [address]  [place] [address]     Dear Colleague,    Thank you for referring your patient, Jennifer Alvarez, to the [facility] at [facility]. Please see a copy of my visit note below.    Jennifer is a 77-year-old female with a chronic bronchitis and recurrent respiratory tract infections.  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  Jennifer's last visit with me was in May of this past year.  Since her last visit she was again visiting her relatives down in [place] and taking care of her grandchildren who were sick and she developed another respiratory tract infection.  She was treated with an antibiotic, unclear which one it was about 3 months ago.  However, she still has not fully recovered and feels congested with raving within her chest and increased wheezing.  She is not really able to bring up much sputum and she denies running a fever, but clearly is more congested than what she previously has felt.  She uses albuterol and Advair inhalers as well as albuterol nebulizer for her bronchitis.      REVIEW OF SYSTEMS:  Jennifer is noticing reflux despite being on Prilosec b.i.d. and this has been quite bothersome for her and may be playing a bit of a role in her cough.  She denies chest pain at this point.      PHYSICAL EXAMINATION:   VITAL SIGNS:  Jennifer's blood pressure was elevated with a systolic pressure of 189 and diastolic pressure in the 90s.  Of note, she is scheduled to see Dr. Adair.  She has a stent in place for renal artery stenosis and may need the stent either replaced or adjusted.     HEENT: She is nonicteric.  Mouth and throat she has upper and lower plates in place.  No lesions are seen.   NECK:  Supple, no adenopathy or thyromegaly.   CHEST:  She has rhonchi present.  A few wheezes.  Good breath sounds bilaterally.   HEART:   Regular rate and rhythm, normal S1 and S2.   ABDOMEN:  Soft, no hepatosplenomegaly.   EXTREMITIES:  Without clubbing, cyanosis or appreciable edema today.   SKIN:  No rash.      PULMONARY FUNCTION TESTS:  Her PFTs show FVC of 1.42 or 58% of predicted.  FEV1 is 0.55 or 29% of predicted, her ratio was 39% of predicted.  Her PFTs, the FVC has improved compared to the last several times; however, FEV1 is decreased.  Her PFTs are not much changed compared to PFTs dating back up to 10 years ago.      ASSESSMENT AND PLAN:     1.  In summary then, Jennifer has a chronic bronchitis with recurrent respiratory tract infections.  She has IgM, IgA immune deficiency on immunoglobulin replacement on an every 3 week basis, which we will continue.  For her current respiratory symptoms, I will treat her with a 14-day course of Levaquin and a 2-week course of prednisone.   2.  For GERD with significant reflux despite Prilosec -  I will place her on Zantac 150 mg b.i.d.        I will have Jennifer follow up in 6 months with PFTs and labs.           Maxx Elizabeth  Pulmonary/Critical Care  November 14, 2018 12:24 PM

## 2018-11-14 NOTE — MR AVS SNAPSHOT
After Visit Summary   11/14/2018    Jennifer Alvarez    MRN: [id number]           Patient Information     Date Of Birth          1940        Visit Information        Provider Department      11/14/2018 9:30 AM 1, Fartun Cv Device [facility]        Today's Diagnoses     Sinus bradycardia    -  1    Sinoatrial node dysfunction (H)          Care Instructions    It was a pleasure to see you in clinic today. Please do not hesitate to call with any questions or concerns. We look forward to seeing you in clinic at your next device check in 3 months. Please call to schedule the appointment after you schedule your infusion appointment for February.    Elba Encarnacion, RN  Electrophysiology Nurse Clinician  [facility]  During business hours call:  [phone]  After business hours please call: [phone]- select option #4 and ask for job code [phone].            Follow-ups after your visit        Additional Services     Follow-Up with Device Clinic - 3 months                 Your next 10 appointments already scheduled     Nov 14, 2018 10:00 AM CST   Lab with FARTUN LAB   [facility] ([facility])    [address] [place] [address]  [address] [facility] [address]   [phone]            Nov 14, 2018 10:30 AM CST   PFT VISIT with  PFL D   [facility] Pulmonary Function Testing ([facility])    [address] [place] [address]  3rd Floor  [facility] [address]   [phone]            Nov 14, 2018 11:00 AM CST   (Arrive by 10:45 AM)   Return Interstitial Lung with Maxx Elizabeth MD   [facility] ([facility])    [address] [place] [address]  [address] [place] [address]   [phone]            Nov 30, 2018 11:00 AM CST   Infusion 300 with FARTUN SPEC INFUSION, UC 44 ATC   [facility] ([facility])    [address] [place] [address]  [address]  [address]  [facility] [address] [phone]            Dec 21, 2018 10:30 AM CST   Infusion 300 with UC SPEC INFUSION, UC 48 ATC   [facility] ([facility])    [address]  [address]  [facility] [address]   [phone]              Future tests that were ordered for you today     Open Future Orders        Priority Expected Expires Ordered    Follow-Up with Device Clinic - 3 months Routine 2/12/2019 5/13/2019 11/14/2018            Who to contact     Please call your clinic at No information on file. to:    Ask questions about your health    Make or cancel appointments    Discuss your medicines    Learn about your test results    Speak to your doctor            Additional Information About Your Visit        [employer] Information     [employer] gives you secure access to your electronic health record. If you see a primary care provider, you can also send messages to your care team and make appointments. If you have questions, please call your primary care clinic.  If you do not have a primary care provider, please call [phone] and they will assist you.      [employer] is an electronic gateway that provides easy, online access to your medical records. With [employer], you can request a clinic appointment, read your test results, renew a prescription or communicate with your care team.     To access your existing account, please contact your [facility] or call [phone] for assistance.        Care EveryWhere ID     This is your Care EveryWhere ID. This could be used by other organizations to access your [place] medical records  [id number]         Blood Pressure from Last 3 Encounters:   11/09/18 149/81   10/19/18 151/53   09/28/18 145/80    Weight from Last 3 Encounters:   11/09/18 41 kg (90 lb 6.4 oz)   10/19/18 41 kg (90 lb 6.4 oz)   09/28/18 41.7 kg (92 lb)              We Performed  the Following     (63314) PM DEVICE PROGRAMMING EVAL, DUAL LEAD PACER     Follow-Up with Device Clinic-6 months        Primary Care Provider Office Phone # Fax #    Brandi Burks [phone]       [facility] [address]   [place] [address]        Equal Access to Services     GUERA HOLT : Hadii aad ku hadasho Soomaali, waaxda luqadaha, qaybta kaalmada adeegyada, waxay idiin hayaan adeeg kharash la'aan ah. So [facility] [phone].    ATENCIÓN: Si habla español, tiene a taylor disposición servicios gratuitos de asistencia lingüística. [facility] [phone].    We comply with applicable federal civil rights laws and [place] laws. We do not discriminate on the basis of race, color, national origin, age, disability, sex, sexual orientation, or gender identity.            Thank you!     Thank you for choosing [facility]  for your care. Our goal is always to provide you with excellent care. Hearing back from our patients is one way we can continue to improve our services. Please take a few minutes to complete the written survey that you may receive in the mail after your visit with us. Thank you!             Your Updated Medication List - Protect others around you: Learn how to safely use, store and throw away your medicines at www.disposemymeds.org.          This list is accurate as of 11/14/18  9:49 AM.  Always use your most recent med list.                   Brand Name Dispense Instructions for use Diagnosis    * albuterol 108 (90 Base) MCG/ACT inhaler    PROAIR HFA/PROVENTIL HFA/VENTOLIN HFA    1 Inhaler    Take 2 puffs prn for wheezing or shortness of breath    Wheezing       * albuterol 108 (90 Base) MCG/ACT inhaler    PROAIR HFA/PROVENTIL HFA/VENTOLIN HFA    1 Inhaler    Take 2 puffs as needed for shortness of breath    Mixed simple and mucopurulent chronic bronchitis (H), Gastroesophageal reflux disease without esophagitis       * albuterol (2.5 MG/3ML) 0.083% neb solution      Take 1 ampule  by nebulization every 6 hours as needed.        aspirin 81 MG tablet      Take 1 tablet by mouth daily.        atorvastatin 10 MG tablet    LIPITOR    90 tablet    Take 4 tablets (40 mg) by mouth daily    Hyperlipidemia LDL goal <100       azithromycin 250 MG tablet    ZITHROMAX    6 tablet    Take as directed    Mixed simple and mucopurulent chronic bronchitis (H), Gastroesophageal reflux disease without esophagitis       Blood Pressure Kit      daily.        carvedilol 12.5 MG tablet    COREG    180 tablet    Take 1 tablet (12.5 mg) by mouth 2 times daily (with meals)    Secondary hypertension       clopidogrel 75 MG tablet    PLAVIX    90 tablet    Take 1 tablet (75 mg) by mouth daily    Coronary artery disease       DEMEROL 25 MG/ML injection   Generic drug:  meperidine      Inject 25 mg into the vein as needed. 25 mg IV prior to IgG infusion        dicyclomine 10 MG capsule    BENTYL     Take 10 mg by mouth 4 times daily (before meals and nightly).        diphenhydrAMINE 50 MG capsule    BENADRYL    1 capsule    Take 1 capsule (50 mg) by mouth See Admin Instructions Take 50 mg the morning of the procedure    Allergic reaction to contrast dye       FISH OIL      Unsure of dosage take two caps daily        * fluticasone-salmeterol 250-50 MCG/DOSE diskus inhaler    ADVAIR    1 Inhaler    Inhale 1 puff into the lungs 2 times daily.    Unspecified chronic bronchitis (H)       * fluticasone-salmeterol 250-50 MCG/DOSE diskus inhaler    ADVAIR    1 Inhaler    Inhale 1 puff into the lungs 2 times daily    Mixed simple and mucopurulent chronic bronchitis (H), Gastroesophageal reflux disease without esophagitis       hydrochlorothiazide 25 MG tablet    HYDRODIURIL    90 tablet    Take 1 tablet (25 mg) by mouth daily    Benign essential hypertension       HYDROcodone-acetaminophen  MG per tablet    LORCET     Take 1 tablet by mouth every 6 hours as needed.        Immune Globulin (Human) 25 GM/500ML Soln      Inject  25 g into the vein See Admin Instructions. 25 grams every 3 weeks IVIG        isosorbide mononitrate 60 MG 24 hr tablet    IMDUR    90 tablet    Take 1 tablet (60 mg) by mouth daily    HTN (hypertension)       lisinopril 10 MG tablet    PRINIVIL/ZESTRIL    180 tablet    Take 1 tablet (10 mg) by mouth 2 times daily    Essential hypertension       metFORMIN 1000 MG tablet    GLUCOPHAGE     Take 1 tablet (1,000 mg) by mouth daily (with dinner) Take 1 tablet twice daily    DM (diabetes mellitus) (H)       nitroGLYcerin 0.4 MG sublingual tablet    NITROQUICK    25 tablet    Place 1 tablet (0.4 mg) under the tongue every 5 minutes as needed    Chest pain       * omeprazole 20 MG CR capsule    priLOSEC    60 capsule    Take 1 capsule (20 mg) by mouth 2 times daily    Esophageal reflux, Unspecified chronic bronchitis (H)       * omeprazole 20 MG CR capsule    priLOSEC    60 capsule    Take 1 capsule (20 mg) by mouth daily    Mixed simple and mucopurulent chronic bronchitis (H), Gastroesophageal reflux disease without esophagitis       * blood glucose monitoring lancets     3 Box    Use to test blood sugar 3 times daily or as directed.    Type 2 diabetes mellitus with complication (H)       * ONE TOUCH FINE POINT LANCETS      2 times daily.        * ONETOUCH ULTRA test strip   Generic drug:  blood glucose monitoring     200 strip    Test blood sugar twice a day.    Diabetes mellitus type II       * blood glucose monitoring test strip    TERRY CONTOUR NEXT    270 each    by In Vitro route 3 times daily Use to test blood sugar 3 times daily or as directed.    Type 2 diabetes mellitus with complication (H)       * predniSONE 10 MG tablet    DELTASONE    30 tablet    4 tablets by mouth each day for 3 days, then 3 tablets each day for 3 days, then 2 tablets each day for 3 days, then 1 tablet each day for 3 days, then off    Mixed simple and mucopurulent chronic bronchitis (H), Gastroesophageal reflux disease without esophagitis        * predniSONE 20 MG tablet    DELTASONE    5 tablet    Take 1 tablet (20 mg) by mouth See Admin Instructions 60 mg the night before the procedure, 30 mg the morning of the procedure    Allergic reaction to contrast dye       * tiotropium 18 MCG capsule    SPIRIVA HANDIHALER    30 capsule    One puff every day    Unspecified chronic bronchitis (H)       * tiotropium 18 MCG capsule    SPIRIVA HANDIHALER    30 capsule    One puff every day    Mixed simple and mucopurulent chronic bronchitis (H), Gastroesophageal reflux disease without esophagitis       TYLENOL 325 MG tablet   Generic drug:  acetaminophen      Take 1-2 tablets by mouth every 6 hours as needed.        * Notice:  This list has 15 medication(s) that are the same as other medications prescribed for you. Read the directions carefully, and ask your doctor or other care provider to review them with you.

## 2018-11-14 NOTE — NURSING NOTE
Chief Complaint   Patient presents with     RECHECK     Bronchitis 6 month follow up      Lore Graham CMA

## 2018-11-30 NOTE — MR AVS SNAPSHOT
After Visit Summary   11/30/2018    Jennifer Alvarez    MRN: [id number]           Patient Information     Date Of Birth          1940        Visit Information        Provider Department      11/30/2018 11:00 AM UC 44 ATC; UC SPEC INFUSION [facility] Specialty and Procedure        Today's Diagnoses     Hypogammaglobulinemia (H)    -  1    IgA deficiency (H)          Care Instructions    Dear Jennifer Alvarez    Thank you for choosing [facility] ([facility]) for your infusion.  The following information is a summary of our appointment as well as important reminders.            We look forward in seeing you on your next appointment here at [facility].  Please don t hesitate to call us at [phone] to reschedule any of your appointments or to speak with one of the [facility] registered nurses.  It was a pleasure taking care of you today.    Sincerely,    [facility]  Specialty Infusion & Procedure Center  [address] [place] [address]  [address]  Phone:  [phone]            Follow-ups after your visit        Your next 10 appointments already scheduled     Dec 21, 2018 10:30 AM CST   Infusion 300 with UC SPEC INFUSION, UC 48 ATC   [facility] Specialty and Procedure ([facility])    [address] [place] [address]  [address]  [facility] [address] [phone]            May 15, 2019  9:30 AM CDT   Pacemaker Check with  Cv Device 1   [facility] Care ([facility])    [address] [place] [address]  3rd Floor  [address]               May 15, 2019 10:15 AM CDT   Lab with  LAB   [facility] Lab ([facility])    [address] [place] [address]  1st Floor  [facility] [address] [phone]            May 15, 2019 10:30 AM CDT   PFT VISIT with  PFL C   [facility] Pulmonary Function Testing ([facility])    [address]  3rd Floor  [facility] [address] [phone]            May 15, 2019 11:00 AM CDT   (Arrive by 10:45 AM)   Return Interstitial Lung with Maxx Elizabeth MD   [facility] for [facility] ([facility])    [address] [facility] [address]  [address]  [facility] [address] [phone]              Who to contact     If you have questions or need follow up information about today's clinic visit or your schedule please contact [facility] SPECIALTY [facility] PROCEDURE directly at [phone].  Normal or non-critical lab and imaging results will be communicated to you by MyChart, letter or phone within 4 business days after the clinic has received the results. If you do not hear from us within 7 days, please contact the clinic through [employer] or phone. If you have a critical or abnormal lab result, we will notify you by phone as soon as possible.  Submit refill requests through [employer] or call your pharmacy and they will forward the refill request to us. Please allow 3 business days for your refill to be completed.          Additional Information About Your Visit        [email or web address] Information     [employer] gives you secure access to your electronic health record. If you see a primary care provider, you can also send messages to your care team and make appointments. If you have questions, please call your primary care clinic.  If you do not have a primary care provider, please call [phone] and they will assist you.        Care EveryWhere ID     This is your Care EveryWhere ID. This could be used by other organizations to access your [place] medical records  [id number]        Your Vitals Were     Pulse Temperature Respirations Pulse Oximetry BMI (Body Mass Index)       72 98  F (36.7  C) (Oral) 16 99% 17.05 kg/m2        Blood Pressure from Last 3 Encounters:   11/30/18 159/65   11/14/18 189/79   11/09/18 149/81    Weight  from Last 3 Encounters:   11/30/18 40.9 kg (90 lb 3.2 oz)   11/14/18 40.8 kg (90 lb)   11/09/18 41 kg (90 lb 6.4 oz)              Today, you had the following     No orders found for display       Primary Care Provider Office Phone # Fax #    Brandi Burks [phone]       [facility] [address]   [facility] [place] [address]        Equal Access to Services     GUERA HOLT : Hadii aad ku hadasho Soomaali, waaxda luqadaha, qaybta kaalmada adeegyada, waxay idiin hayaan adeeg kharash la'aan . So [facility] [phone].    ATENCIÓN: Si derrickla español, tiene a taylor disposición servicios gratuitos de asistencia lingüística. [facility] [phone].    We comply with applicable federal civil rights laws and [place] laws. We do not discriminate on the basis of race, color, national origin, age, disability, sex, sexual orientation, or gender identity.            Thank you!     Thank you for choosing [facility] SPECIALTY AND PROCEDURE  for your care. Our goal is always to provide you with excellent care. Hearing back from our patients is one way we can continue to improve our services. Please take a few minutes to complete the written survey that you may receive in the mail after your visit with us. Thank you!             Your Updated Medication List - Protect others around you: Learn how to safely use, store and throw away your medicines at www.disposemymeds.org.          This list is accurate as of 11/30/18  2:14 PM.  Always use your most recent med list.                   Brand Name Dispense Instructions for use Diagnosis    * albuterol 108 (90 Base) MCG/ACT inhaler    PROAIR HFA/PROVENTIL HFA/VENTOLIN HFA    1 Inhaler    Take 2 puffs prn for wheezing or shortness of breath    Wheezing       * albuterol 108 (90 Base) MCG/ACT inhaler    PROAIR HFA/PROVENTIL HFA/VENTOLIN HFA    1 Inhaler    Take 2 puffs as needed for shortness of breath    Mixed simple and mucopurulent chronic bronchitis (H),  Gastroesophageal reflux disease without esophagitis       * albuterol (2.5 MG/3ML) 0.083% neb solution    PROVENTIL     Take 1 ampule by nebulization every 6 hours as needed.        aspirin 81 MG tablet    ASA     Take 1 tablet by mouth daily.        atorvastatin 10 MG tablet    LIPITOR    90 tablet    Take 4 tablets (40 mg) by mouth daily    Hyperlipidemia LDL goal <100       azithromycin 250 MG tablet    ZITHROMAX    6 tablet    Take as directed    Mixed simple and mucopurulent chronic bronchitis (H), Gastroesophageal reflux disease without esophagitis       Blood Pressure Kit      daily.        carvedilol 12.5 MG tablet    COREG    180 tablet    Take 1 tablet (12.5 mg) by mouth 2 times daily (with meals)    Secondary hypertension       clopidogrel 75 MG tablet    PLAVIX    90 tablet    Take 1 tablet (75 mg) by mouth daily    Coronary artery disease       DEMEROL 25 MG/ML injection   Generic drug:  meperidine      Inject 25 mg into the vein as needed. 25 mg IV prior to IgG infusion        dicyclomine 10 MG capsule    BENTYL     Take 10 mg by mouth 4 times daily (before meals and nightly).        diphenhydrAMINE 50 MG capsule    BENADRYL    1 capsule    Take 1 capsule (50 mg) by mouth See Admin Instructions Take 50 mg the morning of the procedure    Allergic reaction to contrast dye       FISH OIL      Unsure of dosage take two caps daily        * fluticasone-salmeterol 250-50 MCG/DOSE inhaler    ADVAIR    1 Inhaler    Inhale 1 puff into the lungs 2 times daily.    Unspecified chronic bronchitis (H)       * fluticasone-salmeterol 250-50 MCG/DOSE inhaler    ADVAIR    1 Inhaler    Inhale 1 puff into the lungs 2 times daily    Mixed simple and mucopurulent chronic bronchitis (H), Gastroesophageal reflux disease without esophagitis       hydrochlorothiazide 25 MG tablet    HYDRODIURIL    90 tablet    Take 1 tablet (25 mg) by mouth daily    Benign essential hypertension       HYDROcodone-acetaminophen  MG per  tablet    LORCET     Take 1 tablet by mouth every 6 hours as needed.        Immune Globulin (Human) 25 GM/500ML Soln      Inject 25 g into the vein See Admin Instructions. 25 grams every 3 weeks IVIG        isosorbide mononitrate 60 MG 24 hr tablet    IMDUR    90 tablet    Take 1 tablet (60 mg) by mouth daily    HTN (hypertension)       levofloxacin 500 MG tablet    LEVAQUIN    14 tablet    Take 1 tablet (500 mg) by mouth daily    Mixed simple and mucopurulent chronic bronchitis (H)       lisinopril 10 MG tablet    PRINIVIL/ZESTRIL    180 tablet    Take 1 tablet (10 mg) by mouth 2 times daily    Essential hypertension       metFORMIN 1000 MG tablet    GLUCOPHAGE     Take 1 tablet (1,000 mg) by mouth daily (with dinner) Take 1 tablet twice daily    DM (diabetes mellitus) (H)       nitroGLYcerin 0.4 MG sublingual tablet    NITROQUICK    25 tablet    Place 1 tablet (0.4 mg) under the tongue every 5 minutes as needed    Chest pain       * omeprazole 20 MG DR capsule    priLOSEC    60 capsule    Take 1 capsule (20 mg) by mouth 2 times daily    Esophageal reflux, Unspecified chronic bronchitis (H)       * omeprazole 20 MG DR capsule    priLOSEC    60 capsule    Take 1 capsule (20 mg) by mouth daily    Mixed simple and mucopurulent chronic bronchitis (H), Gastroesophageal reflux disease without esophagitis       * blood glucose monitoring lancets     3 Box    Use to test blood sugar 3 times daily or as directed.    Type 2 diabetes mellitus with complication (H)       * ONE TOUCH FINE POINT LANCETS      2 times daily.        * ONETOUCH ULTRA test strip   Generic drug:  blood glucose monitoring     200 strip    Test blood sugar twice a day.    Diabetes mellitus type II       * blood glucose monitoring test strip    TERRY CONTOUR NEXT    270 each    by In Vitro route 3 times daily Use to test blood sugar 3 times daily or as directed.    Type 2 diabetes mellitus with complication (H)       * predniSONE 10 MG tablet     DELTASONE    30 tablet    4 tablets by mouth each day for 3 days, then 3 tablets each day for 3 days, then 2 tablets each day for 3 days, then 1 tablet each day for 3 days, then off    Mixed simple and mucopurulent chronic bronchitis (H), Gastroesophageal reflux disease without esophagitis       * predniSONE 20 MG tablet    DELTASONE    5 tablet    Take 1 tablet (20 mg) by mouth See Admin Instructions 60 mg the night before the procedure, 30 mg the morning of the procedure    Allergic reaction to contrast dye       * predniSONE 10 MG tablet    DELTASONE    40 tablet    Take 4 tablets by mouth for 3 days, the 3 tablets for 3 days, then 2 tablets for 3 days, then 1 tablet for 3 days then off    Mixed simple and mucopurulent chronic bronchitis (H)       ranitidine 75 MG tablet    ZANTAC    30 tablet    Take 2 tablets (150 mg) by mouth 2 times daily    Mixed simple and mucopurulent chronic bronchitis (H)       * tiotropium 18 MCG inhaled capsule    SPIRIVA HANDIHALER    30 capsule    One puff every day    Unspecified chronic bronchitis (H)       * tiotropium 18 MCG inhaled capsule    SPIRIVA HANDIHALER    30 capsule    One puff every day    Mixed simple and mucopurulent chronic bronchitis (H), Gastroesophageal reflux disease without esophagitis       TYLENOL 325 MG tablet   Generic drug:  acetaminophen      Take 1-2 tablets by mouth every 6 hours as needed.        * Notice:  This list has 16 medication(s) that are the same as other medications prescribed for you. Read the directions carefully, and ask your doctor or other care provider to review them with you.

## 2018-11-30 NOTE — PATIENT INSTRUCTIONS
Dear Jennifer Alvarez    Thank you for choosing [facility] Center ([facility]) for your infusion.  The following information is a summary of our appointment as well as important reminders.            We look forward in seeing you on your next appointment here at [facility].  Please don t hesitate to call us at [phone] to reschedule any of your appointments or to speak with one of the [facility] registered nurses.  It was a pleasure taking care of you today.    Sincerely,    [facility]  Specialty Infusion & Procedure Center  [address] [place] [address]  [address]  Phone:  [phone]

## 2018-11-30 NOTE — PROGRESS NOTES
Nursing Note  Jennifer Alvarez presents today to [facility] for:   Chief Complaint   Patient presents with     Infusion     IVIG   During today's [facility] appointment, orders from Dr. Elizabeth were completed.  Frequency: every 3 weeks    Progress note:  Patient identification verified by name and date of birth.  Assessment completed.  Vitals recorded in Doc Flowsheets.  Patient was provided with education regarding infusion and possible side effects.  Patient verbalized understanding.      needed: No  Premedications: administered per order.  Infusion Rates: infusion starts at 20 ml/hr, then increased by 20 ml/hr every 15 minutes to final rate of 160 ml/hr.  Approximate Infusion length:3 hours.   Labs: were not ordered for this appointment.  Vascular access: peripheral IV placed today.  Treatment Conditions: patient denies fever, chills, signs of infection, recent illness, on antibiotics, productive cough or elevated temperature.  Patient tolerated infusion: well.    Discharge Plan:   Follow up plan of care with: ongoing infusions at [facility]. and primary medical doctor.  Discharge instructions were reviewed with patient.  Patient/representative verbalized understanding of discharge instructions and all questions answered.  Patient discharged from [facility] in stable condition.  Tawana Robert RN    Administrations This Visit     acetaminophen (TYLENOL) tablet 650 mg     Admin Date Action Dose Route Administered By             11/30/2018 Given 650 mg Oral Tawana Robert RN                    diphenhydrAMINE (BENADRYL) capsule 25 mg     Admin Date Action Dose Route Administered By             11/30/2018 Given 25 mg Oral Tawana Robert RN                    immune globulin - (GAMMAGARD) sucrose free 10 % injection 25 g     Admin Date Action Dose Route Administered By              11/30/2018 New Bag 25 g Intravenous Poeschel, Tawana WILLS RN                    meperidine (DEMEROL) injection 25 mg     Admin Date Action Dose Route Administered By             11/30/2018 Given 25 mg Intravenous PoeschelTawana RN                    methylPREDNISolone sodium succinate (solu-MEDROL) injection 100 mg     Admin Date Action Dose Route Administered By             11/30/2018 Given 100 mg Intravenous PojericheTaawna jameson RN                          /65  Pulse 72  Temp 98  F (36.7  C) (Oral)  Resp 16  Wt 40.9 kg (90 lb 3.2 oz)  SpO2 99%  BMI 17.05 kg/m2

## 2018-12-21 NOTE — PATIENT INSTRUCTIONS
Dear Jennifer Alvarez    Thank you for choosing [facility] ([facility]) for your infusion.  The following information is a summary of our appointment as well as important reminders.      Additional information: Your infusion today of   immune globulin - sucrose free 10 % injection 25 g (GAMMAGARD)    We look forward in seeing you on your next appointment here at [facility].  Please don t hesitate to call us at [phone] to reschedule any of your appointments or to speak with one of the [facility] registered nurses.  It was a pleasure taking care of you today.    Sincerely,    [facility]  [address] [place]  [address]  Phone:  [phone]  Patient Education     Immune Globulin Solution for injection  What is this medicine?  IMMUNE GLOBULIN (im MUNE GLOB sapna cynthia) helps to prevent or reduce the severity of certain infections in patients who are at risk. This medicine is collected from the pooled blood of many donors. It is used to treat immune system problems, thrombocytopenia, and Kawasaki syndrome.  This medicine may be used for other purposes; ask your health care provider or pharmacist if you have questions.  What should I tell my health care provider before I take this medicine?  They need to know if you have any of these conditions:    diabetes    extremely low or no immune antibodies in the blood    heart disease    history of blood clots    hyperprolinemia    infection in the blood, sepsis    kidney disease    taking medicine that may change kidney function - ask your health care provider about your medicine    an unusual or allergic reaction to human immune globulin, albumin, maltose, sucrose, polysorbate 80, other medicines, foods, dyes, or preservatives    pregnant or trying to get pregnant    breast-feeding  How should I use this medicine?  This medicine is for injection  into a muscle or infusion into a vein or skin. It is usually given by a health care professional in a hospital or clinic setting.  In rare cases, some brands of this medicine might be given at home. You will be taught how to give this medicine. Use exactly as directed. Take your medicine at regular intervals. Do not take your medicine more often than directed.  Talk to your pediatrician regarding the use of this medicine in children. Special care may be needed.  Overdosage: If you think you have taken too much of this medicine contact a poison control center or emergency room at once.  NOTE: This medicine is only for you. Do not share this medicine with others.  What if I miss a dose?  It is important not to miss your dose. Call your doctor or health care professional if you are unable to keep an appointment. If you give yourself the medicine and you miss a dose, take it as soon as you can. If it is almost time for your next dose, take only that dose. Do not take double or extra doses.  What may interact with this medicine?    aspirin and aspirin-like medicines    cisplatin    cyclosporine    medicines for infection like acyclovir, adefovir, amphotericin B, bacitracin, cidofovir, foscarnet, ganciclovir, gentamicin, pentamidine, vancomycin    NSAIDS, medicines for pain and inflammation, like ibuprofen or naproxen    pamidronate    vaccines    zoledronic acid  This list may not describe all possible interactions. Give your health care provider a list of all the medicines, herbs, non-prescription drugs, or dietary supplements you use. Also tell them if you smoke, drink alcohol, or use illegal drugs. Some items may interact with your medicine.  What should I watch for while using this medicine?  Your condition will be monitored carefully while you are receiving this medicine.  This medicine is made from pooled blood donations of many different people. It may be possible to pass an infection in this medicine. However,  the donors are screened for infections and all products are tested for HIV and hepatitis. The medicine is treated to kill most or all bacteria and viruses. Talk to your doctor about the risks and benefits of this medicine.  Do not have vaccinations for at least 14 days before, or until at least 3 months after receiving this medicine.  What side effects may I notice from receiving this medicine?  Side effects that you should report to your doctor or health care professional as soon as possible:    allergic reactions like skin rash, itching or hives, swelling of the face, lips, or tongue    breathing problems    chest pain or tightness    fever, chills    headache with nausea, vomiting    neck pain or difficulty moving neck    pain when moving eyes    pain, swelling, warmth in the leg    problems with balance, talking, walking    sudden weight gain    swelling of the ankles, feet, hands    trouble passing urine or change in the amount of urine  Side effects that usually do not require medical attention (report to your doctor or health care professional if they continue or are bothersome):    dizzy, drowsy    flushing    increased sweating    leg cramps    muscle aches and pains    pain at site where injected  This list may not describe all possible side effects. Call your doctor for medical advice about side effects. You may report side effects to FDA at 2-042-FDA-9885.  Where should I keep my medicine?  Keep out of the reach of children.  This drug is usually given in a hospital or clinic and will not be stored at home.  In rare cases, some brands of this medicine may be given at home. If you are using this medicine at home, you will be instructed on how to store this medicine. Throw away any unused medicine after the expiration date on the label.  NOTE: This sheet is a summary. It may not cover all possible information. If you have questions about this medicine, talk to your doctor, pharmacist, or health care  provider.  NOTE:This sheet is a summary. It may not cover all possible information. If you have questions about this medicine, talk to your doctor, pharmacist, or health care provider. Copyright  2016 Gold Standard

## 2018-12-21 NOTE — PROGRESS NOTES
Nursing Note  Jennifer Alvarez presents today to [facility] for:   Chief Complaint   Patient presents with     Infusion     IVIG (immune globulin)     During [facility]'s [facility] appointment, orders from Dr. Elizabeth were completed.  Frequency: every 3 weeks    Progress note:  Patient identification verified by name and date of birth.  Assessment completed.  Vitals recorded in Doc Flowsheets.  Patient was provided with education regarding infusion and possible side effects.  Patient verbalized understanding.      needed: No  Premedications: administered per order.  Infusion Rates: infusion starts at 20 ml/hr, then increased by 20 ml/hr every 15 minutes to final rate of 160 ml/hr.  Approximate Infusion length:4 hours.   Labs: were not ordered for this appointment.  Vascular access: peripheral IV placed today.  Treatment Conditions: patient denies fever, chills, signs of infection, recent illness, on antibiotics, productive cough or elevated temperature.  Patient tolerated infusion: well.    Drug Waste Record? Yes      Drug Name: Solu-medrol  Dose: 100 mg (1.6 ml)  Route administered: IV  NDC #: 3830-5671-86  Amount of waste(mL): 25 mg (0.4 ml)  Reason for waste: Single use vial     Discharge Plan:   Follow up plan of care with: ongoing infusions at [facility].  Discharge instructions were reviewed with patient.  Patient/representative verbalized understanding of discharge instructions and all questions answered.  Patient discharged from [facility] in stable condition.    Suzette Norris RN    Administrations This Visit     acetaminophen (TYLENOL) tablet 650 mg     Admin Date  12/21/2018 Action  Given Dose  650 mg Route  Oral Administered By  Suzette Norris RN          diphenhydrAMINE (BENADRYL) capsule 25 mg     Admin Date  12/21/2018 Action  Given Dose  25 mg Route  Oral Administered  By  Suzette Norris RN          immune globulin - sucrose free 10 % injection 25 g (GAMMAGARD)     Admin Date  12/21/2018 Action  New Bag Dose  25 g Route  Intravenous Administered By  Suzette Norris RN          meperidine (DEMEROL) injection 25 mg     Admin Date  12/21/2018 Action  Given Dose  25 mg Route  Intravenous Administered By  Suzette Norris RN          methylPREDNISolone sodium succinate (solu-MEDROL) injection 100 mg     Admin Date  12/21/2018 Action  Given Dose  100 mg Route  Intravenous Administered By  Suzette Norris RN                /70   Pulse 78   Temp 98.2  F (36.8  C) (Oral)   SpO2 98%

## 2018-12-26 NOTE — TELEPHONE ENCOUNTER
Health Call Center    Phone Message    May a detailed message be left on voicemail: yes    Reason for Call: patient called in as she has a procedure scheduled for 1/4/2019, she is just wondering if she needs to have a pre op prior and also if she needs to quit taking her clopidogrel (PLAVIX) 75 MG tablet prior to the procedure?? Please call her back asap to let her know home or mobile numbers are fine.       Action Taken: Message routed to:  [facility] ([facility] CARDIO

## 2018-12-26 NOTE — TELEPHONE ENCOUNTER
Returned patient's phone call. She is to take Plavix the day of the procedure. Also reminded her to take her premedication for contrast allergy. She does not need a pre op physical for the procedure.

## 2019-01-01 ENCOUNTER — TELEPHONE (OUTPATIENT)
Dept: INTERVENTIONAL RADIOLOGY/VASCULAR | Facility: CLINIC | Age: 79
End: 2019-01-01

## 2019-01-01 ENCOUNTER — INFUSION THERAPY VISIT (OUTPATIENT)
Dept: INFUSION THERAPY | Facility: CLINIC | Age: 79
End: 2019-01-01
Attending: INTERNAL MEDICINE
Payer: MEDICARE

## 2019-01-01 ENCOUNTER — TELEPHONE (OUTPATIENT)
Dept: CARDIOLOGY | Facility: CLINIC | Age: 79
End: 2019-01-01

## 2019-01-01 ENCOUNTER — HOSPITAL ENCOUNTER (EMERGENCY)
Facility: CLINIC | Age: 79
Discharge: HOME | End: 2019-05-28
Attending: EMERGENCY MEDICINE | Admitting: EMERGENCY MEDICINE
Payer: MEDICARE

## 2019-01-01 ENCOUNTER — ANCILLARY PROCEDURE (OUTPATIENT)
Dept: CARDIOLOGY | Facility: CLINIC | Age: 79
End: 2019-01-01
Attending: INTERNAL MEDICINE
Payer: MEDICARE

## 2019-01-01 ENCOUNTER — PATIENT OUTREACH (OUTPATIENT)
Dept: PULMONOLOGY | Facility: CLINIC | Age: 79
End: 2019-01-01

## 2019-01-01 ENCOUNTER — ANCILLARY PROCEDURE (OUTPATIENT)
Dept: CARDIOLOGY | Facility: CLINIC | Age: 79
End: 2019-01-01
Payer: MEDICARE

## 2019-01-01 ENCOUNTER — APPOINTMENT (OUTPATIENT)
Dept: MEDSURG UNIT | Facility: CLINIC | Age: 79
End: 2019-01-01
Attending: INTERNAL MEDICINE
Payer: MEDICARE

## 2019-01-01 ENCOUNTER — HOSPITAL ENCOUNTER (OUTPATIENT)
Dept: NUCLEAR MEDICINE | Facility: CLINIC | Age: 79
Setting detail: NUCLEAR MEDICINE
End: 2019-05-28
Attending: INTERNAL MEDICINE
Payer: MEDICARE

## 2019-01-01 ENCOUNTER — APPOINTMENT (OUTPATIENT)
Dept: INTERVENTIONAL RADIOLOGY/VASCULAR | Facility: CLINIC | Age: 79
End: 2019-01-01
Attending: INTERNAL MEDICINE
Payer: MEDICARE

## 2019-01-01 ENCOUNTER — OFFICE VISIT (OUTPATIENT)
Dept: CARDIOLOGY | Facility: CLINIC | Age: 79
End: 2019-01-01
Attending: NURSE PRACTITIONER
Payer: MEDICARE

## 2019-01-01 ENCOUNTER — OFFICE VISIT (OUTPATIENT)
Dept: PULMONOLOGY | Facility: CLINIC | Age: 79
End: 2019-01-01
Attending: INTERNAL MEDICINE
Payer: MEDICARE

## 2019-01-01 ENCOUNTER — HOSPITAL ENCOUNTER (OUTPATIENT)
Dept: CARDIOLOGY | Facility: CLINIC | Age: 79
Discharge: HOME | End: 2019-05-28
Attending: INTERNAL MEDICINE | Admitting: INTERNAL MEDICINE
Payer: MEDICARE

## 2019-01-01 ENCOUNTER — HOSPITAL ENCOUNTER (OUTPATIENT)
Facility: CLINIC | Age: 79
Discharge: HOME | End: 2019-01-04
Attending: INTERNAL MEDICINE | Admitting: INTERNAL MEDICINE
Payer: MEDICARE

## 2019-01-01 VITALS
TEMPERATURE: 97.4 F | WEIGHT: 84.4 LBS | OXYGEN SATURATION: 97 % | RESPIRATION RATE: 18 BRPM | HEART RATE: 69 BPM | BODY MASS INDEX: 15.93 KG/M2 | DIASTOLIC BLOOD PRESSURE: 57 MMHG | HEIGHT: 61 IN | SYSTOLIC BLOOD PRESSURE: 116 MMHG

## 2019-01-01 VITALS
BODY MASS INDEX: 16.46 KG/M2 | WEIGHT: 87.2 LBS | DIASTOLIC BLOOD PRESSURE: 54 MMHG | RESPIRATION RATE: 14 BRPM | SYSTOLIC BLOOD PRESSURE: 167 MMHG | HEIGHT: 61 IN | OXYGEN SATURATION: 99 % | HEART RATE: 70 BPM | TEMPERATURE: 98.2 F

## 2019-01-01 VITALS
RESPIRATION RATE: 16 BRPM | HEART RATE: 71 BPM | TEMPERATURE: 98.1 F | SYSTOLIC BLOOD PRESSURE: 157 MMHG | DIASTOLIC BLOOD PRESSURE: 79 MMHG | WEIGHT: 86.6 LBS | BODY MASS INDEX: 15.84 KG/M2

## 2019-01-01 VITALS
OXYGEN SATURATION: 100 % | SYSTOLIC BLOOD PRESSURE: 127 MMHG | RESPIRATION RATE: 16 BRPM | WEIGHT: 85.5 LBS | TEMPERATURE: 98.5 F | HEART RATE: 77 BPM | DIASTOLIC BLOOD PRESSURE: 68 MMHG | BODY MASS INDEX: 15.64 KG/M2

## 2019-01-01 VITALS
HEART RATE: 100 BPM | SYSTOLIC BLOOD PRESSURE: 124 MMHG | OXYGEN SATURATION: 99 % | TEMPERATURE: 98.4 F | DIASTOLIC BLOOD PRESSURE: 72 MMHG | RESPIRATION RATE: 18 BRPM

## 2019-01-01 VITALS
SYSTOLIC BLOOD PRESSURE: 121 MMHG | TEMPERATURE: 98 F | WEIGHT: 91.1 LBS | HEART RATE: 65 BPM | BODY MASS INDEX: 17.22 KG/M2 | DIASTOLIC BLOOD PRESSURE: 56 MMHG | RESPIRATION RATE: 14 BRPM | OXYGEN SATURATION: 100 %

## 2019-01-01 VITALS
WEIGHT: 91 LBS | RESPIRATION RATE: 18 BRPM | TEMPERATURE: 98 F | BODY MASS INDEX: 16.64 KG/M2 | SYSTOLIC BLOOD PRESSURE: 127 MMHG | DIASTOLIC BLOOD PRESSURE: 70 MMHG | HEART RATE: 68 BPM | OXYGEN SATURATION: 100 %

## 2019-01-01 VITALS
TEMPERATURE: 98.3 F | SYSTOLIC BLOOD PRESSURE: 120 MMHG | BODY MASS INDEX: 15.35 KG/M2 | WEIGHT: 83.9 LBS | RESPIRATION RATE: 16 BRPM | DIASTOLIC BLOOD PRESSURE: 74 MMHG | HEART RATE: 62 BPM | OXYGEN SATURATION: 100 %

## 2019-01-01 VITALS
TEMPERATURE: 98.1 F | RESPIRATION RATE: 16 BRPM | BODY MASS INDEX: 16.88 KG/M2 | WEIGHT: 89.3 LBS | SYSTOLIC BLOOD PRESSURE: 137 MMHG | DIASTOLIC BLOOD PRESSURE: 75 MMHG | HEART RATE: 68 BPM

## 2019-01-01 VITALS
BODY MASS INDEX: 16.35 KG/M2 | SYSTOLIC BLOOD PRESSURE: 180 MMHG | DIASTOLIC BLOOD PRESSURE: 72 MMHG | OXYGEN SATURATION: 100 % | WEIGHT: 86.6 LBS | HEIGHT: 61 IN | HEART RATE: 60 BPM

## 2019-01-01 VITALS
TEMPERATURE: 98.1 F | OXYGEN SATURATION: 98 % | WEIGHT: 91.5 LBS | OXYGEN SATURATION: 100 % | HEART RATE: 66 BPM | DIASTOLIC BLOOD PRESSURE: 71 MMHG | HEART RATE: 75 BPM | BODY MASS INDEX: 15.99 KG/M2 | BODY MASS INDEX: 16.74 KG/M2 | DIASTOLIC BLOOD PRESSURE: 68 MMHG | SYSTOLIC BLOOD PRESSURE: 150 MMHG | TEMPERATURE: 98 F | RESPIRATION RATE: 18 BRPM | SYSTOLIC BLOOD PRESSURE: 124 MMHG | WEIGHT: 84.6 LBS | RESPIRATION RATE: 18 BRPM

## 2019-01-01 VITALS
OXYGEN SATURATION: 99 % | HEART RATE: 64 BPM | BODY MASS INDEX: 15.64 KG/M2 | WEIGHT: 85 LBS | SYSTOLIC BLOOD PRESSURE: 182 MMHG | RESPIRATION RATE: 16 BRPM | HEIGHT: 62 IN | DIASTOLIC BLOOD PRESSURE: 73 MMHG

## 2019-01-01 VITALS
TEMPERATURE: 98.9 F | SYSTOLIC BLOOD PRESSURE: 126 MMHG | DIASTOLIC BLOOD PRESSURE: 41 MMHG | RESPIRATION RATE: 16 BRPM | OXYGEN SATURATION: 98 % | HEART RATE: 74 BPM

## 2019-01-01 VITALS
DIASTOLIC BLOOD PRESSURE: 90 MMHG | BODY MASS INDEX: 16.78 KG/M2 | HEIGHT: 62 IN | WEIGHT: 91.2 LBS | HEART RATE: 66 BPM | SYSTOLIC BLOOD PRESSURE: 171 MMHG | OXYGEN SATURATION: 100 %

## 2019-01-01 VITALS
SYSTOLIC BLOOD PRESSURE: 143 MMHG | TEMPERATURE: 98.2 F | HEART RATE: 65 BPM | RESPIRATION RATE: 16 BRPM | DIASTOLIC BLOOD PRESSURE: 83 MMHG

## 2019-01-01 DIAGNOSIS — E78.5 HYPERLIPIDEMIA LDL GOAL <100: ICD-10-CM

## 2019-01-01 DIAGNOSIS — D80.2 IGA DEFICIENCY (H): Primary | ICD-10-CM

## 2019-01-01 DIAGNOSIS — Z98.62 S/P RENAL ARTERY ANGIOPLASTY: ICD-10-CM

## 2019-01-01 DIAGNOSIS — D80.1 HYPOGAMMAGLOBULINEMIA (H): ICD-10-CM

## 2019-01-01 DIAGNOSIS — I15.9 SECONDARY HYPERTENSION: ICD-10-CM

## 2019-01-01 DIAGNOSIS — R00.1 BRADYCARDIA: ICD-10-CM

## 2019-01-01 DIAGNOSIS — I70.1 RENAL ARTERY STENOSIS (H): Primary | ICD-10-CM

## 2019-01-01 DIAGNOSIS — J41.8 MIXED SIMPLE AND MUCOPURULENT CHRONIC BRONCHITIS (H): ICD-10-CM

## 2019-01-01 DIAGNOSIS — S91.031A PUNCTURE WOUND OF RIGHT ANKLE, INITIAL ENCOUNTER: ICD-10-CM

## 2019-01-01 DIAGNOSIS — Z98.62 S/P RENAL ARTERY ANGIOPLASTY: Primary | ICD-10-CM

## 2019-01-01 DIAGNOSIS — I25.118 CORONARY ARTERY DISEASE OF NATIVE ARTERY OF NATIVE HEART WITH STABLE ANGINA PECTORIS (H): ICD-10-CM

## 2019-01-01 DIAGNOSIS — I25.118 CORONARY ARTERY DISEASE OF NATIVE ARTERY OF NATIVE HEART WITH STABLE ANGINA PECTORIS (H): Primary | ICD-10-CM

## 2019-01-01 DIAGNOSIS — R01.1 HEART MURMUR: Primary | ICD-10-CM

## 2019-01-01 DIAGNOSIS — J42 CHRONIC BRONCHITIS, UNSPECIFIED CHRONIC BRONCHITIS TYPE (H): Primary | ICD-10-CM

## 2019-01-01 DIAGNOSIS — J84.9 ILD (INTERSTITIAL LUNG DISEASE) (H): ICD-10-CM

## 2019-01-01 DIAGNOSIS — J84.9 ILD (INTERSTITIAL LUNG DISEASE) (H): Primary | ICD-10-CM

## 2019-01-01 DIAGNOSIS — R01.1 HEART MURMUR: ICD-10-CM

## 2019-01-01 DIAGNOSIS — E78.5 HYPERLIPIDEMIA LDL GOAL <70: ICD-10-CM

## 2019-01-01 DIAGNOSIS — I10 HTN (HYPERTENSION): ICD-10-CM

## 2019-01-01 DIAGNOSIS — J41.8 MIXED SIMPLE AND MUCOPURULENT CHRONIC BRONCHITIS (H): Primary | ICD-10-CM

## 2019-01-01 DIAGNOSIS — I25.10 CORONARY ARTERY DISEASE: ICD-10-CM

## 2019-01-01 DIAGNOSIS — I10 ESSENTIAL HYPERTENSION: ICD-10-CM

## 2019-01-01 DIAGNOSIS — Z95.0 CARDIAC PACEMAKER IN SITU: Primary | ICD-10-CM

## 2019-01-01 LAB
ALBUMIN SERPL-MCNC: 3.3 G/DL (ref 3.4–5)
ALBUMIN SERPL-MCNC: 3.8 G/DL (ref 3.4–5)
ALP SERPL-CCNC: 55 U/L (ref 40–150)
ALP SERPL-CCNC: 60 U/L (ref 40–150)
ALT SERPL W P-5'-P-CCNC: 20 U/L (ref 0–50)
ALT SERPL W P-5'-P-CCNC: 21 U/L (ref 0–50)
ANION GAP SERPL CALCULATED.3IONS-SCNC: 7 MMOL/L (ref 3–14)
AST SERPL W P-5'-P-CCNC: 22 U/L (ref 0–45)
AST SERPL W P-5'-P-CCNC: 27 U/L (ref 0–45)
B-HCG FREE SERPL-ACNC: 1 [IU]/L (ref 0.86–1.14)
BASOPHILS # BLD AUTO: 0 10E9/L (ref 0–0.2)
BASOPHILS NFR BLD AUTO: 0.6 %
BILIRUB DIRECT SERPL-MCNC: <0.1 MG/DL (ref 0–0.2)
BILIRUB SERPL-MCNC: 0.3 MG/DL (ref 0.2–1.3)
BILIRUB SERPL-MCNC: 0.4 MG/DL (ref 0.2–1.3)
BUN SERPL-MCNC: 25 MG/DL (ref 7–30)
BUN SERPL-MCNC: 26 MG/DL (ref 7–30)
BUN SERPL-MCNC: 29 MG/DL (ref 7–30)
CALCIUM SERPL-MCNC: 8.1 MG/DL (ref 8.5–10.1)
CALCIUM SERPL-MCNC: 8.9 MG/DL (ref 8.5–10.1)
CALCIUM SERPL-MCNC: 9.2 MG/DL (ref 8.5–10.1)
CHLORIDE SERPL-SCNC: 103 MMOL/L (ref 94–109)
CHLORIDE SERPL-SCNC: 104 MMOL/L (ref 94–109)
CHLORIDE SERPL-SCNC: 105 MMOL/L (ref 94–109)
CO2 SERPL-SCNC: 26 MMOL/L (ref 20–32)
CO2 SERPL-SCNC: 28 MMOL/L (ref 20–32)
CO2 SERPL-SCNC: 28 MMOL/L (ref 20–32)
CREAT SERPL-MCNC: 0.77 MG/DL (ref 0.52–1.04)
CREAT SERPL-MCNC: 0.82 MG/DL (ref 0.52–1.04)
CREAT SERPL-MCNC: 0.97 MG/DL (ref 0.52–1.04)
DIFFERENTIAL METHOD BLD: ABNORMAL
EOSINOPHIL # BLD AUTO: 0.1 10E9/L (ref 0–0.7)
EOSINOPHIL NFR BLD AUTO: 1.8 %
ERYTHROCYTE [DISTWIDTH] IN BLOOD BY AUTOMATED COUNT: 14 % (ref 10–15)
ERYTHROCYTE [DISTWIDTH] IN BLOOD BY AUTOMATED COUNT: 14.2 % (ref 10–15)
ERYTHROCYTE [DISTWIDTH] IN BLOOD BY AUTOMATED COUNT: 15.9 % (ref 10–15)
EXPTIME-PRE: 6.6 SEC
FEF2575-%PRED-PRE: 23 %
FEF2575-PRE: 0.36 L/SEC
FEF2575-PRED: 1.54 L/SEC
FEFMAX-%PRED-PRE: 31 %
FEFMAX-PRE: 1.46 L/SEC
FEFMAX-PRED: 4.68 L/SEC
FEV1-%PRED-PRE: 46 %
FEV1-PRE: 0.86 L
FEV1FEV6-PRE: 59 %
FEV1FEV6-PRED: 78 %
FEV1FVC-PRE: 58 %
FEV1FVC-PRED: 77 %
FIFMAX-PRE: 1.22 L/SEC
FVC-%PRED-PRE: 60 %
FVC-PRE: 1.46 L
FVC-PRED: 2.42 L
GFR SERPL CREATININE-BSD FRML MDRD: 56 ML/MIN/{1.73_M2}
GFR SERPL CREATININE-BSD FRML MDRD: 69 ML/MIN/{1.73_M2}
GFR SERPL CREATININE-BSD FRML MDRD: 74 ML/MIN/{1.73_M2}
GLUCOSE SERPL-MCNC: 111 MG/DL (ref 70–99)
GLUCOSE SERPL-MCNC: 126 MG/DL (ref 70–99)
GLUCOSE SERPL-MCNC: 94 MG/DL (ref 70–99)
HCT VFR BLD AUTO: 40.4 % (ref 35–47)
HCT VFR BLD AUTO: 41.2 % (ref 35–47)
HCT VFR BLD AUTO: 43.1 % (ref 35–47)
HGB BLD-MCNC: 12.8 G/DL (ref 11.7–15.7)
HGB BLD-MCNC: 12.8 G/DL (ref 11.7–15.7)
HGB BLD-MCNC: 13.8 G/DL (ref 11.7–15.7)
IMM GRANULOCYTES # BLD: 0 10E9/L (ref 0–0.4)
IMM GRANULOCYTES NFR BLD: 0.2 %
LYMPHOCYTES # BLD AUTO: 1.8 10E9/L (ref 0.8–5.3)
LYMPHOCYTES NFR BLD AUTO: 32.4 %
MCH RBC QN AUTO: 26.3 PG (ref 26.5–33)
MCH RBC QN AUTO: 26.3 PG (ref 26.5–33)
MCH RBC QN AUTO: 27.3 PG (ref 26.5–33)
MCHC RBC AUTO-ENTMCNC: 31.1 G/DL (ref 31.5–36.5)
MCHC RBC AUTO-ENTMCNC: 31.7 G/DL (ref 31.5–36.5)
MCHC RBC AUTO-ENTMCNC: 32 G/DL (ref 31.5–36.5)
MCV RBC AUTO: 83 FL (ref 78–100)
MCV RBC AUTO: 85 FL (ref 78–100)
MCV RBC AUTO: 85 FL (ref 78–100)
MDC_IDC_LEAD_IMPLANT_DT: NORMAL
MDC_IDC_LEAD_LOCATION: NORMAL
MDC_IDC_LEAD_LOCATION_DETAIL_1: NORMAL
MDC_IDC_LEAD_MFG: NORMAL
MDC_IDC_LEAD_MODEL: NORMAL
MDC_IDC_LEAD_POLARITY_TYPE: NORMAL
MDC_IDC_LEAD_SERIAL: NORMAL
MDC_IDC_LEAD_SPECIAL_FUNCTION: NORMAL
MDC_IDC_LEAD_SPECIAL_FUNCTION: NORMAL
MDC_IDC_MSMT_BATTERY_DTM: NORMAL
MDC_IDC_MSMT_BATTERY_REMAINING_LONGEVITY: 136 MO
MDC_IDC_MSMT_BATTERY_REMAINING_LONGEVITY: 136 MO
MDC_IDC_MSMT_BATTERY_STATUS: NORMAL
MDC_IDC_MSMT_BATTERY_STATUS: NORMAL
MDC_IDC_MSMT_BATTERY_VOLTAGE: 3.01 V
MDC_IDC_MSMT_BATTERY_VOLTAGE: 3.01 V
MDC_IDC_MSMT_LEADCHNL_RA_IMPEDANCE_VALUE: 275 OHM
MDC_IDC_MSMT_LEADCHNL_RA_IMPEDANCE_VALUE: 287.5 OHM
MDC_IDC_MSMT_LEADCHNL_RA_PACING_THRESHOLD_AMPLITUDE: 0.5 V
MDC_IDC_MSMT_LEADCHNL_RA_PACING_THRESHOLD_AMPLITUDE: 0.62 V
MDC_IDC_MSMT_LEADCHNL_RA_PACING_THRESHOLD_PULSEWIDTH: 0.4 MS
MDC_IDC_MSMT_LEADCHNL_RA_PACING_THRESHOLD_PULSEWIDTH: 0.4 MS
MDC_IDC_MSMT_LEADCHNL_RA_SENSING_INTR_AMPL: 2.9 MV
MDC_IDC_MSMT_LEADCHNL_RA_SENSING_INTR_AMPL: 3.2 MV
MDC_IDC_MSMT_LEADCHNL_RV_IMPEDANCE_VALUE: 412.5 OHM
MDC_IDC_MSMT_LEADCHNL_RV_IMPEDANCE_VALUE: 425 OHM
MDC_IDC_MSMT_LEADCHNL_RV_PACING_THRESHOLD_AMPLITUDE: 1.38 V
MDC_IDC_MSMT_LEADCHNL_RV_PACING_THRESHOLD_AMPLITUDE: 1.75 V
MDC_IDC_MSMT_LEADCHNL_RV_PACING_THRESHOLD_PULSEWIDTH: 0.5 MS
MDC_IDC_MSMT_LEADCHNL_RV_PACING_THRESHOLD_PULSEWIDTH: 0.5 MS
MDC_IDC_MSMT_LEADCHNL_RV_SENSING_INTR_AMPL: 10.3 MV
MDC_IDC_MSMT_LEADCHNL_RV_SENSING_INTR_AMPL: 9.6 MV
MDC_IDC_PG_IMPLANT_DTM: NORMAL
MDC_IDC_PG_IMPLANT_DTM: NORMAL
MDC_IDC_PG_MFG: NORMAL
MDC_IDC_PG_MFG: NORMAL
MDC_IDC_PG_MODEL: NORMAL
MDC_IDC_PG_MODEL: NORMAL
MDC_IDC_PG_SERIAL: NORMAL
MDC_IDC_PG_SERIAL: NORMAL
MDC_IDC_PG_TYPE: NORMAL
MDC_IDC_PG_TYPE: NORMAL
MDC_IDC_SESS_CLINIC_NAME: NORMAL
MDC_IDC_SESS_CLINIC_NAME: NORMAL
MDC_IDC_SESS_DTM: NORMAL
MDC_IDC_SESS_DTM: NORMAL
MDC_IDC_SESS_TYPE: NORMAL
MDC_IDC_SESS_TYPE: NORMAL
MDC_IDC_SET_BRADY_AT_MODE_SWITCH_MODE: NORMAL
MDC_IDC_SET_BRADY_AT_MODE_SWITCH_MODE: NORMAL
MDC_IDC_SET_BRADY_AT_MODE_SWITCH_RATE: 180 {BEATS}/MIN
MDC_IDC_SET_BRADY_AT_MODE_SWITCH_RATE: 180 {BEATS}/MIN
MDC_IDC_SET_BRADY_HYSTRATE: NORMAL
MDC_IDC_SET_BRADY_HYSTRATE: NORMAL
MDC_IDC_SET_BRADY_LOWRATE: 60 {BEATS}/MIN
MDC_IDC_SET_BRADY_LOWRATE: 60 {BEATS}/MIN
MDC_IDC_SET_BRADY_MAX_SENSOR_RATE: 130 {BEATS}/MIN
MDC_IDC_SET_BRADY_MAX_SENSOR_RATE: 130 {BEATS}/MIN
MDC_IDC_SET_BRADY_MAX_TRACKING_RATE: 130 {BEATS}/MIN
MDC_IDC_SET_BRADY_MAX_TRACKING_RATE: 130 {BEATS}/MIN
MDC_IDC_SET_BRADY_MODE: NORMAL
MDC_IDC_SET_BRADY_MODE: NORMAL
MDC_IDC_SET_BRADY_NIGHT_RATE: NORMAL
MDC_IDC_SET_BRADY_NIGHT_RATE: NORMAL
MDC_IDC_SET_BRADY_PAV_DELAY_HIGH: 100 MS
MDC_IDC_SET_BRADY_PAV_DELAY_HIGH: 100 MS
MDC_IDC_SET_BRADY_PAV_DELAY_LOW: 225 MS
MDC_IDC_SET_BRADY_PAV_DELAY_LOW: 225 MS
MDC_IDC_SET_BRADY_SAV_DELAY_HIGH: 100 MS
MDC_IDC_SET_BRADY_SAV_DELAY_HIGH: 100 MS
MDC_IDC_SET_BRADY_SAV_DELAY_LOW: 200 MS
MDC_IDC_SET_BRADY_SAV_DELAY_LOW: 200 MS
MDC_IDC_SET_LEADCHNL_RA_PACING_AMPLITUDE: 1.5 V
MDC_IDC_SET_LEADCHNL_RA_PACING_AMPLITUDE: 1.62
MDC_IDC_SET_LEADCHNL_RA_PACING_ANODE_ELECTRODE_1: NORMAL
MDC_IDC_SET_LEADCHNL_RA_PACING_ANODE_ELECTRODE_1: NORMAL
MDC_IDC_SET_LEADCHNL_RA_PACING_ANODE_LOCATION_1: NORMAL
MDC_IDC_SET_LEADCHNL_RA_PACING_ANODE_LOCATION_1: NORMAL
MDC_IDC_SET_LEADCHNL_RA_PACING_CAPTURE_MODE: NORMAL
MDC_IDC_SET_LEADCHNL_RA_PACING_CAPTURE_MODE: NORMAL
MDC_IDC_SET_LEADCHNL_RA_PACING_CATHODE_ELECTRODE_1: NORMAL
MDC_IDC_SET_LEADCHNL_RA_PACING_CATHODE_ELECTRODE_1: NORMAL
MDC_IDC_SET_LEADCHNL_RA_PACING_CATHODE_LOCATION_1: NORMAL
MDC_IDC_SET_LEADCHNL_RA_PACING_CATHODE_LOCATION_1: NORMAL
MDC_IDC_SET_LEADCHNL_RA_PACING_POLARITY: NORMAL
MDC_IDC_SET_LEADCHNL_RA_PACING_POLARITY: NORMAL
MDC_IDC_SET_LEADCHNL_RA_PACING_PULSEWIDTH: 0.4 MS
MDC_IDC_SET_LEADCHNL_RA_PACING_PULSEWIDTH: 0.4 MS
MDC_IDC_SET_LEADCHNL_RA_SENSING_ADAPTATION_MODE: NORMAL
MDC_IDC_SET_LEADCHNL_RA_SENSING_ADAPTATION_MODE: NORMAL
MDC_IDC_SET_LEADCHNL_RA_SENSING_ANODE_ELECTRODE_1: NORMAL
MDC_IDC_SET_LEADCHNL_RA_SENSING_ANODE_ELECTRODE_1: NORMAL
MDC_IDC_SET_LEADCHNL_RA_SENSING_ANODE_LOCATION_1: NORMAL
MDC_IDC_SET_LEADCHNL_RA_SENSING_ANODE_LOCATION_1: NORMAL
MDC_IDC_SET_LEADCHNL_RA_SENSING_CATHODE_ELECTRODE_1: NORMAL
MDC_IDC_SET_LEADCHNL_RA_SENSING_CATHODE_ELECTRODE_1: NORMAL
MDC_IDC_SET_LEADCHNL_RA_SENSING_CATHODE_LOCATION_1: NORMAL
MDC_IDC_SET_LEADCHNL_RA_SENSING_CATHODE_LOCATION_1: NORMAL
MDC_IDC_SET_LEADCHNL_RA_SENSING_POLARITY: NORMAL
MDC_IDC_SET_LEADCHNL_RA_SENSING_POLARITY: NORMAL
MDC_IDC_SET_LEADCHNL_RV_PACING_AMPLITUDE: 1.62
MDC_IDC_SET_LEADCHNL_RV_PACING_AMPLITUDE: 3.5 V
MDC_IDC_SET_LEADCHNL_RV_PACING_ANODE_ELECTRODE_1: NORMAL
MDC_IDC_SET_LEADCHNL_RV_PACING_ANODE_ELECTRODE_1: NORMAL
MDC_IDC_SET_LEADCHNL_RV_PACING_ANODE_LOCATION_1: NORMAL
MDC_IDC_SET_LEADCHNL_RV_PACING_ANODE_LOCATION_1: NORMAL
MDC_IDC_SET_LEADCHNL_RV_PACING_CAPTURE_MODE: NORMAL
MDC_IDC_SET_LEADCHNL_RV_PACING_CAPTURE_MODE: NORMAL
MDC_IDC_SET_LEADCHNL_RV_PACING_CATHODE_ELECTRODE_1: NORMAL
MDC_IDC_SET_LEADCHNL_RV_PACING_CATHODE_ELECTRODE_1: NORMAL
MDC_IDC_SET_LEADCHNL_RV_PACING_CATHODE_LOCATION_1: NORMAL
MDC_IDC_SET_LEADCHNL_RV_PACING_CATHODE_LOCATION_1: NORMAL
MDC_IDC_SET_LEADCHNL_RV_PACING_POLARITY: NORMAL
MDC_IDC_SET_LEADCHNL_RV_PACING_POLARITY: NORMAL
MDC_IDC_SET_LEADCHNL_RV_PACING_PULSEWIDTH: 0.5 MS
MDC_IDC_SET_LEADCHNL_RV_PACING_PULSEWIDTH: 0.5 MS
MDC_IDC_SET_LEADCHNL_RV_SENSING_ANODE_ELECTRODE_1: NORMAL
MDC_IDC_SET_LEADCHNL_RV_SENSING_ANODE_ELECTRODE_1: NORMAL
MDC_IDC_SET_LEADCHNL_RV_SENSING_ANODE_LOCATION_1: NORMAL
MDC_IDC_SET_LEADCHNL_RV_SENSING_ANODE_LOCATION_1: NORMAL
MDC_IDC_SET_LEADCHNL_RV_SENSING_CATHODE_ELECTRODE_1: NORMAL
MDC_IDC_SET_LEADCHNL_RV_SENSING_CATHODE_ELECTRODE_1: NORMAL
MDC_IDC_SET_LEADCHNL_RV_SENSING_CATHODE_LOCATION_1: NORMAL
MDC_IDC_SET_LEADCHNL_RV_SENSING_CATHODE_LOCATION_1: NORMAL
MDC_IDC_SET_LEADCHNL_RV_SENSING_POLARITY: NORMAL
MDC_IDC_SET_LEADCHNL_RV_SENSING_POLARITY: NORMAL
MDC_IDC_SET_LEADCHNL_RV_SENSING_SENSITIVITY: 2 MV
MDC_IDC_SET_LEADCHNL_RV_SENSING_SENSITIVITY: 2 MV
MDC_IDC_STAT_AT_MODE_SW_COUNT: 2
MDC_IDC_STAT_AT_MODE_SW_COUNT: 4
MDC_IDC_STAT_BRADY_RA_PERCENT_PACED: 24 %
MDC_IDC_STAT_BRADY_RA_PERCENT_PACED: 28 %
MDC_IDC_STAT_BRADY_RV_PERCENT_PACED: 0.06 %
MDC_IDC_STAT_BRADY_RV_PERCENT_PACED: 0.09 %
MONOCYTES # BLD AUTO: 0.4 10E9/L (ref 0–1.3)
MONOCYTES NFR BLD AUTO: 7.2 %
NEUTROPHILS # BLD AUTO: 3.2 10E9/L (ref 1.6–8.3)
NEUTROPHILS NFR BLD AUTO: 57.8 %
NRBC # BLD AUTO: 0 10*3/UL
NRBC BLD AUTO-RTO: 0 /100
PLATELET # BLD AUTO: 186 10E9/L (ref 150–450)
PLATELET # BLD AUTO: 202 10E9/L (ref 150–450)
PLATELET # BLD AUTO: 210 10E9/L (ref 150–450)
POTASSIUM SERPL-SCNC: 2.8 MMOL/L (ref 3.4–5.3)
POTASSIUM SERPL-SCNC: 3 MMOL/L (ref 3.4–5.3)
POTASSIUM SERPL-SCNC: 3.6 MMOL/L (ref 3.4–5.3)
PROT SERPL-MCNC: 6.7 G/DL (ref 6.8–8.8)
PROT SERPL-MCNC: 7.3 G/DL (ref 6.8–8.8)
RBC # BLD AUTO: 4.86 10E12/L (ref 3.8–5.2)
RBC # BLD AUTO: 4.87 10E12/L (ref 3.8–5.2)
RBC # BLD AUTO: 5.06 10E12/L (ref 3.8–5.2)
SODIUM SERPL-SCNC: 138 MMOL/L (ref 133–144)
SODIUM SERPL-SCNC: 139 MMOL/L (ref 133–144)
SODIUM SERPL-SCNC: 140 MMOL/L (ref 133–144)
WBC # BLD AUTO: 3.9 10E9/L (ref 4–11)
WBC # BLD AUTO: 5.4 10E9/L (ref 4–11)
WBC # BLD AUTO: 6.1 10E9/L (ref 4–11)

## 2019-01-01 PROCEDURE — 25500064 ZZH RX 255 OP 636: Performed by: INTERNAL MEDICINE

## 2019-01-01 PROCEDURE — 96375 TX/PRO/DX INJ NEW DRUG ADDON: CPT

## 2019-01-01 PROCEDURE — C1769 GUIDE WIRE: HCPCS

## 2019-01-01 PROCEDURE — 80048 BASIC METABOLIC PNL TOTAL CA: CPT | Performed by: INTERNAL MEDICINE

## 2019-01-01 PROCEDURE — 27210780 ZZH KIT CR3

## 2019-01-01 PROCEDURE — 99153 MOD SED SAME PHYS/QHP EA: CPT

## 2019-01-01 PROCEDURE — A9270 NON-COVERED ITEM OR SERVICE: HCPCS | Mod: ZF,GY | Performed by: INTERNAL MEDICINE

## 2019-01-01 PROCEDURE — 25000132 ZZH RX MED GY IP 250 OP 250 PS 637: Mod: ZF,GY | Performed by: INTERNAL MEDICINE

## 2019-01-01 PROCEDURE — 93018 CV STRESS TEST I&R ONLY: CPT | Performed by: INTERNAL MEDICINE

## 2019-01-01 PROCEDURE — 96365 THER/PROPH/DIAG IV INF INIT: CPT

## 2019-01-01 PROCEDURE — 96366 THER/PROPH/DIAG IV INF ADDON: CPT

## 2019-01-01 PROCEDURE — 25000132 ZZH RX MED GY IP 250 OP 250 PS 637: Mod: ZF | Performed by: INTERNAL MEDICINE

## 2019-01-01 PROCEDURE — 27210895 ZZH CATH CR8

## 2019-01-01 PROCEDURE — G0463 HOSPITAL OUTPT CLINIC VISIT: HCPCS | Mod: ZF

## 2019-01-01 PROCEDURE — 27210908 ZZH NEEDLE CR4

## 2019-01-01 PROCEDURE — 34300033 ZZH RX 343: Performed by: INTERNAL MEDICINE

## 2019-01-01 PROCEDURE — 36415 COLL VENOUS BLD VENIPUNCTURE: CPT | Performed by: INTERNAL MEDICINE

## 2019-01-01 PROCEDURE — 25000128 H RX IP 250 OP 636: Mod: ZF | Performed by: INTERNAL MEDICINE

## 2019-01-01 PROCEDURE — 25000132 ZZH RX MED GY IP 250 OP 250 PS 637: Mod: GY,ZF | Performed by: INTERNAL MEDICINE

## 2019-01-01 PROCEDURE — 27210804 ZZH SHEATH CR3

## 2019-01-01 PROCEDURE — 27210732 ZZH ACCESSORY CR1

## 2019-01-01 PROCEDURE — 85610 PROTHROMBIN TIME: CPT

## 2019-01-01 PROCEDURE — 85027 COMPLETE CBC AUTOMATED: CPT | Performed by: INTERNAL MEDICINE

## 2019-01-01 PROCEDURE — A9270 NON-COVERED ITEM OR SERVICE: HCPCS | Mod: ZF | Performed by: INTERNAL MEDICINE

## 2019-01-01 PROCEDURE — 80076 HEPATIC FUNCTION PANEL: CPT | Performed by: INTERNAL MEDICINE

## 2019-01-01 PROCEDURE — 80053 COMPREHEN METABOLIC PANEL: CPT | Performed by: INTERNAL MEDICINE

## 2019-01-01 PROCEDURE — C1887 CATHETER, GUIDING: HCPCS

## 2019-01-01 PROCEDURE — 99282 EMERGENCY DEPT VISIT SF MDM: CPT | Mod: 25 | Performed by: EMERGENCY MEDICINE

## 2019-01-01 PROCEDURE — 25000128 H RX IP 250 OP 636: Performed by: INTERNAL MEDICINE

## 2019-01-01 PROCEDURE — A9270 NON-COVERED ITEM OR SERVICE: HCPCS | Mod: GY | Performed by: INTERNAL MEDICINE

## 2019-01-01 PROCEDURE — A9270 NON-COVERED ITEM OR SERVICE: HCPCS | Mod: GY,ZF | Performed by: INTERNAL MEDICINE

## 2019-01-01 PROCEDURE — 85025 COMPLETE CBC W/AUTO DIFF WBC: CPT | Performed by: INTERNAL MEDICINE

## 2019-01-01 PROCEDURE — 40000167 ZZH STATISTIC PP CARE STAGE 2

## 2019-01-01 PROCEDURE — 78452 HT MUSCLE IMAGE SPECT MULT: CPT

## 2019-01-01 PROCEDURE — A9502 TC99M TETROFOSMIN: HCPCS | Performed by: INTERNAL MEDICINE

## 2019-01-01 PROCEDURE — 93017 CV STRESS TEST TRACING ONLY: CPT

## 2019-01-01 PROCEDURE — 99152 MOD SED SAME PHYS/QHP 5/>YRS: CPT

## 2019-01-01 PROCEDURE — 99214 OFFICE O/P EST MOD 30 MIN: CPT | Mod: ZP | Performed by: INTERNAL MEDICINE

## 2019-01-01 PROCEDURE — 93016 CV STRESS TEST SUPVJ ONLY: CPT | Performed by: INTERNAL MEDICINE

## 2019-01-01 PROCEDURE — 27210905 ZZH KIT CR7

## 2019-01-01 PROCEDURE — 78452 HT MUSCLE IMAGE SPECT MULT: CPT | Mod: 26 | Performed by: INTERNAL MEDICINE

## 2019-01-01 PROCEDURE — 25000128 H RX IP 250 OP 636: Performed by: RADIOLOGY

## 2019-01-01 PROCEDURE — 25000132 ZZH RX MED GY IP 250 OP 250 PS 637: Mod: GY | Performed by: INTERNAL MEDICINE

## 2019-01-01 PROCEDURE — 36251 INS CATH REN ART 1ST UNILAT: CPT

## 2019-01-01 PROCEDURE — 99283 EMERGENCY DEPT VISIT LOW MDM: CPT | Mod: Z6 | Performed by: EMERGENCY MEDICINE

## 2019-01-01 PROCEDURE — 99215 OFFICE O/P EST HI 40 MIN: CPT | Mod: ZP | Performed by: NURSE PRACTITIONER

## 2019-01-01 RX ORDER — ACETAMINOPHEN 325 MG/1
650 TABLET ORAL ONCE
Status: COMPLETED | OUTPATIENT
Start: 2019-01-01 | End: 2019-01-01

## 2019-01-01 RX ORDER — CARVEDILOL 25 MG/1
25 TABLET ORAL 2 TIMES DAILY WITH MEALS
Qty: 180 TABLET | Refills: 3 | Status: SHIPPED | OUTPATIENT
Start: 2019-01-01

## 2019-01-01 RX ORDER — NALOXONE HYDROCHLORIDE 0.4 MG/ML
.1-.4 INJECTION, SOLUTION INTRAMUSCULAR; INTRAVENOUS; SUBCUTANEOUS
Status: CANCELLED | OUTPATIENT
Start: 2019-01-01

## 2019-01-01 RX ORDER — MEPERIDINE HYDROCHLORIDE 25 MG/ML
25 INJECTION INTRAMUSCULAR; INTRAVENOUS; SUBCUTANEOUS
Status: CANCELLED
Start: 2019-01-01

## 2019-01-01 RX ORDER — METHYLPREDNISOLONE SODIUM SUCCINATE 125 MG/2ML
100 INJECTION, POWDER, LYOPHILIZED, FOR SOLUTION INTRAMUSCULAR; INTRAVENOUS ONCE
Status: CANCELLED
Start: 2019-01-01 | End: 2019-01-01

## 2019-01-01 RX ORDER — CARVEDILOL 12.5 MG/1
25 TABLET ORAL 2 TIMES DAILY WITH MEALS
Qty: 180 TABLET | Refills: 1 | Status: SHIPPED | OUTPATIENT
Start: 2019-01-01 | End: 2019-01-01

## 2019-01-01 RX ORDER — METHYLPREDNISOLONE SODIUM SUCCINATE 125 MG/2ML
100 INJECTION, POWDER, LYOPHILIZED, FOR SOLUTION INTRAMUSCULAR; INTRAVENOUS ONCE
Status: COMPLETED | OUTPATIENT
Start: 2019-01-01 | End: 2019-01-01

## 2019-01-01 RX ORDER — MEPERIDINE HYDROCHLORIDE 25 MG/ML
25 INJECTION INTRAMUSCULAR; INTRAVENOUS; SUBCUTANEOUS
Status: DISCONTINUED | OUTPATIENT
Start: 2019-01-01 | End: 2019-01-01 | Stop reason: HOSPADM

## 2019-01-01 RX ORDER — FENTANYL CITRATE 50 UG/ML
25-50 INJECTION, SOLUTION INTRAMUSCULAR; INTRAVENOUS
Status: CANCELLED | OUTPATIENT
Start: 2019-01-01 | End: 2019-01-01

## 2019-01-01 RX ORDER — METHYLPREDNISOLONE SODIUM SUCCINATE 125 MG/2ML
100 INJECTION, POWDER, LYOPHILIZED, FOR SOLUTION INTRAMUSCULAR; INTRAVENOUS ONCE
Status: CANCELLED
Start: 2019-01-01

## 2019-01-01 RX ORDER — ACETAMINOPHEN 325 MG/1
650 TABLET ORAL ONCE
Status: CANCELLED
Start: 2019-01-01

## 2019-01-01 RX ORDER — DIPHENHYDRAMINE HCL 25 MG
25 CAPSULE ORAL ONCE
Status: COMPLETED | OUTPATIENT
Start: 2019-01-01 | End: 2019-01-01

## 2019-01-01 RX ORDER — LIDOCAINE 40 MG/G
CREAM TOPICAL
Status: DISCONTINUED | OUTPATIENT
Start: 2019-01-01 | End: 2019-01-01 | Stop reason: HOSPADM

## 2019-01-01 RX ORDER — FLUMAZENIL 0.1 MG/ML
0.2 INJECTION, SOLUTION INTRAVENOUS
Status: DISCONTINUED | OUTPATIENT
Start: 2019-01-01 | End: 2019-01-01 | Stop reason: HOSPADM

## 2019-01-01 RX ORDER — ALBUTEROL SULFATE 90 UG/1
2 AEROSOL, METERED RESPIRATORY (INHALATION) EVERY 5 MIN PRN
Status: DISCONTINUED | OUTPATIENT
Start: 2019-01-01 | End: 2019-01-01 | Stop reason: HOSPADM

## 2019-01-01 RX ORDER — ACYCLOVIR 200 MG/1
0-1 CAPSULE ORAL
Status: DISCONTINUED | OUTPATIENT
Start: 2019-01-01 | End: 2019-01-01 | Stop reason: HOSPADM

## 2019-01-01 RX ORDER — DIPHENHYDRAMINE HCL 25 MG
25 CAPSULE ORAL ONCE
Status: CANCELLED | OUTPATIENT
Start: 2019-01-01

## 2019-01-01 RX ORDER — HYDROCODONE BITARTRATE AND ACETAMINOPHEN 5; 325 MG/1; MG/1
2 TABLET ORAL ONCE
Status: COMPLETED | OUTPATIENT
Start: 2019-01-01 | End: 2019-01-01

## 2019-01-01 RX ORDER — LIDOCAINE 40 MG/G
CREAM TOPICAL
Status: CANCELLED | OUTPATIENT
Start: 2019-01-01

## 2019-01-01 RX ORDER — SODIUM CHLORIDE 9 MG/ML
INJECTION, SOLUTION INTRAVENOUS CONTINUOUS
Status: CANCELLED | OUTPATIENT
Start: 2019-01-01

## 2019-01-01 RX ORDER — METHYLPREDNISOLONE 32 MG/1
32 TABLET ORAL ONCE
Status: CANCELLED | OUTPATIENT
Start: 2019-01-01 | End: 2019-01-01

## 2019-01-01 RX ORDER — DIPHENHYDRAMINE HYDROCHLORIDE 50 MG/ML
25 INJECTION INTRAMUSCULAR; INTRAVENOUS ONCE
Status: COMPLETED | OUTPATIENT
Start: 2019-01-01 | End: 2019-01-01

## 2019-01-01 RX ORDER — POTASSIUM CHLORIDE 750 MG/1
40 TABLET, EXTENDED RELEASE ORAL DAILY
Status: DISCONTINUED | OUTPATIENT
Start: 2019-01-01 | End: 2019-01-01 | Stop reason: HOSPADM

## 2019-01-01 RX ORDER — ISOSORBIDE MONONITRATE 60 MG/1
60 TABLET, EXTENDED RELEASE ORAL DAILY
Qty: 90 TABLET | Refills: 3
Start: 2019-01-01

## 2019-01-01 RX ORDER — DEXTROSE MONOHYDRATE 25 G/50ML
25-50 INJECTION, SOLUTION INTRAVENOUS
Status: DISCONTINUED | OUTPATIENT
Start: 2019-01-01 | End: 2019-01-01 | Stop reason: HOSPADM

## 2019-01-01 RX ORDER — ATROPINE SULFATE 0.1 MG/ML
0.5 INJECTION INTRAVENOUS EVERY 5 MIN PRN
Status: CANCELLED | OUTPATIENT
Start: 2019-01-01 | End: 2019-01-01

## 2019-01-01 RX ORDER — NALOXONE HYDROCHLORIDE 0.4 MG/ML
.2-.4 INJECTION, SOLUTION INTRAMUSCULAR; INTRAVENOUS; SUBCUTANEOUS
Status: CANCELLED | OUTPATIENT
Start: 2019-01-01 | End: 2019-01-01

## 2019-01-01 RX ORDER — CLOPIDOGREL BISULFATE 75 MG/1
75 TABLET ORAL DAILY
Qty: 90 TABLET | Refills: 3
Start: 2019-01-01

## 2019-01-01 RX ORDER — METHYLPREDNISOLONE SODIUM SUCCINATE 125 MG/2ML
125 INJECTION, POWDER, LYOPHILIZED, FOR SOLUTION INTRAMUSCULAR; INTRAVENOUS ONCE
Status: DISCONTINUED | OUTPATIENT
Start: 2019-01-01 | End: 2019-01-01

## 2019-01-01 RX ORDER — ISOSORBIDE MONONITRATE 60 MG/1
60 TABLET, EXTENDED RELEASE ORAL DAILY
Qty: 90 TABLET | Refills: 3 | Status: SHIPPED | OUTPATIENT
Start: 2019-01-01 | End: 2019-01-01

## 2019-01-01 RX ORDER — IODIXANOL 320 MG/ML
150 INJECTION, SOLUTION INTRAVASCULAR ONCE
Status: COMPLETED | OUTPATIENT
Start: 2019-01-01 | End: 2019-01-01

## 2019-01-01 RX ORDER — NICOTINE POLACRILEX 4 MG
15-30 LOZENGE BUCCAL
Status: DISCONTINUED | OUTPATIENT
Start: 2019-01-01 | End: 2019-01-01 | Stop reason: HOSPADM

## 2019-01-01 RX ORDER — ATORVASTATIN CALCIUM 40 MG/1
40 TABLET, FILM COATED ORAL DAILY
Qty: 90 TABLET | Refills: 3 | Status: SHIPPED | OUTPATIENT
Start: 2019-01-01

## 2019-01-01 RX ORDER — FENTANYL CITRATE 50 UG/ML
25-50 INJECTION, SOLUTION INTRAMUSCULAR; INTRAVENOUS EVERY 5 MIN PRN
Status: DISCONTINUED | OUTPATIENT
Start: 2019-01-01 | End: 2019-01-01 | Stop reason: HOSPADM

## 2019-01-01 RX ORDER — CARVEDILOL 25 MG/1
25 TABLET ORAL 2 TIMES DAILY WITH MEALS
Qty: 180 TABLET | Refills: 3 | OUTPATIENT
Start: 2019-01-01

## 2019-01-01 RX ORDER — REGADENOSON 0.08 MG/ML
0.4 INJECTION, SOLUTION INTRAVENOUS ONCE
Status: DISCONTINUED | OUTPATIENT
Start: 2019-01-01 | End: 2019-01-01 | Stop reason: HOSPADM

## 2019-01-01 RX ORDER — REGADENOSON 0.08 MG/ML
0.4 INJECTION, SOLUTION INTRAVENOUS ONCE
Status: COMPLETED | OUTPATIENT
Start: 2019-01-01 | End: 2019-01-01

## 2019-01-01 RX ORDER — NALOXONE HYDROCHLORIDE 0.4 MG/ML
.1-.4 INJECTION, SOLUTION INTRAMUSCULAR; INTRAVENOUS; SUBCUTANEOUS
Status: DISCONTINUED | OUTPATIENT
Start: 2019-01-01 | End: 2019-01-01 | Stop reason: HOSPADM

## 2019-01-01 RX ORDER — AMINOPHYLLINE 25 MG/ML
50-100 INJECTION, SOLUTION INTRAVENOUS
Status: DISCONTINUED | OUTPATIENT
Start: 2019-01-01 | End: 2019-01-01 | Stop reason: HOSPADM

## 2019-01-01 RX ORDER — SODIUM CHLORIDE 9 MG/ML
INJECTION, SOLUTION INTRAVENOUS CONTINUOUS
Status: DISCONTINUED | OUTPATIENT
Start: 2019-01-01 | End: 2019-01-01 | Stop reason: HOSPADM

## 2019-01-01 RX ORDER — FLUMAZENIL 0.1 MG/ML
0.2 INJECTION, SOLUTION INTRAVENOUS
Status: CANCELLED | OUTPATIENT
Start: 2019-01-01 | End: 2019-01-01

## 2019-01-01 RX ORDER — PREDNISONE 10 MG/1
TABLET ORAL
Qty: 30 TABLET | Refills: 3 | Status: SHIPPED | OUTPATIENT
Start: 2019-01-01

## 2019-01-01 RX ORDER — LISINOPRIL 20 MG/1
20 TABLET ORAL 2 TIMES DAILY
Qty: 180 TABLET | Refills: 3
Start: 2019-01-01

## 2019-01-01 RX ADMIN — TETROFOSMIN 41 MCI.: 1.38 INJECTION, POWDER, LYOPHILIZED, FOR SOLUTION INTRAVENOUS at 10:11

## 2019-01-01 RX ADMIN — IMMUNE GLOBULIN INFUSION (HUMAN) 25 G: 100 INJECTION, SOLUTION INTRAVENOUS; SUBCUTANEOUS at 11:34

## 2019-01-01 RX ADMIN — METHYLPREDNISOLONE SODIUM SUCCINATE 100 MG: 125 INJECTION, POWDER, FOR SOLUTION INTRAMUSCULAR; INTRAVENOUS at 13:29

## 2019-01-01 RX ADMIN — HUMAN IMMUNOGLOBULIN G 25 G: 20 LIQUID INTRAVENOUS at 11:41

## 2019-01-01 RX ADMIN — ACETAMINOPHEN 650 MG: 325 TABLET ORAL at 11:12

## 2019-01-01 RX ADMIN — DIPHENHYDRAMINE HYDROCHLORIDE 25 MG: 25 CAPSULE ORAL at 12:08

## 2019-01-01 RX ADMIN — ACETAMINOPHEN 650 MG: 325 TABLET ORAL at 10:49

## 2019-01-01 RX ADMIN — HUMAN IMMUNOGLOBULIN G 25 G: 20 LIQUID INTRAVENOUS at 11:13

## 2019-01-01 RX ADMIN — MEPERIDINE HYDROCHLORIDE 25 MG: 25 INJECTION INTRAMUSCULAR; INTRAVENOUS; SUBCUTANEOUS at 11:01

## 2019-01-01 RX ADMIN — METHYLPREDNISOLONE SODIUM SUCCINATE 100 MG: 125 INJECTION, POWDER, FOR SOLUTION INTRAMUSCULAR; INTRAVENOUS at 11:15

## 2019-01-01 RX ADMIN — MEPERIDINE HYDROCHLORIDE 25 MG: 25 INJECTION INTRAMUSCULAR; INTRAVENOUS; SUBCUTANEOUS at 11:51

## 2019-01-01 RX ADMIN — DIPHENHYDRAMINE HYDROCHLORIDE 25 MG: 25 CAPSULE ORAL at 10:58

## 2019-01-01 RX ADMIN — IMMUNE GLOBULIN INFUSION (HUMAN) 25 G: 100 INJECTION, SOLUTION INTRAVENOUS; SUBCUTANEOUS at 11:33

## 2019-01-01 RX ADMIN — ACETAMINOPHEN 650 MG: 325 TABLET ORAL at 11:41

## 2019-01-01 RX ADMIN — ACETAMINOPHEN 650 MG: 325 TABLET ORAL at 11:04

## 2019-01-01 RX ADMIN — MEPERIDINE HYDROCHLORIDE 25 MG: 25 INJECTION INTRAMUSCULAR; INTRAVENOUS; SUBCUTANEOUS at 11:17

## 2019-01-01 RX ADMIN — MEPERIDINE HYDROCHLORIDE 25 MG: 25 INJECTION INTRAMUSCULAR; INTRAVENOUS; SUBCUTANEOUS at 11:05

## 2019-01-01 RX ADMIN — DIPHENHYDRAMINE HYDROCHLORIDE 25 MG: 25 CAPSULE ORAL at 11:34

## 2019-01-01 RX ADMIN — ACETAMINOPHEN 650 MG: 325 TABLET ORAL at 13:29

## 2019-01-01 RX ADMIN — DIPHENHYDRAMINE HYDROCHLORIDE 25 MG: 50 INJECTION, SOLUTION INTRAMUSCULAR; INTRAVENOUS at 15:05

## 2019-01-01 RX ADMIN — MIDAZOLAM HYDROCHLORIDE 0.5 MG: 2 INJECTION, SOLUTION INTRAMUSCULAR; INTRAVENOUS at 15:15

## 2019-01-01 RX ADMIN — POTASSIUM CHLORIDE 40 MEQ: 750 TABLET, EXTENDED RELEASE ORAL at 13:44

## 2019-01-01 RX ADMIN — METHYLPREDNISOLONE SODIUM SUCCINATE 100 MG: 125 INJECTION, POWDER, FOR SOLUTION INTRAMUSCULAR; INTRAVENOUS at 12:06

## 2019-01-01 RX ADMIN — MEPERIDINE HYDROCHLORIDE 25 MG: 25 INJECTION INTRAMUSCULAR; INTRAVENOUS; SUBCUTANEOUS at 11:15

## 2019-01-01 RX ADMIN — METHYLPREDNISOLONE SODIUM SUCCINATE 100 MG: 125 INJECTION, POWDER, FOR SOLUTION INTRAMUSCULAR; INTRAVENOUS at 11:01

## 2019-01-01 RX ADMIN — DIPHENHYDRAMINE HYDROCHLORIDE 25 MG: 25 CAPSULE ORAL at 11:04

## 2019-01-01 RX ADMIN — DIPHENHYDRAMINE HYDROCHLORIDE 25 MG: 25 CAPSULE ORAL at 11:41

## 2019-01-01 RX ADMIN — DIPHENHYDRAMINE HYDROCHLORIDE 25 MG: 25 CAPSULE ORAL at 11:22

## 2019-01-01 RX ADMIN — DEXTROSE MONOHYDRATE 50 ML: 50 INJECTION, SOLUTION INTRAVENOUS at 12:38

## 2019-01-01 RX ADMIN — METHYLPREDNISOLONE SODIUM SUCCINATE 100 MG: 125 INJECTION, POWDER, FOR SOLUTION INTRAMUSCULAR; INTRAVENOUS at 11:18

## 2019-01-01 RX ADMIN — ACETAMINOPHEN 650 MG: 325 TABLET ORAL at 10:58

## 2019-01-01 RX ADMIN — REGADENOSON 0.4 MG: 0.08 INJECTION, SOLUTION INTRAVENOUS at 09:08

## 2019-01-01 RX ADMIN — IODIXANOL 50 ML: 320 INJECTION, SOLUTION INTRAVASCULAR at 16:32

## 2019-01-01 RX ADMIN — ACETAMINOPHEN 650 MG: 325 TABLET ORAL at 12:08

## 2019-01-01 RX ADMIN — MEPERIDINE HYDROCHLORIDE 25 MG: 25 INJECTION INTRAMUSCULAR; INTRAVENOUS; SUBCUTANEOUS at 12:08

## 2019-01-01 RX ADMIN — ACETAMINOPHEN 650 MG: 325 TABLET ORAL at 11:34

## 2019-01-01 RX ADMIN — IMMUNE GLOBULIN INFUSION (HUMAN) 25 G: 100 INJECTION, SOLUTION INTRAVENOUS; SUBCUTANEOUS at 13:48

## 2019-01-01 RX ADMIN — ACETAMINOPHEN 650 MG: 325 TABLET ORAL at 11:15

## 2019-01-01 RX ADMIN — IMMUNE GLOBULIN INFUSION (HUMAN) 25 G: 100 INJECTION, SOLUTION INTRAVENOUS; SUBCUTANEOUS at 11:50

## 2019-01-01 RX ADMIN — METHYLPREDNISOLONE SODIUM SUCCINATE 100 MG: 125 INJECTION, POWDER, FOR SOLUTION INTRAMUSCULAR; INTRAVENOUS at 11:42

## 2019-01-01 RX ADMIN — DIPHENHYDRAMINE HYDROCHLORIDE 25 MG: 25 CAPSULE ORAL at 11:12

## 2019-01-01 RX ADMIN — IMMUNE GLOBULIN INFUSION (HUMAN) 25 G: 100 INJECTION, SOLUTION INTRAVENOUS; SUBCUTANEOUS at 11:24

## 2019-01-01 RX ADMIN — METHYLPREDNISOLONE SODIUM SUCCINATE 100 MG: 125 INJECTION, POWDER, FOR SOLUTION INTRAMUSCULAR; INTRAVENOUS at 11:13

## 2019-01-01 RX ADMIN — IMMUNE GLOBULIN INFUSION (HUMAN) 25 G: 100 INJECTION, SOLUTION INTRAVENOUS; SUBCUTANEOUS at 12:25

## 2019-01-01 RX ADMIN — MEPERIDINE HYDROCHLORIDE 25 MG: 25 INJECTION INTRAMUSCULAR; INTRAVENOUS; SUBCUTANEOUS at 11:32

## 2019-01-01 RX ADMIN — DIPHENHYDRAMINE HYDROCHLORIDE 25 MG: 25 CAPSULE ORAL at 13:29

## 2019-01-01 RX ADMIN — ACETAMINOPHEN 650 MG: 325 TABLET ORAL at 11:22

## 2019-01-01 RX ADMIN — METHYLPREDNISOLONE SODIUM SUCCINATE 100 MG: 125 INJECTION, POWDER, FOR SOLUTION INTRAMUSCULAR; INTRAVENOUS at 11:25

## 2019-01-01 RX ADMIN — METHYLPREDNISOLONE SODIUM SUCCINATE 100 MG: 125 INJECTION, POWDER, FOR SOLUTION INTRAMUSCULAR; INTRAVENOUS at 11:34

## 2019-01-01 RX ADMIN — HUMAN IMMUNOGLOBULIN G 25 G: 20 LIQUID INTRAVENOUS at 12:00

## 2019-01-01 RX ADMIN — HYDROCODONE BITARTRATE AND ACETAMINOPHEN 2 TABLET: 5; 325 TABLET ORAL at 18:26

## 2019-01-01 RX ADMIN — MEPERIDINE HYDROCHLORIDE 25 MG: 25 INJECTION INTRAMUSCULAR; INTRAVENOUS; SUBCUTANEOUS at 11:41

## 2019-01-01 RX ADMIN — DIPHENHYDRAMINE HYDROCHLORIDE 25 MG: 25 CAPSULE ORAL at 11:13

## 2019-01-01 RX ADMIN — FENTANYL CITRATE 25 MCG: 50 INJECTION, SOLUTION INTRAMUSCULAR; INTRAVENOUS at 15:16

## 2019-01-01 RX ADMIN — TETROFOSMIN 9.7 MCI.: 1.38 INJECTION, POWDER, LYOPHILIZED, FOR SOLUTION INTRAVENOUS at 08:15

## 2019-01-01 RX ADMIN — METHYLPREDNISOLONE SODIUM SUCCINATE 100 MG: 125 INJECTION, POWDER, FOR SOLUTION INTRAMUSCULAR; INTRAVENOUS at 11:05

## 2019-01-01 RX ADMIN — SODIUM CHLORIDE: 9 INJECTION, SOLUTION INTRAVENOUS at 14:40

## 2019-01-01 RX ADMIN — IMMUNE GLOBULIN INFUSION (HUMAN) 25 G: 100 INJECTION, SOLUTION INTRAVENOUS; SUBCUTANEOUS at 12:38

## 2019-01-01 RX ADMIN — ACETAMINOPHEN 650 MG: 325 TABLET ORAL at 11:13

## 2019-01-01 RX ADMIN — DIPHENHYDRAMINE HYDROCHLORIDE 25 MG: 25 CAPSULE ORAL at 10:49

## 2019-01-01 RX ADMIN — MEPERIDINE HYDROCHLORIDE 25 MG: 25 INJECTION INTRAMUSCULAR; INTRAVENOUS; SUBCUTANEOUS at 13:34

## 2019-01-01 RX ADMIN — DIPHENHYDRAMINE HYDROCHLORIDE 25 MG: 25 CAPSULE ORAL at 11:15

## 2019-01-01 RX ADMIN — IMMUNE GLOBULIN INFUSION (HUMAN) 25 G: 100 INJECTION, SOLUTION INTRAVENOUS; SUBCUTANEOUS at 11:08

## 2019-01-01 RX ADMIN — METHYLPREDNISOLONE SODIUM SUCCINATE 100 MG: 125 INJECTION, POWDER, FOR SOLUTION INTRAMUSCULAR; INTRAVENOUS at 10:58

## 2019-01-01 RX ADMIN — MEPERIDINE HYDROCHLORIDE 25 MG: 25 INJECTION INTRAMUSCULAR; INTRAVENOUS; SUBCUTANEOUS at 11:00

## 2019-01-01 ASSESSMENT — PAIN SCALES - GENERAL
PAINLEVEL: MILD PAIN (2)
PAINLEVEL: MODERATE PAIN (5)
PAINLEVEL: NO PAIN (0)
PAINLEVEL: SEVERE PAIN (6)
PAINLEVEL: NO PAIN (0)

## 2019-01-01 ASSESSMENT — PAIN DESCRIPTION - DESCRIPTORS: DESCRIPTORS: ACHING

## 2019-01-01 ASSESSMENT — ENCOUNTER SYMPTOMS
SHORTNESS OF BREATH: 0
FEVER: 0
ABDOMINAL PAIN: 0
WOUND: 1

## 2019-01-01 ASSESSMENT — MIFFLIN-ST. JEOR
SCORE: 810.2
SCORE: 812.92
SCORE: 818.94
SCORE: 800.22
SCORE: 846.93

## 2019-01-04 NOTE — PROCEDURES
FINAL RENAL ANGIOGRAM REPORT     PROCEDURES PERFORMED:   Aortogram/ Renal angiogram/ Perclose    PHYSICIANS:  Attending Physician: Jaleel Adair MD  Interventional Radiology Fellow: Yordy Myers MD  Cardiology Fellow: Lb Alvarado MD    INDICATION:  Jennifer Alvarez is a 78 year old female with atherosclerosis including prior renal artery stenting who presents today for elective renal artery angiogram and likely angioplasty.      DESCRIPTION:  1. Consent obtained with discussion of risks.  All questions were answered.  2. Sterile prep and procedure.  3. Location with Sheaths:   Aortogram/ Renal angiogram  4. Access: Local anesthetic with lidocaine.  A micropuncture 21 guage needle with ultrasound guidance was used to establish vascular access using a modified Seldinger technique.  5. Diagnostic Catheters:   SOS catheter 5Fr, distal segment of catheter cut to change engagement approach  6. Guiding Catheters:  None  6. Estimated blood loss: < 5 ml    MEDICATIONS:  The procedure was performed under conscious sedation for 75 minutes.  The patient was assessed immediately before the first sedation medication was administered.  Midazolam 0.5 mg and Fentanyl 25 mcg were administered.  Heart rate, BP, respiration, oxygen saturation and patient responses were monitored throughout the procedure with the assistance of the RN under my supervision.  >> Antiplatelet Therapy: ASA 81 mg     Procedures:    Aortogram/ Renal angiogram:  Aortography at the level of L1 showed widely patent right renal artery system.  The left renal arterial system was patent but imaging of the ostial segment was suboptimal because of the previously placed stent which extended into the aorta.    Selective left renal angiography showed a widely patent stent with mild (20-30%) in-stent restenosis of the left renal artery.  There was no significant plaque beyond the ostial segment.    Sheath Removal:  Angiography was performed on the right common femoral  artery.  The arteriotomy was superior to the SFA takeoff.  There was some evidence of  PAD.      Contrast: Isovue, 50 ml     Fluoroscopy Time: 26.9 min    COMPLICATIONS:  1. None    SUMMARY:   Successful non selective aortogram with renal angiogram - 20-30 % ISR of the left renal stent    PLAN:   >> No intervention.  >> Bedrest per protocol.  >> Continued medical management and lifestyle modification for cardiovascular risk factor optimization.   >> Discharge today per protocol    The attending interventional cardiologist was present and supervised all critical aspects the procedure.    Findings discussed with patient.    See CVIS report for final draft.    Lb Alvarado MD   Cardiology Fellow    Yordy Myers MD  Interventional Radiology      Staff Cardiologist: I supervised the cardiology fellow and reviewed the angiography findings with the fellow at the completion of the procedure.  I agree with the documentation above.    Jaleel Adair MD

## 2019-01-04 NOTE — H&P
Cardiology Note    Patient presents electively today for placement of left renal artery stent for renal artery stenosis. She is without complaints today. She does have a contrast allergy, took Prednisone and Benadryl.     PMH and PSH- reviewed- pertinent for chronic bronchitis and respiratory infections, Ig deficiencies receing IV immunoglobin, Hypertension, renal artery stenosis s/p prior stenting, Coronary artery disease, sick sinus syndrome s/p PPM.     Social history- here with her daughter, former remote tobacco use    Med- reviewed  Allergies- Bees, Morphine, codeine, contrast dye, penicillin, , cephalosporins    Exam:  /66   Pulse 83   Temp 98.9  F (37.2  C) (Oral)   SpO2 99%   Gen: lying on stretcher, in NAD  HEENT: NC/AT, sclera anicteric, MMM  Pulm: CTA B  CV: RRR, 3/6 systolic murmur best at left sternal border  Abd: ND, +BS, soft  Ext: no edema, 2+ DP pulses bilateral  Neuro: alert and conversant  Psych: pleasant mood and affect  Skin: no significant rashes noted    Data:   BMP- potassium low at 3.0- will give supplement  CBC unremarkable  Renal US 8/2018: partially limited due to overlying bowel gas but stent felt to be patent  Renal US 11/9/18: now well visualized stent but newly elevated velocities in the origin and proximal portsion fo the left renal artery d/w instent restenosis.     A/P: Jennifer Alvraez is a 78 year old female with atherosclerosis including prior renal artery stenting who presents today for elective renal artery angiogram and likely angioplasty. Consent was done by Dr. Adair. She is agreeable and safe to proceed. She has received pre-treatment for her contrast dye allergy.   Of note- murmur noted on exam today, hasn't had recent Echo in our system. Will notify Dr. Adair. No acute need today but may     Eloina Tavares PA-C  [facility]

## 2019-01-04 NOTE — PROGRESS NOTES
Paged Dr. Adair about patients sedation assessment and premedication for a contrast allergy.  Awaiting call back.

## 2019-01-04 NOTE — DISCHARGE INSTRUCTIONS
Going Home after Renal Artery Embolizaion        After you go home:    Have an adult stay with you for 24 hours.    Drink plenty of fluids.    You may eat your normal diet, unless your doctor tells you otherwise.    For 24 hours:  - Relax and take it easy.  - Do NOT smoke.  - Do NOT make any important or legal decisions.  - Do NOT drive or operate machines at home or at work.  - Do NOT drink alcohol.      Remove the Band-Aid after 24 hours. If there is minor oozing, apply another Band-aid and remove it after 12 hours.    For 2 days, do NOT have sex or do any heavy exercise.    Do NOT take a bath, or use a hot tub or pool for at least 3 days. You may shower.      Care of groin site  It is normal to have a small bruise or lump at the site.    Do NOT scrub the site.    For the first 2 days, when you cough, sneeze or move your bowels, hold your hand over the puncture site and press gently.    Do NOT lift more than 10 pounds for at least 3 to 5 days.    Do not use lotion or powder near the puncture site for 3 days.  If you start bleeding from the site in your groin, lie down flat and press firmly on the site. Call  your doctor as soon as you can.                        If you start bleeding from the site in your arm:    Sit down and press firmly on the site with your fingers for 10 minutes. Call your doctor as soon as you can.    If the bleeding stops, sit still and keep your wrist straight for 2 hours.  Medicines    If you have begun Plavix or Effient (with a stent), do not stop taking it until you talk to your heart doctor (cardiologist).    If you are on metformin (Glucophage), do not restart it until you have blood tests (within 2 to 3 days after discharge). When your doctor tells you it is safe, you may restart the metformin.  Call your doctor if:    You have a large or growing hard lump around the site.      The site is red, swollen, hot or tender.    Blood or fluid is draining from the site.    You have chills or  a fever greater than 101 F (38 C).    Your leg  turns bluish, feels numb or cool.    You have hives, a rash or unusual itching.  Call 911 right away if you have:    Bleeding that does not stop.    Heavy bleeding.  [facility] [place]:    [phone]   Ask for Dr. Jaleel Adair to call you back        I have received and understand my discharge instructions and I have all of my personal belongings.         Patient / Significant Other s Signature  Relationship      ______/______/______  Nurse s Signature  Date

## 2019-01-04 NOTE — IP AVS SNAPSHOT
[address] [address]                                    After Visit Summary   1/4/2019    Jennifer Alvarez    MRN: [id number]           After Visit Summary Signature Page    I have received my discharge instructions, and my questions have been answered. I have discussed any challenges I see with this plan with the nurse or doctor.    ..........................................................................................................................................  Patient/Patient Representative Signature      ..........................................................................................................................................  Patient Representative Print Name and Relationship to Patient    ..................................................               ................................................  Date                                   Time    ..........................................................................................................................................  Reviewed by Signature/Title    ...................................................              ..............................................  Date                                               Time          22EPIC Rev 08/18

## 2019-01-04 NOTE — IP AVS SNAPSHOT
MRN:[id number]                      After Visit Summary   1/4/2019    Jennifer Alvarez    MRN: [id number]           Visit Information        Department      1/4/2019 10:59 AM [address] [facility]          Review of your medicines      UNREVIEWED medicines. Ask your doctor about these medicines       Dose / Directions   * albuterol 108 (90 Base) MCG/ACT inhaler  Commonly known as:  PROAIR HFA/PROVENTIL HFA/VENTOLIN HFA  Used for:  Wheezing      Take 2 puffs prn for wheezing or shortness of breath  Quantity:  1 Inhaler  Refills:  12     * albuterol 108 (90 Base) MCG/ACT inhaler  Commonly known as:  PROAIR HFA/PROVENTIL HFA/VENTOLIN HFA  Used for:  Mixed simple and mucopurulent chronic bronchitis (H), Gastroesophageal reflux disease without esophagitis      Take 2 puffs as needed for shortness of breath  Quantity:  1 Inhaler  Refills:  12     * albuterol (2.5 MG/3ML) 0.083% neb solution  Commonly known as:  PROVENTIL      Dose:  1 ampule  Take 1 ampule by nebulization every 6 hours as needed.  Refills:  0     aspirin 81 MG tablet  Commonly known as:  ASA      Dose:  1 tablet  Take 1 tablet by mouth daily.  Refills:  0     atorvastatin 10 MG tablet  Commonly known as:  LIPITOR  Used for:  Hyperlipidemia LDL goal <100      Dose:  40 mg  Take 4 tablets (40 mg) by mouth daily  Quantity:  90 tablet  Refills:  3     carvedilol 12.5 MG tablet  Commonly known as:  COREG  Used for:  Secondary hypertension      Dose:  12.5 mg  Take 1 tablet (12.5 mg) by mouth 2 times daily (with meals)  Quantity:  180 tablet  Refills:  1     clopidogrel 75 MG tablet  Commonly known as:  PLAVIX  Used for:  Coronary artery disease      Dose:  75 mg  Take 1 tablet (75 mg) by mouth daily  Quantity:  90 tablet  Refills:  3     DEMEROL 25 MG/ML injection  Generic drug:  meperidine      Dose:  25 mg  Inject 25 mg into the vein as needed. 25 mg IV prior to IgG infusion  Refills:  0     dicyclomine 10 MG capsule  Commonly known as:   BENTYL      Dose:  10 mg  Take 10 mg by mouth 4 times daily (before meals and nightly).  Refills:  0     * diphenhydrAMINE 50 MG capsule  Commonly known as:  BENADRYL  Used for:  Allergic reaction to contrast dye      Dose:  50 mg  Take 1 capsule (50 mg) by mouth See Admin Instructions Take 50 mg the morning of the procedure  Quantity:  1 capsule  Refills:  0     * diphenhydrAMINE 50 MG capsule  Commonly known as:  BENADRYL  Used for:  Allergic reaction      Dose:  50 mg  Take 1 capsule (50 mg) by mouth See Admin Instructions for 1 dose Take 50 mg the morning of the procedure  Quantity:  1 capsule  Refills:  0     FISH OIL      Unsure of dosage take two caps daily  Refills:  0     * fluticasone-salmeterol 250-50 MCG/DOSE inhaler  Commonly known as:  ADVAIR  Used for:  Unspecified chronic bronchitis (H)      Dose:  1 puff  Inhale 1 puff into the lungs 2 times daily.  Quantity:  1 Inhaler  Refills:  12     * fluticasone-salmeterol 250-50 MCG/DOSE inhaler  Commonly known as:  ADVAIR  Used for:  Mixed simple and mucopurulent chronic bronchitis (H), Gastroesophageal reflux disease without esophagitis      Dose:  1 puff  Inhale 1 puff into the lungs 2 times daily  Quantity:  1 Inhaler  Refills:  12     hydrochlorothiazide 25 MG tablet  Commonly known as:  HYDRODIURIL  Used for:  Benign essential hypertension      Dose:  25 mg  Take 1 tablet (25 mg) by mouth daily  Quantity:  90 tablet  Refills:  3     HYDROcodone-acetaminophen  MG per tablet  Commonly known as:  LORCET      Dose:  1 tablet  Take 1 tablet by mouth every 6 hours as needed.  Refills:  0     Immune Globulin (Human) 25 GM/500ML Soln      Dose:  25 g  Inject 25 g into the vein See Admin Instructions. 25 grams every 3 weeks IVIG  Refills:  0     isosorbide mononitrate 60 MG 24 hr tablet  Commonly known as:  IMDUR  Used for:  HTN (hypertension)      Dose:  60 mg  Take 1 tablet (60 mg) by mouth daily  Quantity:  90 tablet  Refills:  3     lisinopril 10 MG  tablet  Commonly known as:  PRINIVIL/ZESTRIL  Used for:  Essential hypertension      Dose:  10 mg  Take 1 tablet (10 mg) by mouth 2 times daily  Quantity:  180 tablet  Refills:  3     metFORMIN 1000 MG tablet  Commonly known as:  GLUCOPHAGE  Used for:  DM (diabetes mellitus) (H)      Dose:  1000 mg  Take 1 tablet (1,000 mg) by mouth daily (with dinner) Take 1 tablet twice daily  Refills:  0     nitroGLYcerin 0.4 MG sublingual tablet  Commonly known as:  NITROQUICK  Used for:  Chest pain      Dose:  0.4 mg  Place 1 tablet (0.4 mg) under the tongue every 5 minutes as needed  Quantity:  25 tablet  Refills:  3     * omeprazole 20 MG DR capsule  Commonly known as:  priLOSEC  Used for:  Esophageal reflux, Unspecified chronic bronchitis (H)      Dose:  20 mg  Take 1 capsule (20 mg) by mouth 2 times daily  Quantity:  60 capsule  Refills:  6     * omeprazole 20 MG DR capsule  Commonly known as:  priLOSEC  Used for:  Mixed simple and mucopurulent chronic bronchitis (H), Gastroesophageal reflux disease without esophagitis      Dose:  20 mg  Take 1 capsule (20 mg) by mouth daily  Quantity:  60 capsule  Refills:  6     predniSONE 20 MG tablet  Commonly known as:  DELTASONE  Used for:  Allergic reaction to contrast dye      Dose:  20 mg  Take 1 tablet (20 mg) by mouth See Admin Instructions 60 mg the night before the procedure, 30 mg the morning of the procedure  Quantity:  5 tablet  Refills:  3     * tiotropium 18 MCG inhaled capsule  Commonly known as:  SPIRIVA HANDIHALER  Used for:  Unspecified chronic bronchitis (H)      One puff every day  Quantity:  30 capsule  Refills:  6     * tiotropium 18 MCG inhaled capsule  Commonly known as:  SPIRIVA HANDIHALER  Used for:  Mixed simple and mucopurulent chronic bronchitis (H), Gastroesophageal reflux disease without esophagitis      One puff every day  Quantity:  30 capsule  Refills:  6     TYLENOL 325 MG tablet  Generic drug:  acetaminophen      Dose:  1-2 tablet  Take 1-2 tablets by  mouth every 6 hours as needed.  Refills:  0         * This list has 11 medication(s) that are the same as other medications prescribed for you. Read the directions carefully, and ask your doctor or other care provider to review them with you.            CONTINUE these medicines which have NOT CHANGED       Dose / Directions   Blood Pressure Kit      daily.  Refills:  0     * blood glucose monitoring lancets  Used for:  Type 2 diabetes mellitus with complication (H)      Use to test blood sugar 3 times daily or as directed.  Quantity:  3 Box  Refills:  3     * ONE TOUCH FINE POINT LANCETS      2 times daily.  Refills:  0     * ONETOUCH ULTRA test strip  Used for:  Diabetes mellitus type II  Generic drug:  blood glucose      Test blood sugar twice a day.  Quantity:  200 strip  Refills:  3     * blood glucose test strip  Commonly known as:  AppSpotr CONTOUR NEXT  Used for:  Type 2 diabetes mellitus with complication (H)      by In Vitro route 3 times daily Use to test blood sugar 3 times daily or as directed.  Quantity:  270 each  Refills:  3         * This list has 4 medication(s) that are the same as other medications prescribed for you. Read the directions carefully, and ask your doctor or other care provider to review them with you.                  Protect others around you: Learn how to safely use, store and throw away your medicines at www.disposemymeds.org.       Follow-ups after your visit       Additional Services     Follow-Up with Cardiac Advanced Practice Provider      Follow up appointment should be made in 2 weeks after discharge           Your next 10 appointments already scheduled    Jan 14, 2019 11:00 AM CST  Infusion 300 with UC SPEC INFUSION, UC 44 ATC  M [facility] [place] [facility] ([facility]) [address]  [address]  [place] [facility] [place] [address]  [phone]   Feb 04, 2019 11:00 AM CST  Infusion 300 with UC SPEC INFUSION, UC 44 ATC  M  [facility] Procedure ([facility]) [address]  [address]  [place] [address]  [phone]   Feb 18, 2019 12:00 AM CST  CARDIAC DEVICE CHECK - REMOTE with UC ICD REMOTE  [facility] ([facility]) [address]  [address]  [place] [address]  [phone]   Feb 25, 2019 11:00 AM CST  Infusion 300 with UC SPEC INFUSION, UC 44 ATC  [facility] Specialty and Procedure ([facility]) [address]  [address]  [place] [address]  [phone]   Mar 18, 2019 11:00 AM CDT  Infusion 300 with UC SPEC INFUSION, UC 44 ATC  [facility] Procedure ([facility]) [address]  [address]  [place] [address]  [phone]   Apr 08, 2019 11:00 AM CDT  Infusion 300 with UC SPEC INFUSION  [facility] Procedure ([facility]) [address]  [address]  [place] [address]  [phone]      Care Instructions       After Care Instructions     Discharge Instructions - IF on Metformin (Glucophage or Glucovance) or Metformin containing medications      IF on Metformin (Glucophage or Glucovance) or Metformin containing medications , schedule a Basic Metabolic Panel at [facility] in 48 - 72 hours post procedure and PRIOR TO resuming the Metformin or Metformin containing medications.  Hold Metformin (Glucophage or Glucovance) or Metformin containing medications until after the Basic Metabolic Panel on the 2nd or 3rd day following the procedure.  May resume after blood draw is complete.           Further instructions from your care team       Going Home after Renal Artery Embolizaion        After you go home:    Have an adult stay with you for 24 hours.    Drink plenty of fluids.    You may eat your  normal diet, unless your doctor tells you otherwise.    For 24 hours:  - Relax and take it easy.  - Do NOT smoke.  - Do NOT make any important or legal decisions.  - Do NOT drive or operate machines at home or at work.  - Do NOT drink alcohol.      Remove the Band-Aid after 24 hours. If there is minor oozing, apply another Band-aid and remove it after 12 hours.    For 2 days, do NOT have sex or do any heavy exercise.    Do NOT take a bath, or use a hot tub or pool for at least 3 days. You may shower.      Care of groin site  It is normal to have a small bruise or lump at the site.    Do NOT scrub the site.    For the first 2 days, when you cough, sneeze or move your bowels, hold your hand over the puncture site and press gently.    Do NOT lift more than 10 pounds for at least 3 to 5 days.    Do not use lotion or powder near the puncture site for 3 days.  If you start bleeding from the site in your groin, lie down flat and press firmly on the site. Call  your doctor as soon as you can.                        If you start bleeding from the site in your arm:    Sit down and press firmly on the site with your fingers for 10 minutes. Call your doctor as soon as you can.    If the bleeding stops, sit still and keep your wrist straight for 2 hours.  Medicines    If you have begun Plavix or Effient (with a stent), do not stop taking it until you talk to your heart doctor (cardiologist).    If you are on metformin (Glucophage), do not restart it until you have blood tests (within 2 to 3 days after discharge). When your doctor tells you it is safe, you may restart the metformin.  Call your doctor if:    You have a large or growing hard lump around the site.      The site is red, swollen, hot or tender.    Blood or fluid is draining from the site.    You have chills or a fever greater than 101 F (38 C).    Your leg  turns bluish, feels numb or cool.    You have hives, a rash or unusual itching.  Call 911 right away if you  have:    Bleeding that does not stop.    Heavy bleeding.  [facility] [place]:    [phone]   Ask for Dr. Jaleel Adair to call you back        I have received and understand my discharge instructions and I have all of my personal belongings.         Patient / Significant Other s Signature  Relationship      ______/______/______  Nurse s Signature  Date                Additional Information About Your Visit       [employer] Information    [employer] gives you secure access to your electronic health record. If you see a primary care provider, you can also send messages to your care team and make appointments. If you have questions, please call your primary care clinic.  If you do not have a primary care provider, please call [phone] and they will assist you.       Care EveryWhere ID    This is your Care EveryWhere ID. This could be used by other organizations to access your [facility] medical records  [id number]       Your Vitals Were     Blood Pressure   113/47    Pulse   73    Temperature   98.9  F (37.2  C) (Oral)    Respirations   16    Pulse Oximetry   97%          Primary Care Provider Office Phone # Fax #    Brandi Burks [phone]      Equal Access to Services    [facility] : Hadii aad ku hadasho Sosusyali, waaxda luqadaha, qaybta kaalmada ademarkyacurly, chester caicedo . So [facility] [phone].    ATENCIÓN: Si habla español, tiene a taylor disposición servicios gratuitos de asistencia lingüística. Llame al [phone].    We comply with applicable federal civil rights laws and [place] laws. We do not discriminate on the basis of race, color, national origin, age, disability, sex, sexual orientation, or gender identity.           Thank you!    Thank you for choosing [place] for your care. Our goal is always to provide you with excellent care. Hearing back from our patients is one way we can continue to improve our services. Please take a  few minutes to complete the written survey that you may receive in the mail after you visit with us. Thank you!            Medication List      Medications          Morning Afternoon Evening Bedtime As Needed    Blood Pressure Kit  INSTRUCTIONS:  daily.                     * blood glucose monitoring lancets  INSTRUCTIONS:  Use to test blood sugar 3 times daily or as directed.                     * ONE TOUCH FINE POINT LANCETS  INSTRUCTIONS:  2 times daily.                     * ONETOUCH ULTRA test strip  INSTRUCTIONS:  Test blood sugar twice a day.  Generic drug:  blood glucose                     * blood glucose test strip  Also known as:  TERRY CONTOUR NEXT  INSTRUCTIONS:  by In Vitro route 3 times daily Use to test blood sugar 3 times daily or as directed.                        * This list has 4 medication(s) that are the same as other medications prescribed for you. Read the directions carefully, and ask your doctor or other care provider to review them with you.            ASK your doctor about these medications          Morning Afternoon Evening Bedtime As Needed    * albuterol 108 (90 Base) MCG/ACT inhaler  Also known as:  PROAIR HFA/PROVENTIL HFA/VENTOLIN HFA  INSTRUCTIONS:  Take 2 puffs prn for wheezing or shortness of breath                     * albuterol 108 (90 Base) MCG/ACT inhaler  Also known as:  PROAIR HFA/PROVENTIL HFA/VENTOLIN HFA  INSTRUCTIONS:  Take 2 puffs as needed for shortness of breath                     * albuterol (2.5 MG/3ML) 0.083% neb solution  Also known as:  PROVENTIL  INSTRUCTIONS:  Take 1 ampule by nebulization every 6 hours as needed.                     aspirin 81 MG tablet  Also known as:  ASA  INSTRUCTIONS:  Take 1 tablet by mouth daily.                     atorvastatin 10 MG tablet  Also known as:  LIPITOR  INSTRUCTIONS:  Take 4 tablets (40 mg) by mouth daily                     carvedilol 12.5 MG tablet  Also known as:  COREG  INSTRUCTIONS:  Take 1 tablet (12.5 mg) by mouth  2 times daily (with meals)                     clopidogrel 75 MG tablet  Also known as:  PLAVIX  INSTRUCTIONS:  Take 1 tablet (75 mg) by mouth daily                     DEMEROL 25 MG/ML injection  INSTRUCTIONS:  Inject 25 mg into the vein as needed. 25 mg IV prior to IgG infusion  Generic drug:  meperidine                     dicyclomine 10 MG capsule  Also known as:  BENTYL  INSTRUCTIONS:  Take 10 mg by mouth 4 times daily (before meals and nightly).                     * diphenhydrAMINE 50 MG capsule  Also known as:  BENADRYL  INSTRUCTIONS:  Take 1 capsule (50 mg) by mouth See Admin Instructions Take 50 mg the morning of the procedure  LAST TAKEN:  Ask your nurse or doctor                     * diphenhydrAMINE 50 MG capsule  Also known as:  BENADRYL  INSTRUCTIONS:  Take 1 capsule (50 mg) by mouth See Admin Instructions for 1 dose Take 50 mg the morning of the procedure  LAST TAKEN:  Ask your nurse or doctor                     FISH OIL  INSTRUCTIONS:  Unsure of dosage take two caps daily                     * fluticasone-salmeterol 250-50 MCG/DOSE inhaler  Also known as:  ADVAIR  INSTRUCTIONS:  Inhale 1 puff into the lungs 2 times daily.                     * fluticasone-salmeterol 250-50 MCG/DOSE inhaler  Also known as:  ADVAIR  INSTRUCTIONS:  Inhale 1 puff into the lungs 2 times daily                     hydrochlorothiazide 25 MG tablet  Also known as:  HYDRODIURIL  INSTRUCTIONS:  Take 1 tablet (25 mg) by mouth daily                     HYDROcodone-acetaminophen  MG per tablet  Also known as:  LORCET  INSTRUCTIONS:  Take 1 tablet by mouth every 6 hours as needed.                     Immune Globulin (Human) 25 GM/500ML Soln  INSTRUCTIONS:  Inject 25 g into the vein See Admin Instructions. 25 grams every 3 weeks IVIG                     isosorbide mononitrate 60 MG 24 hr tablet  Also known as:  IMDUR  INSTRUCTIONS:  Take 1 tablet (60 mg) by mouth daily                     lisinopril 10 MG tablet  Also known  as:  PRINIVIL/ZESTRIL  INSTRUCTIONS:  Take 1 tablet (10 mg) by mouth 2 times daily  Doctor's comments:  Dose increase                     metFORMIN 1000 MG tablet  Also known as:  GLUCOPHAGE  INSTRUCTIONS:  Take 1 tablet (1,000 mg) by mouth daily (with dinner) Take 1 tablet twice daily                     nitroGLYcerin 0.4 MG sublingual tablet  Also known as:  NITROQUICK  INSTRUCTIONS:  Place 1 tablet (0.4 mg) under the tongue every 5 minutes as needed                     * omeprazole 20 MG DR capsule  Also known as:  priLOSEC  INSTRUCTIONS:  Take 1 capsule (20 mg) by mouth 2 times daily                     * omeprazole 20 MG DR capsule  Also known as:  priLOSEC  INSTRUCTIONS:  Take 1 capsule (20 mg) by mouth daily                     predniSONE 20 MG tablet  Also known as:  DELTASONE  INSTRUCTIONS:  Take 1 tablet (20 mg) by mouth See Admin Instructions 60 mg the night before the procedure, 30 mg the morning of the procedure                     * tiotropium 18 MCG inhaled capsule  Also known as:  SPIRIVA HANDIHALER  INSTRUCTIONS:  One puff every day                     * tiotropium 18 MCG inhaled capsule  Also known as:  SPIRIVA HANDIHALER  INSTRUCTIONS:  One puff every day                     TYLENOL 325 MG tablet  INSTRUCTIONS:  Take 1-2 tablets by mouth every 6 hours as needed.  Generic drug:  acetaminophen                        * This list has 11 medication(s) that are the same as other medications prescribed for you. Read the directions carefully, and ask your doctor or other care provider to review them with you.

## 2019-01-04 NOTE — PROGRESS NOTES
Patient Name: Jennifer Alvarez  Medical Record Number: [id number]  Today's Date: 1/4/2019    Procedure: Left Renal angiogram, right groin procedural access site.  Proceduralist: Dr. MITCH Adair, Dr. Myers, Dr. Travis.    Sedation start time: 1515.  Sedation end time:  1625  Sedation medications administered: 0.5 mg Versed, 25 mcg Fentanyl,   Total sedation time: 70 minutes  Sedation Notes: 25 mg Benadryl IV given per Dr. Adair.    Patient in room: 1449  Procedure start time: 1514  Puncture time: 1519  Procedure end time: 1625  Patient out of room:  1632    Report given to: 2a CARLIN Soriano    Other Notes: Pt arrived to IR room 5 from . Consent reviewed. Pt denies any questions or concerns regarding procedure.  Patient had prednisone and benadryl premedication.  Pt positioned supine and monitored per protocol. Pt tolerated procedure without any noted complications. Pt transferred back to .. Bedside handoff done with CARLIN Soriano.    Proglide 6F (Ref [id number], Lot [id number]) closure device placed at 1624.  No hematoma following closure device at right groin site, distal pulses in right lower extremity intact.  Patient must lay flat for 2 hours post procedure, until 1825.

## 2019-01-04 NOTE — PROGRESS NOTES
Arrived from home for a renal stent.  VSS.  Denies pain.  PIV placed and labs sent. Need orders.  Need consent.  H&P current.

## 2019-01-04 NOTE — PROGRESS NOTES
Interventional Radiology Pre-Procedure Sedation Assessment   Time of Assessment: 2:57 PM    Expected Level: Moderate Sedation    Indication: Sedation is required for the following type of Procedure: Arterial    Sedation and procedural consent: Risks, benefits and alternatives were discussed with Patient    PO Intake: Appropriately NPO for procedure    ASA Class: Class 1 - HEALTHY PATIENT    Mallampati: Grade 1:  Soft palate, uvula, tonsillar pillars, and posterior pharyngeal wall visible    Lungs: Lungs Clear with good breath sounds bilaterally    Heart: Normal heart sounds and rate    History and physical reviewed and no updates needed. I have reviewed the lab findings, diagnostic data, medications, and the plan for sedation. I have determined this patient to be an appropriate candidate for the planned sedation and procedure and have reassessed the patient IMMEDIATELY PRIOR to sedation and procedure.    Vadim Myers MD

## 2019-01-05 NOTE — PROGRESS NOTES
Tolerated bedrest without issues.  Tolerated food, fluids, ambulation and urination.  C/O right shoulder pain which is her norm at home.  Patient takes Vicodin twice a day at home.  Vicodin given with relief.  Reviewed discharge instructions with patient.  Discharged to home with daughter.

## 2019-01-08 NOTE — TELEPHONE ENCOUNTER
Spoke with patient about recent renal angiogram, no intervention done. Site is dry and intact, did have some initial bleeding through the bandaid but patient held pressure and the bleeding resolved. Noted on H&P, 1/4/2019 Of note- murmur noted on exam today, hasn't had recent Echo in our system    E[place] is ordered and [place] [facility]

## 2019-01-08 NOTE — TELEPHONE ENCOUNTER
Health Call Center    Phone Message    May a detailed message be left on voicemail: yes    Reason for Call: Other: Patient states she was supposed to call Samantha about her surgery. Please give patient a call back, she says Samantha will know what it is about. Thanks!     Action Taken: Message routed to:  [facility] Cardiology

## 2019-01-14 NOTE — PROGRESS NOTES
Nursing Note  Jennifer Alvarez presents today to [facility] for:   Chief Complaint   Patient presents with     Infusion     IVIG infusion     During today's [facility] appointment, orders from Dr. Elizabeth were completed.  Frequency: every 21 days    Progress note:  Patient identification verified by name and date of birth.  Assessment completed.  Vitals recorded in Doc Flowsheets.  Patient was provided with education regarding infusion and possible side effects.  Patient verbalized understanding.      needed: No  Premedications: administered per order.  Infusion Rates: infusion starts at 20 ml/hr, then increased by 20 ml/hr every 15 minutes to final rate of 160 ml/hr.  Approximate Infusion length: IVIG - 3 hours   Labs: were drawn per orders.   Vascular access: peripheral IV placed today.  Treatment Conditions: patient denies fever, chills, signs of infection, recent illness, on antibiotics, productive cough or elevated temperature.  Patient tolerated infusion: well.    Drug Waste Record    Drug Name: Solu Medrol  Dose: 100 mg  Route administered: IV  NDC #: 1127-5925-05  Amount of waste(mL):0.4 ml  Reason for waste: Single use vial      Discharge Plan:   Follow up plan of care with: ongoing infusions at [facility].  Discharge instructions were reviewed with patient.  Patient/representative verbalized understanding of discharge instructions and all questions answered.  Patient discharged from [facility] in stable condition.    Bonny Mckoy RN    Administrations This Visit     acetaminophen (TYLENOL) tablet 650 mg     Admin Date  01/14/2019 Action  Given Dose  650 mg Route  Oral Administered By  Bonny Mckoy RN          dextrose 5% BOLUS     Admin Date  01/14/2019 Action  New Bag Dose  50 mL Route  Intravenous Administered By  Bonny Mckoy RN          diphenhydrAMINE (BENADRYL)  capsule 25 mg     Admin Date  01/14/2019 Action  Given Dose  25 mg Route  Oral Administered By  Bonny Mckoy RN          immune globulin (Gammagard) - sucrose free 10 % injection 25 g     Admin Date  01/14/2019 Action  New Bag Dose  25 g Route  Intravenous Administered By  Bonny Mckoy RN          meperidine (DEMEROL) injection 25 mg     Admin Date  01/14/2019 Action  Given Dose  25 mg Route  Intravenous Administered By  Bonny Mckoy RN          methylPREDNISolone sodium succinate (solu-MEDROL) injection 100 mg     Admin Date  01/14/2019 Action  Given Dose  100 mg Route  Intravenous Administered By  Bonny Mckoy RN                /72 (BP Location: Left arm)   Pulse 73   Temp 98  F (36.7  C) (Oral)   Resp 14   Wt 41.3 kg (91 lb 1.6 oz)   SpO2 100%   BMI 17.22 kg/m

## 2019-01-14 NOTE — PATIENT INSTRUCTIONS
Dear Jennifer Alvarez    Thank you for choosing [facility] ([facility]) for your infusion.  The following information is a summary of our appointment as well as important reminders.          We look forward in seeing you on your next appointment here at [facility].  Please don t hesitate to call us at [phone] to reschedule any of your appointments or to speak with one of the [facility] registered nurses.  It was a pleasure taking care of you today.    Sincerely,    [facility]  [facility]  [address] [place] [address]  [address]  Phone:  [phone]

## 2019-02-04 NOTE — PROGRESS NOTES
Nursing Note  Jennifer Alvarez presents today to [facility] for:   Chief Complaint   Patient presents with     Infusion     IVIG     During today's [facility] appointment, orders from Dr. Elizabeth were completed.  Frequency: every 21 days    Progress note:  Patient identification verified by name and date of birth.  Assessment completed.  Vitals recorded in Doc Flowsheets.  Patient was provided with education regarding infusion and possible side effects.  Patient verbalized understanding.      needed: No  Premedications: administered per order.  Infusion Rates: infusion starts at 20 ml/hr, then increased by 20 ml/hr every 15 minutes to final rate of 160 ml/hr.  Approximate Infusion length:3 hours.   Labs: were not ordered for this appointment.  Vascular access: peripheral IV placed today.  Treatment Conditions: patient denies fever, chills, signs of infection, recent illness, on antibiotics, productive cough or elevated temperature.  Patient tolerated infusion: well.    Drug Waste Record? Yes      Drug Name: solu-medrol  Dose: 100 mg given  Route administered: IV  NDC #: 7523-1641-37  Amount of waste(mL): 0.4 ml  Reason for waste: Single use vial     Discharge Plan:   Follow up plan of care with: ongoing infusions at [facility].  Discharge instructions were reviewed with patient.  Patient/representative verbalized understanding of discharge instructions and all questions answered.  Patient discharged from [facility] in stable condition.    Roxann Freeman RN       Administrations This Visit     acetaminophen (TYLENOL) tablet 650 mg     Admin Date  02/04/2019 Action  Given Dose  650 mg Route  Oral Administered By  Roxann Freeman RN          diphenhydrAMINE (BENADRYL) capsule 25 mg     Admin Date  02/04/2019 Action  Given Dose  25 mg Route  Oral Administered By  Roxann Freeman RN           immune globulin (GAMMAGARD) - sucrose free 10 % injection 25 g     Admin Date  02/04/2019 Action  New Bag Dose  25 g Route  Intravenous Administered By  Roxann Freeman, CARLIN          meperidine (DEMEROL) injection 25 mg     Admin Date  02/04/2019 Action  Given Dose  25 mg Route  Intravenous Administered By  Roxann Freeman, CARLIN          methylPREDNISolone sodium succinate (solu-MEDROL) injection 100 mg     Admin Date  02/04/2019 Action  Given Dose  100 mg Route  Intravenous Administered By  Roxann Freeman, CARLIN                  /70 (BP Location: Left arm)   Pulse 76   Temp 98.1  F (36.7  C) (Oral)   Resp 16   Wt 40.5 kg (89 lb 4.8 oz)   BMI 16.88 kg/m

## 2019-02-22 NOTE — NURSING NOTE
Chief Complaint   Patient presents with     Follow Up     77 y/o, s/p renal stenting 2017, for follow up of renal artery stenosis. S/p renal angiogram 01/2019.     Vitals were taken and medications were reconciled.     Concepcion Gilmore MA    10:26 AM

## 2019-02-22 NOTE — PATIENT INSTRUCTIONS
You were seen today in the Cardiovascular Clinic at the [facility].      Cardiology Providers you saw during your visit:   Dr. Adair    Diagnosis:    Renal artery stenosis   S/P renal artery angioplasty    Results:  None today    Recommendations:      Increase Coreg to 25 mg twice a day.  You will take TWO 12.5 mg in the morning and again in the evening.  Once you run out we can refill the Coreg as 25 mg tablets.    Please record your blood pressure 2 hours after taking your morning medications and again in the evening, before you take your medications.      Follow-up:   6 months with LUZ MARIA Duncan      For emergencies call [phone].    For any scheduling needs, please call [phone].     Thank you for your visit today!     Please call if you have any questions or concerns.  Keith Clarke RN

## 2019-02-22 NOTE — NURSING NOTE
Home Blood Pressure Monitoring: Patient was instructed regarding the indication, timing and documentation of home blood pressures. The patient has a home blood pressure cuff and will document blood pressures daily and send to clinic in 2-3 weeks. Patient demonstrated understanding of this information and agreed to call with further questions or concerns.  Med Reconcile: Reviewed and verified all current medications with the patient. The updated medication list was printed and given to the patient.  Return Appointment: 6 months with LUZ MARIA Duncan Patient given instructions regarding scheduling next clinic visit. Patient demonstrated understanding of this information and agreed to call with further questions or concerns.  Medication Change: increase Coreg to 25 mg po bid.  Patient was educated regarding prescribed medication change, including discussion of the indication, administration, side effects, and when to report to MD or RN. Patient demonstrated understanding of this information and agreed to call with further questions or concerns.  Patient stated she understood all health information given and agreed to call with further questions or concerns.

## 2019-02-22 NOTE — LETTER
2/22/2019      RE: Jennifer Alvarez  [address]  [place] [address]       Dear Colleague,    Thank you for the opportunity to participate in the care of your patient, Jennifer Alvarez, at the [facility] at [facility]. Please see a copy of my visit note below.    CARDIOLOGY CLINIC FOLLOW UP    HPI: Ms. Jennifer Alvarez is a 79 yo woman with a Hx of single vessel CAD (LAD), s/p stenting of the mid and distal LAD following a positive stress nuclear study (December 2007), s/p dual chamber pacemaker (January 2008) for sick sinus syndrome, DIAZ s/p LF renal artery stenting, who returns to the clinic today for ongoing evaluation of CAD. Her cardiovascular risk factors include (+) HTN, (+) Type 2 DM, (+) hyperlipidemia, (+) prior smoking [quit 40 yrs ago], (+) Family hx of CAD.   Her cardiac history dates back to 2007 when she was found to have 1 x v CAD with PCI to mid and distal LAD. A repeat coronary angiogram in 2008 revealed patent stents and non-obstructive CAD. She had coronary angiography (May 2010) showing minimal CAD with patent stents in the mid and distal LAD. RHC showed mild elevation of PA pressures and left sided filling pressures. She had three hospitalizations in 2010, including two ER visits for tachyarrhythmia. She had a dobutamine stress ECHO (Sept 2011) that was negative for any inducible ischemia. LV function was normal at baseline and augmented appropriately with stress. A repeat echocardiogram in June 2013 revealed a normal global and regional left ventricular function with an EF of 60-65% and normal right ventricular function. There was mild concentric LVH and mild aortic sclerosis with no significant gradient across AV.  She had a negative dobutamine ECHO in July 2015.    She underwent renal artery stenting (LEFT) in 2016 and subsequent US and think sliced CT angio suggested in stent restenosis.  She underwent repeat angiography a few months  "ago and the stent appeared widely patent.  Home BPs 159-171/70 mm Hg.  She insists she is taking her medications.    She continues to experience chest discomfort and exertional dyspnea.  It occurs when she \"overworks\" or going up / down stairs.  It is a pressure sensation and radiates to both sides of the upper chest.  It is relieved with rest.  She has taken SL NTG on two occasions with improvement in discomfort (side effect headaches).  The episodes last up to 30 seconds but resolve immediate with rest.  They have become more frequent, now occurring every other day.  She believes they are more frequent when her BP is elevated.  She has recorded SBP of 190 in this setting.  She walks in her home and does stretch exercises.  If she uses stairs excessively, she has to stop.  She stopped exercising on a treadmill because it broke.  She has experienced PND on two occasions.   She denies orthopnea.  She notes pedal edema.  She denies palpitations, and syncope.  She has had orthostatic lightheadedness.    Her pacemaker was recently interrogated.   The generator was recently changed.      PAST MEDICAL HISTORY:  Past Medical History:   Diagnosis Date     Abdominal pain     cramping     Arthritis      CAD (coronary artery disease)     stent x2, ppm     Cancer (H)     skin     Cardiac pacemaker      CC (Crohn's colitis) (H)      Chronic kidney disease      Diabetes (H)     DM type 2, oral agent     GERD (gastroesophageal reflux disease)      History of blood transfusion      HTN (hypertension)      Hyperlipidemia LDL goal <70 11/19/2013     IgA deficiency (H)      Neck pain 12/31/2014     Uncomplicated asthma      Weight loss        CURRENT MEDICATIONS:  Current Outpatient Medications   Medication Sig Dispense Refill     acetaminophen (TYLENOL) 325 MG tablet Take 1-2 tablets by mouth every 6 hours as needed.       albuterol (2.5 MG/3ML) 0.083% nebulizer solution Take 1 ampule by nebulization every 6 hours as needed.       " albuterol (PROAIR HFA/PROVENTIL HFA/VENTOLIN HFA) 108 (90 Base) MCG/ACT Inhaler Take 2 puffs as needed for shortness of breath 1 Inhaler 12     albuterol (PROAIR HFA/PROVENTIL HFA/VENTOLIN HFA) 108 (90 BASE) MCG/ACT Inhaler Take 2 puffs prn for wheezing or shortness of breath 1 Inhaler 12     aspirin 81 MG tablet Take 1 tablet by mouth daily.       Blood Pressure KIT daily.       carvedilol (COREG) 12.5 MG tablet Take 1 tablet (12.5 mg) by mouth 2 times daily (with meals) 180 tablet 1     clopidogrel (PLAVIX) 75 MG tablet Take 1 tablet (75 mg) by mouth daily 90 tablet 3     dicyclomine (BENTYL) 10 MG capsule Take 10 mg by mouth 4 times daily (before meals and nightly).       diphenhydrAMINE (BENADRYL) 50 MG capsule Take 1 capsule (50 mg) by mouth See Admin Instructions for 1 dose Take 50 mg the morning of the procedure 1 capsule 0     diphenhydrAMINE (BENADRYL) 50 MG capsule Take 1 capsule (50 mg) by mouth See Admin Instructions Take 50 mg the morning of the procedure 1 capsule 0     FISH OIL Unsure of dosage take two caps daily       fluticasone-salmeterol (ADVAIR) 250-50 MCG/DOSE diskus inhaler Inhale 1 puff into the lungs 2 times daily 1 Inhaler 12     fluticasone-salmeterol (ADVAIR) 250-50 MCG/DOSE diskus inhaler Inhale 1 puff into the lungs 2 times daily. 1 Inhaler 12     hydrochlorothiazide (HYDRODIURIL) 25 MG tablet Take 1 tablet (25 mg) by mouth daily 90 tablet 3     hydrocodone-acetaminophen (LORCET)  MG per tablet Take 1 tablet by mouth every 6 hours as needed.       Immune Globulin, Human, 25 GM/500ML SOLN Inject 25 g into the vein See Admin Instructions. 25 grams every 3 weeks IVIG       isosorbide mononitrate (IMDUR) 60 MG 24 hr tablet Take 1 tablet (60 mg) by mouth daily 90 tablet 3     lisinopril (PRINIVIL/ZESTRIL) 10 MG tablet Take 1 tablet (10 mg) by mouth 2 times daily 180 tablet 3     meperidine (DEMEROL) 25 MG/ML injection Inject 25 mg into the vein as needed. 25 mg IV prior to IgG infusion        metFORMIN (GLUCOPHAGE) 1000 MG tablet Take 1 tablet (1,000 mg) by mouth daily (with dinner) Take 1 tablet twice daily  0     nitroglycerin (NITROQUICK) 0.4 MG sublingual tablet Place 1 tablet (0.4 mg) under the tongue every 5 minutes as needed 25 tablet 3     omeprazole (PRILOSEC) 20 MG capsule Take 1 capsule (20 mg) by mouth 2 times daily 60 capsule 6     tiotropium (SPIRIVA HANDIHALER) 18 MCG capsule One puff every day 30 capsule 6     tiotropium (SPIRIVA HANDIHALER) 18 MCG inhalation capsule One puff every day 30 capsule 6     atorvastatin (LIPITOR) 10 MG tablet Take 4 tablets (40 mg) by mouth daily (Patient not taking: Reported on 2/22/2019) 90 tablet 3     blood glucose monitoring (TERRY CONTOUR NEXT) test strip by In Vitro route 3 times daily Use to test blood sugar 3 times daily or as directed. (Patient not taking: Reported on 2/22/2019) 270 each 3     blood glucose monitoring (TERRY MICROLET) lancets Use to test blood sugar 3 times daily or as directed. (Patient not taking: Reported on 2/22/2019) 3 Box 3     omeprazole (PRILOSEC) 20 MG CR capsule Take 1 capsule (20 mg) by mouth daily (Patient not taking: Reported on 2/22/2019) 60 capsule 6     ONE TOUCH FINE POINT LANCETS 2 times daily.       ONE TOUCH ULTRA TEST strip Test blood sugar twice a day. (Patient not taking: Reported on 2/22/2019) 200 strip 3     predniSONE (DELTASONE) 20 MG tablet Take 1 tablet (20 mg) by mouth See Admin Instructions 60 mg the night before the procedure, 30 mg the morning of the procedure (Patient not taking: Reported on 2/22/2019.) 5 tablet 3       PAST SURGICAL HISTORY:  Past Surgical History:   Procedure Laterality Date     APPENDECTOMY       C LAP,SURG,COLECTOMY, PARTIAL, W/ANAST      partial colectomy;diverticulitis     cardiac stents      x 2     CARPAL TUNNEL RELEASE RT/LT      bilateral     CHOLECYSTECTOMY       COLONOSCOPY       COLONOSCOPY N/A 11/9/2015    Procedure: COMBINED COLONOSCOPY, SINGLE OR MULTIPLE  BIOPSY/POLYPECTOMY BY BIOPSY;  Surgeon: Victor Hugo Turner MD;  Location:  GI     ENT SURGERY      back throat     ESOPHAGEAL MOTILITY STUDY  5-5-15     ESOPHAGOSCOPY, GASTROSCOPY, DUODENOSCOPY (EGD), COMBINED Left 5/13/2015    Procedure: COMBINED ESOPHAGOSCOPY, GASTROSCOPY, DUODENOSCOPY (EGD), BIOPSY SINGLE OR MULTIPLE;  Surgeon: Dipesh Bobby MD;  Location:  GI     ESOPHAGOSCOPY, GASTROSCOPY, DUODENOSCOPY (EGD), COMBINED N/A 11/6/2015    Procedure: COMBINED ESOPHAGOSCOPY, GASTROSCOPY, DUODENOSCOPY (EGD), BIOPSY SINGLE OR MULTIPLE;  Surgeon: Victor Hugo Turner MD;  Location:  GI     FOOT SURGERY      left     HC ESOPH/GAS REFLUX TEST W NASAL IMPED >1 HR N/A 5/5/2015    Procedure: ESOPHAGEAL IMPEDENCE FUNCTION TEST WITH 24 HOUR PH GREATER THAN 1 HOUR;  Surgeon: Dipesh Bobby MD;  Location:  GI     IMPLANT PACEMAKER      PPM     IR RENAL/VISCERAL STENT/ATHERECT/PTA Left 07/2017     IR VISCERAL ANGIOGRAM  1/4/2019     LAPAROTOMY EXPLORATORY       NISSEN FUNDOPLICATION      x 2     SHOULDER SURGERY      right     UPPER GI ENDOSCOPY       WRIST SURGERY      right cyst       ALLERGIES  Bees; Codeine sulfate; Contrast dye; Milk products; Morphine sulfate; Penicillins; Zinacef [cefuroxime sodium]; Influenza vaccine live; Pneumovax [pneumococcal polysaccharides]; Procardia [nifedipine]; and Barium    FAMILY HX:  Family History   Problem Relation Age of Onset     Ovarian Cancer Mother      Stomach Cancer Father      Heart Disease Father      Diabetes Father      Ovarian Cancer Sister      Leukemia Brother      Ovarian Cancer Sister      Diabetes Brother      Diabetes Brother      Neurologic Disorder No family hx of        SOCIAL HX:  History     Social History     Marital Status:      Spouse Name: N/A     Number of Children: N/A     Years of Education: N/A     Social History Main Topics     Smoking status: Former Smoker -- 0.20 packs/day for 20 years     Types: Cigarettes     Quit date:  "02/24/1974     Smokeless tobacco: Never Used      Comment: Does not remember what year she quit     Alcohol Use: No     Drug Use: No     Sexual Activity: Not on file     Other Topics Concern     Not on file     Social History Narrative       ROS:  Constitutional: No fever, chills, or sweats. No weight gain/loss.   HEENT: No visual disturbance, ear ache, epistaxis, sore throat.   Allergies/Immunologic: Negative.   Respiratory: cough and COREY  Cardiovascular: As per HPI.   GI: No nausea, vomiting, hematemesis, melena, or hematochezia.   : No urinary frequency, dysuria, or hematuria.   Integument: No rash.   Psychiatric: No anxiety / depression.   Neuro: No speech disturbance, focal sensory or motor deficit.   Endocrinology: No polyuria / polyphagia.   Musculoskeletal: No myalgia.    VITAL SIGNS:  /90 (BP Location: Left arm, Patient Position: Chair, Cuff Size: Adult Small)   Pulse 66   Ht 1.575 m (5' 2\")   Wt 41.4 kg (91 lb 3.2 oz)   SpO2 100%   BMI 16.68 kg/m     Body mass index is 16.68 kg/m .  Wt Readings from Last 2 Encounters:   02/22/19 41.4 kg (91 lb 3.2 oz)   02/04/19 40.5 kg (89 lb 4.8 oz)   Repeat Blood Pressure:  BP Pulse Site Cuff Size Time Date   161/71 66 Right arm Adult Small 10:08 AM 2/22/2019   171/90 --- Left arm Adult Small 10:12 AM 2/22/2019     Pain Information  Score Location Time Date   No Pain (0) --- 10:08 AM 2/22/2019   Comment: No chest pain/ a little SOB   No orthostatic vitals data filed.  No peak flow data filed.      PHYSICAL EXAM  Jennifer Alvarez is a 78 year old female.in no acute distress.  HEENT: Eyes Nonicteric.  Neck: JVP normal.  Carotids +3/3 bilaterally without bruits.  Lungs: prolonged  expiratory phase, diffuse rhonchi. .  Cor: RRR. Normal S1 and S2.  Early systolic murmur, RUSB.  No rub, or gallop.  PMI in Lf 5th ICS.  Abd: Soft, nontender, nondistended.  NABS.  No pulsatile mass.  Extremities: No C/C/E.  Pulses +3/3 symmetric in upper and lower extremities.  " Neuro: Grossly intact.  Psych: A&O x 3.  Skin: No rash.    LABS  Recent Labs   Lab Test 11/14/18  1005 04/05/17  1253 06/10/15  1108 12/03/14  1013   CHOL 217* 236* 191 171   HDL 74 60 57 51   * 131* 90 92   TRIG 118 226* 216* 136   CHOLHDLRATIO  --   --  3.3 3.3       Procedures    Renal US (12/29/16):  Findings:  The right kidney measures 8.8 cm. The left kidney measures 8.7 cm.  No definite stones, masses or hydronephrosis. Shadowing parenchymal calcification in the midpole of the left kidney may correspond to vascular calcification.    Peak systolic velocities in the abdominal aorta are:   66 cm/sec in the suprarenal aorta.   76 cm/sec in the infrarenal proximal aorta.   103 cm/sec in the infrarenal distal aorta.    Right renal artery:  71 cm/sec at the origin, 70 cm/sec in the proximal artery, 104 cm/sec in the mid artery, and 62 cm/sec at the hilum. Resistive indices in the arcuate arteries vary between 0.67-0.77. RAR of 1.57.     Left renal artery:  52 cm/sec at the origin, 77 cm/sec in the proximal artery, 226 cm/sec in the mid artery, 456 cm/sec at the mid to distal, 105 cm/s and 61 cm/s in the distal renal artery. Resistive indices in the arcuate arteries vary between 0.57-0.64. RAR of 6.9. Focally elevated velocity in the mid to distal renal artery with associated spectral broadening. No post stenotic waveforms in the distal renal artery.     Impression:  1. Left renal artery: Focally elevated velocity in the mid-distal left renal artery with systolic velocity ratio of 6.9. This should suggest severe focal stenosis, but is of uncertain significance in the absence  of post obstructive physiology by waveform evaluation. Consider contrast enhanced cross-sectional examination for further evaluation.  2. No evidence of renal artery stenosis on the right.    ZIOPATCH (Dec 2016): Rare PACs and PVCs and 6 beat atrial tachycardia.  No AF.  Hold off on anticoagulation at this time.    CT Renal Arteries (Jan  2017):  Impression:   1. High grade stenosis of the left renal artery at the ostium. This corresponds with sonographic findings suggesting renal artery stenosis.  2. Hyperdense renal cyst arising from the superior pole the of the left kidney is too small to definitely characterize, but does not demonstrate significant arterial enhancement. Considerations include hemorrhagic versus proteinaceous cyst.    Ziopatch (12/10/2013)   Predominant underlying rhythm is sinus. Minimum HR 59, maximum 197 with an average of 79 bpm. 7 supraventricular tachycardia runs occurred, the run with fastest interval lasting 4 beats with a max rate of 197 bpm, the longest lasting 19 beats with an avg rate of 120 bpm. Supraventricular Tachycardia was detected within + or - seconds of the patient pressing the trigger button. Some episodes of Supraventricular Tachycardia conducted with possible aberrancy. Possible Atrial and Ventricular Pacing was present. Isolated SVEs, SVE Couplets and SVE triples were rare (0 to < 1.0%). Isolate VEs and VE Couplets were rate (0 to <1%), and no VE triplets were found. Ventricular Bigeminy was present.     ECHO (2/4/19):  Normal left ventricular size and systolic function.   LVEF 64% based on biplane 2D tracing.  Trileaflet aortic sclerosis without stenosis.  No significant valve dysfunction.  Dobutamine ECHO (7/2/2015)  Interpretation Summary  Normal dobutamine stress echo at target heartrate.  No significant valvular abnormality.  ECHO (6/6/2013)  Global and regional left ventricular function is normal with an EF of 60-65%. Global right ventricular function is normal. Pulmonary artery systolic pressure cannot be assessed. The inferior vena cava is normal. No pericardial effusion is present. Mild concentric LVH and mild aortic sclerosis with no significant gradient across the AV.  PFTs (April 2013)  Her FEV1 was 0.57 or 28% of predicted before bronchodilator and 0.95 or 47% of predicted after bronchodilator  for 67% improvement. Her FVC was 0.83 or 31% of predicted and increased to 1.33 or 49% of predicted, also 60% increase with a bronchodilator  STRESS ECHO (9/7/2011)   Normal dobutamine echo without infarct or ischemia at adequate rate pressure product. LV size and function change appropriately with stress.  Coronary angiogram and RHC (5/26/2010)   Pt. is a 70 yo female that presents with acute on chronic dyspnea on exertion and chest pain on exertion.   Risk Factors: Htn, hyperlipidemia, DMII, CKD, x-smoker   Previous revascularization: 2007, mid and apical LAD   Pertinent tests: Normal Echo (hyperdynamic) with no WMA   Summary of findings:   RHC:   Mild pulmonary htn with mild elevated left sided pressures (patient was hydrated the evening before) with a normal CO as determined by KONSTANTIN   LHC:   Right Dominant   LM: Mild distal disease   LAD: Mild disease proximally, patent stents with no evidence of restenosis and normal in the mid and distal portions   LCX: Normal   RCA: Normal   Diagnosis:   Mild pulmonary htn   Mild left sided filling pressures, likely due to hydration overnight   Normal coronaries     ECHO (5/25/2010)   Global and regional left ventricular function is hyperkinetic with an EF >70%. Right ventricular function, chamber size, wall motion, and thickness. are normal. Pulmonary artery systolic pressure is normal. The inferior vena cava is normal. No pericardial effusion is present.   CATH (6/4/2008)   66 yo female referred to the cath lab for CP and SOB over the last few days.   The LM had no disease. Proximal LAD had a 20% stenosis. Mid and distal LAD stents are wide open and no other angiographic disease was noted. The LCx had no significant disease. RCA showed diffuse stenosis of the PL branch   RHC: Normal readings please refer to the hemodynamic section for details.   Konstantin CI: 2.3     CATH (12/19/2007)   66 yo female referred to the cath lab because of a stress test showing ischemia in the ant  and inf territories.   Cor angio done with 4Fr JL4 and 3DRC from the RFA showed a Rt dominant system. The LM had no disease. LMCA had a 20% stenosis. The LAD had an 80% tubular stenosis in the mid segment and another focal 80% stenosis in the distal segment. The LCx and RCA have no significant disease.   The LAD was successfully intervened on.    STRESS ECHO (12/17/2007)   Markedly abnormal exercise echo with both ecg and echo evidence of ischemia. ECG shows 1-2 mm of ST elevation in L, V1 and V2 as well as <2 mm of ST depression in inferolateral leads. Echo shows ischemia in inferior, lateral, and anterior distributions. LV systolic cavity size fails to decrease. These findings are consistent with mulitvessel disease Results phoned to Paul[facility] BUSTAMANTE    NICS:   12/12/17  IMPRESSION:     Right Vertebral Artery: Antegrade  Left Vertebral Artery: Antegrade    Right Subclavian Artery: Triphasic  Left Subclavian Artery: Triphasic    Right Internal Carotid Artery  Plaque Morphology:  Irregular Heterogenous  50-69%: 125-230cm/s PSV & 40-100cm/s EDV &/or ratio 2-4    Left Internal Carotid Artery  Plaque Morphology:   Smooth Homogeneous  <50%: <125cm/s PSV & <40cm/s EDV &/or ratio <2 (Mild Plaque)    ASSESSMENT AND PLAN:   77 y/o Cauc F w/ h/o CAD s/p PCI 2007, HTN, HLD, DM, CKD, and IgA deficiency with chronic bronchitis who presents for follow-up of her CAD    1. CAD  - Patient continues to have atypical symptoms; unclear if this due to microvascular disease.  - I suspect hypertension is contributing.  - Plan on stress nuclear study (MRI contraindicated).  2. Palpitations   - S/p dual chamber pacer for sick sinus syndrome  - Interrogation of device raises concern of PAF.  - Continue Diltiazem and Metoprolol  - Her CHADS-VASC score is 5 corresponded to a stroke/embolic annual rate of 6.7%.   - Sanam in Dec 2016 showed rare PACs and PVCs and 6 beat atrial tachycardia.  No AF.  - If AF found, I would recommend NOAC given the large  number of medications and the potential interaction with coumadin.  - Recheck pacemaker interrogation per protocol.  3. HLD  -  but patient ran out of Lipitor  - Continue Lipitor 40 mg qd, hold off on increasing dose due to myalgias  - Recheck in 6 months.  4. HTN  - Increase Coreg to 25 mg BID.  - Continue HCTZ 25 every day  - I need to determine why her Spironolactone 40 every day was stopped and her Lisinopril was reduced from 40 mg every day to 10 BID.  I do not see evidence of SALLY in the chart.  - IF BP remains elevated, plan to resume Ca blocker (potentially Procardial XL due to potency) and may start back on Spironolactone     - Check ambulatory BPs.  5. Carotid Artery Disease  - Intermediate on RT ICA (50-69%)  - No indication for intervention  - ASA and statin    FOLLOW UP: 6 months    Jaleel Adair MD      CC  MINDI RANGEL

## 2019-02-22 NOTE — PROGRESS NOTES
"CARDIOLOGY CLINIC FOLLOW UP    HPI: Ms. Jennifer Alvarez is a 79 yo woman with a Hx of single vessel CAD (LAD), s/p stenting of the mid and distal LAD following a positive stress nuclear study (December 2007), s/p dual chamber pacemaker (January 2008) for sick sinus syndrome, DIAZ s/p LF renal artery stenting, who returns to the clinic today for ongoing evaluation of CAD. Her cardiovascular risk factors include (+) HTN, (+) Type 2 DM, (+) hyperlipidemia, (+) prior smoking [quit 40 yrs ago], (+) Family hx of CAD.   Her cardiac history dates back to 2007 when she was found to have 1 x v CAD with PCI to mid and distal LAD. A repeat coronary angiogram in 2008 revealed patent stents and non-obstructive CAD. She had coronary angiography (May 2010) showing minimal CAD with patent stents in the mid and distal LAD. RHC showed mild elevation of PA pressures and left sided filling pressures. She had three hospitalizations in 2010, including two ER visits for tachyarrhythmia. She had a dobutamine stress ECHO (Sept 2011) that was negative for any inducible ischemia. LV function was normal at baseline and augmented appropriately with stress. A repeat echocardiogram in June 2013 revealed a normal global and regional left ventricular function with an EF of 60-65% and normal right ventricular function. There was mild concentric LVH and mild aortic sclerosis with no significant gradient across AV.  She had a negative dobutamine ECHO in July 2015.    She underwent renal artery stenting (LEFT) in 2016 and subsequent US and think sliced CT angio suggested in stent restenosis.  She underwent repeat angiography a few months ago and the stent appeared widely patent.  Home BPs 159-171/70 mm Hg.  She insists she is taking her medications.    She continues to experience chest discomfort and exertional dyspnea.  It occurs when she \"overworks\" or going up / down stairs.  It is a pressure sensation and radiates to both sides of the upper chest. "  It is relieved with rest.  She has taken SL NTG on two occasions with improvement in discomfort (side effect headaches).  The episodes last up to 30 seconds but resolve immediate with rest.  They have become more frequent, now occurring every other day.  She believes they are more frequent when her BP is elevated.  She has recorded SBP of 190 in this setting.  She walks in her home and does stretch exercises.  If she uses stairs excessively, she has to stop.  She stopped exercising on a treadmill because it broke.  She has experienced PND on two occasions.   She denies orthopnea.  She notes pedal edema.  She denies palpitations, and syncope.  She has had orthostatic lightheadedness.    Her pacemaker was recently interrogated.   The generator was recently changed.      PAST MEDICAL HISTORY:  Past Medical History:   Diagnosis Date     Abdominal pain     cramping     Arthritis      CAD (coronary artery disease)     stent x2, ppm     Cancer (H)     skin     Cardiac pacemaker      CC (Crohn's colitis) (H)      Chronic kidney disease      Diabetes (H)     DM type 2, oral agent     GERD (gastroesophageal reflux disease)      History of blood transfusion      HTN (hypertension)      Hyperlipidemia LDL goal <70 11/19/2013     IgA deficiency (H)      Neck pain 12/31/2014     Uncomplicated asthma      Weight loss        CURRENT MEDICATIONS:  Current Outpatient Medications   Medication Sig Dispense Refill     acetaminophen (TYLENOL) 325 MG tablet Take 1-2 tablets by mouth every 6 hours as needed.       albuterol (2.5 MG/3ML) 0.083% nebulizer solution Take 1 ampule by nebulization every 6 hours as needed.       albuterol (PROAIR HFA/PROVENTIL HFA/VENTOLIN HFA) 108 (90 Base) MCG/ACT Inhaler Take 2 puffs as needed for shortness of breath 1 Inhaler 12     albuterol (PROAIR HFA/PROVENTIL HFA/VENTOLIN HFA) 108 (90 BASE) MCG/ACT Inhaler Take 2 puffs prn for wheezing or shortness of breath 1 Inhaler 12     aspirin 81 MG tablet Take 1  tablet by mouth daily.       Blood Pressure KIT daily.       carvedilol (COREG) 12.5 MG tablet Take 1 tablet (12.5 mg) by mouth 2 times daily (with meals) 180 tablet 1     clopidogrel (PLAVIX) 75 MG tablet Take 1 tablet (75 mg) by mouth daily 90 tablet 3     dicyclomine (BENTYL) 10 MG capsule Take 10 mg by mouth 4 times daily (before meals and nightly).       diphenhydrAMINE (BENADRYL) 50 MG capsule Take 1 capsule (50 mg) by mouth See Admin Instructions for 1 dose Take 50 mg the morning of the procedure 1 capsule 0     diphenhydrAMINE (BENADRYL) 50 MG capsule Take 1 capsule (50 mg) by mouth See Admin Instructions Take 50 mg the morning of the procedure 1 capsule 0     FISH OIL Unsure of dosage take two caps daily       fluticasone-salmeterol (ADVAIR) 250-50 MCG/DOSE diskus inhaler Inhale 1 puff into the lungs 2 times daily 1 Inhaler 12     fluticasone-salmeterol (ADVAIR) 250-50 MCG/DOSE diskus inhaler Inhale 1 puff into the lungs 2 times daily. 1 Inhaler 12     hydrochlorothiazide (HYDRODIURIL) 25 MG tablet Take 1 tablet (25 mg) by mouth daily 90 tablet 3     hydrocodone-acetaminophen (LORCET)  MG per tablet Take 1 tablet by mouth every 6 hours as needed.       Immune Globulin, Human, 25 GM/500ML SOLN Inject 25 g into the vein See Admin Instructions. 25 grams every 3 weeks IVIG       isosorbide mononitrate (IMDUR) 60 MG 24 hr tablet Take 1 tablet (60 mg) by mouth daily 90 tablet 3     lisinopril (PRINIVIL/ZESTRIL) 10 MG tablet Take 1 tablet (10 mg) by mouth 2 times daily 180 tablet 3     meperidine (DEMEROL) 25 MG/ML injection Inject 25 mg into the vein as needed. 25 mg IV prior to IgG infusion       metFORMIN (GLUCOPHAGE) 1000 MG tablet Take 1 tablet (1,000 mg) by mouth daily (with dinner) Take 1 tablet twice daily  0     nitroglycerin (NITROQUICK) 0.4 MG sublingual tablet Place 1 tablet (0.4 mg) under the tongue every 5 minutes as needed 25 tablet 3     omeprazole (PRILOSEC) 20 MG capsule Take 1 capsule (20  mg) by mouth 2 times daily 60 capsule 6     tiotropium (SPIRIVA HANDIHALER) 18 MCG capsule One puff every day 30 capsule 6     tiotropium (SPIRIVA HANDIHALER) 18 MCG inhalation capsule One puff every day 30 capsule 6     atorvastatin (LIPITOR) 10 MG tablet Take 4 tablets (40 mg) by mouth daily (Patient not taking: Reported on 2/22/2019) 90 tablet 3     blood glucose monitoring (TERRY CONTOUR NEXT) test strip by In Vitro route 3 times daily Use to test blood sugar 3 times daily or as directed. (Patient not taking: Reported on 2/22/2019) 270 each 3     blood glucose monitoring (TERRY MICROLET) lancets Use to test blood sugar 3 times daily or as directed. (Patient not taking: Reported on 2/22/2019) 3 Box 3     omeprazole (PRILOSEC) 20 MG CR capsule Take 1 capsule (20 mg) by mouth daily (Patient not taking: Reported on 2/22/2019) 60 capsule 6     ONE TOUCH FINE POINT LANCETS 2 times daily.       ONE TOUCH ULTRA TEST strip Test blood sugar twice a day. (Patient not taking: Reported on 2/22/2019) 200 strip 3     predniSONE (DELTASONE) 20 MG tablet Take 1 tablet (20 mg) by mouth See Admin Instructions 60 mg the night before the procedure, 30 mg the morning of the procedure (Patient not taking: Reported on 2/22/2019.) 5 tablet 3       PAST SURGICAL HISTORY:  Past Surgical History:   Procedure Laterality Date     APPENDECTOMY       C LAP,SURG,COLECTOMY, PARTIAL, W/ANAST      partial colectomy;diverticulitis     cardiac stents      x 2     CARPAL TUNNEL RELEASE RT/LT      bilateral     CHOLECYSTECTOMY       COLONOSCOPY       COLONOSCOPY N/A 11/9/2015    Procedure: COMBINED COLONOSCOPY, SINGLE OR MULTIPLE BIOPSY/POLYPECTOMY BY BIOPSY;  Surgeon: Victor Hugo Turner MD;  Location:  GI     ENT SURGERY      back throat     ESOPHAGEAL MOTILITY STUDY  5-5-15     ESOPHAGOSCOPY, GASTROSCOPY, DUODENOSCOPY (EGD), COMBINED Left 5/13/2015    Procedure: COMBINED ESOPHAGOSCOPY, GASTROSCOPY, DUODENOSCOPY (EGD), BIOPSY SINGLE OR MULTIPLE;   Surgeon: Dipesh Bobby MD;  Location:  GI     ESOPHAGOSCOPY, GASTROSCOPY, DUODENOSCOPY (EGD), COMBINED N/A 11/6/2015    Procedure: COMBINED ESOPHAGOSCOPY, GASTROSCOPY, DUODENOSCOPY (EGD), BIOPSY SINGLE OR MULTIPLE;  Surgeon: Victor Hugo Turner MD;  Location: [facility]     FOOT SURGERY      left     HC ESOPH/GAS REFLUX TEST W NASAL IMPED >1 HR N/A 5/5/2015    Procedure: ESOPHAGEAL IMPEDENCE FUNCTION TEST WITH 24 HOUR PH GREATER THAN 1 HOUR;  Surgeon: Dipesh Bobby MD;  Location:  GI     IMPLANT PACEMAKER      PPM     IR RENAL/VISCERAL STENT/ATHERECT/PTA Left 07/2017     IR VISCERAL ANGIOGRAM  1/4/2019     LAPAROTOMY EXPLORATORY       NISSEN FUNDOPLICATION      x 2     SHOULDER SURGERY      right     UPPER GI ENDOSCOPY       WRIST SURGERY      right cyst       ALLERGIES  Bees; Codeine sulfate; Contrast dye; Milk products; Morphine sulfate; Penicillins; Zinacef [cefuroxime sodium]; Influenza vaccine live; Pneumovax [pneumococcal polysaccharides]; Procardia [nifedipine]; and Barium    FAMILY HX:  Family History   Problem Relation Age of Onset     Ovarian Cancer Mother      Stomach Cancer Father      Heart Disease Father      Diabetes Father      Ovarian Cancer Sister      Leukemia Brother      Ovarian Cancer Sister      Diabetes Brother      Diabetes Brother      Neurologic Disorder No family hx of        SOCIAL HX:  History     Social History     Marital Status:      Spouse Name: N/A     Number of Children: N/A     Years of Education: N/A     Social History Main Topics     Smoking status: Former Smoker -- 0.20 packs/day for 20 years     Types: Cigarettes     Quit date: 02/24/1974     Smokeless tobacco: Never Used      Comment: Does not remember what year she quit     Alcohol Use: No     Drug Use: No     Sexual Activity: Not on file     Other Topics Concern     Not on file     Social History Narrative       ROS:  Constitutional: No fever, chills, or sweats. No weight gain/loss.   HEENT: No visual  "disturbance, ear ache, epistaxis, sore throat.   Allergies/Immunologic: Negative.   Respiratory: cough and COREY  Cardiovascular: As per HPI.   GI: No nausea, vomiting, hematemesis, melena, or hematochezia.   : No urinary frequency, dysuria, or hematuria.   Integument: No rash.   Psychiatric: No anxiety / depression.   Neuro: No speech disturbance, focal sensory or motor deficit.   Endocrinology: No polyuria / polyphagia.   Musculoskeletal: No myalgia.    VITAL SIGNS:  /90 (BP Location: Left arm, Patient Position: Chair, Cuff Size: Adult Small)   Pulse 66   Ht 1.575 m (5' 2\")   Wt 41.4 kg (91 lb 3.2 oz)   SpO2 100%   BMI 16.68 kg/m    Body mass index is 16.68 kg/m .  Wt Readings from Last 2 Encounters:   02/22/19 41.4 kg (91 lb 3.2 oz)   02/04/19 40.5 kg (89 lb 4.8 oz)   Repeat Blood Pressure:  BP Pulse Site Cuff Size Time Date   161/71 66 Right arm Adult Small 10:08 AM 2/22/2019   171/90 --- Left arm Adult Small 10:12 AM 2/22/2019     Pain Information  Score Location Time Date   No Pain (0) --- 10:08 AM 2/22/2019   Comment: No chest pain/ a little SOB   No orthostatic vitals data filed.  No peak flow data filed.      PHYSICAL EXAM  Jennifer Alvarez is a 78 year old female.in no acute distress.  HEENT: Eyes Nonicteric.  Neck: JVP normal.  Carotids +3/3 bilaterally without bruits.  Lungs: prolonged  expiratory phase, diffuse rhonchi. .  Cor: RRR. Normal S1 and S2.  Early systolic murmur, RUSB.  No rub, or gallop.  PMI in Lf 5th ICS.  Abd: Soft, nontender, nondistended.  NABS.  No pulsatile mass.  Extremities: No C/C/E.  Pulses +3/3 symmetric in upper and lower extremities.  Neuro: Grossly intact.  Psych: A&O x 3.  Skin: No rash.    LABS  Recent Labs   Lab Test 11/14/18  1005 04/05/17  1253 06/10/15  1108 12/03/14  1013   CHOL 217* 236* 191 171   HDL 74 60 57 51   * 131* 90 92   TRIG 118 226* 216* 136   CHOLHDLRATIO  --   --  3.3 3.3       Procedures    Renal US (12/29/16):  Findings:  The right " kidney measures 8.8 cm. The left kidney measures 8.7 cm.  No definite stones, masses or hydronephrosis. Shadowing parenchymal calcification in the midpole of the left kidney may correspond to vascular calcification.    Peak systolic velocities in the abdominal aorta are:   66 cm/sec in the suprarenal aorta.   76 cm/sec in the infrarenal proximal aorta.   103 cm/sec in the infrarenal distal aorta.    Right renal artery:  71 cm/sec at the origin, 70 cm/sec in the proximal artery, 104 cm/sec in the mid artery, and 62 cm/sec at the hilum. Resistive indices in the arcuate arteries vary between 0.67-0.77. RAR of 1.57.     Left renal artery:  52 cm/sec at the origin, 77 cm/sec in the proximal artery, 226 cm/sec in the mid artery, 456 cm/sec at the mid to distal, 105 cm/s and 61 cm/s in the distal renal artery. Resistive indices in the arcuate arteries vary between 0.57-0.64. RAR of 6.9. Focally elevated velocity in the mid to distal renal artery with associated spectral broadening. No post stenotic waveforms in the distal renal artery.     Impression:  1. Left renal artery: Focally elevated velocity in the mid-distal left renal artery with systolic velocity ratio of 6.9. This should suggest severe focal stenosis, but is of uncertain significance in the absence  of post obstructive physiology by waveform evaluation. Consider contrast enhanced cross-sectional examination for further evaluation.  2. No evidence of renal artery stenosis on the right.    ZIOPATCH (Dec 2016): Rare PACs and PVCs and 6 beat atrial tachycardia.  No AF.  Hold off on anticoagulation at this time.    CT Renal Arteries (Jan 2017):  Impression:   1. High grade stenosis of the left renal artery at the ostium. This corresponds with sonographic findings suggesting renal artery stenosis.  2. Hyperdense renal cyst arising from the superior pole the of the left kidney is too small to definitely characterize, but does not demonstrate significant arterial  enhancement. Considerations include hemorrhagic versus proteinaceous cyst.    Ziopatch (12/10/2013)   Predominant underlying rhythm is sinus. Minimum HR 59, maximum 197 with an average of 79 bpm. 7 supraventricular tachycardia runs occurred, the run with fastest interval lasting 4 beats with a max rate of 197 bpm, the longest lasting 19 beats with an avg rate of 120 bpm. Supraventricular Tachycardia was detected within + or - seconds of the patient pressing the trigger button. Some episodes of Supraventricular Tachycardia conducted with possible aberrancy. Possible Atrial and Ventricular Pacing was present. Isolated SVEs, SVE Couplets and SVE triples were rare (0 to < 1.0%). Isolate VEs and VE Couplets were rate (0 to <1%), and no VE triplets were found. Ventricular Bigeminy was present.     ECHO (2/4/19):  Normal left ventricular size and systolic function.   LVEF 64% based on biplane 2D tracing.  Trileaflet aortic sclerosis without stenosis.  No significant valve dysfunction.  Dobutamine ECHO (7/2/2015)  Interpretation Summary  Normal dobutamine stress echo at target heartrate.  No significant valvular abnormality.  ECHO (6/6/2013)  Global and regional left ventricular function is normal with an EF of 60-65%. Global right ventricular function is normal. Pulmonary artery systolic pressure cannot be assessed. The inferior vena cava is normal. No pericardial effusion is present. Mild concentric LVH and mild aortic sclerosis with no significant gradient across the AV.  PFTs (April 2013)  Her FEV1 was 0.57 or 28% of predicted before bronchodilator and 0.95 or 47% of predicted after bronchodilator for 67% improvement. Her FVC was 0.83 or 31% of predicted and increased to 1.33 or 49% of predicted, also 60% increase with a bronchodilator  STRESS ECHO (9/7/2011)   Normal dobutamine echo without infarct or ischemia at adequate rate pressure product. LV size and function change appropriately with stress.  Coronary angiogram  and RHC (5/26/2010)   Pt. is a 70 yo female that presents with acute on chronic dyspnea on exertion and chest pain on exertion.   Risk Factors: Htn, hyperlipidemia, DMII, CKD, x-smoker   Previous revascularization: 2007, mid and apical LAD   Pertinent tests: Normal Echo (hyperdynamic) with no WMA   Summary of findings:   RHC:   Mild pulmonary htn with mild elevated left sided pressures (patient was hydrated the evening before) with a normal CO as determined by KONSTANTIN   LHC:   Right Dominant   LM: Mild distal disease   LAD: Mild disease proximally, patent stents with no evidence of restenosis and normal in the mid and distal portions   LCX: Normal   RCA: Normal   Diagnosis:   Mild pulmonary htn   Mild left sided filling pressures, likely due to hydration overnight   Normal coronaries     ECHO (5/25/2010)   Global and regional left ventricular function is hyperkinetic with an EF >70%. Right ventricular function, chamber size, wall motion, and thickness. are normal. Pulmonary artery systolic pressure is normal. The inferior vena cava is normal. No pericardial effusion is present.   CATH (6/4/2008)   68 yo female referred to the cath lab for CP and SOB over the last few days.   The LM had no disease. Proximal LAD had a 20% stenosis. Mid and distal LAD stents are wide open and no other angiographic disease was noted. The LCx had no significant disease. RCA showed diffuse stenosis of the PL branch   RHC: Normal readings please refer to the hemodynamic section for details.   Konstantin CI: 2.3     CATH (12/19/2007)   68 yo female referred to the cath lab because of a stress test showing ischemia in the ant and inf territories.   Cor angio done with 4Fr JL4 and 3DRC from the [facility] showed a Rt dominant system. The LM had no disease. LMCA had a 20% stenosis. The LAD had an 80% tubular stenosis in the mid segment and another focal 80% stenosis in the distal segment. The LCx and RCA have no significant disease.   The LAD was successfully  intervened on.    STRESS ECHO (12/17/2007)   Markedly abnormal exercise echo with both ecg and echo evidence of ischemia. ECG shows 1-2 mm of ST elevation in L, V1 and V2 as well as <2 mm of ST depression in inferolateral leads. Echo shows ischemia in inferior, lateral, and anterior distributions. LV systolic cavity size fails to decrease. These findings are consistent with mulitvessel disease Results phoned to Paulino BUSTAMANTE    NICS:   12/12/17  IMPRESSION:     Right Vertebral Artery: Antegrade  Left Vertebral Artery: Antegrade    Right Subclavian Artery: Triphasic  Left Subclavian Artery: Triphasic    Right Internal Carotid Artery  Plaque Morphology:  Irregular Heterogenous  50-69%: 125-230cm/s PSV & 40-100cm/s EDV &/or ratio 2-4    Left Internal Carotid Artery  Plaque Morphology:   Smooth Homogeneous  <50%: <125cm/s PSV & <40cm/s EDV &/or ratio <2 (Mild Plaque)    ASSESSMENT AND PLAN:   79 y/o Cauc F w/ h/o CAD s/p PCI 2007, HTN, HLD, DM, CKD, and IgA deficiency with chronic bronchitis who presents for follow-up of her CAD    1. CAD  - Patient continues to have atypical symptoms; unclear if this due to microvascular disease.  - I suspect hypertension is contributing.  - Plan on stress nuclear study (MRI contraindicated).  2. Palpitations   - S/p dual chamber pacer for sick sinus syndrome  - Interrogation of device raises concern of PAF.  - Continue Diltiazem and Metoprolol  - Her CHADS-VASC score is 5 corresponded to a stroke/embolic annual rate of 6.7%.   - Ziopach in Dec 2016 showed rare PACs and PVCs and 6 beat atrial tachycardia.  No AF.  - If AF found, I would recommend NOAC given the large number of medications and the potential interaction with coumadin.  - Recheck pacemaker interrogation per protocol.  3. HLD  -  but patient ran out of Lipitor  - Continue Lipitor 40 mg qd, hold off on increasing dose due to myalgias  - Recheck in 6 months.  4. HTN  - Increase Coreg to 25 mg BID.  - Continue HCTZ 25 every  day  - I need to determine why her Spironolactone 40 every day was stopped and her Lisinopril was reduced from 40 mg every day to 10 BID.  I do not see evidence of SALLY in the chart.  - IF BP remains elevated, plan to resume Ca blocker (potentially Procardial XL due to potency) and may start back on Spironolactone     - Check ambulatory BPs.  5. Carotid Artery Disease  - Intermediate on RT ICA (50-69%)  - No indication for intervention  - ASA and statin    FOLLOW UP: 6 months    LEXISCAN (5/28/19):  FINDINGS:  1.  Overall quality of the study: Diagnostic.   2.  Left ventricular cavity is Normal on the rest and stress studies.  3.  SPECT images demonstrate uniform radiotracer uptake of the myocardium on both stress and rest images..   4.   Left ventricular ejection fraction is 88%. Left ventricular end-diastolic volume is 50 mL.  End-systolic volume is 6 mL.  IMPRESSION:  1.  Normal  myocardial SPECT study with a summed stress score of  0.  No ischemia or infarct identified. Normal LV function.      Jaleel Adair MD    Divisions of Cardiology  [place], [place]    MINDI RODRIGUEZ

## 2019-02-25 NOTE — PATIENT INSTRUCTIONS
It was a pleasure to see you in clinic today. Please do not hesitate to call with any questions or concerns. We look forward to seeing you in clinic at your next device check on May 15, 2019.    Adela Vásquez RN  Electrophysiology Nurse Clinician  [facility]  During business hours call:  [phone]  After business hours please call: [phone]- select option #4 and ask for job code [phone].

## 2019-02-25 NOTE — PROGRESS NOTES
Nursing Note  Jennifer Alvarez presents today to [facility] for:   Chief Complaint   Patient presents with     Infusion     IVIG     During today's [facility] appointment, orders from Dr. Maxx Elizabeth were completed.  Frequency: every 3 weeks (21 days)    Progress note:  Patient identification verified by name and date of birth.  Assessment completed.  Vitals recorded in Doc Flowsheets.  Patient was provided with education regarding infusion and possible side effects.  Patient verbalized understanding.      needed: No  Premedications: administered per order.  Infusion Rates: infusion starts at 20 ml/hr, then increased by 20 ml/hr every 15 minutes to final rate of 160 ml/hr.  Approximate Infusion length:3 hours.   Labs: were not ordered for this appointment.  Vascular access: peripheral IV placed today.  Treatment Conditions: patient denies fever, chills, signs of infection, recent illness, on antibiotics, productive cough or elevated temperature.  Patient tolerated infusion: well.    Drug Waste Record? Yes      Drug Name: Solumedrol  Dose: 100mg  Route administered: IV  NDC #: 4584-7022-78  Amount of waste(mL):0.4ml  Reason for waste: Single use vial     Discharge Plan:   Follow up plan of care with: ongoing infusions at [facility].  Discharge instructions were reviewed with patient.  Patient/representative verbalized understanding of discharge instructions and all questions answered.  Patient discharged from [facility] in stable condition. Patient headed to her next appointment here in the [facility] for pacemaker check.    Ericka Severson, RN    Administrations This Visit     acetaminophen (TYLENOL) tablet 650 mg     Admin Date  02/25/2019 Action  Given Dose  650 mg Route  Oral Administered By  Severson, Ericka, RN          diphenhydrAMINE (BENADRYL) capsule 25 mg     Admin Date  02/25/2019  Action  Given Dose  25 mg Route  Oral Administered By  Severson, Ericka, CARLIN          methylPREDNISolone sodium succinate (solu-MEDROL) injection 100 mg     Admin Date  02/25/2019 Action  Given Dose  100 mg Route  Intravenous Administered By  Severson, Ericka, RN                /77   Pulse 67   Temp 98  F (36.7  C) (Oral)   Resp 18   Wt 41.3 kg (91 lb)   SpO2 100%   BMI 16.64 kg/m

## 2019-03-18 NOTE — PROGRESS NOTES
Nursing Note  Jennifer Alvarez presents today to [facility] for:   Chief Complaint   Patient presents with     Infusion     IVIG     During today's [facility] appointment, orders from Dr. Elizabeth were completed.  Frequency: every 3 weeks    Progress note:  Patient identification verified by name and date of birth.  Assessment completed.  Vitals recorded in Doc Flowsheets.  Patient was provided with education regarding infusion and possible side effects.  Patient verbalized understanding.      needed: No  Premedications: administered per order.  Infusion Rates: infusion starts at 20 ml/hr, then increased by 20 ml/hr every 15 minutes to final rate of 160 ml/hr.  Approximate Infusion length:2.5 hours.   Labs: were not ordered for this appointment.  Vascular access: peripheral IV placed today.  Treatment Conditions: non-applicable.  Patient tolerated infusion: well.    Drug Waste Record? Yes      Drug Name: solu-medrol  Dose: 100 mg given  Route administered: IV  NDC #: 0893-6613-14  Amount of waste(mL): 0.4 ml  Reason for waste: Single use vial     Discharge Plan:   Follow up plan of care with: ongoing infusions at [facility].  Discharge instructions were reviewed with patient.  Patient/representative verbalized understanding of discharge instructions and all questions answered.  Patient discharged from [facility] in stable condition.    Roxann Freeman RN       Administrations This Visit     acetaminophen (TYLENOL) tablet 650 mg     Admin Date  03/18/2019 Action  Given Dose  650 mg Route  Oral Administered By  Roaxnn Freeman, CARLIN          diphenhydrAMINE (BENADRYL) capsule 25 mg     Admin Date  03/18/2019 Action  Given Dose  25 mg Route  Oral Administered By  Roxnan Freeman RN          immune globulin (Gammagard) - sucrose free 10 % injection 25 g     Admin Date  03/18/2019 Action  New Bag  Dose  25 g Route  Intravenous Administered By  Roxann Freeman, RN          meperidine (DEMEROL) injection 25 mg     Admin Date  03/18/2019 Action  Given Dose  25 mg Route  Intravenous Administered By  Roxann Freeman, RN          methylPREDNISolone sodium succinate (solu-MEDROL) injection 100 mg     Admin Date  03/18/2019 Action  Given Dose  100 mg Route  Intravenous Administered By  Roxann Freeman, RN                  /70 (BP Location: Left arm)   Pulse 75   Temp 98.1  F (36.7  C) (Oral)   Resp 18   Wt 41.5 kg (91 lb 8 oz)   SpO2 98%   BMI 16.74 kg/m

## 2019-04-08 NOTE — PATIENT INSTRUCTIONS
Dear Jennifer Alvarez    Thank you for choosing [facility] ([facility]) for your infusion.  The following information is a summary of our appointment as well as important reminders.      We look forward in seeing you on your next appointment here at [facility].  Please don t hesitate to call us at [phone] to reschedule any of your appointments or to speak with one of the [facility] registered nurses.  It was a pleasure taking care of you today.    Sincerely,    [facility]  [facility]  [address] [place] [address]  [id number]  Phone:  [phone]

## 2019-04-29 NOTE — PROGRESS NOTES
Nursing Note  Jennifer Alvarez presents today to [facility] for:   Chief Complaint   Patient presents with     Infusion     IVIG     During [facility]'s [facility] appointment, orders from Dr. Elizabeth were completed.  Frequency: every 21 days    Progress note:  Patient identification verified by name and date of birth.  Assessment completed.  Vitals recorded in Doc Flowsheets.  Patient was provided with education regarding infusion and possible side effects.  Patient verbalized understanding.     present during visit today: Not Applicable.    Premedications: administered per order.    Infusion length and rate:  infusion starts at 19 ml/hr, then increased by 19 ml/hr every 15 minutes to final rate of 152 ml/hr. Line flushed with D5 pre and post IVIG.    Labs: were not ordered for this appointment.    Vascular access: peripheral IV placed today.    Treatment Conditions: patient denies fever, chills, signs of infection, recent illness, on antibiotics, productive cough or elevated temperature.    Post Infusion Assessment:  Patient tolerated infusion without incident.     Drug Waste Record  Drug Name: Solu-Medrol   Dose: 100 mg  Route administered: IV  NDC #: 1538-9269-07  Amount of waste(mL): 0.4 ml  Reason for waste: Single use vial    Discharge Plan:   Follow up plan of care with: ongoing infusions at [facility].  Discharge instructions were reviewed with patient.  Patient/representative verbalized understanding of discharge instructions and all questions answered.  Patient discharged from [facility] in stable condition.    Roxie Tamez RN     Administrations This Visit     acetaminophen (TYLENOL) tablet 650 mg     Admin Date  04/29/2019 Action  Given Dose  650 mg Route  Oral Administered By  Roxie Tamez RN          diphenhydrAMINE (BENADRYL) capsule 25 mg     Admin Date  04/29/2019  Action  Given Dose  25 mg Route  Oral Administered By  Roxie Tamez RN          immune globulin (gammagard) - sucrose free 10 % injection 25 g     Admin Date  04/29/2019 Action  New Bag Dose  25 g Route  Intravenous Administered By  Roxie Tamez RN          meperidine (DEMEROL) injection 25 mg     Admin Date  04/29/2019 Action  Given Dose  25 mg Route  Intravenous Administered By  Roxie Tamez RN          methylPREDNISolone sodium succinate (solu-MEDROL) injection 100 mg     Admin Date  04/29/2019 Action  Given Dose  100 mg Route  Intravenous Administered By  Roxie Tamez RN              /66   Pulse 85   Temp 98.5  F (36.9  C) (Oral)   Resp 16   Wt 38.8 kg (85 lb 8 oz)   SpO2 100%   BMI 15.64 kg/m

## 2019-05-15 NOTE — PATIENT INSTRUCTIONS
It was a pleasure to see you in clinic today. Please do not hesitate to call with any questions or concerns. We look forward to seeing you in clinic at your next device check on 8/21/19.    Adela Vásquez RN  Electrophysiology Nurse Clinician  [facility]  During business hours call:  [phone]  After business hours please call: [phone]- select option #4 and ask for job code [phone].

## 2019-05-15 NOTE — PROGRESS NOTES
Jennifer is a 78-year-old female with chronic bronchitis and recurrent respiratory tract infections.  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  Her last visit with me was in 11/2018.  She states that she has had several episodes of respiratory tract infections, one in November and one in March.  The last one in March she had used Levaquin and a prednisone burst and taper.  She is mostly resolved from that but still has a lingering dry cough related to that most recent infection.  She is due for an IVIG infusion this upcoming week.  Jennifer is recovering from bowel surgery, had a bowel obstruction and is recovering from bowel surgery and still is a little bit tender in the abdomen.  She still reports having episodes of reflux despite being on Prilosec twice a day.  She denies chest pain at this point in time or wheezing.      PHYSICAL EXAMINATION:   VITAL SIGNS:  Today her blood pressure was 182/73, pulse is 64, respiratory rate 16, O2 sats are 99% on room air.   HEENT:  Eyes are anicteric.  Mouth and throat have upper and lower plates in place.  No lesions seen.   NECK:  Supple, no adenopathy or thyromegaly.   CHEST:  She had rhonchi initially present which cleared with deep breaths.  No wheezes heard.  Good sounds bilaterally.   HEART:  Regular rate and rhythm, normal S1 and S2.   ABDOMEN:  No hepatosplenomegaly appreciated.  There is midline scar that is well-healed.   EXTREMITIES:  Without clubbing, cyanosis or edema.   SKIN:  No rash.      PFTs were done today.  Her PFTs look stable to slightly improved compared to what they were in November.  Her forced vital capacity was 1.46 or 60% of predicted.  FEV1 today was 0.86 or 46% of predicted.      ASSESSMENT AND PLAN:     1.  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  She is on immunoglobulin replacement on an  every week basis.  We will continue with that.  She has instructions to call us if she gets respiratory tract infections for antibiotics and prednisone taper.      2.  For her chronic bronchitis she has albuterol nebulizer and albuterol ProAir inhaler and Advair, which she uses.     3.  For her GERD she is on Prilosec and Zantac.        My plan will be to follow up with Jennifer in 6 months with PFTs and labs.  We will also get a chest x-ray on her at that time.           Maxx Elizabeth  Pulmonary/Critical Care  May 15, 2019 12:08 PM

## 2019-05-15 NOTE — NURSING NOTE
Chief Complaint   Patient presents with     Interstitial Lung Disease (ILD)     Bronchitis 6 month follow up      Lore Graham CMA  Completed Fall Risk Screening and updated Health Maintenance .  See screenings  Pt was due for PHQ2. Pt completed. Updated health maintenance. See Screenings.

## 2019-05-15 NOTE — LETTER
5/15/2019       RE: Jennifer Alvarez  [address]  [place] [address]     Dear Colleague,    Thank you for referring your patient, Jennifer Alvarez, to the [facility] at [facility]. Please see a copy of my visit note below.    Jennifer is a 78-year-old female with chronic bronchitis and recurrent respiratory tract infections.  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  Her last visit with me was in 11/2018.  She states that she has had several episodes of respiratory tract infections, one in November and one in March.  The last one in March she had used Levaquin and a prednisone burst and taper.  She is mostly resolved from that but still has a lingering dry cough related to that most recent infection.  She is due for an IVIG infusion this upcoming week.  Jennifer is recovering from bowel surgery, had a bowel obstruction and is recovering from bowel surgery and still is a little bit tender in the abdomen.  She still reports having episodes of reflux despite being on Prilosec twice a day.  She denies chest pain at this point in time or wheezing.      PHYSICAL EXAMINATION:   VITAL SIGNS:  Today her blood pressure was 182/73, pulse is 64, respiratory rate 16, O2 sats are 99% on room air.   HEENT:  Eyes are anicteric.  Mouth and throat have upper and lower plates in place.  No lesions seen.   NECK:  Supple, no adenopathy or thyromegaly.   CHEST:  She had rhonchi initially present which cleared with deep breaths.  No wheezes heard.  Good sounds bilaterally.   HEART:  Regular rate and rhythm, normal S1 and S2.   ABDOMEN:  No hepatosplenomegaly appreciated.  There is midline scar that is well-healed.   EXTREMITIES:  Without clubbing, cyanosis or edema.   SKIN:  No rash.      PFTs were done today.  Her PFTs look stable to slightly improved compared to what they were in November.  Her  forced vital capacity was 1.46 or 60% of predicted.  FEV1 today was 0.86 or 46% of predicted.      ASSESSMENT AND PLAN:     1.  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  She is on immunoglobulin replacement on an every week basis.  We will continue with that.  She has instructions to call us if she gets respiratory tract infections for antibiotics and prednisone taper.      2.  For her chronic bronchitis she has albuterol nebulizer and albuterol ProAir inhaler and Advair, which she uses.     3.  For her GERD she is on Prilosec and Zantac.        My plan will be to follow up with Jennifer in 6 months with PFTs and labs.  We will also get a chest x-ray on her at that time.       Maxx Elizabeth  Pulmonary/Critical Care  May 15, 2019 12:08 PM

## 2019-05-20 NOTE — PROGRESS NOTES
Nursing Note  Jennifer Alvarez presents [facility] for:   Chief Complaint   Patient presents with     Infusion     IVIG     During [facility]'s [facility] appointment, orders from Dr. Elizabeth were completed.  Frequency: every 21 days    Progress note:  Patient identification verified by name and date of birth.  Assessment completed.  Vitals recorded in Doc Flowsheets.  Patient was provided with education regarding infusion and possible side effects.  Patient verbalized understanding.      needed: No  Premedications: administered per order.  Infusion Rates: infusion starts at 20 ml/hr, then increased by 20 ml/hr every 15 minutes to final rate of 160 ml/hr.  Approximate Infusion length:2.5 hours.   Labs: were not ordered for this appointment.  Vascular access: peripheral IV placed today.  Treatment Conditions: patient denies fever, chills, signs of infection, recent illness, on antibiotics, productive cough or elevated temperature.  Patient tolerated infusion: well.    Drug Waste Record? Yes      Drug Name: solu-medrol  Dose: 100 mg  Route administered: IV  NDC #: 0641-7071-63  Amount of waste(mL): 0.4 ml  Reason for waste: Single use vial     Discharge Plan:   Follow up plan of care with: ongoing infusions at [facility].  Discharge instructions were reviewed with patient.  Patient/representative verbalized understanding of discharge instructions and all questions answered.  Patient discharged from [facility] in stable condition.    Roxann Freeman RN       Administrations This Visit     acetaminophen (TYLENOL) tablet 650 mg     Admin Date  05/20/2019 Action  Given Dose  650 mg Route  Oral Administered By  Roxann Freeman RN          diphenhydrAMINE (BENADRYL) capsule 25 mg     Admin Date  05/20/2019 Action  Given Dose  25 mg Route  Oral Administered By  Roxann Freeman, CARLIN          immune  globulin (GAMAGARD) sucrose free 10 % injection 25 g     Admin Date  05/20/2019 Action  New Bag Dose  25 g Route  Intravenous Administered By  Roxann Freeman, CARLIN          meperidine (DEMEROL) injection 25 mg     Admin Date  05/20/2019 Action  Given Dose  25 mg Route  Intravenous Administered By  Roxann Freeman, CARLIN          methylPREDNISolone sodium succinate (solu-MEDROL) injection 100 mg     Admin Date  05/20/2019 Action  Given Dose  100 mg Route  Intravenous Administered By  Roxann Freeman, RN                /79 (BP Location: Left arm)   Pulse 71   Temp 98.1  F (36.7  C) (Oral)   Resp 16   Wt 39.3 kg (86 lb 9.6 oz)   BMI 15.84 kg/m

## 2019-05-20 NOTE — PATIENT INSTRUCTIONS
Patient Education     Immune Globulin Solution for injection  What is this medicine?  IMMUNE GLOBULIN (im MUNE GLOB sapna marie) helps to prevent or reduce the severity of certain infections in patients who are at risk. This medicine is collected from the pooled blood of many donors. It is used to treat immune system problems, thrombocytopenia, and Kawasaki syndrome.  This medicine may be used for other purposes; ask your health care provider or pharmacist if you have questions.  What should I tell my health care provider before I take this medicine?  They need to know if you have any of these conditions:    diabetes    extremely low or no immune antibodies in the blood    heart disease    history of blood clots    hyperprolinemia    infection in the blood, sepsis    kidney disease    taking medicine that may change kidney function - ask your health care provider about your medicine    an unusual or allergic reaction to human immune globulin, albumin, maltose, sucrose, polysorbate 80, other medicines, foods, dyes, or preservatives    pregnant or trying to get pregnant    breast-feeding  How should I use this medicine?  This medicine is for injection into a muscle or infusion into a vein or skin. It is usually given by a health care professional in a hospital or clinic setting.  In rare cases, some brands of this medicine might be given at home. You will be taught how to give this medicine. Use exactly as directed. Take your medicine at regular intervals. Do not take your medicine more often than directed.  Talk to your pediatrician regarding the use of this medicine in children. Special care may be needed.  Overdosage: If you think you have taken too much of this medicine contact a poison control center or emergency room at once.  NOTE: This medicine is only for you. Do not share this medicine with others.  What if I miss a dose?  It is important not to miss your dose. Call your doctor or health care professional if you  are unable to keep an appointment. If you give yourself the medicine and you miss a dose, take it as soon as you can. If it is almost time for your next dose, take only that dose. Do not take double or extra doses.  What may interact with this medicine?    aspirin and aspirin-like medicines    cisplatin    cyclosporine    medicines for infection like acyclovir, adefovir, amphotericin B, bacitracin, cidofovir, foscarnet, ganciclovir, gentamicin, pentamidine, vancomycin    NSAIDS, medicines for pain and inflammation, like ibuprofen or naproxen    pamidronate    vaccines    zoledronic acid  This list may not describe all possible interactions. Give your health care provider a list of all the medicines, herbs, non-prescription drugs, or dietary supplements you use. Also tell them if you smoke, drink alcohol, or use illegal drugs. Some items may interact with your medicine.  What should I watch for while using this medicine?  Your condition will be monitored carefully while you are receiving this medicine.  This medicine is made from pooled blood donations of many different people. It may be possible to pass an infection in this medicine. However, the donors are screened for infections and all products are tested for HIV and hepatitis. The medicine is treated to kill most or all bacteria and viruses. Talk to your doctor about the risks and benefits of this medicine.  Do not have vaccinations for at least 14 days before, or until at least 3 months after receiving this medicine.  What side effects may I notice from receiving this medicine?  Side effects that you should report to your doctor or health care professional as soon as possible:    allergic reactions like skin rash, itching or hives, swelling of the face, lips, or tongue    breathing problems    chest pain or tightness    fever, chills    headache with nausea, vomiting    neck pain or difficulty moving neck    pain when moving eyes    pain, swelling, warmth in  the leg    problems with balance, talking, walking    sudden weight gain    swelling of the ankles, feet, hands    trouble passing urine or change in the amount of urine  Side effects that usually do not require medical attention (report to your doctor or health care professional if they continue or are bothersome):    dizzy, drowsy    flushing    increased sweating    leg cramps    muscle aches and pains    pain at site where injected  This list may not describe all possible side effects. Call your doctor for medical advice about side effects. You may report side effects to FDA at 2-321-FDA-2355.  Where should I keep my medicine?  Keep out of the reach of children.  This drug is usually given in a hospital or clinic and will not be stored at home.  In rare cases, some brands of this medicine may be given at home. If you are using this medicine at home, you will be instructed on how to store this medicine. Throw away any unused medicine after the expiration date on the label.  NOTE: This sheet is a summary. It may not cover all possible information. If you have questions about this medicine, talk to your doctor, pharmacist, or health care provider.  NOTE:This sheet is a summary. It may not cover all possible information. If you have questions about this medicine, talk to your doctor, pharmacist, or health care provider. Copyright  2016 Gold Standard

## 2019-05-21 NOTE — TELEPHONE ENCOUNTER
isosorbide mononitrate (IMDUR) 60 MG 24 hr tablet      Last Written Prescription Date:  3/22/18  Last Fill Quantity: 90,   # refills: 3  Last Office Visit : 2/22/19  Future Office visit:  8/21/19    Routing refill request to provider for review/approval because:  Per protocol-- Last BP > 140/90

## 2019-05-28 NOTE — ED TRIAGE NOTES
"Triage Assessment & Note:    /54   Pulse 69   Temp 98.2  F (36.8  C) (Oral)   Resp 18   Ht 1.549 m (5' 1\")   Wt 39.6 kg (87 lb 3.2 oz)   SpO2 97%   BMI 16.48 kg/m      Patient presents with: c/o Bleeding from her right ankle. PT reports a vein \"popped\" last evening while in bed. Bleeding is controlled by a dressing applied by PT. PT also C/o feeling light headed. PT was at clinic this am for \"neuclear testing\"     Home Treatments/Remedies: direct pressure dressing    Febrile / Afebrile? aferbrile    Duration of C/o:      Jason Zelaya  May 28, 2019      "

## 2019-05-28 NOTE — DISCHARGE INSTRUCTIONS
Keep a tight dressing on the area of bleeding.      If rebleeding occurs, hold pressure for 15 min and than place tight bandaid.     Please make an appointment to follow up with Your Primary Care Provider in 3-5 days even if entirely better.

## 2019-05-28 NOTE — ED AVS SNAPSHOT
[facility], [place], Emergency Department  [address] [place] [address]  Phone:  [phone]                                    Jennifer Alvarez   MRN: [id number]    Department:  [facility], Emergency Department   Date of Visit:  5/28/2019           After Visit Summary Signature Page    I have received my discharge instructions, and my questions have been answered. I have discussed any challenges I see with this plan with the nurse or doctor.    ..........................................................................................................................................  Patient/Patient Representative Signature      ..........................................................................................................................................  Patient Representative Print Name and Relationship to Patient    ..................................................               ................................................  Date                                   Time    ..........................................................................................................................................  Reviewed by Signature/Title    ...................................................              ..............................................  Date                                               Time          22EPIC Rev 08/18

## 2019-05-28 NOTE — ED PROVIDER NOTES
"    [place] EMERGENCY DEPARTMENT ([facility])  5/28/19   History     Chief Complaint   Patient presents with     Dizziness     PT report a \"popped vein\"      The history is provided by the patient.     Jennifer Alvarez is a 78 year old female with a medical history significant for CAD s/p stent placement (on Plavix), sick sinus syndrome s/p dual-chamber pacemaker (1/2008), hypertension, hyperlipidemia and type 2 diabetes mellitus who presents to the Emergency Department for evaluation of bleeding from her right, medial ankle.  The patient reports that she woke up this morning at approximately 7:15 AM to use the restroom.  When she gone to the bathroom she noticed that she was bleeding from her right ankle and when she returned to the bed she noticed blood in the bed.  The patient denies any trauma/injury to the ankle and she is unsure what caused the bleeding.  The patient was able to get the bleeding to stop.  The patient was scheduled to have a nuclear stress test this morning, which she went to.  After finishing this, she noticed that her right ankle was bleeding once again.  The patient put paper towels in her sock and presents here to the Emergency Department now for evaluation.  The patient reports that she is on Plavix, but denies being on any anticoagulation medications.  The patient does note that she has been having problems with right leg numbness for the past 6 months.    I have reviewed the Medications, Allergies, Past Medical and Surgical History, and Social History in the [facility] system.    Past Medical History:   Diagnosis Date     Abdominal pain     cramping     Arthritis      CAD (coronary artery disease)     stent x2, ppm     Cancer (H)     skin     Cardiac pacemaker      CC (Crohn's colitis) (H)      Chronic kidney disease      Diabetes (H)     DM type 2, oral agent     GERD (gastroesophageal reflux disease)      History of blood transfusion      HTN (hypertension)      Hyperlipidemia LDL " goal <70 11/19/2013     IgA deficiency (H)      Neck pain 12/31/2014     Uncomplicated asthma      Weight loss        Past Surgical History:   Procedure Laterality Date     APPENDECTOMY       C LAP,SURG,COLECTOMY, PARTIAL, W/ANAST      partial colectomy;diverticulitis     cardiac stents      x 2     CARPAL TUNNEL RELEASE RT/LT      bilateral     CHOLECYSTECTOMY       COLONOSCOPY       COLONOSCOPY N/A 11/9/2015    Procedure: COMBINED COLONOSCOPY, SINGLE OR MULTIPLE BIOPSY/POLYPECTOMY BY BIOPSY;  Surgeon: Victor Hugo Turner MD;  Location:  GI     ENT SURGERY      back throat     ESOPHAGEAL MOTILITY STUDY  5-5-15     ESOPHAGOSCOPY, GASTROSCOPY, DUODENOSCOPY (EGD), COMBINED Left 5/13/2015    Procedure: COMBINED ESOPHAGOSCOPY, GASTROSCOPY, DUODENOSCOPY (EGD), BIOPSY SINGLE OR MULTIPLE;  Surgeon: Dipesh Bobby MD;  Location: [facility]     ESOPHAGOSCOPY, GASTROSCOPY, DUODENOSCOPY (EGD), COMBINED N/A 11/6/2015    Procedure: COMBINED ESOPHAGOSCOPY, GASTROSCOPY, DUODENOSCOPY (EGD), BIOPSY SINGLE OR MULTIPLE;  Surgeon: Victor Hugo Turner MD;  Location:  GI     FOOT SURGERY      left     HC ESOPH/GAS REFLUX TEST W NASAL IMPED >1 HR N/A 5/5/2015    Procedure: ESOPHAGEAL IMPEDENCE FUNCTION TEST WITH 24 HOUR PH GREATER THAN 1 HOUR;  Surgeon: Dipesh Bobby MD;  Location:  GI     IMPLANT PACEMAKER      PPM     IR RENAL/VISCERAL STENT/ATHERECT/PTA Left 07/2017     IR VISCERAL ANGIOGRAM  1/4/2019     LAPAROTOMY EXPLORATORY       NISSEN FUNDOPLICATION      x 2     SHOULDER SURGERY      right     UPPER GI ENDOSCOPY       WRIST SURGERY      right cyst       Family History   Problem Relation Age of Onset     Ovarian Cancer Mother      Stomach Cancer Father      Heart Disease Father      Diabetes Father      Ovarian Cancer Sister      Leukemia Brother      Ovarian Cancer Sister      Diabetes Brother      Diabetes Brother      Neurologic Disorder No family hx of        Social History     Tobacco Use     Smoking status:  Former Smoker     Packs/day: 0.20     Years: 20.00     Pack years: 4.00     Types: Cigarettes     Last attempt to quit: 1974     Years since quittin.2     Smokeless tobacco: Never Used   Substance Use Topics     Alcohol use: No     Alcohol/week: 0.0 oz       Current Facility-Administered Medications   Medication     silver nitrate (ARZOL) Misc     Current Outpatient Medications   Medication     acetaminophen (TYLENOL) 325 MG tablet     albuterol (2.5 MG/3ML) 0.083% nebulizer solution     albuterol (PROAIR HFA/PROVENTIL HFA/VENTOLIN HFA) 108 (90 Base) MCG/ACT Inhaler     albuterol (PROAIR HFA/PROVENTIL HFA/VENTOLIN HFA) 108 (90 BASE) MCG/ACT Inhaler     aspirin 81 MG tablet     atorvastatin (LIPITOR) 40 MG tablet     blood glucose monitoring (TERRY CONTOUR NEXT) test strip     blood glucose monitoring (TERRY MICROLET) lancets     Blood Pressure KIT     carvedilol (COREG) 25 MG tablet     clopidogrel (PLAVIX) 75 MG tablet     dicyclomine (BENTYL) 10 MG capsule     diphenhydrAMINE (BENADRYL) 50 MG capsule     diphenhydrAMINE (BENADRYL) 50 MG capsule     FISH OIL     fluticasone-salmeterol (ADVAIR) 250-50 MCG/DOSE diskus inhaler     fluticasone-salmeterol (ADVAIR) 250-50 MCG/DOSE diskus inhaler     hydrochlorothiazide (HYDRODIURIL) 25 MG tablet     hydrocodone-acetaminophen (LORCET)  MG per tablet     Immune Globulin, Human, 25 GM/500ML SOLN     isosorbide mononitrate (IMDUR) 60 MG 24 hr tablet     lisinopril (PRINIVIL/ZESTRIL) 10 MG tablet     meperidine (DEMEROL) 25 MG/ML injection     metFORMIN (GLUCOPHAGE) 1000 MG tablet     nitroglycerin (NITROQUICK) 0.4 MG sublingual tablet     omeprazole (PRILOSEC) 20 MG capsule     ONE TOUCH FINE POINT LANCETS     ONE TOUCH ULTRA TEST strip     predniSONE (DELTASONE) 10 MG tablet     tiotropium (SPIRIVA HANDIHALER) 18 MCG capsule     tiotropium (SPIRIVA HANDIHALER) 18 MCG inhalation capsule     Facility-Administered Medications Ordered in Other Encounters  "  Medication     albuterol (PROAIR HFA/PROVENTIL HFA/VENTOLIN HFA) 108 (90 Base) MCG/ACT inhaler 2 puff     albuterol (PROAIR HFA/PROVENTIL HFA/VENTOLIN HFA) 108 (90 Base) MCG/ACT inhaler 2 puff     aminophylline  mg     aminophylline  mg     regadenoson (LEXISCAN) injection 0.4 mg     sodium chloride (PF) 0.9% PF flush 10 mL     sodium chloride (PF) 0.9% PF flush 10 mL     sodium chloride (PF) 0.9% PF flush 5-10 mL     sodium chloride (PF) 0.9% PF flush 5-10 mL     sodium chloride bacteriostatic 0.9 % flush 0-1 mL     sodium chloride bacteriostatic 0.9 % flush 0-1 mL        Allergies   Allergen Reactions     Bees Anaphylaxis     Codeine Sulfate Hives     Contrast Dye Anaphylaxis and Hives     Milk Products Shortness Of Breath     Morphine Sulfate Other (See Comments), Hives and Itching     Pt had abdominal pain that had her doubled over     Penicillins Difficulty breathing     Zinacef [Cefuroxime Sodium] Difficulty breathing     Influenza Vaccine Live Rash     Pneumovax [Pneumococcal Polysaccharides]      Procardia [Nifedipine] Difficulty breathing     Barium Rash         Review of Systems   Constitutional: Negative for fever.   Respiratory: Negative for shortness of breath.    Cardiovascular: Negative for chest pain.   Gastrointestinal: Negative for abdominal pain.   Skin: Positive for wound (right, medial ankle).   All other systems reviewed and are negative.      Physical Exam   BP: 167/54  Pulse: 69  Temp: 98.2  F (36.8  C)  Resp: 18  Height: 154.9 cm (5' 1\")  Weight: 39.6 kg (87 lb 3.2 oz)  SpO2: 97 %      Physical Exam   Constitutional: She is oriented to person, place, and time. She appears well-developed and well-nourished.   Thin, small stature   HENT:   Head: Normocephalic and atraumatic.   Neck: Normal range of motion.   Cardiovascular: Normal rate, regular rhythm and normal heart sounds.   Pulmonary/Chest: Effort normal and breath sounds normal. No respiratory distress.   Musculoskeletal: " She exhibits no tenderness.   Neurological: She is alert and oriented to person, place, and time.   Skin: Skin is warm and dry.   Pinpoint area of mild oozing of blood from right medial aspect of ankle; at location of medial malleolus; not tenderness; normal DP/PT pulses    Psychiatric: She has a normal mood and affect. Her behavior is normal. Thought content normal.       ED Course   11:01 AM  The patient was seen and examined by Astrid Bateman MD in Room ED08.        Procedures             Critical Care time:  none         Results for orders placed or performed during the hospital encounter of 05/28/19   NM Lexiscan stress test    Narrative    INDICATION:  CAD.    PROTOCOL:    Rest and stress myocardial perfusion imaging was performed using  Tc-99m tetrafosmin intravenously. Pharmacological stress was performed  with 0.4 mg of regadenoson intravenously. The EKG showed normal sinus  rhythm at rest. No significant abnormalities were noted with infusion  of regadenoson.    FINDINGS:  1.  Overall quality of the study: Diagnostic.   2.  Left ventricular cavity is Normal on the rest and stress studies.  3.  SPECT images demonstrate uniform radiotracer uptake of the  myocardium on both stress and rest images..   4.   Left ventricular ejection fraction is 88%. Left ventricular  end-diastolic volume is 50 mL. End-systolic volume is 6 mL.      Impression    IMPRESSION:  1.  Normal  myocardial SPECT study with a summed stress score of  0 .   No ischemia or infarct identified. Normal LV function.    ANASTACIO MCKEON MD     Medications - No data to display         Labs Ordered and Resulted from Time of ED Arrival Up to the Time of Departure from the ED - No data to display     No results found for this or any previous visit (from the past 24 hour(s)).           Assessments & Plan (with Medical Decision Making):  Patient is a 78-year-old female presents to the ER due to a small area of pinpoint bleeding on her right ankle.   Patient said that she noticed it this morning and then noticed it again after her stress test today.  Patient has an area that is less than 1 mm that is bleeding small amounts oozing every 4 to 5 seconds small drop.  Patient had a area cauterized with silver nitrate and a tight dressing placed with Steri-Strips.  Patient was watched in the ER for another 25 minutes and had no further bleeding.  Patient will be discharged home instruction to follow-up for further care.  No other acute issues at this time.  Patient stable for discharge.  This part of the medical record was transcribed by Garrett Curtis, Medical Scribe, from a dictation done by Astrid Bateman MD.        I have reviewed the nursing notes.    I have reviewed the findings, diagnosis, plan and need for follow up with the patient.       Medication List      ASK your doctor about these medications    predniSONE 10 MG tablet  Commonly known as:  DELTASONE  10 mg, Oral, SEE ADMIN INSTRUCTIONS, Take 60 mg the night before the procedure and 30 mg the morning of the procedure  Ask about: Should I take this medication?            Final diagnoses:   Puncture wound of right ankle, initial encounter - pinpoint area of bleeding     IMark, am serving as a trained medical scribe to document services personally performed by Astrid Bateman MD, based on the provider's statements to me.   IAstrid MD, was physically present and have reviewed and verified the accuracy of this note documented by Mark Fan.    5/28/2019   [facility], [place], EMERGENCY DEPARTMENT     Astrid Bateman MD  05/28/19 1816

## 2019-05-28 NOTE — PROGRESS NOTES
Pt here for Lexiscan nuclear stress test.  Medication and side effects reviewed with patient. Lung sounds clear to auscultation bilaterally.  Denied caffeine use.  Patient tolerated Lexiscan dose without any adverse reactions.  Monitored post injection and then taken back to nuclear medicine for follow up imaging.

## 2019-06-03 NOTE — TELEPHONE ENCOUNTER
Spoke with patient by phone.  Review of her blood pressures in Care Everywhere show blood pressures between 120 and 128 systolic.  Patient has been hospitalized for bronchitis in [facility].  She is currently on prednisone.  She has established care with a cardiologist in [place].  The cardiologist there has taken over care of blood pressures.  Patient states that her blood pressure during her nuclear stress test was 167 systolic.  This is not unexpected during stress.  Stress test was normal.  Summed score of 0.  Discussed these results with patient.  Now that patient is feeling better, she will document home blood pressures twice per week and bring this documentation with to her appointment with LUZ MARIA Duncan on 8/21/19.  The patient states understanding and agrees to call with any further questions or concerns.

## 2019-06-10 NOTE — PROGRESS NOTES
Patient is in infusion center to get IVIG.  Patient has been on Levaquin for a respiratory infection and has 3 days left of the course.  She is completely asymptomatic.  Infusion center is asking if she can get her IVIG.  Attempted to reach Dr. Elizabeth but unable to get a hold o [facility].  Contacted Dr. Perlman who stated that it is safe to proceed with the infusion.

## 2019-06-10 NOTE — PROGRESS NOTES
Nursing Note  Jennifer Alvarez [facility] for:   No chief complaint on file.    During today's [facility] appointment, orders from Dr. Elizabeth were completed.  Frequency: every 21 days    Progress note:  Patient identification verified by name and date of birth.  Assessment completed.  Vitals recorded in Doc Flowsheets.  Patient was provided with education regarding infusion and possible side effects.  Patient verbalized understanding.      needed: No  Premedications: administered per order.  Infusion Rates: infusion starts at 20 ml/hr, then increased by 20 ml/hr every 15 minutes to final rate of 160 ml/hr.  Approximate Infusion length:2.5 hours.   Labs: were not ordered for this appointment.  Vascular access: peripheral IV placed today.  Treatment Conditions: Patient reports having fevers and upper respiratory symptoms last week. Patient on Levaquin for 3-4 more days. Patient is afebrile today with no symptoms today. Dr. Elizabeth paged to update however was not in clinic today. Contacted Inessa Peña RN who got a hold of Dr. Perlman who gave the ok to proceed with infusion today.       Patient tolerated infusion: well.    Drug Waste Record? Yes      Drug Name: solu-medrol  Dose: 100 mg  Route administered: IV  NDC #: 4150-7981-99  Amount of waste(mL): 0.4 ml  Reason for waste: Single use vial     Discharge Plan:   Follow up plan of care with: ongoing infusions at [facility].  Discharge instructions were reviewed with patient.  Patient/representative verbalized understanding of discharge instructions and all questions answered.  Patient discharged [facility] in stable condition.    Mary Jo Marquis RN           /83   Pulse 100   Temp 98.4  F (36.9  C) (Oral)   Resp 18   SpO2 99%

## 2019-07-01 NOTE — PROGRESS NOTES
Nursing Note  Jennifer Alvarez presents [facility] for:   Chief Complaint   Patient presents with     Infusion     IVIG     During [facility]'s [facility] appointment, orders from Dr. Elizabeth were completed.  Frequency: every 21 days    Progress note:  Patient identification verified by name and date of birth.  Assessment completed.  Vitals recorded in Doc Flowsheets.  Patient was provided with education regarding infusion and possible side effects.  Patient verbalized understanding.      needed: No  Premedications: administered per order.  Infusion Rates: infusion starts at 20 ml/hr, then increased by 20 ml/hr every 15 minutes to final rate of 160 ml/hr.  Approximate Infusion length:2.5 hours.   Labs: were not ordered for this appointment.  Vascular access: peripheral IV placed today.  Treatment Conditions: patient denies fever, chills, signs of infection, recent illness, on antibiotics, productive cough or elevated temperature.  Patient tolerated infusion: well.    Drug Waste Record? Yes      Drug Name: solu-medrol  Dose: 100 mg  Route administered: IV  NDC #: 8408-3120-89  Amount of waste(mL): 0.4 ml  Reason for waste: Single use vial     Discharge Plan:   Follow up plan of care with: ongoing infusions at [facility].  Discharge instructions were reviewed with patient.  Patient/representative verbalized understanding of discharge instructions and all questions answered.  Patient discharged [facility] in stable condition.    Tawana Robert RN    Administrations This Visit     acetaminophen (TYLENOL) tablet 650 mg     Admin Date  07/01/2019 Action  Given Dose  650 mg Route  Oral Administered By  Tawana Robert RN          diphenhydrAMINE (BENADRYL) capsule 25 mg     Admin Date  07/01/2019 Action  Given Dose  25 mg Route  Oral Administered By  Tawana Robert RN           immune globulin (Gammagard) - sucrose free 10 % injection 25 g     Admin Date  07/01/2019 Action  New Bag Dose  25 g Route  Intravenous Administered By  Tawana Robert RN          meperidine (DEMEROL) injection 25 mg     Admin Date  07/01/2019 Action  Given Dose  25 mg Route  Intravenous Administered By  Tawana Robert RN          methylPREDNISolone sodium succinate (solu-MEDROL) injection 100 mg     Admin Date  07/01/2019 Action  Given Dose  100 mg Route  Intravenous Administered By  Tawana Robert RN                /68   Pulse 66   Temp 98  F (36.7  C) (Oral)   Resp 18   Wt 38.4 kg (84 lb 9.6 oz)   SpO2 100%   BMI 15.99 kg/m

## 2019-07-23 NOTE — PROGRESS NOTES
Infusion nursing note:    Jennifer Alvarez presents today to [facility] for an IVIG infusion.  During today's [facility] appointment orders from Dr. Campbell were completed.      Progress note:  ID verified by name and .  Assessment completed.  Vitals were stable throughout time in [facility].  verbal education given to patient/representative regarding infusion and possible side effects.  Patient verbalized understanding.    Note: infusion started at 20ml/h and increased by 20ml/h every 15 minutes to a final rate of 160ml/h         Administrations This Visit     acetaminophen (TYLENOL) tablet 650 mg     Admin Date  2019 Action  Given Dose  650 mg Route  Oral Administered By  Deidre Campbell RN          diphenhydrAMINE (BENADRYL) capsule 25 mg     Admin Date  2019 Action  Given Dose  25 mg Route  Oral Administered By  Deidre Campbell RN          immune globulin (Privigen) - sucrose free 10 % injection 25 g     Admin Date  2019 Action  New Bag Dose  25 g Route  Intravenous Administered By  Deidre Campbell RN          meperidine (DEMEROL) injection 25 mg     Admin Date  2019 Action  Given Dose  25 mg Route  Intravenous Administered By  Deidre Campbell RN          methylPREDNISolone sodium succinate (solu-MEDROL) injection 100 mg     Admin Date  2019 Action  Given Dose  100 mg Route  Intravenous Administered By  Deidre Campbell RN                /83   Pulse 65   Temp 98.2  F (36.8  C) (Oral)   Resp 16         Discharge plan:    Discharge instructions were reviewed with patient: Yes  Patient/representative verbalized understanding of discharge instructions and all questions answered: Yes.    Discharged from [facility] in stable condition.    Deidre Campbell

## 2019-08-12 NOTE — PATIENT INSTRUCTIONS
Dear Jennifer Alvarez    Thank you for choosing [facility] ([facility]) for your infusion.  The following information is a summary of our appointment as well as important reminders.      We look forward in seeing you on your next appointment here at [facility] ([facility]).  Please don t hesitate to call us at [phone] to reschedule any of your appointments or to speak with one of the [facility] registered nurses.  It was a pleasure taking care of you today.    Sincerely,    [facility]  [facility]  [address] [place] [address]  [address]  Phone:  [phone]

## 2019-08-12 NOTE — PROGRESS NOTES
Nursing Note  Jennifer Alvarez presents [facility] for:   Chief Complaint   Patient presents with     Infusion     IVIG     During [facility]'s [facility] appointment, orders from Dr. Maxx Elizabeth were completed.  Frequency: every 21 days    Progress note:  Patient identification verified by name and date of birth.  Assessment completed.  Vitals recorded in Doc Flowsheets.  Patient was provided with education regarding infusion and possible side effects.  Patient verbalized understanding.     present during visit today: Not Applicable.    Treatment Conditions: patient denies fever, chills, signs of infection, recent illness, on antibiotics, productive cough or elevated temperature.    Premedications: administered per order.    Drug Waste Record: Yes      Drug Name: Solumedrol  Dose: 100mg  Route administered: IV  NDC #: 0316-0616-77  Amount of waste(mL) 0.4mL  Reason for waste: Single use vial    Infusion length and rate:  infusion starts at 19 ml/hr, then increased by 19 ml/hr every 15 minutes to final rate of 152 ml/hr.    Labs: were not ordered for this appointment.    Vascular access: peripheral IV placed today.    Post Infusion Assessment:  Patient tolerated infusion without incident.  Site patent and intact, free from redness, edema or discomfort.     Discharge Plan:   Follow up plan of care with: ongoing infusions at [facility].  Discharge instructions were reviewed with patient.  Patient/representative verbalized understanding of discharge instructions and all questions answered.  Patient discharged [facility] in stable condition.    Ericka Severson, RN       Administrations This Visit     acetaminophen (TYLENOL) tablet 650 mg     Admin Date  08/12/2019 Action  Given Dose  650 mg Route  Oral Administered By  Severson, Ericka, RN          diphenhydrAMINE (BENADRYL) capsule  "25 mg     Admin Date  08/12/2019 Action  Given Dose  25 mg Route  Oral Administered By  Severson, Ericka, RN          immune globulin (Privigen) - sucrose free 10 % injection 25 g     Admin Date  08/12/2019 Action  New Bag Dose  25 g Route  Intravenous Administered By  Severson, Ericka, CARLIN          meperidine (DEMEROL) injection 25 mg     Admin Date  08/12/2019 Action  Given Dose  25 mg Route  Intravenous Administered By  Severson, Ericka, RN          methylPREDNISolone sodium succinate (solu-MEDROL) injection 100 mg     Admin Date  08/12/2019 Action  Given Dose  100 mg Route  Intravenous Administered By  Severson, Ericka, CARLIN                  BP (!) 148/61   Temp 97.4  F (36.3  C) (Oral)   Resp 18   Ht 1.549 m (5' 1\")   Wt 38.3 kg (84 lb 6.4 oz)   SpO2 97%   BMI 15.95 kg/m        "

## 2019-08-13 NOTE — PROGRESS NOTES
CARDIOLOGY CLINIC FOLLOW UP  August 21, 2019      HPI: Ms. Jennifer Alvarez is a 79 yo woman with a Hx of single vessel CAD (LAD), s/p stenting of the mid and distal LAD following a positive stress nuclear study (December 2007), s/p dual chamber pacemaker (January 2008) for sick sinus syndrome, DIAZ s/p LF renal artery stenting 2016 w/repeat angio 2018 showing patent stents, HTN, HLD and former smoker who returns to the clinic today for ongoing evaluation of CAD.     Her cardiac history dates back to 2007 when she was found to have 1V CAD with PCI to mid and distal LAD. A repeat coronary angiogram in 2008 revealed patent stents and non-obstructive CAD. She had coronary angiography (May 2010) showing minimal CAD with patent stents in the mid and distal LAD. RHC showed mild elevation of PA pressures and left sided filling pressures. She had three hospitalizations in 2010, including two ER visits for tachyarrhythmia. She had a dobutamine stress ECHO (Sept 2011) that was negative for any inducible ischemia. LV function was normal at baseline and augmented appropriately with stress. A repeat echocardiogram in June 2013 revealed a normal global and regional left ventricular function with an EF of 60-65% and normal right ventricular function. There was mild concentric LVH and mild aortic sclerosis with no significant gradient across AV.  She had a negative dobutamine ECHO in July 2015. She was last evaluated by Dr. Adair in February 2019 and reported increasing exertional chest pain and dyspnea. Her antianginals were uptitrated and she underwent nucllear stress testing demonstrating no evidence of inducible ischemia.     Patient states that she continues to experience occasional chest discomfort and exertional dyspnea but only when she overexerts herself such as going up / down stairs. She describes the discomfort as a pressure sensation located substernal, nonradiating.The episodes last up to 30 minutes but resolve with  rest. She has taken SL NTG on 1 occasion and the pain improved. She states that the episodes have become less frequent since she was seen in clinic 6 months ago, now occurring about 3 times a week. She remains active walking 10 blocks one way to and from the mailbox on a daily basis. She reports some fatigue with this but no chest pain or shortness of breath. She denies orthopnea, PND or leg swelling. She denies palpitations, and syncope. She has had orthostatic lightheadedness. She reports leg cramps at night, no claudication.  She is tolerating her cardiac medications including ASA, Plavix, atorvastatin 40 mg, coreg 25 mg BID, Imdur 60 mg daily, lisinopril 10 mg BID and hydrochlorothiazide 25 mg daily. She reports bleeding more than normal when she gets a cut but it eventually stops. Her home BP have been running high in the 140-160s/70s, today in clinic 180/70.      PAST MEDICAL HISTORY:  Past Medical History:   Diagnosis Date     Abdominal pain     cramping     Arthritis      CAD (coronary artery disease)     stent x2, ppm     Cancer (H)     skin     Cardiac pacemaker      CC (Crohn's colitis) (H)      Chronic kidney disease      Diabetes (H)     DM type 2, oral agent     GERD (gastroesophageal reflux disease)      History of blood transfusion      HTN (hypertension)      Hyperlipidemia LDL goal <70 11/19/2013     IgA deficiency (H)      Neck pain 12/31/2014     Uncomplicated asthma      Weight loss        CURRENT MEDICATIONS:  Current Outpatient Medications   Medication Sig Dispense Refill     acetaminophen (TYLENOL) 325 MG tablet Take 1-2 tablets by mouth every 6 hours as needed.       albuterol (2.5 MG/3ML) 0.083% nebulizer solution Take 1 ampule by nebulization every 6 hours as needed.       albuterol (PROAIR HFA/PROVENTIL HFA/VENTOLIN HFA) 108 (90 Base) MCG/ACT Inhaler Take 2 puffs as needed for shortness of breath 1 Inhaler 12     albuterol (PROAIR HFA/PROVENTIL HFA/VENTOLIN HFA) 108 (90 BASE) MCG/ACT  Inhaler Take 2 puffs prn for wheezing or shortness of breath 1 Inhaler 12     aspirin 81 MG tablet Take 1 tablet by mouth daily.       atorvastatin (LIPITOR) 40 MG tablet Take 1 tablet (40 mg) by mouth daily (Patient not taking: Reported on 8/12/2019) 90 tablet 3     blood glucose monitoring (TERRY CONTOUR NEXT) test strip by In Vitro route 3 times daily Use to test blood sugar 3 times daily or as directed. 270 each 3     blood glucose monitoring (TERRY MICROLET) lancets Use to test blood sugar 3 times daily or as directed. 3 Box 3     Blood Pressure KIT daily.       carvedilol (COREG) 25 MG tablet Take 1 tablet (25 mg) by mouth 2 times daily (with meals) 180 tablet 3     clopidogrel (PLAVIX) 75 MG tablet Take 1 tablet (75 mg) by mouth daily 90 tablet 3     dicyclomine (BENTYL) 10 MG capsule Take 10 mg by mouth 4 times daily (before meals and nightly).       diphenhydrAMINE (BENADRYL) 50 MG capsule Take 1 capsule (50 mg) by mouth See Admin Instructions for 1 dose Take 50 mg the morning of the procedure 1 capsule 0     diphenhydrAMINE (BENADRYL) 50 MG capsule Take 1 capsule (50 mg) by mouth See Admin Instructions Take 50 mg the morning of the procedure 1 capsule 0     FISH OIL Unsure of dosage take two caps daily       fluticasone-salmeterol (ADVAIR) 250-50 MCG/DOSE diskus inhaler Inhale 1 puff into the lungs 2 times daily 1 Inhaler 12     fluticasone-salmeterol (ADVAIR) 250-50 MCG/DOSE diskus inhaler Inhale 1 puff into the lungs 2 times daily. 1 Inhaler 12     hydrochlorothiazide (HYDRODIURIL) 25 MG tablet Take 1 tablet (25 mg) by mouth daily 90 tablet 3     hydrocodone-acetaminophen (LORCET)  MG per tablet Take 1 tablet by mouth every 6 hours as needed.       Immune Globulin, Human, 25 GM/500ML SOLN Inject 25 g into the vein See Admin Instructions. 25 grams every 3 weeks IVIG       isosorbide mononitrate (IMDUR) 60 MG 24 hr tablet Take 1 tablet (60 mg) by mouth daily 90 tablet 3     lisinopril  (PRINIVIL/ZESTRIL) 10 MG tablet Take 1 tablet (10 mg) by mouth 2 times daily 180 tablet 3     meperidine (DEMEROL) 25 MG/ML injection Inject 25 mg into the vein as needed. 25 mg IV prior to IgG infusion       metFORMIN (GLUCOPHAGE) 1000 MG tablet Take 1,000 mg by mouth daily (with dinner)   0     nitroglycerin (NITROQUICK) 0.4 MG sublingual tablet Place 1 tablet (0.4 mg) under the tongue every 5 minutes as needed 25 tablet 3     omeprazole (PRILOSEC) 20 MG capsule Take 1 capsule (20 mg) by mouth 2 times daily 60 capsule 6     ONE TOUCH FINE POINT LANCETS 2 times daily.       ONE TOUCH ULTRA TEST strip Test blood sugar twice a day. 200 strip 3     predniSONE (DELTASONE) 10 MG tablet Take 4 tablets for 3 days, then 3 tablets for 3 days, then 2 tablets for 3 days, then 1 tablet for 3 days, then off. 30 tablet 3     tiotropium (SPIRIVA HANDIHALER) 18 MCG capsule One puff every day 30 capsule 6     tiotropium (SPIRIVA HANDIHALER) 18 MCG inhalation capsule One puff every day 30 capsule 6       PAST SURGICAL HISTORY:  Past Surgical History:   Procedure Laterality Date     APPENDECTOMY       C LAP,SURG,COLECTOMY, PARTIAL, W/ANAST      partial colectomy;diverticulitis     cardiac stents      x 2     CARPAL TUNNEL RELEASE RT/LT      bilateral     CHOLECYSTECTOMY       COLONOSCOPY       COLONOSCOPY N/A 11/9/2015    Procedure: COMBINED COLONOSCOPY, SINGLE OR MULTIPLE BIOPSY/POLYPECTOMY BY BIOPSY;  Surgeon: Victor Hugo Turner MD;  Location:  GI     ENT SURGERY      back throat     ESOPHAGEAL MOTILITY STUDY  5-5-15     ESOPHAGOSCOPY, GASTROSCOPY, DUODENOSCOPY (EGD), COMBINED Left 5/13/2015    Procedure: COMBINED ESOPHAGOSCOPY, GASTROSCOPY, DUODENOSCOPY (EGD), BIOPSY SINGLE OR MULTIPLE;  Surgeon: Dipesh Bobby MD;  Location:  GI     ESOPHAGOSCOPY, GASTROSCOPY, DUODENOSCOPY (EGD), COMBINED N/A 11/6/2015    Procedure: COMBINED ESOPHAGOSCOPY, GASTROSCOPY, DUODENOSCOPY (EGD), BIOPSY SINGLE OR MULTIPLE;  Surgeon: Johnny  MD Victor Hugo;  Location: [facility]     FOOT SURGERY      left     HC ESOPH/GAS REFLUX TEST W NASAL IMPED >1 HR N/A 5/5/2015    Procedure: ESOPHAGEAL IMPEDENCE FUNCTION TEST WITH 24 HOUR PH GREATER THAN 1 HOUR;  Surgeon: Dipesh Bobby MD;  Location: [facility] GI     IMPLANT PACEMAKER      PPM     IR RENAL/VISCERAL STENT/ATHERECT/PTA Left 07/2017     IR VISCERAL ANGIOGRAM  1/4/2019     LAPAROTOMY EXPLORATORY       NISSEN FUNDOPLICATION      x 2     SHOULDER SURGERY      right     UPPER GI ENDOSCOPY       WRIST SURGERY      right cyst       ALLERGIES  Bees; Codeine sulfate; Contrast dye; Milk products; Morphine sulfate; Penicillins; Zinacef [cefuroxime sodium]; Influenza vaccine live; Pneumovax [pneumococcal polysaccharides]; Procardia [nifedipine]; and Barium    FAMILY HX:  Family History   Problem Relation Age of Onset     Ovarian Cancer Mother      Stomach Cancer Father      Heart Disease Father      Diabetes Father      Ovarian Cancer Sister      Leukemia Brother      Ovarian Cancer Sister      Diabetes Brother      Diabetes Brother      Neurologic Disorder No family hx of        SOCIAL HX:  History     Social History     Marital Status:      Spouse Name: N/A     Number of Children: N/A     Years of Education: N/A     Social History Main Topics     Smoking status: Former Smoker -- 0.20 packs/day for 20 years     Types: Cigarettes     Quit date: 02/24/1974     Smokeless tobacco: Never Used      Comment: Does not remember what year she quit     Alcohol Use: No     Drug Use: No     Sexual Activity: Not on file     Other Topics Concern     Not on file     Social History Narrative     ROS:  Constitutional: No fever, chills, or sweats. No weight gain/loss.   HEENT: No visual disturbance, ear ache, epistaxis, sore throat.   Allergies/Immunologic: Negative.   Respiratory: cough and COREY  Cardiovascular: As per HPI.   GI: No nausea, vomiting, hematemesis, melena, or hematochezia.   : No urinary frequency, dysuria, or  "hematuria.   Integument: No rash.   Psychiatric: No anxiety / depression.   Neuro: No speech disturbance, focal sensory or motor deficit.   Endocrinology: No polyuria / polyphagia.   Musculoskeletal: No myalgia.    VITAL SIGNS:  BP (!) 180/72 (BP Location: Right arm, Patient Position: Chair, Cuff Size: Adult Small)   Pulse 60   Ht 1.549 m (5' 1\")   Wt 39.3 kg (86 lb 9.6 oz)   SpO2 100%   BMI 16.36 kg/m    Body mass index is 16.36 kg/m .  Wt Readings from Last 2 Encounters:   08/21/19 39.3 kg (86 lb 9.6 oz)   08/12/19 38.3 kg (84 lb 6.4 oz)   Extended Vitals not filed for this encounter.    PHYSICAL EXAM  Jennifer Alvarez is a 78 year old female.in no acute distress.  HEENT: Eyes Nonicteric.  Neck: JVP normal.  Carotids +3/3 bilaterally without bruits.  Lungs: prolonged  expiratory phase, diffuse rhonchi. .  Cor: RRR. Normal S1 and S2.  Early systolic murmur 2/6, RUSB.  No rub, or gallop.  PMI in Lf 5th ICS.  Abd: Soft, nontender, nondistended.  NABS.  No pulsatile mass.  Extremities: No C/C/E.  Pulses +3/3 symmetric in upper and lower extremities.  Neuro: Grossly intact.  Psych: A&O x 3.  Skin: No rash.    LABS  Recent Labs   Lab Test 11/14/18  1005 04/05/17  1253 06/10/15  1108 12/03/14  1013   CHOL 217* 236* 191 171   HDL 74 60 57 51   * 131* 90 92   TRIG 118 226* 216* 136   CHOLHDLRATIO  --   --  3.3 3.3     Procedures  NUCLEAR MPI 5/2019:  FINDINGS:  1.  Overall quality of the study: Diagnostic.   2.  Left ventricular cavity is Normal on the rest and stress studies.  3.  SPECT images demonstrate uniform radiotracer uptake of the  myocardium on both stress and rest images..   4.   Left ventricular ejection fraction is 88%. Left ventricular  end-diastolic volume is 50 mL. End-systolic volume is 6 mL.                                                    IMPRESSION:  1.  Normal  myocardial SPECT study with a summed stress score of  0 .   No ischemia or infarct identified. Normal LV function.    Renal US " (12/29/16):  Findings:  The right kidney measures 8.8 cm. The left kidney measures 8.7 cm.  No definite stones, masses or hydronephrosis. Shadowing parenchymal calcification in the midpole of the left kidney may correspond to vascular calcification.    Peak systolic velocities in the abdominal aorta are:   66 cm/sec in the suprarenal aorta.   76 cm/sec in the infrarenal proximal aorta.   103 cm/sec in the infrarenal distal aorta.    Right renal artery:  71 cm/sec at the origin, 70 cm/sec in the proximal artery, 104 cm/sec in the mid artery, and 62 cm/sec at the hilum. Resistive indices in the arcuate arteries vary between 0.67-0.77. RAR of 1.57.     Left renal artery:  52 cm/sec at the origin, 77 cm/sec in the proximal artery, 226 cm/sec in the mid artery, 456 cm/sec at the mid to distal, 105 cm/s and 61 cm/s in the distal renal artery. Resistive indices in the arcuate arteries vary between 0.57-0.64. RAR of 6.9. Focally elevated velocity in the mid to distal renal artery with associated spectral broadening. No post stenotic waveforms in the distal renal artery.     Impression:  1. Left renal artery: Focally elevated velocity in the mid-distal left renal artery with systolic velocity ratio of 6.9. This should suggest severe focal stenosis, but is of uncertain significance in the absence  of post obstructive physiology by waveform evaluation. Consider contrast enhanced cross-sectional examination for further evaluation.  2. No evidence of renal artery stenosis on the right.    ZIOPATCH (Dec 2016): Rare PACs and PVCs and 6 beat atrial tachycardia.  No AF.  Hold off on anticoagulation at this time.    CT Renal Arteries (Jan 2017):  Impression:   1. High grade stenosis of the left renal artery at the ostium. This corresponds with sonographic findings suggesting renal artery stenosis.  2. Hyperdense renal cyst arising from the superior pole the of the left kidney is too small to definitely characterize, but does not  demonstrate significant arterial enhancement. Considerations include hemorrhagic versus proteinaceous cyst.    Ziopatch (12/10/2013)   Predominant underlying rhythm is sinus. Minimum HR 59, maximum 197 with an average of 79 bpm. 7 supraventricular tachycardia runs occurred, the run with fastest interval lasting 4 beats with a max rate of 197 bpm, the longest lasting 19 beats with an avg rate of 120 bpm. Supraventricular Tachycardia was detected within + or - seconds of the patient pressing the trigger button. Some episodes of Supraventricular Tachycardia conducted with possible aberrancy. Possible Atrial and Ventricular Pacing was present. Isolated SVEs, SVE Couplets and SVE triples were rare (0 to < 1.0%). Isolate VEs and VE Couplets were rate (0 to <1%), and no VE triplets were found. Ventricular Bigeminy was present.     ECHO (2/4/19):  Normal left ventricular size and systolic function.   LVEF 64% based on biplane 2D tracing.  Trileaflet aortic sclerosis without stenosis.  No significant valve dysfunction.  Dobutamine ECHO (7/2/2015)  Interpretation Summary  Normal dobutamine stress echo at target heartrate.  No significant valvular abnormality.  ECHO (6/6/2013)  Global and regional left ventricular function is normal with an EF of 60-65%. Global right ventricular function is normal. Pulmonary artery systolic pressure cannot be assessed. The inferior vena cava is normal. No pericardial effusion is present. Mild concentric LVH and mild aortic sclerosis with no significant gradient across the AV.  PFTs (April 2013)  Her FEV1 was 0.57 or 28% of predicted before bronchodilator and 0.95 or 47% of predicted after bronchodilator for 67% improvement. Her FVC was 0.83 or 31% of predicted and increased to 1.33 or 49% of predicted, also 60% increase with a bronchodilator  STRESS ECHO (9/7/2011)   Normal dobutamine echo without infarct or ischemia at adequate rate pressure product. LV size and function change appropriately  with stress.  Coronary angiogram and RHC (5/26/2010)   Pt. is a 68 yo female that presents with acute on chronic dyspnea on exertion and chest pain on exertion.   Risk Factors: Htn, hyperlipidemia, DMII, CKD, x-smoker   Previous revascularization: 2007, mid and apical LAD   Pertinent tests: Normal Echo (hyperdynamic) with no WMA   Summary of findings:   RHC:   Mild pulmonary htn with mild elevated left sided pressures (patient was hydrated the evening before) with a normal CO as determined by KONSTANTIN   LHC:   Right Dominant   LM: Mild distal disease   LAD: Mild disease proximally, patent stents with no evidence of restenosis and normal in the mid and distal portions   LCX: Normal   RCA: Normal   Diagnosis:   Mild pulmonary htn   Mild left sided filling pressures, likely due to hydration overnight   Normal coronaries     ECHO (5/25/2010)   Global and regional left ventricular function is hyperkinetic with an EF >70%. Right ventricular function, chamber size, wall motion, and thickness. are normal. Pulmonary artery systolic pressure is normal. The inferior vena cava is normal. No pericardial effusion is present.   CATH (6/4/2008)   68 yo female referred to the cath lab for CP and SOB over the last few days.   The LM had no disease. Proximal LAD had a 20% stenosis. Mid and distal LAD stents are wide open and no other angiographic disease was noted. The LCx had no significant disease. RCA showed diffuse stenosis of the PL branch   RHC: Normal readings please refer to the hemodynamic section for details.   Konstantin CI: 2.3     CATH (12/19/2007)   68 yo female referred to the cath lab because of a stress test showing ischemia in the ant and inf territories.   Cor angio done with 4Fr JL4 and 3DRC from the [facility] showed a Rt dominant system. The LM had no disease. LMCA had a 20% stenosis. The LAD had an 80% tubular stenosis in the mid segment and another focal 80% stenosis in the distal segment. The LCx and RCA have no significant  disease.   The LAD was successfully intervened on.    STRESS ECHO (12/17/2007)   Markedly abnormal exercise echo with both ecg and echo evidence of ischemia. ECG shows 1-2 mm of ST elevation in L, V1 and V2 as well as <2 mm of ST depression in inferolateral leads. Echo shows ischemia in inferior, lateral, and anterior distributions. LV systolic cavity size fails to decrease. These findings are consistent with mulitvessel disease Results phoned to Paul[facility] BUSTAMANTE    NICS:   12/12/17  IMPRESSION:     Right Vertebral Artery: Antegrade  Left Vertebral Artery: Antegrade    Right Subclavian Artery: Triphasic  Left Subclavian Artery: Triphasic    Right Internal Carotid Artery  Plaque Morphology:  Irregular Heterogenous  50-69%: 125-230cm/s PSV & 40-100cm/s EDV &/or ratio 2-4    Left Internal Carotid Artery  Plaque Morphology:   Smooth Homogeneous  <50%: <125cm/s PSV & <40cm/s EDV &/or ratio <2 (Mild Plaque)    ASSESSMENT AND PLAN:   79 y/o Cauc F w/ h/o CAD s/p PCI 2007, HTN, HLD, DM, CKD, and IgA deficiency with chronic bronchitis who presents for follow-up of her CAD  1. Single vessel CAD s/p LAD PCI 2007  - Patient reports stable angina when she overexerts herself; however, able to walk 10 blocks w/out symptoms  - Recent nuclear MPI without inducible ischemia, continue medical management  - Continue ASA, Plavix, Statin, Coreg and Imdur    2. Palpitations   - S/p dual chamber pacer for sick sinus syndrome  - Interrogation of device raises concern of PAF.  - Continue Coreg 25 mg BID  - Her CHADS-VASC score is 5 corresponded to a stroke/embolic annual rate of 6.7%.   - Ziopach in Dec 2016 showed rare PACs and PVCs and 6 beat atrial tachycardia.  No AF.  - If AF found, I would recommend NOAC given the large number of medications and the potential interaction with coumadin.  - Recheck pacemaker interrogation per protocol.  3. HLD  - Last  in 11/2018  - Continue Lipitor 40 mg every day  - Fasting lipids September 3rd when she  returns for IgA infusion  4. HTN  - Remains hypertensive, BP today 180/72  - Continue Coreg to 25 mg BID and HCTZ 25 every day  - Increase lisinopril to 20 mg twice a day   - See PCP in 2 weeks for ongoing management, could consider switching to chlorthalidone vs starting spironolactone  5. Carotid Artery Disease  - Intermediate on RT ICA (50-69%)  - No indication for intervention  - ASA and statin    FOLLOW UP: 6 months    CINDY Duncan, CNP  Cardiology Nurse Practitioner  [facility]  Clinic: [phone]  [facility]: [phone]

## 2019-08-21 NOTE — NURSING NOTE
Vitals completed successfully and medication reconciled.     Lamar Delgado CMA  11:22 AM  Chief Complaint   Patient presents with     Follow Up     6 month follow up ongoing evaluation of CAD with device check prior- medication check and BP readings

## 2019-08-21 NOTE — PATIENT INSTRUCTIONS
You were seen today in the Cardiovascular Clinic at the [facility].    Cardiology provider you saw during your visit Shawna Potts NP.    1. Your blood pressure if high today.  2. Continue coreg and hydrochlorothiazide.  3. Increase lisinopril to 20 mg twice daily  4. See your primary care provider in 2 weeks for ongoing management, could consider switching hydrochlorothiazide to chlorthalidone or adding spironolactone.  5. Have fasting lipids drawn on 9/3/19.  6. Please make a follow-up appointment in 6 months.     Questions and [phone].   First press #1 for the aaTag and then press #3 for Medical Questions to reach the Cardiology triage nurse.     On Call Cardiologist for after hours or on weekends: [phone], press option #4 and ask to speak to the on-call Cardiologist.

## 2019-08-21 NOTE — LETTER
8/21/2019      RE: Jennifer Alvarez  [address]  [place] [address]       Dear Colleague,    Thank you for the opportunity to participate in the care of your patient, Jennifer Alvarez, at the [facility]. Please see a copy of my visit note below.    CARDIOLOGY CLINIC FOLLOW UP  August 21, 2019      HPI: Ms. Jennifer Alvarez is a 79 yo woman with a Hx of single vessel CAD (LAD), s/p stenting of the mid and distal LAD following a positive stress nuclear study (December 2007), s/p dual chamber pacemaker (January 2008) for sick sinus syndrome, DIAZ s/p LF renal artery stenting 2016 w/repeat angio 2018 showing patent stents, HTN, HLD and former smoker who returns to the clinic today for ongoing evaluation of CAD.     Her cardiac history dates back to 2007 when she was found to have 1V CAD with PCI to mid and distal LAD. A repeat coronary angiogram in 2008 revealed patent stents and non-obstructive CAD. She had coronary angiography (May 2010) showing minimal CAD with patent stents in the mid and distal LAD. RHC showed mild elevation of PA pressures and left sided filling pressures. She had three hospitalizations in 2010, including two ER visits for tachyarrhythmia. She had a dobutamine stress ECHO (Sept 2011) that was negative for any inducible ischemia. LV function was normal at baseline and augmented appropriately with stress. A repeat echocardiogram in June 2013 revealed a normal global and regional left ventricular function with an EF of 60-65% and normal right ventricular function. There was mild concentric LVH and mild aortic sclerosis with no significant gradient across AV.  She had a negative dobutamine ECHO in July 2015. She was last evaluated by Dr. Adair in February 2019 and reported increasing exertional chest pain and dyspnea. Her antianginals were uptitrated and she underwent nucllear stress testing demonstrating no evidence of inducible  ischemia.     Patient states that she continues to experience occasional chest discomfort and exertional dyspnea but only when she overexerts herself such as going up / down stairs. She describes the discomfort as a pressure sensation located substernal, nonradiating.The episodes last up to 30 minutes but resolve with rest. She has taken SL NTG on 1 occasion and the pain improved. She states that the episodes have become less frequent since she was seen in clinic 6 months ago, now occurring about 3 times a week. She remains active walking 10 blocks one way to and from the mailbox on a daily basis. She reports some fatigue with this but no chest pain or shortness of breath. She denies orthopnea, PND or leg swelling. She denies palpitations, and syncope. She has had orthostatic lightheadedness. She reports leg cramps at night, no claudication.  She is tolerating her cardiac medications including ASA, Plavix, atorvastatin 40 mg, coreg 25 mg BID, Imdur 60 mg daily, lisinopril 10 mg BID and hydrochlorothiazide 25 mg daily. She reports bleeding more than normal when she gets a cut but it eventually stops. Her home BP have been running high in the 140-160s/70s, today in clinic 180/70.      PAST MEDICAL HISTORY:  Past Medical History:   Diagnosis Date     Abdominal pain     cramping     Arthritis      CAD (coronary artery disease)     stent x2, ppm     Cancer (H)     skin     Cardiac pacemaker      CC (Crohn's colitis) (H)      Chronic kidney disease      Diabetes (H)     DM type 2, oral agent     GERD (gastroesophageal reflux disease)      History of blood transfusion      HTN (hypertension)      Hyperlipidemia LDL goal <70 11/19/2013     IgA deficiency (H)      Neck pain 12/31/2014     Uncomplicated asthma      Weight loss        CURRENT MEDICATIONS:  Current Outpatient Medications   Medication Sig Dispense Refill     acetaminophen (TYLENOL) 325 MG tablet Take 1-2 tablets by mouth every 6 hours as needed.       albuterol  (2.5 MG/3ML) 0.083% nebulizer solution Take 1 ampule by nebulization every 6 hours as needed.       albuterol (PROAIR HFA/PROVENTIL HFA/VENTOLIN HFA) 108 (90 Base) MCG/ACT Inhaler Take 2 puffs as needed for shortness of breath 1 Inhaler 12     albuterol (PROAIR HFA/PROVENTIL HFA/VENTOLIN HFA) 108 (90 BASE) MCG/ACT Inhaler Take 2 puffs prn for wheezing or shortness of breath 1 Inhaler 12     aspirin 81 MG tablet Take 1 tablet by mouth daily.       atorvastatin (LIPITOR) 40 MG tablet Take 1 tablet (40 mg) by mouth daily (Patient not taking: Reported on 8/12/2019) 90 tablet 3     blood glucose monitoring (TERRY CONTOUR NEXT) test strip by In Vitro route 3 times daily Use to test blood sugar 3 times daily or as directed. 270 each 3     blood glucose monitoring (TERRY MICROLET) lancets Use to test blood sugar 3 times daily or as directed. 3 Box 3     Blood Pressure KIT daily.       carvedilol (COREG) 25 MG tablet Take 1 tablet (25 mg) by mouth 2 times daily (with meals) 180 tablet 3     clopidogrel (PLAVIX) 75 MG tablet Take 1 tablet (75 mg) by mouth daily 90 tablet 3     dicyclomine (BENTYL) 10 MG capsule Take 10 mg by mouth 4 times daily (before meals and nightly).       diphenhydrAMINE (BENADRYL) 50 MG capsule Take 1 capsule (50 mg) by mouth See Admin Instructions for 1 dose Take 50 mg the morning of the procedure 1 capsule 0     diphenhydrAMINE (BENADRYL) 50 MG capsule Take 1 capsule (50 mg) by mouth See Admin Instructions Take 50 mg the morning of the procedure 1 capsule 0     FISH OIL Unsure of dosage take two caps daily       fluticasone-salmeterol (ADVAIR) 250-50 MCG/DOSE diskus inhaler Inhale 1 puff into the lungs 2 times daily 1 Inhaler 12     fluticasone-salmeterol (ADVAIR) 250-50 MCG/DOSE diskus inhaler Inhale 1 puff into the lungs 2 times daily. 1 Inhaler 12     hydrochlorothiazide (HYDRODIURIL) 25 MG tablet Take 1 tablet (25 mg) by mouth daily 90 tablet 3     hydrocodone-acetaminophen (LORCET)  MG per  tablet Take 1 tablet by mouth every 6 hours as needed.       Immune Globulin, Human, 25 GM/500ML SOLN Inject 25 g into the vein See Admin Instructions. 25 grams every 3 weeks IVIG       isosorbide mononitrate (IMDUR) 60 MG 24 hr tablet Take 1 tablet (60 mg) by mouth daily 90 tablet 3     lisinopril (PRINIVIL/ZESTRIL) 10 MG tablet Take 1 tablet (10 mg) by mouth 2 times daily 180 tablet 3     meperidine (DEMEROL) 25 MG/ML injection Inject 25 mg into the vein as needed. 25 mg IV prior to IgG infusion       metFORMIN (GLUCOPHAGE) 1000 MG tablet Take 1,000 mg by mouth daily (with dinner)   0     nitroglycerin (NITROQUICK) 0.4 MG sublingual tablet Place 1 tablet (0.4 mg) under the tongue every 5 minutes as needed 25 tablet 3     omeprazole (PRILOSEC) 20 MG capsule Take 1 capsule (20 mg) by mouth 2 times daily 60 capsule 6     ONE TOUCH FINE POINT LANCETS 2 times daily.       ONE TOUCH ULTRA TEST strip Test blood sugar twice a day. 200 strip 3     predniSONE (DELTASONE) 10 MG tablet Take 4 tablets for 3 days, then 3 tablets for 3 days, then 2 tablets for 3 days, then 1 tablet for 3 days, then off. 30 tablet 3     tiotropium (SPIRIVA HANDIHALER) 18 MCG capsule One puff every day 30 capsule 6     tiotropium (SPIRIVA HANDIHALER) 18 MCG inhalation capsule One puff every day 30 capsule 6       PAST SURGICAL HISTORY:  Past Surgical History:   Procedure Laterality Date     APPENDECTOMY       C LAP,SURG,COLECTOMY, PARTIAL, W/ANAST      partial colectomy;diverticulitis     cardiac stents      x 2     CARPAL TUNNEL RELEASE RT/LT      bilateral     CHOLECYSTECTOMY       COLONOSCOPY       COLONOSCOPY N/A 11/9/2015    Procedure: COMBINED COLONOSCOPY, SINGLE OR MULTIPLE BIOPSY/POLYPECTOMY BY BIOPSY;  Surgeon: Victor Hugo Turner MD;  Location:  GI     ENT SURGERY      back throat     ESOPHAGEAL MOTILITY STUDY  5-5-15     ESOPHAGOSCOPY, GASTROSCOPY, DUODENOSCOPY (EGD), COMBINED Left 5/13/2015    Procedure: COMBINED ESOPHAGOSCOPY,  GASTROSCOPY, DUODENOSCOPY (EGD), BIOPSY SINGLE OR MULTIPLE;  Surgeon: Dipesh Bobby MD;  [facility]     ESOPHAGOSCOPY, GASTROSCOPY, DUODENOSCOPY (EGD), COMBINED N/A 11/6/2015    Procedure: COMBINED ESOPHAGOSCOPY, GASTROSCOPY, DUODENOSCOPY (EGD), BIOPSY SINGLE OR MULTIPLE;  Surgeon: Victor Hugo Turner MD;  Location: [facility]     FOOT SURGERY      left     HC ESOPH/GAS REFLUX TEST W NASAL IMPED >1 HR N/A 5/5/2015    Procedure: ESOPHAGEAL IMPEDENCE FUNCTION TEST WITH 24 HOUR PH GREATER THAN 1 HOUR;  Surgeon: Dipesh Bobby MD;  Location: [facility]     IMPLANT PACEMAKER      PPM     IR RENAL/VISCERAL STENT/ATHERECT/PTA Left 07/2017     IR VISCERAL ANGIOGRAM  1/4/2019     LAPAROTOMY EXPLORATORY       NISSEN FUNDOPLICATION      x 2     SHOULDER SURGERY      right     UPPER GI ENDOSCOPY       WRIST SURGERY      right cyst       ALLERGIES  Bees; Codeine sulfate; Contrast dye; Milk products; Morphine sulfate; Penicillins; Zinacef [cefuroxime sodium]; Influenza vaccine live; Pneumovax [pneumococcal polysaccharides]; Procardia [nifedipine]; and Barium    FAMILY HX:  Family History   Problem Relation Age of Onset     Ovarian Cancer Mother      Stomach Cancer Father      Heart Disease Father      Diabetes Father      Ovarian Cancer Sister      Leukemia Brother      Ovarian Cancer Sister      Diabetes Brother      Diabetes Brother      Neurologic Disorder No family hx of        SOCIAL HX:  History     Social History     Marital Status:      Spouse Name: N/A     Number of Children: N/A     Years of Education: N/A     Social History Main Topics     Smoking status: Former Smoker -- 0.20 packs/day for 20 years     Types: Cigarettes     Quit date: 02/24/1974     Smokeless tobacco: Never Used      Comment: Does not remember what year she quit     Alcohol Use: No     Drug Use: No     Sexual Activity: Not on file     Other Topics Concern     Not on file     Social History Narrative     ROS:  Constitutional: No fever,  "chills, or sweats. No weight gain/loss.   HEENT: No visual disturbance, ear ache, epistaxis, sore throat.   Allergies/Immunologic: Negative.   Respiratory: cough and COREY  Cardiovascular: As per HPI.   GI: No nausea, vomiting, hematemesis, melena, or hematochezia.   : No urinary frequency, dysuria, or hematuria.   Integument: No rash.   Psychiatric: No anxiety / depression.   Neuro: No speech disturbance, focal sensory or motor deficit.   Endocrinology: No polyuria / polyphagia.   Musculoskeletal: No myalgia.    VITAL SIGNS:  BP (!) 180/72 (BP Location: Right arm, Patient Position: Chair, Cuff Size: Adult Small)   Pulse 60   Ht 1.549 m (5' 1\")   Wt 39.3 kg (86 lb 9.6 oz)   SpO2 100%   BMI 16.36 kg/m     Body mass index is 16.36 kg/m .  Wt Readings from Last 2 Encounters:   08/21/19 39.3 kg (86 lb 9.6 oz)   08/12/19 38.3 kg (84 lb 6.4 oz)   Extended Vitals not filed for this encounter.    PHYSICAL EXAM  Jennifer Alvarez is a 78 year old female.in no acute distress.  HEENT: Eyes Nonicteric.  Neck: JVP normal.  Carotids +3/3 bilaterally without bruits.  Lungs: prolonged  expiratory phase, diffuse rhonchi. .  Cor: RRR. Normal S1 and S2.  Early systolic murmur 2/6, RUSB.  No rub, or gallop.  PMI in Lf 5th ICS.  Abd: Soft, nontender, nondistended.  NABS.  No pulsatile mass.  Extremities: No C/C/E.  Pulses +3/3 symmetric in upper and lower extremities.  Neuro: Grossly intact.  Psych: A&O x 3.  Skin: No rash.    LABS  Recent Labs   Lab Test 11/14/18  1005 04/05/17  1253 06/10/15  1108 12/03/14  1013   CHOL 217* 236* 191 171   HDL 74 60 57 51   * 131* 90 92   TRIG 118 226* 216* 136   CHOLHDLRATIO  --   --  3.3 3.3     Procedures  NUCLEAR MPI 5/2019:  FINDINGS:  1.  Overall quality of the study: Diagnostic.   2.  Left ventricular cavity is Normal on the rest and stress studies.  3.  SPECT images demonstrate uniform radiotracer uptake of the  myocardium on both stress and rest images..   4.   Left ventricular " ejection fraction is 88%. Left ventricular  end-diastolic volume is 50 mL. End-systolic volume is 6 mL.                                                    IMPRESSION:  1.  Normal  myocardial SPECT study with a summed stress score of  0 .   No ischemia or infarct identified. Normal LV function.    Renal US (12/29/16):  Findings:  The right kidney measures 8.8 cm. The left kidney measures 8.7 cm.  No definite stones, masses or hydronephrosis. Shadowing parenchymal calcification in the midpole of the left kidney may correspond to vascular calcification.    Peak systolic velocities in the abdominal aorta are:   66 cm/sec in the suprarenal aorta.   76 cm/sec in the infrarenal proximal aorta.   103 cm/sec in the infrarenal distal aorta.    Right renal artery:  71 cm/sec at the origin, 70 cm/sec in the proximal artery, 104 cm/sec in the mid artery, and 62 cm/sec at the hilum. Resistive indices in the arcuate arteries vary between 0.67-0.77. RAR of 1.57.     Left renal artery:  52 cm/sec at the origin, 77 cm/sec in the proximal artery, 226 cm/sec in the mid artery, 456 cm/sec at the mid to distal, 105 cm/s and 61 cm/s in the distal renal artery. Resistive indices in the arcuate arteries vary between 0.57-0.64. RAR of 6.9. Focally elevated velocity in the mid to distal renal artery with associated spectral broadening. No post stenotic waveforms in the distal renal artery.     Impression:  1. Left renal artery: Focally elevated velocity in the mid-distal left renal artery with systolic velocity ratio of 6.9. This should suggest severe focal stenosis, but is of uncertain significance in the absence  of post obstructive physiology by waveform evaluation. Consider contrast enhanced cross-sectional examination for further evaluation.  2. No evidence of renal artery stenosis on the right.    ZIOPATCH (Dec 2016): Rare PACs and PVCs and 6 beat atrial tachycardia.  No AF.  Hold off on anticoagulation at this time.    CT Renal  Arteries (Jan 2017):  Impression:   1. High grade stenosis of the left renal artery at the ostium. This corresponds with sonographic findings suggesting renal artery stenosis.  2. Hyperdense renal cyst arising from the superior pole the of the left kidney is too small to definitely characterize, but does not demonstrate significant arterial enhancement. Considerations include hemorrhagic versus proteinaceous cyst.    Ziopatch (12/10/2013)   Predominant underlying rhythm is sinus. Minimum HR 59, maximum 197 with an average of 79 bpm. 7 supraventricular tachycardia runs occurred, the run with fastest interval lasting 4 beats with a max rate of 197 bpm, the longest lasting 19 beats with an avg rate of 120 bpm. Supraventricular Tachycardia was detected within + or - seconds of the patient pressing the trigger button. Some episodes of Supraventricular Tachycardia conducted with possible aberrancy. Possible Atrial and Ventricular Pacing was present. Isolated SVEs, SVE Couplets and SVE triples were rare (0 to < 1.0%). Isolate VEs and VE Couplets were rate (0 to <1%), and no VE triplets were found. Ventricular Bigeminy was present.     ECHO (2/4/19):  Normal left ventricular size and systolic function.   LVEF 64% based on biplane 2D tracing.  Trileaflet aortic sclerosis without stenosis.  No significant valve dysfunction.  Dobutamine ECHO (7/2/2015)  Interpretation Summary  Normal dobutamine stress echo at target heartrate.  No significant valvular abnormality.  ECHO (6/6/2013)  Global and regional left ventricular function is normal with an EF of 60-65%. Global right ventricular function is normal. Pulmonary artery systolic pressure cannot be assessed. The inferior vena cava is normal. No pericardial effusion is present. Mild concentric LVH and mild aortic sclerosis with no significant gradient across the AV.  PFTs (April 2013)  Her FEV1 was 0.57 or 28% of predicted before bronchodilator and 0.95 or 47% of predicted after  bronchodilator for 67% improvement. Her FVC was 0.83 or 31% of predicted and increased to 1.33 or 49% of predicted, also 60% increase with a bronchodilator  STRESS ECHO (9/7/2011)   Normal dobutamine echo without infarct or ischemia at adequate rate pressure product. LV size and function change appropriately with stress.  Coronary angiogram and RHC (5/26/2010)   Pt. is a 70 yo female that presents with acute on chronic dyspnea on exertion and chest pain on exertion.   Risk Factors: Htn, hyperlipidemia, DMII, CKD, x-smoker   Previous revascularization: 2007, mid and apical LAD   Pertinent tests: Normal Echo (hyperdynamic) with no WMA   Summary of findings:   RHC:   Mild pulmonary htn with mild elevated left sided pressures (patient was hydrated the evening before) with a normal CO as determined by KONSTANTIN   LHC:   Right Dominant   LM: Mild distal disease   LAD: Mild disease proximally, patent stents with no evidence of restenosis and normal in the mid and distal portions   LCX: Normal   RCA: Normal   Diagnosis:   Mild pulmonary htn   Mild left sided filling pressures, likely due to hydration overnight   Normal coronaries     ECHO (5/25/2010)   Global and regional left ventricular function is hyperkinetic with an EF >70%. Right ventricular function, chamber size, wall motion, and thickness. are normal. Pulmonary artery systolic pressure is normal. The inferior vena cava is normal. No pericardial effusion is present.   CATH (6/4/2008)   66 yo female referred to the cath lab for CP and SOB over the last few days.   The LM had no disease. Proximal LAD had a 20% stenosis. Mid and distal LAD stents are wide open and no other angiographic disease was noted. The LCx had no significant disease. RCA showed diffuse stenosis of the PL branch   RHC: Normal readings please refer to the hemodynamic section for details.   Konstantin CI: 2.3     CATH (12/19/2007)   66 yo female referred to the cath lab because of a stress test showing  ischemia in the ant and inf territories.   Cor angio done with 4Fr JL4 and 3DRC from the RFA showed a Rt dominant system. The LM had no disease. LMCA had a 20% stenosis. The LAD had an 80% tubular stenosis in the mid segment and another focal 80% stenosis in the distal segment. The LCx and RCA have no significant disease.   The LAD was successfully intervened on.    STRESS ECHO (12/17/2007)   Markedly abnormal exercise echo with both ecg and echo evidence of ischemia. ECG shows 1-2 mm of ST elevation in L, V1 and V2 as well as <2 mm of ST depression in inferolateral leads. Echo shows ischemia in inferior, lateral, and anterior distributions. LV systolic cavity size fails to decrease. These findings are consistent with mulitvessel disease Results phoned to Paulino BUSTAMANTE    NICS:   12/12/17  IMPRESSION:     Right Vertebral Artery: Antegrade  Left Vertebral Artery: Antegrade    Right Subclavian Artery: Triphasic  Left Subclavian Artery: Triphasic    Right Internal Carotid Artery  Plaque Morphology:  Irregular Heterogenous  50-69%: 125-230cm/s PSV & 40-100cm/s EDV &/or ratio 2-4    Left Internal Carotid Artery  Plaque Morphology:   Smooth Homogeneous  <50%: <125cm/s PSV & <40cm/s EDV &/or ratio <2 (Mild Plaque)    ASSESSMENT AND PLAN:   79 y/o Cauc F w/ h/o CAD s/p PCI 2007, HTN, HLD, DM, CKD, and IgA deficiency with chronic bronchitis who presents for follow-up of her CAD  1. Single vessel CAD s/p LAD PCI 2007  - Patient reports stable angina when she overexerts herself; however, able to walk 10 blocks w/out symptoms  - Recent nuclear MPI without inducible ischemia, continue medical management  - Continue ASA, Plavix, Statin, Coreg and Imdur    2. Palpitations   - S/p dual chamber pacer for sick sinus syndrome  - Interrogation of device raises concern of PAF.  - Continue Coreg 25 mg BID  - Her CHADS-VASC score is 5 corresponded to a stroke/embolic annual rate of 6.7%.   - Ziopach in Dec 2016 showed rare PACs and PVCs and 6  beat atrial tachycardia.  No AF.  - If AF found, I would recommend NOAC given the large number of medications and the potential interaction with coumadin.  - Recheck pacemaker interrogation per protocol.  3. HLD  - Last  in 11/2018  - Continue Lipitor 40 mg every day  - Fasting lipids September 3rd when she returns for IgA infusion  4. HTN  - Remains hypertensive, BP today 180/72  - Continue Coreg to 25 mg BID and HCTZ 25 every day  - Increase lisinopril to 20 mg twice a day   - See PCP in 2 weeks for ongoing management, could consider switching to chlorthalidone vs starting spironolactone  5. Carotid Artery Disease  - Intermediate on RT ICA (50-69%)  - No indication for intervention  - ASA and statin     FOLLOW UP: 6 months    CINDY Duncan, CNP  Cardiology Nurse Practitioner  [facility]  Clinic: [phone]  Hospital: [phone]

## 2019-10-03 ENCOUNTER — HEALTH MAINTENANCE LETTER (OUTPATIENT)
Age: 79
End: 2019-10-03

## 2021-01-01 NOTE — MR AVS SNAPSHOT
After Visit Summary   5/16/2018    Jennifer Alvarez    MRN: [id number]           Patient Information     Date Of Birth          1940        Visit Information        Provider Department      5/16/2018 1:30 PM 1, Uc Cv Device [facility]        Today's Diagnoses     Sinoatrial node dysfunction (H)    -  1      Care Instructions    It was a pleasure to see you in clinic today.  Please do not hesitate to call with any questions or concerns.  You are scheduled for a remote transmission on 8/20/18.  We look forward to seeing you in clinic at your next device check in 6 months.    Linn Bradshaw, RN, MS, CCRN  Electrophysiology Nurse Clinician  [facility]    During Business Hours Please Call:  [phone]  After Hours Please Call:  [phone] - select option #4 and ask for job code [phone]                        Follow-ups after your visit        Additional Services     Follow-Up with Device Clinic-6 months                 Your next 10 appointments already scheduled     May 16, 2018  2:00 PM CDT   (Arrive by 1:45 PM)   RETURN ARRHYTHMIA with CINDY Escudero [facility] ([facility])    [address] [place] [address]  [address] [place] [address]   [phone]            May 25, 2018 11:00 AM CDT   Infusion 300 with UC SPEC INFUSION, UC 44 ATC   [facility] ([facility])    [address]  [address]  [facility] [address]   [phone]            Ian 15, 2018 11:00 AM CDT   Infusion 300 with UC SPEC INFUSION, UC 44 ATC   [facility] ([facility])    [address] [place] [address]  [address]  [facility] [address]   [phone]            Jul 06, 2018 11:00 AM CDT   Infusion 300 with UC SPEC INFUSION, UC 44 ATC   [facility] (    Problem: Physiological Stability  Goal: Remains free of signs and symptoms of infection  Outcome: Outcome Met, Continue evaluating goal progress toward completion  Goal: Parent / caregiver verbalizes understanding of NICU care related to patient-specific condition and treatment  Description: Document on Patient Education Activity  Outcome: Outcome Met, Continue evaluating goal progress toward completion     Problem: Pain  Goal: Acceptable level of comfort exhibited by infant (based on N-Pass/NIPS Scoring)  Outcome: Outcome Met, Continue evaluating goal progress toward completion     Problem: Oxygenation/Respiratory Function  Goal: Parent / caregiver verbalizes understanding of infant's respiratory condition and treatment  Description: Document on Patient Education Activity  Outcome: Outcome Met, Continue evaluating goal progress toward completion     Problem: Skin Integrity  Goal: Skin integrity is maintained or improved (skin will be intact without erythma or breakdown)  Outcome: Outcome Met, Continue evaluating goal progress toward completion     Problem: Alteration in Family Bonding  Goal: Family Interaction is supported  Outcome: Outcome Met, Continue evaluating goal progress toward completion  Goal: Family demonstrates appropriate coping mechanisms  Outcome: Outcome Met, Continue evaluating goal progress toward completion     Problem: Nutrition  Goal: Tolerates feedings  Outcome: Outcome Met, Continue evaluating goal progress toward completion  Goal: Consumes sufficient dietary intake without complications  Outcome: Outcome Met, Continue evaluating goal progress toward completion  Goal: Achieves catch up weight gain and growth consistent with birth weight percentile  Outcome: Outcome Met, Continue evaluating goal progress toward completion     Problem: Physiological Stability  Goal: Vital signs and physical assessments stable and within expected parameters  Outcome: Outcome Not Met, Continue to Monitor      Problem: Thermoregulation  Goal: Body temperature maintained within normal range  Outcome: Outcome Not Met, Continue to Monitor     Problem: Oxygenation/Respiratory Function  Goal: Optimized respiratory function and gas exchange  Outcome: Outcome Not Met, Continue to Monitor     Problem: Nutrition  Goal: Progresses toward ability to receive all feeding from breast or bottle  Outcome: Outcome Not Met, Continue to Monitor      [facility] [place])    [address]  [address]  [facility] [address]   [phone]            Jul 27, 2018 11:00 AM CDT   Infusion 300 with UC SPEC INFUSION, UC 44 ATC   [facility] [place] [facility] ([facility])    [address]  [address]  [facility] [address]   [phone]            Aug 14, 2018 10:45 AM CDT   (Arrive by 10:30 AM)   Return Visit with CINDY Ortiz CNP   [facility] ([facility])    [address]  [address]  [facility] [address] [phone]              Future tests that were ordered for you today     Open Future Orders        Priority Expected Expires Ordered    Follow-Up with Device Clinic-6 months Routine 11/12/2018 2/10/2019 5/16/2018    CBC with platelets Routine 11/16/2018 5/16/2019 5/16/2018    Basic metabolic panel Routine 11/16/2018 5/16/2019 5/16/2018    Hepatic panel Routine 11/16/2018 5/16/2019 5/16/2018    Spirometry, Breathing Capacity Routine 11/16/2018 5/16/2019 5/16/2018    HC DIFFUSING CAPACITY Routine 11/16/2018 5/16/2019 5/16/2018    Immunoglobulins A G and M Routine 11/16/2018 5/16/2019 5/16/2018    Immunoglobulin G subclasses Routine 11/16/2018 5/16/2019 5/16/2018    IgG Routine 11/16/2018 5/16/2019 5/16/2018            Who to contact     If you have questions or need follow up information about today's clinic visit or your schedule please contact [facility] directly at [phone].  Normal or non-critical lab and imaging results will be communicated to you by MyChart, letter or phone within 4 business days after the clinic has received the results. If you do not hear from us within 7 days, please contact the clinic through MyChart or phone. If you have a critical or abnormal lab result, we will notify you by phone as soon as possible.  Submit refill requests through EPIS or call your pharmacy and they will forward  the refill request to us. Please allow 3 business days for your refill to be completed.          Additional Information About Your Visit        [employer] Information     [employer] gives you secure access to your electronic health record. If you see a primary care provider, you can also send messages to your care team and make appointments. If you have questions, please call your primary care clinic.  If you do not have a primary care provider, please call [phone] and they will assist you.        Care EveryWhere ID     This is your Care EveryWhere ID. This could be used by other organizations to access your [place] medical records  [id number]         Blood Pressure from Last 3 Encounters:   05/16/18 169/80   04/13/18 129/58   03/23/18 134/69    Weight from Last 3 Encounters:   05/16/18 42.2 kg (93 lb)   04/13/18 41.7 kg (92 lb)   03/02/18 42.4 kg (93 lb 6.4 oz)              We Performed the Following     PM DEVICE PROGRAMMING EVAL, DUAL LEAD PACER          Today's Medication Changes          These changes are accurate as of 5/16/18  1:32 PM.  If you have any questions, ask your nurse or doctor.               Start taking these medicines.        Dose/Directions    azithromycin 250 MG tablet   Commonly known as:  ZITHROMAX   Used for:  Mixed simple and mucopurulent chronic bronchitis (H), Gastroesophageal reflux disease without esophagitis   Started by:  Maxx Elizabeth MD        Take as directed   Quantity:  6 tablet   Refills:  1         These medicines have changed or have updated prescriptions.        Dose/Directions    * albuterol 108 (90 Base) MCG/ACT Inhaler   Commonly known as:  PROAIR HFA/PROVENTIL HFA/VENTOLIN HFA   This may have changed:    - Another medication with the same name was added. Make sure you understand how and when to take each.  - Another medication with the same name was removed. Continue taking this medication, and follow the directions you see here.   Used for:  Wheezing   Changed by:   Maxx Elizabeth MD        Take 2 puffs prn for wheezing or shortness of breath   Quantity:  1 Inhaler   Refills:  12       * albuterol 108 (90 Base) MCG/ACT Inhaler   Commonly known as:  PROAIR HFA/PROVENTIL HFA/VENTOLIN HFA   This may have changed:  You were already taking a medication with the same name, and this prescription was added. Make sure you understand how and when to take each.   Used for:  Mixed simple and mucopurulent chronic bronchitis (H), Gastroesophageal reflux disease without esophagitis   Replaces:  albuterol (2.5 MG/3ML) 0.083% neb solution   Changed by:  Maxx Elizabeth MD        Take 2 puffs as needed for shortness of breath   Quantity:  1 Inhaler   Refills:  12       * albuterol (2.5 MG/3ML) 0.083% neb solution   This may have changed:    - Another medication with the same name was added. Make sure you understand how and when to take each.  - Another medication with the same name was removed. Continue taking this medication, and follow the directions you see here.   Changed by:  Maxx Elizabeth MD        Dose:  1 ampule   Take 1 ampule by nebulization every 6 hours as needed.   Refills:  0       * fluticasone-salmeterol 250-50 MCG/DOSE diskus inhaler   Commonly known as:  ADVAIR   This may have changed:  Another medication with the same name was added. Make sure you understand how and when to take each.   Used for:  Unspecified chronic bronchitis   Changed by:  Maxx Elizabeth MD        Dose:  1 puff   Inhale 1 puff into the lungs 2 times daily.   Quantity:  1 Inhaler   Refills:  12       * fluticasone-salmeterol 250-50 MCG/DOSE diskus inhaler   Commonly known as:  ADVAIR   This may have changed:  You were already taking a medication with the same name, and this prescription was added. Make sure you understand how and when to take each.   Used for:  Mixed simple and mucopurulent chronic bronchitis (H), Gastroesophageal reflux disease without esophagitis   Changed by:  Glenn  Maxx Marmolejo MD        Dose:  1 puff   Inhale 1 puff into the lungs 2 times daily   Quantity:  1 Inhaler   Refills:  12       * omeprazole 20 MG CR capsule   Commonly known as:  priLOSEC   This may have changed:  Another medication with the same name was added. Make sure you understand how and when to take each.   Used for:  Esophageal reflux, Unspecified chronic bronchitis   Changed by:  Maxx Elizabeth MD        Dose:  20 mg   Take 1 capsule (20 mg) by mouth 2 times daily   Quantity:  60 capsule   Refills:  6       * omeprazole 20 MG CR capsule   Commonly known as:  priLOSEC   This may have changed:  You were already taking a medication with the same name, and this prescription was added. Make sure you understand how and when to take each.   Used for:  Mixed simple and mucopurulent chronic bronchitis (H), Gastroesophageal reflux disease without esophagitis   Changed by:  Maxx Elizabeth MD        Dose:  20 mg   Take 1 capsule (20 mg) by mouth daily   Quantity:  60 capsule   Refills:  6       * predniSONE 20 MG tablet   Commonly known as:  DELTASONE   This may have changed:  Another medication with the same name was added. Make sure you understand how and when to take each.   Used for:  Allergic reaction to contrast dye   Changed by:  Maxx Elizabeth MD        Patient to take 60 mg the evening before the procedure and 30 mg the morning of the procedure.   Quantity:  5 tablet   Refills:  0       * predniSONE 10 MG tablet   Commonly known as:  DELTASONE   This may have changed:  You were already taking a medication with the same name, and this prescription was added. Make sure you understand how and when to take each.   Used for:  Mixed simple and mucopurulent chronic bronchitis (H), Gastroesophageal reflux disease without esophagitis   Changed by:  Maxx Elizabeth MD        4 tablets by mouth each day for 3 days, then 3 tablets each day for 3 days, then 2 tablets each day for 3 days, then 1 tablet each  day for 3 days, then off   Quantity:  30 tablet   Refills:  1       * tiotropium 18 MCG capsule   Commonly known as:  SPIRIVA HANDIHALER   This may have changed:  Another medication with the same name was added. Make sure you understand how and when to take each.   Used for:  Unspecified chronic bronchitis   Changed by:  Maxx Elizabeth MD        One puff every day   Quantity:  30 capsule   Refills:  6       * tiotropium 18 MCG capsule   Commonly known as:  SPIRIVA HANDIHALER   This may have changed:  You were already taking a medication with the same name, and this prescription was added. Make sure you understand how and when to take each.   Used for:  Mixed simple and mucopurulent chronic bronchitis (H), Gastroesophageal reflux disease without esophagitis   Changed by:  Maxx Elizabeth MD        One puff every day   Quantity:  30 capsule   Refills:  6       * Notice:  This list has 11 medication(s) that are the same as other medications prescribed for you. Read the directions carefully, and ask your doctor or other care provider to review them with you.      Stop taking these medicines if you haven't already. Please contact your care team if you have questions.     albuterol (2.5 MG/3ML) 0.083% neb solution   Replaced by:  albuterol 108 (90 Base) MCG/ACT Inhaler   You also have another medication with the same name that you need to continue taking as instructed.   Stopped by:  Maxx Elizabeth MD                Where to get your medicines      These medications were sent to [address] [address]     Phone:  [phone]     albuterol 108 (90 Base) MCG/ACT Inhaler    azithromycin 250 MG tablet    fluticasone-salmeterol 250-50 MCG/DOSE diskus inhaler    omeprazole 20 MG CR capsule    tiotropium 18 MCG capsule         Some of these will need a paper prescription and others can be bought over the counter.  Ask your nurse if you have questions.      Bring a paper prescription for each of these medications     predniSONE 10 MG tablet                Primary Care Provider Office Phone # Fax #    Brandi Burks [phone]       [facility] [address]   [facility] [address]        Equal Access to Services     GUERA HOLT : Hadii aad ku hadlamonto Soomaali, waaxda luqadaha, qaybta kaalmada adeegyada, chester hawthornen andrzejmark garduno sascha lovelace. So [facility] [phone].    ATENCIÓN: Si habla español, tiene a taylor disposición servicios gratuitos de asistencia lingüística. Llame al [phone].    We comply with applicable federal civil rights laws and [place] laws. We do not discriminate on the basis of race, color, national origin, age, disability, sex, sexual orientation, or gender identity.            Thank you!     Thank you for choosing [facility]  for your care. Our goal is always to provide you with excellent care. Hearing back from our patients is one way we can continue to improve our services. Please take a few minutes to complete the written survey that you may receive in the mail after your visit with us. Thank you!             Your Updated Medication List - Protect others around you: Learn how to safely use, store and throw away your medicines at www.disposemymeds.org.          This list is accurate as of 5/16/18  1:32 PM.  Always use your most recent med list.                   Brand Name Dispense Instructions for use Diagnosis    * albuterol 108 (90 Base) MCG/ACT Inhaler    PROAIR HFA/PROVENTIL HFA/VENTOLIN HFA    1 Inhaler    Take 2 puffs prn for wheezing or shortness of breath    Wheezing       * albuterol 108 (90 Base) MCG/ACT Inhaler    PROAIR HFA/PROVENTIL HFA/VENTOLIN HFA    1 Inhaler    Take 2 puffs as needed for shortness of breath    Mixed simple and mucopurulent chronic bronchitis (H), Gastroesophageal reflux disease without esophagitis       * albuterol (2.5 MG/3ML) 0.083% neb solution      Take 1 ampule by nebulization  every 6 hours as needed.        aspirin 81 MG tablet      Take 1 tablet by mouth daily.        atorvastatin 10 MG tablet    LIPITOR    90 tablet    Take 4 tablets (40 mg) by mouth daily    Hyperlipidemia LDL goal <100       azithromycin 250 MG tablet    ZITHROMAX    6 tablet    Take as directed    Mixed simple and mucopurulent chronic bronchitis (H), Gastroesophageal reflux disease without esophagitis       Blood Pressure Kit      daily.        carvedilol 12.5 MG tablet    COREG    180 tablet    Take 1 tablet (12.5 mg) by mouth 2 times daily (with meals)    Secondary hypertension       clopidogrel 75 MG tablet    PLAVIX    90 tablet    Take 1 tablet (75 mg) by mouth daily    Coronary artery disease       DEMEROL 25 MG/ML injection   Generic drug:  meperidine      Inject 25 mg into the vein as needed. 25 mg IV prior to IgG infusion        dicyclomine 10 MG capsule    BENTYL     Take 10 mg by mouth 4 times daily (before meals and nightly).        FISH OIL      Unsure of dosage take two caps daily        * fluticasone-salmeterol 250-50 MCG/DOSE diskus inhaler    ADVAIR    1 Inhaler    Inhale 1 puff into the lungs 2 times daily.    Unspecified chronic bronchitis       * fluticasone-salmeterol 250-50 MCG/DOSE diskus inhaler    ADVAIR    1 Inhaler    Inhale 1 puff into the lungs 2 times daily    Mixed simple and mucopurulent chronic bronchitis (H), Gastroesophageal reflux disease without esophagitis       HYDROcodone-acetaminophen  MG per tablet    LORCET     Take 1 tablet by mouth every 6 hours as needed.        Immune Globulin (Human) 25 GM/500ML Soln      Inject 25 g into the vein See Admin Instructions. 25 grams every 3 weeks IVIG        isosorbide mononitrate 60 MG 24 hr tablet    IMDUR    90 tablet    Take 1 tablet (60 mg) by mouth daily    HTN (hypertension)       metFORMIN 1000 MG tablet    GLUCOPHAGE     Take 1 tablet (1,000 mg) by mouth daily (with dinner) Take 1 tablet twice daily    DM (diabetes  mellitus) (H)       nitroGLYcerin 0.4 MG sublingual tablet    NITROQUICK    25 tablet    Place 1 tablet (0.4 mg) under the tongue every 5 minutes as needed    Chest pain       * omeprazole 20 MG CR capsule    priLOSEC    60 capsule    Take 1 capsule (20 mg) by mouth 2 times daily    Esophageal reflux, Unspecified chronic bronchitis       * omeprazole 20 MG CR capsule    priLOSEC    60 capsule    Take 1 capsule (20 mg) by mouth daily    Mixed simple and mucopurulent chronic bronchitis (H), Gastroesophageal reflux disease without esophagitis       * blood glucose monitoring lancets     3 Box    Use to test blood sugar 3 times daily or as directed.    Type 2 diabetes mellitus with complication (H)       * ONE TOUCH FINE POINT LANCETS      2 times daily.        * ONETOUCH ULTRA test strip   Generic drug:  blood glucose monitoring     200 strip    Test blood sugar twice a day.    Diabetes mellitus type II       * blood glucose monitoring test strip    TERRY CONTOUR NEXT    270 each    by In Vitro route 3 times daily Use to test blood sugar 3 times daily or as directed.    Type 2 diabetes mellitus with complication (H)       * predniSONE 20 MG tablet    DELTASONE    5 tablet    Patient to take 60 mg the evening before the procedure and 30 mg the morning of the procedure.    Allergic reaction to contrast dye       * predniSONE 10 MG tablet    DELTASONE    30 tablet    4 tablets by mouth each day for 3 days, then 3 tablets each day for 3 days, then 2 tablets each day for 3 days, then 1 tablet each day for 3 days, then off    Mixed simple and mucopurulent chronic bronchitis (H), Gastroesophageal reflux disease without esophagitis       * tiotropium 18 MCG capsule    SPIRIVA HANDIHALER    30 capsule    One puff every day    Unspecified chronic bronchitis       * tiotropium 18 MCG capsule    SPIRIVA HANDIHALER    30 capsule    One puff every day    Mixed simple and mucopurulent chronic bronchitis (H), Gastroesophageal reflux  disease without esophagitis       traMADol 50 MG tablet    ULTRAM    20 tablet    Take 1 tablet (50 mg) by mouth every 6 hours as needed for pain    Pacemaker battery depletion       TYLENOL 325 MG tablet   Generic drug:  acetaminophen      Take 1-2 tablets by mouth every 6 hours as needed.        * Notice:  This list has 15 medication(s) that are the same as other medications prescribed for you. Read the directions carefully, and ask your doctor or other care provider to review them with you.

## 2021-01-06 NOTE — PATIENT INSTRUCTIONS
Dear Jennifer Alvarez    Thank you for choosing [facility] ([facility]) for your infusion.  The following information is a summary of our appointment as well as important reminders.           We look forward in seeing you on your next appointment here at [facility].  Please don t hesitate to call us at [phone] to reschedule any of your appointments or to speak with one of the [facility] registered nurses.  It was a pleasure taking care of you today.    Sincerely,  Tawana Robert, RN  [facility]  [address] [place] [address]  [address]  Phone:  [phone]     Detail Level: Zone

## 2023-04-01 NOTE — ADDENDUM NOTE
Addended by: LAQUITA RUGGIERO on: 5/11/2017 11:01 AM     Modules accepted: Orders    
Addended by: SHAHRAM GREENFIELD on: 5/11/2017 07:41 PM     Modules accepted: Orders    
Home

## (undated) RX ORDER — LIDOCAINE HYDROCHLORIDE 10 MG/ML
INJECTION, SOLUTION INFILTRATION; PERINEURAL
Status: DISPENSED
Start: 2019-01-01

## (undated) RX ORDER — METHYLPREDNISOLONE SODIUM SUCCINATE 125 MG/2ML
INJECTION, POWDER, LYOPHILIZED, FOR SOLUTION INTRAMUSCULAR; INTRAVENOUS
Status: DISPENSED
Start: 2018-02-12

## (undated) RX ORDER — CLOPIDOGREL BISULFATE 75 MG/1
TABLET ORAL
Status: DISPENSED
Start: 2017-07-19

## (undated) RX ORDER — HYDROCODONE BITARTRATE AND ACETAMINOPHEN 5; 325 MG/1; MG/1
TABLET ORAL
Status: DISPENSED
Start: 2019-01-01

## (undated) RX ORDER — DIPHENHYDRAMINE HYDROCHLORIDE 50 MG/ML
INJECTION INTRAMUSCULAR; INTRAVENOUS
Status: DISPENSED
Start: 2019-01-01

## (undated) RX ORDER — FENTANYL CITRATE 50 UG/ML
INJECTION, SOLUTION INTRAMUSCULAR; INTRAVENOUS
Status: DISPENSED
Start: 2017-07-19

## (undated) RX ORDER — REGADENOSON 0.08 MG/ML
INJECTION, SOLUTION INTRAVENOUS
Status: DISPENSED
Start: 2019-01-01

## (undated) RX ORDER — FENTANYL CITRATE 50 UG/ML
INJECTION, SOLUTION INTRAMUSCULAR; INTRAVENOUS
Status: DISPENSED
Start: 2018-02-12

## (undated) RX ORDER — CLINDAMYCIN PHOSPHATE 900 MG/50ML
INJECTION, SOLUTION INTRAVENOUS
Status: DISPENSED
Start: 2018-02-12

## (undated) RX ORDER — HEPARIN SODIUM 1000 [USP'U]/ML
INJECTION, SOLUTION INTRAVENOUS; SUBCUTANEOUS
Status: DISPENSED
Start: 2017-07-19

## (undated) RX ORDER — DIPHENHYDRAMINE HYDROCHLORIDE 50 MG/ML
INJECTION INTRAMUSCULAR; INTRAVENOUS
Status: DISPENSED
Start: 2018-02-12

## (undated) RX ORDER — FENTANYL CITRATE 50 UG/ML
INJECTION, SOLUTION INTRAMUSCULAR; INTRAVENOUS
Status: DISPENSED
Start: 2019-01-01

## (undated) RX ORDER — SODIUM CHLORIDE 9 MG/ML
INJECTION, SOLUTION INTRAVENOUS
Status: DISPENSED
Start: 2017-07-19

## (undated) RX ORDER — POTASSIUM CHLORIDE 750 MG/1
TABLET, EXTENDED RELEASE ORAL
Status: DISPENSED
Start: 2019-01-01

## (undated) RX ORDER — LIDOCAINE HYDROCHLORIDE 10 MG/ML
INJECTION, SOLUTION EPIDURAL; INFILTRATION; INTRACAUDAL; PERINEURAL
Status: DISPENSED
Start: 2017-07-19

## (undated) RX ORDER — SODIUM CHLORIDE 9 MG/ML
INJECTION, SOLUTION INTRAVENOUS
Status: DISPENSED
Start: 2019-01-01

## (undated) RX ORDER — SODIUM CHLORIDE 9 MG/ML
INJECTION, SOLUTION INTRAVENOUS
Status: DISPENSED
Start: 2018-02-12